# Patient Record
Sex: FEMALE | Race: OTHER | HISPANIC OR LATINO | URBAN - METROPOLITAN AREA
[De-identification: names, ages, dates, MRNs, and addresses within clinical notes are randomized per-mention and may not be internally consistent; named-entity substitution may affect disease eponyms.]

---

## 2018-01-16 ENCOUNTER — INPATIENT (INPATIENT)
Facility: HOSPITAL | Age: 83
LOS: 11 days | Discharge: HOME HEALTH | DRG: 252 | End: 2018-01-28
Attending: SPECIALIST | Admitting: SPECIALIST
Payer: MEDICARE

## 2018-01-16 VITALS
DIASTOLIC BLOOD PRESSURE: 55 MMHG | OXYGEN SATURATION: 95 % | TEMPERATURE: 98 F | SYSTOLIC BLOOD PRESSURE: 115 MMHG | HEART RATE: 108 BPM | WEIGHT: 184.09 LBS | RESPIRATION RATE: 18 BRPM

## 2018-01-16 LAB
ALBUMIN SERPL ELPH-MCNC: 4 G/DL — SIGNIFICANT CHANGE UP (ref 3.3–5)
ALP SERPL-CCNC: 109 U/L — SIGNIFICANT CHANGE UP (ref 40–120)
ALT FLD-CCNC: 24 U/L — SIGNIFICANT CHANGE UP (ref 10–45)
ANION GAP SERPL CALC-SCNC: 12 MMOL/L — SIGNIFICANT CHANGE UP (ref 5–17)
APTT BLD: 30.5 SEC — SIGNIFICANT CHANGE UP (ref 27.5–37.4)
AST SERPL-CCNC: 31 U/L — SIGNIFICANT CHANGE UP (ref 10–40)
BASOPHILS NFR BLD AUTO: 0 % — SIGNIFICANT CHANGE UP (ref 0–2)
BILIRUB SERPL-MCNC: 0.3 MG/DL — SIGNIFICANT CHANGE UP (ref 0.2–1.2)
BUN SERPL-MCNC: 42 MG/DL — HIGH (ref 7–23)
CALCIUM SERPL-MCNC: 9.4 MG/DL — SIGNIFICANT CHANGE UP (ref 8.4–10.5)
CHLORIDE SERPL-SCNC: 101 MMOL/L — SIGNIFICANT CHANGE UP (ref 96–108)
CK MB CFR SERPL CALC: <1 NG/ML — SIGNIFICANT CHANGE UP (ref 0–6.7)
CK SERPL-CCNC: 36 U/L — SIGNIFICANT CHANGE UP (ref 25–170)
CO2 SERPL-SCNC: 28 MMOL/L — SIGNIFICANT CHANGE UP (ref 22–31)
CREAT SERPL-MCNC: 1.57 MG/DL — HIGH (ref 0.5–1.3)
EOSINOPHIL NFR BLD AUTO: 0 % — SIGNIFICANT CHANGE UP (ref 0–6)
GLUCOSE SERPL-MCNC: 255 MG/DL — HIGH (ref 70–99)
HCT VFR BLD CALC: 27.7 % — LOW (ref 34.5–45)
HGB BLD-MCNC: 8.6 G/DL — LOW (ref 11.5–15.5)
INR BLD: 1.11 — SIGNIFICANT CHANGE UP (ref 0.88–1.16)
LYMPHOCYTES # BLD AUTO: 6.8 % — LOW (ref 13–44)
MCHC RBC-ENTMCNC: 28.4 PG — SIGNIFICANT CHANGE UP (ref 27–34)
MCHC RBC-ENTMCNC: 31 G/DL — LOW (ref 32–36)
MCV RBC AUTO: 91.4 FL — SIGNIFICANT CHANGE UP (ref 80–100)
MONOCYTES NFR BLD AUTO: 3.8 % — SIGNIFICANT CHANGE UP (ref 2–14)
NEUTROPHILS NFR BLD AUTO: 89.4 % — HIGH (ref 43–77)
NT-PROBNP SERPL-SCNC: 3805 PG/ML — HIGH (ref 0–300)
PLATELET # BLD AUTO: 137 K/UL — LOW (ref 150–400)
POTASSIUM SERPL-MCNC: 4.9 MMOL/L — SIGNIFICANT CHANGE UP (ref 3.5–5.3)
POTASSIUM SERPL-SCNC: 4.9 MMOL/L — SIGNIFICANT CHANGE UP (ref 3.5–5.3)
PROT SERPL-MCNC: 7.3 G/DL — SIGNIFICANT CHANGE UP (ref 6–8.3)
PROTHROM AB SERPL-ACNC: 12.4 SEC — SIGNIFICANT CHANGE UP (ref 9.8–12.7)
RBC # BLD: 3.03 M/UL — LOW (ref 3.8–5.2)
RBC # FLD: 17.2 % — HIGH (ref 10.3–16.9)
SODIUM SERPL-SCNC: 141 MMOL/L — SIGNIFICANT CHANGE UP (ref 135–145)
TROPONIN T SERPL-MCNC: <0.01 NG/ML — SIGNIFICANT CHANGE UP (ref 0–0.01)
WBC # BLD: 5.8 K/UL — SIGNIFICANT CHANGE UP (ref 3.8–10.5)
WBC # FLD AUTO: 5.8 K/UL — SIGNIFICANT CHANGE UP (ref 3.8–10.5)

## 2018-01-16 PROCEDURE — 93010 ELECTROCARDIOGRAM REPORT: CPT

## 2018-01-16 PROCEDURE — 71045 X-RAY EXAM CHEST 1 VIEW: CPT | Mod: 26

## 2018-01-16 PROCEDURE — 99285 EMERGENCY DEPT VISIT HI MDM: CPT | Mod: 25

## 2018-01-16 RX ORDER — HEPARIN SODIUM 5000 [USP'U]/ML
6500 INJECTION INTRAVENOUS; SUBCUTANEOUS ONCE
Qty: 0 | Refills: 0 | Status: COMPLETED | OUTPATIENT
Start: 2018-01-16 | End: 2018-01-16

## 2018-01-16 RX ORDER — HEPARIN SODIUM 5000 [USP'U]/ML
INJECTION INTRAVENOUS; SUBCUTANEOUS
Qty: 25000 | Refills: 0 | Status: DISCONTINUED | OUTPATIENT
Start: 2018-01-16 | End: 2018-01-17

## 2018-01-16 RX ORDER — FUROSEMIDE 40 MG
40 TABLET ORAL ONCE
Qty: 0 | Refills: 0 | Status: COMPLETED | OUTPATIENT
Start: 2018-01-16 | End: 2018-01-16

## 2018-01-16 RX ORDER — SODIUM CHLORIDE 9 MG/ML
3 INJECTION INTRAMUSCULAR; INTRAVENOUS; SUBCUTANEOUS ONCE
Qty: 0 | Refills: 0 | Status: COMPLETED | OUTPATIENT
Start: 2018-01-16 | End: 2018-01-16

## 2018-01-16 RX ADMIN — Medication 40 MILLIGRAM(S): at 22:14

## 2018-01-16 RX ADMIN — HEPARIN SODIUM 1500 UNIT(S)/HR: 5000 INJECTION INTRAVENOUS; SUBCUTANEOUS at 23:04

## 2018-01-16 RX ADMIN — HEPARIN SODIUM 6500 UNIT(S): 5000 INJECTION INTRAVENOUS; SUBCUTANEOUS at 23:03

## 2018-01-16 RX ADMIN — SODIUM CHLORIDE 3 MILLILITER(S): 9 INJECTION INTRAMUSCULAR; INTRAVENOUS; SUBCUTANEOUS at 21:53

## 2018-01-16 NOTE — ED PROVIDER NOTE - CARE PLAN
Principal Discharge DX:	Atrial fibrillation  Secondary Diagnosis:	DM (diabetes mellitus)  Secondary Diagnosis:	CAD (coronary artery disease)

## 2018-01-16 NOTE — ED ADULT NURSE NOTE - PMH
CAD (coronary artery disease)    DM (diabetes mellitus)    Emphysema    HTN (hypertension)    Hypercholesteremia

## 2018-01-16 NOTE — ED ADULT TRIAGE NOTE - ARRIVAL INFO ADDITIONAL COMMENTS
x 1 week, , sent by her MD for admission, flu like symptoms 3 weeks ago x 1 week, , sent by her MD for admission, back pain on inspiration flu like symptoms 3 weeks ago, new onset Afib?

## 2018-01-16 NOTE — ED PROVIDER NOTE - ATTENDING CONTRIBUTION TO CARE
89F with known anemia, shortness of breath, s/p PCI for CAD, pt with nl EF, pulm HTN recent admission in [facility] sent in by cardiologist for new afib and worsening CHF. Pt reports shortness of breath, mild edema to lower ext. Spoke to pt in preferred language of Armenian. Plan for IV heparin, Lasix, will admit to tele.

## 2018-01-16 NOTE — ED ADULT NURSE NOTE - OBJECTIVE STATEMENT
Patient sent to the ED to be admitted by cardiologist.  patient went to see cardiologist for worsening SOB over the past week.  patient denies any chest pain or discomfort at the moment.

## 2018-01-16 NOTE — PATIENT PROFILE ADULT. - ABILITY TO HEAR (WITH HEARING AID OR HEARING APPLIANCE IF NORMALLY USED):
Mildly to Moderately Impaired: difficulty hearing in some environments or speaker may need to increase volume or speak distinctly/Decreased hearing left ear no hearing aid

## 2018-01-16 NOTE — ED PROVIDER NOTE - OBJECTIVE STATEMENT
sent in by Cardiologist for admission + Known anemia , cad s/p pci CHF ( normal ef) vascular disease pulm HTN +_ recent admissions in [facility] and now with new A fib and worsening chf sob/edema  Denies any cp

## 2018-01-17 DIAGNOSIS — R06.00 DYSPNEA, UNSPECIFIED: ICD-10-CM

## 2018-01-17 DIAGNOSIS — N18.3 CHRONIC KIDNEY DISEASE, STAGE 3 (MODERATE): ICD-10-CM

## 2018-01-17 DIAGNOSIS — Z98.890 OTHER SPECIFIED POSTPROCEDURAL STATES: Chronic | ICD-10-CM

## 2018-01-17 DIAGNOSIS — I25.10 ATHEROSCLEROTIC HEART DISEASE OF NATIVE CORONARY ARTERY WITHOUT ANGINA PECTORIS: ICD-10-CM

## 2018-01-17 DIAGNOSIS — I27.21 SECONDARY PULMONARY ARTERIAL HYPERTENSION: ICD-10-CM

## 2018-01-17 DIAGNOSIS — I48.91 UNSPECIFIED ATRIAL FIBRILLATION: ICD-10-CM

## 2018-01-17 DIAGNOSIS — Z96.659 PRESENCE OF UNSPECIFIED ARTIFICIAL KNEE JOINT: Chronic | ICD-10-CM

## 2018-01-17 DIAGNOSIS — Z90.710 ACQUIRED ABSENCE OF BOTH CERVIX AND UTERUS: Chronic | ICD-10-CM

## 2018-01-17 DIAGNOSIS — D64.9 ANEMIA, UNSPECIFIED: ICD-10-CM

## 2018-01-17 DIAGNOSIS — R13.10 DYSPHAGIA, UNSPECIFIED: ICD-10-CM

## 2018-01-17 DIAGNOSIS — Z98.49 CATARACT EXTRACTION STATUS, UNSPECIFIED EYE: Chronic | ICD-10-CM

## 2018-01-17 DIAGNOSIS — E11.9 TYPE 2 DIABETES MELLITUS WITHOUT COMPLICATIONS: ICD-10-CM

## 2018-01-17 DIAGNOSIS — J43.9 EMPHYSEMA, UNSPECIFIED: ICD-10-CM

## 2018-01-17 DIAGNOSIS — I50.9 HEART FAILURE, UNSPECIFIED: ICD-10-CM

## 2018-01-17 LAB
ANION GAP SERPL CALC-SCNC: 13 MMOL/L — SIGNIFICANT CHANGE UP (ref 5–17)
APTT BLD: 128 SEC — CRITICAL HIGH (ref 27.5–37.4)
APTT BLD: 171.7 SEC — CRITICAL HIGH (ref 27.5–37.4)
APTT BLD: 88.8 SEC — HIGH (ref 27.5–37.4)
BUN SERPL-MCNC: 41 MG/DL — HIGH (ref 7–23)
CALCIUM SERPL-MCNC: 9.8 MG/DL — SIGNIFICANT CHANGE UP (ref 8.4–10.5)
CHLORIDE SERPL-SCNC: 100 MMOL/L — SIGNIFICANT CHANGE UP (ref 96–108)
CO2 SERPL-SCNC: 29 MMOL/L — SIGNIFICANT CHANGE UP (ref 22–31)
CREAT SERPL-MCNC: 1.51 MG/DL — HIGH (ref 0.5–1.3)
GLUCOSE BLDC GLUCOMTR-MCNC: 104 MG/DL — HIGH (ref 70–99)
GLUCOSE BLDC GLUCOMTR-MCNC: 160 MG/DL — HIGH (ref 70–99)
GLUCOSE BLDC GLUCOMTR-MCNC: 203 MG/DL — HIGH (ref 70–99)
GLUCOSE BLDC GLUCOMTR-MCNC: 221 MG/DL — HIGH (ref 70–99)
GLUCOSE SERPL-MCNC: 101 MG/DL — HIGH (ref 70–99)
HBA1C BLD-MCNC: 7 % — HIGH (ref 4–5.6)
HCT VFR BLD CALC: 27.5 % — LOW (ref 34.5–45)
HGB BLD-MCNC: 8.7 G/DL — LOW (ref 11.5–15.5)
INR BLD: 1.13 — SIGNIFICANT CHANGE UP (ref 0.88–1.16)
INR BLD: 1.17 — HIGH (ref 0.88–1.16)
MAGNESIUM SERPL-MCNC: 2.3 MG/DL — SIGNIFICANT CHANGE UP (ref 1.6–2.6)
MCHC RBC-ENTMCNC: 28.6 PG — SIGNIFICANT CHANGE UP (ref 27–34)
MCHC RBC-ENTMCNC: 31.6 G/DL — LOW (ref 32–36)
MCV RBC AUTO: 90.5 FL — SIGNIFICANT CHANGE UP (ref 80–100)
NT-PROBNP SERPL-SCNC: 4616 PG/ML — HIGH (ref 0–300)
PLATELET # BLD AUTO: 132 K/UL — LOW (ref 150–400)
POTASSIUM SERPL-MCNC: 4.2 MMOL/L — SIGNIFICANT CHANGE UP (ref 3.5–5.3)
POTASSIUM SERPL-SCNC: 4.2 MMOL/L — SIGNIFICANT CHANGE UP (ref 3.5–5.3)
PROTHROM AB SERPL-ACNC: 12.6 SEC — SIGNIFICANT CHANGE UP (ref 9.8–12.7)
PROTHROM AB SERPL-ACNC: 13 SEC — HIGH (ref 9.8–12.7)
RBC # BLD: 3.04 M/UL — LOW (ref 3.8–5.2)
RBC # FLD: 16.7 % — SIGNIFICANT CHANGE UP (ref 10.3–16.9)
SODIUM SERPL-SCNC: 142 MMOL/L — SIGNIFICANT CHANGE UP (ref 135–145)
TROPONIN T SERPL-MCNC: <0.01 NG/ML — SIGNIFICANT CHANGE UP (ref 0–0.01)
TSH SERPL-MCNC: 5.25 UIU/ML — HIGH (ref 0.35–4.94)
WBC # BLD: 6.9 K/UL — SIGNIFICANT CHANGE UP (ref 3.8–10.5)
WBC # FLD AUTO: 6.9 K/UL — SIGNIFICANT CHANGE UP (ref 3.8–10.5)

## 2018-01-17 PROCEDURE — 99233 SBSQ HOSP IP/OBS HIGH 50: CPT

## 2018-01-17 PROCEDURE — 71045 X-RAY EXAM CHEST 1 VIEW: CPT | Mod: 26

## 2018-01-17 PROCEDURE — 93306 TTE W/DOPPLER COMPLETE: CPT | Mod: 26

## 2018-01-17 PROCEDURE — 99223 1ST HOSP IP/OBS HIGH 75: CPT | Mod: GC

## 2018-01-17 RX ORDER — INSULIN LISPRO 100/ML
VIAL (ML) SUBCUTANEOUS
Qty: 0 | Refills: 0 | Status: DISCONTINUED | OUTPATIENT
Start: 2018-01-17 | End: 2018-01-26

## 2018-01-17 RX ORDER — DEXTROSE 50 % IN WATER 50 %
1 SYRINGE (ML) INTRAVENOUS ONCE
Qty: 0 | Refills: 0 | Status: DISCONTINUED | OUTPATIENT
Start: 2018-01-17 | End: 2018-01-26

## 2018-01-17 RX ORDER — FUROSEMIDE 40 MG
40 TABLET ORAL ONCE
Qty: 0 | Refills: 0 | Status: COMPLETED | OUTPATIENT
Start: 2018-01-17 | End: 2018-01-17

## 2018-01-17 RX ORDER — ATORVASTATIN CALCIUM 80 MG/1
20 TABLET, FILM COATED ORAL AT BEDTIME
Qty: 0 | Refills: 0 | Status: DISCONTINUED | OUTPATIENT
Start: 2018-01-17 | End: 2018-01-18

## 2018-01-17 RX ORDER — FUROSEMIDE 40 MG
40 TABLET ORAL DAILY
Qty: 0 | Refills: 0 | Status: DISCONTINUED | OUTPATIENT
Start: 2018-01-17 | End: 2018-01-17

## 2018-01-17 RX ORDER — GLUCAGON INJECTION, SOLUTION 0.5 MG/.1ML
1 INJECTION, SOLUTION SUBCUTANEOUS ONCE
Qty: 0 | Refills: 0 | Status: DISCONTINUED | OUTPATIENT
Start: 2018-01-17 | End: 2018-01-26

## 2018-01-17 RX ORDER — MONTELUKAST 4 MG/1
10 TABLET, CHEWABLE ORAL DAILY
Qty: 0 | Refills: 0 | Status: DISCONTINUED | OUTPATIENT
Start: 2018-01-17 | End: 2018-01-20

## 2018-01-17 RX ORDER — PANTOPRAZOLE SODIUM 20 MG/1
40 TABLET, DELAYED RELEASE ORAL
Qty: 0 | Refills: 0 | Status: DISCONTINUED | OUTPATIENT
Start: 2018-01-17 | End: 2018-01-28

## 2018-01-17 RX ORDER — FUROSEMIDE 40 MG
40 TABLET ORAL
Qty: 0 | Refills: 0 | Status: DISCONTINUED | OUTPATIENT
Start: 2018-01-17 | End: 2018-01-20

## 2018-01-17 RX ORDER — AMLODIPINE BESYLATE 2.5 MG/1
5 TABLET ORAL DAILY
Qty: 0 | Refills: 0 | Status: DISCONTINUED | OUTPATIENT
Start: 2018-01-17 | End: 2018-01-18

## 2018-01-17 RX ORDER — DEXTROSE 50 % IN WATER 50 %
12.5 SYRINGE (ML) INTRAVENOUS ONCE
Qty: 0 | Refills: 0 | Status: DISCONTINUED | OUTPATIENT
Start: 2018-01-17 | End: 2018-01-26

## 2018-01-17 RX ORDER — SODIUM CHLORIDE 9 MG/ML
1000 INJECTION, SOLUTION INTRAVENOUS
Qty: 0 | Refills: 0 | Status: DISCONTINUED | OUTPATIENT
Start: 2018-01-17 | End: 2018-01-26

## 2018-01-17 RX ORDER — LIDOCAINE 4 G/100G
1 CREAM TOPICAL DAILY
Qty: 0 | Refills: 0 | Status: DISCONTINUED | OUTPATIENT
Start: 2018-01-17 | End: 2018-01-28

## 2018-01-17 RX ORDER — TIOTROPIUM BROMIDE 18 UG/1
1 CAPSULE ORAL; RESPIRATORY (INHALATION) DAILY
Qty: 0 | Refills: 0 | Status: DISCONTINUED | OUTPATIENT
Start: 2018-01-17 | End: 2018-01-28

## 2018-01-17 RX ORDER — IPRATROPIUM/ALBUTEROL SULFATE 18-103MCG
3 AEROSOL WITH ADAPTER (GRAM) INHALATION ONCE
Qty: 0 | Refills: 0 | Status: COMPLETED | OUTPATIENT
Start: 2018-01-17 | End: 2018-01-17

## 2018-01-17 RX ORDER — ASPIRIN/CALCIUM CARB/MAGNESIUM 324 MG
81 TABLET ORAL DAILY
Qty: 0 | Refills: 0 | Status: DISCONTINUED | OUTPATIENT
Start: 2018-01-17 | End: 2018-01-28

## 2018-01-17 RX ORDER — HEPARIN SODIUM 5000 [USP'U]/ML
1000 INJECTION INTRAVENOUS; SUBCUTANEOUS
Qty: 25000 | Refills: 0 | Status: DISCONTINUED | OUTPATIENT
Start: 2018-01-17 | End: 2018-01-17

## 2018-01-17 RX ORDER — METOPROLOL TARTRATE 50 MG
5 TABLET ORAL ONCE
Qty: 0 | Refills: 0 | Status: DISCONTINUED | OUTPATIENT
Start: 2018-01-17 | End: 2018-01-17

## 2018-01-17 RX ORDER — DEXTROSE 50 % IN WATER 50 %
25 SYRINGE (ML) INTRAVENOUS ONCE
Qty: 0 | Refills: 0 | Status: DISCONTINUED | OUTPATIENT
Start: 2018-01-17 | End: 2018-01-26

## 2018-01-17 RX ORDER — HEPARIN SODIUM 5000 [USP'U]/ML
900 INJECTION INTRAVENOUS; SUBCUTANEOUS
Qty: 25000 | Refills: 0 | Status: DISCONTINUED | OUTPATIENT
Start: 2018-01-17 | End: 2018-01-18

## 2018-01-17 RX ORDER — ISOSORBIDE MONONITRATE 60 MG/1
30 TABLET, EXTENDED RELEASE ORAL DAILY
Qty: 0 | Refills: 0 | Status: DISCONTINUED | OUTPATIENT
Start: 2018-01-17 | End: 2018-01-18

## 2018-01-17 RX ORDER — INSULIN LISPRO 100/ML
VIAL (ML) SUBCUTANEOUS AT BEDTIME
Qty: 0 | Refills: 0 | Status: DISCONTINUED | OUTPATIENT
Start: 2018-01-17 | End: 2018-01-26

## 2018-01-17 RX ADMIN — Medication 40 MILLIGRAM(S): at 06:37

## 2018-01-17 RX ADMIN — Medication 4: at 16:24

## 2018-01-17 RX ADMIN — AMLODIPINE BESYLATE 5 MILLIGRAM(S): 2.5 TABLET ORAL at 06:38

## 2018-01-17 RX ADMIN — HEPARIN SODIUM 0 UNIT(S)/HR: 5000 INJECTION INTRAVENOUS; SUBCUTANEOUS at 07:08

## 2018-01-17 RX ADMIN — Medication 3 MILLILITER(S): at 06:49

## 2018-01-17 RX ADMIN — ISOSORBIDE MONONITRATE 30 MILLIGRAM(S): 60 TABLET, EXTENDED RELEASE ORAL at 13:14

## 2018-01-17 RX ADMIN — Medication 40 MILLIGRAM(S): at 09:38

## 2018-01-17 RX ADMIN — Medication 81 MILLIGRAM(S): at 13:15

## 2018-01-17 RX ADMIN — HEPARIN SODIUM 1200 UNIT(S)/HR: 5000 INJECTION INTRAVENOUS; SUBCUTANEOUS at 08:16

## 2018-01-17 RX ADMIN — MONTELUKAST 10 MILLIGRAM(S): 4 TABLET, CHEWABLE ORAL at 13:15

## 2018-01-17 RX ADMIN — HEPARIN SODIUM 1000 UNIT(S)/HR: 5000 INJECTION INTRAVENOUS; SUBCUTANEOUS at 13:48

## 2018-01-17 RX ADMIN — Medication 40 MILLIGRAM(S): at 22:20

## 2018-01-17 RX ADMIN — PANTOPRAZOLE SODIUM 40 MILLIGRAM(S): 20 TABLET, DELAYED RELEASE ORAL at 06:38

## 2018-01-17 RX ADMIN — Medication 4: at 01:42

## 2018-01-17 RX ADMIN — ATORVASTATIN CALCIUM 20 MILLIGRAM(S): 80 TABLET, FILM COATED ORAL at 22:20

## 2018-01-17 NOTE — CONSULT NOTE ADULT - SUBJECTIVE AND OBJECTIVE BOX
History of Present Illness:  History of Present Illness: 	  This is a 89 y/o female, obese, with HTN, HPL, DM-II, Anemia, diastolic CHF (NL EF), known CAD with previous PCIs, b/l carotid endarterectomies, who presented to her cardiologist with c/o mid sternal chest pressure radiating to the back, associated with SOB, palpitations, LE edema and 4lbs weight gain over 1 week. In the office, her EKG showed new AFib and she was sent to the ER for further evaluation.   In the ER, 1st troponin negative, BNP 3805, BUN 42/1.57, H/H 8.6/27.7, EKG AFib @ 100bpm, LBBB, CXR with some vascular congestion. She received Cardizem 5mg IV x1, Lasix 40mg IV x1, was started on IV heparin and admitted for further management and telemetry monitoring.  No s/s of bleeding noted.    Review of Systems:  Review of Systems: GENERAL, CONSTITUTIONAL : + recent weight gain. Denies fever, chills  EYES, VISION: denies changes in vision   EARS, NOSE, THROAT: denies hearing loss  HEART, CARDIOVASCULAR: + chest pain, palpitations, SOB,  LE edema. Denies claudication  RESPIRATORY: + SOB; Denies cough, wheezing, PND, orthopnea  GASTROINTESTINAL: Denies abdominal pain, heartburn, bloody stool, dark tarry stool  GENITOURINARY: Denies frequent urination, urgency  MUSCULOSKELETAL denies joint pain or swelling, restricted motion, musculoskeletal pain.   SKIN & INTEGUMENTARY Denies rashes, sores, blisters, blisters, growths.  NEUROLOGICAL: Denies numbness or tingling sensations, sensation loss, burning.   PSYCHIATRIC: Denies nervousness, anxiety, depression  ENDOCRINE Denies heat or cold intolerance, excessive thirst HEMATOLOGIC/LYMPHATIC: Denies abnormal bleeding, bleeding of any kind	      Allergies and Intolerances:        Allergies:  	No Known Allergies:     Home Medications:   * Patient Currently Takes Medications as of 17-Jan-2018 02:09 documented in Structured Notes  · 	torsemide 10 mg oral tablet: 1 tab(s) orally once a day, Last Dose Taken:    · 	aspirin 81 mg oral tablet: 1 tab(s) orally once a day, Last Dose Taken:    · 	isosorbide mononitrate 30 mg oral tablet, extended release: 1 tab(s) orally once a day (in the morning), Last Dose Taken:    · 	Januvia 50 mg oral tablet: 1 tab(s) orally once a day, Last Dose Taken:    · 	amLODIPine 5 mg oral tablet: 1 tab(s) orally once a day, Last Dose Taken:    · 	predniSONE 20 mg oral tablet: 1 tab(s) orally once a day, Last Dose Taken:    · 	atorvastatin 20 mg oral tablet: 1 tab(s) orally once a day, Last Dose Taken:    · 	montelukast 10 mg oral tablet: 1 tab(s) orally once a day, Last Dose Taken:    · 	omeprazole 40 mg oral delayed release capsule: 1 cap(s) orally once a day, Last Dose Taken:    · 	Spiriva 18 mcg inhalation capsule: 1 cap(s) inhaled once a day, Last Dose Taken:      .    Patient History:   Past Medical History:  Anemia    CAD (coronary artery disease)    DM (diabetes mellitus)    Emphysema    HTN (hypertension)    Hypercholesteremia.    Past Surgical History:  History of bilateral carotid endarterectomy    History of umbilical hernia repair    S/P bladder repair    S/P cataract surgery    S/P hysterectomy with oophorectomy    S/P TKR (total knee replacement).    Family History:  Mother  Still living? No  Family history of emphysema, Age at diagnosis: 71-80.    Social History:  Social History (marital status, living situation, occupation, tobacco use, alcohol and drug use, and sexual history): Denies tobacco, ETOH or illicit drug use Lives with her daughter, ambulates with a walker	    Tobacco Screening:  · Core Measure Site	No	    Risk Assessment:   Present on Admission:  Deep Venous Thrombosis	no 	  Pulmonary Embolus	no 	    Heart Failure:  Does this patient have a history of or has been diagnosed with heart failure? yes.      Physical Exam:  Physical Exam: Temp 98.6F, HR 70bpm, /80, RR 16, O2 sat 98% RA, Weight 200lbs, Height 5' 6"   T(C): 35.9 (01-17-18 @ 01:11), Max: 36.9 (01-16-18 @ 20:16)  HR: 103 (01-16-18 @ 23:45) (88 - 108)  BP: 135/63 (01-16-18 @ 23:45) (115/55 - 135/70)  RR: 18 (01-16-18 @ 23:45) (18 - 20)  SpO2: 100% (01-16-18 @ 23:45) (95% - 100%)    Appearance: Normal   HEENT:   Normal oral mucosa, PERRL, EOMI   Neck: Supple, - JVD; No Carotid Bruit   Cardiovascular: Normal S1 S2, No JVD, No murmurs  Respiratory: Lungs bibasilar crackles, no Rhonchi, Wheezing   Gastrointestinal:  Soft, Non-tender, + BS   Skin: No rashes, No ecchymoses, No cyanosis  Extremities: 3+ edema b/l, No clubbing, cyanosis  Vascular: Femoral pulses 1+ b/l without bruit, DP faint, b/l, PT faint b/l  Neurologic: Non-focal Psychiatry: A & O x 3, Mood & affect appropriate	      Labs and Results:  Labs, Radiology, Cardiology, and Other Results:                        8.7   6.9   )-----------( 132      ( 17 Jan 2018 05:27 )            27.5   142  |  100  |  41<H> ----------------------------<  101<H> 4.2   |  29  |  1.51<H>  Ca    9.8      17 Jan 2018 05:24 Mg     2.3     01-17  TPro  7.3  /  Alb  4.0  /  TBili  0.3  /  DBili  x   /  AST  31  /  ALT  24  /  AlkPhos  109  01-16 	    Assessment and Plan:   Assessment:  · Assessment		  89 y/o female with HTN, HPL, DM-II, CAD/PCIs, diastolic CHF (NL EF), anemia who is admitted with new onset AFib and CHF exacerbation      Problem/Plan - 1:  ·  Problem: Atrial fibrillation.  Plan: New Afib with controlled HR  - Heparin gtt: Dose to keep PTT 60-80    Problem/Plan - 2:  - Monitor CBC closely & transfuse PRBCs to keep Hb > 7g/dl  - Check Iron panel, SPEP, IPEP, Immunofixation, LDH, Beta-2 microglobulin  - Consider Epogen

## 2018-01-17 NOTE — CONSULT NOTE ADULT - ASSESSMENT
89 year old female, with a PMHx of CAD s/p PCIs, b/l carotid endarterectomy, HTN, HLD, DM-II, anemia, HFpEF who presented to her cardiologist with heart failure exacerbation found to have new onset atrial fibrillation. 89 year old female, with a PMHx of CAD s/p PCIs, b/l carotid endarterectomy, HTN, HLD, DM-II, anemia, HFpEF who presented to her cardiologist with heart failure exacerbation found to have new onset atrial fibrillation.  -check TSH  -start lopressor 12.5mg BID once adequately diuresed   -continue diuresis and heart failure management as per primary team 89 year old female, with a PMHx of CAD s/p PCIs, b/l carotid endarterectomy, HTN, HLD, DM-II, anemia, HFpEF who presented to her cardiologist with heart failure exacerbation found to have new onset atrial fibrillation and reduced systolic function (EF 20-25%).  -check TSH  -start amiodarone 400mg BID   -would obtain outpatient records of last ECG and last TTE  -continue heart failure management at per primary team   -discussed with Dr. Connolly  -will continue to follow 89 year old female, with a PMHx of CAD s/p PCIs, b/l carotid endarterectomy, HTN, HLD, DM-II, anemia, HFpEF who presented to her cardiologist with heart failure exacerbation found to have new onset atrial fibrillation and reduced systolic function (EF 20-25%).  -check TSH  -can start PO amiodarone or digoxin if rate uncontrolled  -would avoid betablockers/CCBs in the setting of heart failure  -would obtain outpatient records of last ECG and last TTE  -continue heart failure management at per primary team   -discussed with Dr. Connolly  -will continue to follow

## 2018-01-17 NOTE — PROGRESS NOTE ADULT - PROBLEM SELECTOR PLAN 2
+ JVD, crackles/rales on exam, b/l LE edema  - LE dopplers ordered, f/u results  - Bedside Echo performed, f/u results  - Lasix 40mg IV BID, additional Lasix 40mg IVPx1 given today for SOB  - Bipap started with goal SpO2 >89%, will attempt high flow NC if clinically improving  - Rubalcava placed for strict I&O's, daily weights + JVD, crackles/rales on exam, b/l LE edema  - LE dopplers ordered, f/u results  - Bedside Echo performed, f/u results  - Lasix 40mg IV BID, additional Lasix 40mg IVPx1 given today for SOB  - Bipap started with goal SpO2 >89%, will attempt high flow NC if clinically improving  - Rubalcava placed for strict I&O's, daily weights  - Dr. Gupta consulted, f/u recs - New onset AFIB rates 100-110s s/p Cardizem 5mg IVP x 2 overnight   - Heparin gtt  - Telemetry  - EP consulted, f/u further recs. Will need to d/c Cardizem and use BB for rate control, given newly depressed EF. - New onset AFIB rates 100-110s s/p Cardizem 5mg IVP x 2 overnight.   - Heparin gtt  - Telemetry  - EP consulted. Per verbal recs, can give Amiodarone 400mg PO x 1 overnight (TSH ordered @8PM) if rates are not controlled. No BBs or Cardizem, as pt is in HF. F/u official recs. - New onset AFIB rates 100-110s s/p Cardizem 5mg IVP x 2 overnight.   - On pt specific heparin gtt per heme recs, goal ptt 60-80  - Telemetry  - EP consulted. Per verbal recs, can give Amiodarone 400mg PO x 1 overnight (TSH ordered @8PM) if rates are not controlled. No BBs or Cardizem, as pt is in HF. F/u official recs.

## 2018-01-17 NOTE — PROGRESS NOTE ADULT - PROBLEM SELECTOR PLAN 3
- Continue Imdur, Lipitor, Norvasc - S/p diagnostic LH 7/2016 revealed distal LAD dz  - Continue Imdur, Lipitor, Norvasc - S/p diagnostic LH 7/2016 revealed distal LAD dz, per outpt ppw  - Plan for possible cardiac/renal cath when clinically optimized. D/w Dr. Falcon.  - Continue Imdur, Lipitor, Norvasc
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
## 2018-01-17 NOTE — PROGRESS NOTE ADULT - ASSESSMENT
89 y/o female with HTN, HPL, DM-II, CAD/PCIs, diastolic CHF (NL EF), anemia who is admitted with new onset AFib and CHF exacerbation 89 y/o female with HTN, HPL, DM-II, CAD/PCIs, diastolic CHF (NL EF), anemia who is admitted with new onset AFib and CHF exacerbation    Additional medical information obtained via Dr. Dillard' office note. Copy of note in chart.

## 2018-01-17 NOTE — CONSULT NOTE ADULT - PROBLEM SELECTOR RECOMMENDATION 2
NO EVIDENCE OF AECOPD.  The patient was exposed to biomass.  Continue Spiriva and as needed albuterol consider DC singulair.  Continue oxygen supplementation. Due to patient age no need for CT scan of chest.  Hold on steroids

## 2018-01-17 NOTE — PROGRESS NOTE ADULT - PROBLEM SELECTOR PLAN 6
Pt endorsing difficulty swallowing  - Speech and swallow eval ordered  - Aspiration precautions    FULL CODE  DVT ppx: Heparin gtt  Dispo: Continue diuresis, management of AFIB Pt endorsing difficulty swallowing  - Speech and swallow eval ordered  - Aspiration precautions

## 2018-01-17 NOTE — CONSULT NOTE ADULT - SUBJECTIVE AND OBJECTIVE BOX
HPI:  This is a 89 y/o female, obese, with HTN, HPL, DM-II, Anemia, diastolic CHF (NL EF), known CAD with previous PCIs, b/l carotid endarterectomies, who presented to her cardiologist with c/o mid sternal chest pressure radiating to the back, associated with SOB, palpitations, LE edema and 4lbs weight gain over 1 week. In the office, her EKG showed new AFib and she was sent to the ER for further evaluation.   In the ER, 1st troponin negative, BNP 3805, BUN 42/1.57, H/H 8.6/27.7, EKG AFib @ 100bpm, LBBB, CXR with some vascular congestion. She received Cardizem 5mg IV x1, Lasix 40mg IV x1, was started on IV heparin and admitted for further management and telemetry monitoring (17 Jan 2018 01:23)      PAST MEDICAL & SURGICAL HISTORY:  Anemia  CAD (coronary artery disease)  DM (diabetes mellitus)  Emphysema  Hypercholesteremia  HTN (hypertension)  S/P bladder repair  S/P hysterectomy with oophorectomy  History of bilateral carotid endarterectomy  History of umbilical hernia repair  S/P cataract surgery  S/P TKR (total knee replacement)    PREVIOUS DIAGNOSTIC TESTING:      ECHO  FINDINGS:    STRESS  FINDINGS:    CATHETERIZATION  FINDINGS:    MEDICATIONS  (STANDING):  amLODIPine   Tablet 5 milliGRAM(s) Oral daily  aspirin enteric coated 81 milliGRAM(s) Oral daily  atorvastatin 20 milliGRAM(s) Oral at bedtime  furosemide   Injectable 40 milliGRAM(s) IV Push two times a day  heparin  Infusion.  Unit(s)/Hr (15 mL/Hr) IV Continuous <Continuous>  isosorbide   mononitrate ER Tablet (IMDUR) 30 milliGRAM(s) Oral daily    insulin lispro (HumaLOG) corrective regimen sliding scale   SubCutaneous three times a day before meals  insulin lispro (HumaLOG) corrective regimen sliding scale   SubCutaneous at bedtime  lidocaine   Patch 1 Patch Transdermal daily  montelukast 10 milliGRAM(s) Oral daily  pantoprazole    Tablet 40 milliGRAM(s) Oral before breakfast  tiotropium 18 MICROgram(s) Capsule 1 Capsule(s) Inhalation daily    FAMILY HISTORY:  Family history of emphysema (Mother)      SOCIAL HISTORY:    CIGARETTES:    ALCOHOL:    Vital Signs Last 24 Hrs  T(C): 36.3 (17 Jan 2018 09:20), Max: 37.5 (17 Jan 2018 07:12)  T(F): 97.4 (17 Jan 2018 09:20), Max: 99.5 (17 Jan 2018 07:12)  HR: 108 (17 Jan 2018 08:30) (82 - 108)  BP: 149/60 (17 Jan 2018 08:12) (115/55 - 149/60)  BP(mean): --  RR: 20 (17 Jan 2018 10:00) (18 - 22)  SpO2: 99% (17 Jan 2018 10:00) (90% - 100%)    PHYSICAL EXAM:  Constitutional:  Respiratory:  Cardiovascular:  Gastrointestinal:  Extremities:  Neurological:    INTERPRETATION OF TELEMETRY:    ECG:    I&O's Detail    16 Jan 2018 07:01  -  17 Jan 2018 07:00  --------------------------------------------------------  IN:    heparin  Infusion.: 120 mL    Oral Fluid: 30 mL  Total IN: 150 mL    OUT:    Voided: 400 mL  Total OUT: 400 mL    Total NET: -250 mL      17 Jan 2018 07:01  -  17 Jan 2018 12:43  --------------------------------------------------------  IN:    Oral Fluid: 180 mL  Total IN: 180 mL    OUT:    Indwelling Catheter - Urethral: 550 mL  Total OUT: 550 mL    Total NET: -370 mL          LABS:                        8.7    6.9   )-----------( 132      ( 17 Jan 2018 05:27 )             27.5     01-17    142  |  100  |  41<H>  ----------------------------<  101<H>  4.2   |  29  |  1.51<H>    Ca    9.8      17 Jan 2018 05:24  Mg     2.3     01-17    TPro  7.3  /  Alb  4.0  /  TBili  0.3  /  DBili  x   /  AST  31  /  ALT  24  /  AlkPhos  109  01-16    CARDIAC MARKERS ( 17 Jan 2018 05:24 )  x     / <0.01 ng/mL / x     / x     / x      CARDIAC MARKERS ( 16 Jan 2018 21:50 )  x     / <0.01 ng/mL / 36 U/L / x     / <1.0 ng/mL    PT/INR - ( 17 Jan 2018 05:53 )   PT: 12.6 sec;   INR: 1.13     PTT - ( 17 Jan 2018 05:53 )  PTT:171.7 sec    I&O's Summary    16 Jan 2018 07:01  -  17 Jan 2018 07:00  --------------------------------------------------------  IN: 150 mL / OUT: 400 mL / NET: -250 mL    17 Jan 2018 07:01  -  17 Jan 2018 12:43  --------------------------------------------------------  IN: 180 mL / OUT: 550 mL / NET: -370 mL HPI:  89 year old female, with a PMHx of CAD s/p PCIs, b/l carotid endarterectomy, HTN, HLD, DM-II, anemia, HFpEF who presented to her cardiologist with c/o mid sternal chest pressure, SOB, palpitations, LE edema and 4lbs weight gain over 1 week. She went to her outpatient cardiologist's office and her EKG showed new AFib. She was subsequently sent to the ER for further evaluation. Of note, patient has been admitted for similar episodes multiple times in the last year.     In the ER, EKG AFib with LBBB at a rate of 100, CXR with pulmonary vascular congestion. She received Cardizem 5mg IV, Lasix 40mg IV, started on IV heparin and admitted for further management.     PAST MEDICAL & SURGICAL HISTORY:  Anemia  CAD (coronary artery disease)  DM (diabetes mellitus)  Emphysema  Hypercholesteremia  HTN (hypertension)  S/P bladder repair  S/P hysterectomy with oophorectomy  History of bilateral carotid endarterectomy  History of umbilical hernia repair  S/P cataract surgery  S/P TKR (total knee replacement)    ECHO  FINDINGS:    MEDICATIONS  (STANDING):  amLODIPine   Tablet 5 milliGRAM(s) Oral daily  aspirin enteric coated 81 milliGRAM(s) Oral daily  atorvastatin 20 milliGRAM(s) Oral at bedtime  furosemide   Injectable 40 milliGRAM(s) IV Push two times a day  heparin  Infusion.  Unit(s)/Hr (15 mL/Hr) IV Continuous <Continuous>  isosorbide   mononitrate ER Tablet (IMDUR) 30 milliGRAM(s) Oral daily    insulin lispro (HumaLOG) corrective regimen sliding scale   SubCutaneous three times a day before meals  insulin lispro (HumaLOG) corrective regimen sliding scale   SubCutaneous at bedtime  lidocaine   Patch 1 Patch Transdermal daily  montelukast 10 milliGRAM(s) Oral daily  pantoprazole    Tablet 40 milliGRAM(s) Oral before breakfast  tiotropium 18 MICROgram(s) Capsule 1 Capsule(s) Inhalation daily    FAMILY HISTORY:  Family history of emphysema (Mother)    Vital Signs Last 24 Hrs  T(C): 36.3 (17 Jan 2018 09:20), Max: 37.5 (17 Jan 2018 07:12)  T(F): 97.4 (17 Jan 2018 09:20), Max: 99.5 (17 Jan 2018 07:12)  HR: 108 (17 Jan 2018 08:30) (82 - 108)  BP: 149/60 (17 Jan 2018 08:12) (115/55 - 149/60)  BP(mean): --  RR: 20 (17 Jan 2018 10:00) (18 - 22)  SpO2: 99% (17 Jan 2018 10:00) (90% - 100%)    PHYSICAL EXAM:  Constitutional: NAD. resting comfortably  Respiratory: Crackles throughout lung fields b/l; no accessory muscle use on NC.   Cardiovascular: Irregularly irregular. No m/r/g  Gastrointestinal: Soft, nt, nd, +bs  Extremities: WWP, 2+ edema b/l  Neurological: non focal.     ECG: Afib, LBBB    I&O's Detail    16 Jan 2018 07:01  -  17 Jan 2018 07:00  --------------------------------------------------------  IN:    heparin  Infusion.: 120 mL    Oral Fluid: 30 mL  Total IN: 150 mL    OUT:    Voided: 400 mL  Total OUT: 400 mL    Total NET: -250 mL      17 Jan 2018 07:01  -  17 Jan 2018 12:43  --------------------------------------------------------  IN:    Oral Fluid: 180 mL  Total IN: 180 mL    OUT:    Indwelling Catheter - Urethral: 550 mL  Total OUT: 550 mL    Total NET: -370 mL      LABS:                        8.7    6.9   )-----------( 132      ( 17 Jan 2018 05:27 )             27.5     01-17    142  |  100  |  41<H>  ----------------------------<  101<H>  4.2   |  29  |  1.51<H>    Ca    9.8      17 Jan 2018 05:24  Mg     2.3     01-17    TPro  7.3  /  Alb  4.0  /  TBili  0.3  /  DBili  x   /  AST  31  /  ALT  24  /  AlkPhos  109  01-16    CARDIAC MARKERS ( 17 Jan 2018 05:24 )  x     / <0.01 ng/mL / x     / x     / x      CARDIAC MARKERS ( 16 Jan 2018 21:50 )  x     / <0.01 ng/mL / 36 U/L / x     / <1.0 ng/mL    PT/INR - ( 17 Jan 2018 05:53 )   PT: 12.6 sec;   INR: 1.13     PTT - ( 17 Jan 2018 05:53 )  PTT:171.7 sec

## 2018-01-17 NOTE — PROGRESS NOTE ADULT - SUBJECTIVE AND OBJECTIVE BOX
Interventional Cardiology    Several medical issues  New volume overload, dyspnea and new onset Atrial fibrillation  On CPAP  Improved post diuresis    Subjective Assessment:  	  MEDICATIONS:  amLODIPine   Tablet 5 milliGRAM(s) Oral daily  furosemide   Injectable 40 milliGRAM(s) IV Push two times a day  isosorbide   mononitrate ER Tablet (IMDUR) 30 milliGRAM(s) Oral daily      montelukast 10 milliGRAM(s) Oral daily  tiotropium 18 MICROgram(s) Capsule 1 Capsule(s) Inhalation daily      pantoprazole    Tablet 40 milliGRAM(s) Oral before breakfast    atorvastatin 20 milliGRAM(s) Oral at bedtime  dextrose 50% Injectable 12.5 Gram(s) IV Push once  dextrose 50% Injectable 25 Gram(s) IV Push once  dextrose 50% Injectable 25 Gram(s) IV Push once  dextrose Gel 1 Dose(s) Oral once PRN  glucagon  Injectable 1 milliGRAM(s) IntraMuscular once PRN  insulin lispro (HumaLOG) corrective regimen sliding scale   SubCutaneous three times a day before meals  insulin lispro (HumaLOG) corrective regimen sliding scale   SubCutaneous at bedtime    aspirin enteric coated 81 milliGRAM(s) Oral daily  dextrose 5%. 1000 milliLiter(s) IV Continuous <Continuous>  heparin  Infusion.  Unit(s)/Hr IV Continuous <Continuous>  lidocaine   Patch 1 Patch Transdermal daily      	    [PHYSICAL EXAM:  TELEMETRY:  T(C): 37.7 (18 @ 16:06), Max: 37.7 (18 @ 16:06)  HR: 87 (18 @ 16:57) (82 - 111)  BP: 112/59 (18 @ 16:06) (112/59 - 149/60)  RR: 18 (18 @ 16:06) (17 - 22)  SpO2: 99% (18 @ 16:57) (90% - 100%)  Wt(kg): --  I&O's Summary    2018 07:  -  2018 07:00  --------------------------------------------------------  IN: 150 mL / OUT: 400 mL / NET: -250 mL    2018 07:01  -  2018 18:21  --------------------------------------------------------  IN: 420 mL / OUT: 1450 mL / NET: -1030 mL        Weight (kg): 83.5 (16 @ 20:16)  Rubalcava:  Central/PICC/Mid Line:                                         Appearance: Normal	  HEENT:   Normal oral mucosa, PERRL, EOMI	  Neck: Supple, + JVD/ - JVD; Carotid Bruit s/p CEA and CATARINA b/l   Cardiovascular: distant irreg irreg  S1 S2, No JVD, No murmurs appreciated,   Respiratory: Decreased Breath Sounds bases and Rales b/l	  Gastrointestinal:  Soft, Non-tender, + BS	  Skin: No rashes, No ecchymoses, No cyanosis  Extremities: Normal range of motion, No clubbing, cyanosis   marked improvement of edema  Vascular: Peripheral pulses depressed bilaterally  Neurologic: Non-focal  Psychiatry: A & O x 3, Mood & affect appropriate      	    ECG:  Afib LBBB   	  RADIOLOGY:  CXR  EXAM:  XR CHEST PORTABLE URGENT 1V                          PROCEDURE DATE:  2018                     INTERPRETATION:  INDICATION: eval fluid status        TECHNIQUE: Chest, single portable AP view on  2018 9:52 AM     COMPARISON: Chest radiograph dated 2018    FINDINGS:  There is interval worsening of severe bilateral pulmonary congestion,   worse on the right. There is obscuration hilar and right paratracheal   regions.  There is a probable layering right pleural effusion.  No pneumothorax is evident.  Visualized cardiac silhouette appears grossly stable      IMPRESSION:   Worsening of severe bilateral pulmonary congestion, asymmetric on the   right.    DIAGNOSTIC TESTING:  [ ] Echocardiogram:    EXAM:  ECHOCARDIOGRAM (CARDIOL)                          PROCEDURE DATE:  2018         INTERPRETATION:  Patient Height: 165.0 cm  Patient Weight: 84.0 kg  Heart Rate: 103 bpm  BSA: 1.9 m^2  Interpretation Summary  The left atrium is moderately dilated. The right atrium is borderline   dilated.There is mild aortic valve thickening. There is trace aortic   regurgitation. No hemodynamically significant valvular aortic stenosis.    There   is mild mitral valve thickening. There is moderate mitral regurgitation.   Structurally normal tricuspid valve. There is mild to moderate tricuspid   regurgitation.  This study shows evidence of pulmonary hypertension. The   pulmonary artery systolic pressure is estimated to be 57 mmHg.  The   pulmonic   valve is not well visualized. There is mild pulmonic regurgitation.  The   right   ventricle is normal in size and function.There is mild concentric left   ventricular hypertrophy. Regional wall motion and LV ejection fraction   assessment is limited due to poor endocardial delineation.There is severe   global hypokinesis of the left ventricle. The left ventricular ejection   fraction is estimated to be 20- 25%.  No prior study for comparison.  Procedure Details  A complete two-dimensional transthoracic echocardiogram was performed (2D,  M-mode, spectral and color flow doppler).  Study Quality: Fair.  Left Ventricle  There is mild concentric left ventricular hypertrophy.  There is severe global hypokinesis of the left ventricle.  The left ventricular ejection fraction is estimated to be 20-25%  Left Atrium  The left atrium is moderately dilated.  Right Atrium  The right atrium is borderline dilated.  Right Ventricle  The right ventricle is normal in size and function.  Aortic Valve  There is mild aortic valve thickening.  There is trace aortic regurgitation.  No hemodynamically significant valvular aortic stenosis.  The calculated aortic valve area using the continuity equation is 2.3 cm2.  Mitral Valve  There is mild mitral valve thickening.  There is moderate mitral regurgitation.  Tricuspid Valve  Structurally normal tricuspid valve.  There is mild to moderate tricuspid regurgitation.  This study shows evidence of pulmonary hypertension.  The pulmonary artery systolic pressure is estimated to be 57 mmHg.  Pulmonic Valve  The pulmonic valve is not well visualized.  There is mild pulmonic regurgitation.  Arteries and Venous System  No aortic root dilatation.  The IVC is dilated (>2.1 cm) with an abnormal inspiratory collapse (<50%)  consistent with elevated right atrial pressure.  Pericardium / Pleura  There is no pericardial effusion.  Doppler Measurements & Calculations  Ao V2 max: 172 cm/sec  Ao max P.8 mmHg  Ao max PG (full): 6.1 mmHg  Ao V2 mean: 121.5 cm/sec  Ao mean P.4 mmHg  Ao mean PG (full): 3.3 mmHg  Ao V2 VTI: 30.6 cm  KAREN(I,A): 1.9 cm^2  KAREN(I,D): 1.9 cm^2  KAREN(V,A): 2.0 cm^2  KAREN(V,D): 2.0 cm^2  LV max P.8 mmHg  LV mean PG: 3.1 mmHg  LV V1 max: 120 cm/sec  LV V1 mean: 84.1 cm/sec  LV V1 VTI: 20.1 cm  MR max herrera: 578.4 cm/sec  MR max P.8 mmHg  SV(Ao): 227.8 ml  SI(Ao): 119.0 ml/m^2  SV(LVOT): 58.6 ml  SI(LVOT): 30.6 ml/m^2  TR Max herrera: 302.7 cm/sec  MMode 2D Measurements & Calculations  IVSd: 1.3 cm  LVIDd: 4.2 cm  LVIDs: 3.4 cm  LVPWd: 1.3 cm  IVS/LVPW: 1.0  FS: 20.4 %  EDV(Teich): 79.9 ml  ESV(Teich): 46.3 ml  LV mass(C)d: 196.1 grams  LV mass(C)dI: 102.5 grams/m^2  SI(cubed): 19.6 ml/m^2  Ao root diam: 3.1 cm  Ao root area: 7.4 cm^2  LA dimension: 4.3 cm  LA/Ao: 1.4  LVOT diam: 1.9 cm  LVOT area: 2.9 cm^2  LVOT area (M): 2.9 cm^2  Interpreting Physician:Charlie Marin MD electronically signed on   2018 16:23:47    [ ]  Catheterization:  [ ] Stress Test:    [ ] ANMOL  OTHER: 	    LABS:	 	  CARDIAC MARKERS:                                  8.7    6.9   )-----------( 132      ( 2018 05:27 )             27.5         142  |  100  |  41<H>  ----------------------------<  101<H>  4.2   |  29  |  1.51<H>    Ca    9.8      2018 05:24  Mg     2.3         TPro  7.3  /  Alb  4.0  /  TBili  0.3  /  DBili  x   /  AST  31  /  ALT  24  /  AlkPhos  109      proBNP: Serum Pro-Brain Natriuretic Peptide: 4616 pg/mL ( @ 05:24)  Serum Pro-Brain Natriuretic Peptide: 3805 pg/mL ( @ 21:50)    Lipid Profile:   HgA1c: Hemoglobin A1C, Whole Blood: 7.0 % ( @ 05:28)    TSH:   PT/INR - ( 2018 12:57 )   PT: 13.0 sec;   INR: 1.17          PTT - ( 2018 12:57 )  PTT:128.0 sec    ASSESSMENT/PLAN: 	        DVT ppx:  Dispo:

## 2018-01-17 NOTE — H&P ADULT - NSHPREVIEWOFSYSTEMS_GEN_ALL_CORE
GENERAL, CONSTITUTIONAL : + recent weight gain. Denies fever, chills  EYES, VISION: denies changes in vision   EARS, NOSE, THROAT: denies hearing loss  HEART, CARDIOVASCULAR: + chest pain, palpitations, SOB,  LE edema. Denies claudication  RESPIRATORY: + SOB; Denies cough, wheezing, PND, orthopnea  GASTROINTESTINAL: Denies abdominal pain, heartburn, bloody stool, dark tarry stool  GENITOURINARY: Denies frequent urination, urgency  MUSCULOSKELETAL denies joint pain or swelling, restricted motion, musculoskeletal pain.   SKIN & INTEGUMENTARY Denies rashes, sores, blisters, blisters, growths.  NEUROLOGICAL: Denies numbness or tingling sensations, sensation loss, burning.   PSYCHIATRIC: Denies nervousness, anxiety, depression  ENDOCRINE Denies heat or cold intolerance, excessive thirst  HEMATOLOGIC/LYMPHATIC: Denies abnormal bleeding, bleeding of any kind

## 2018-01-17 NOTE — DIETITIAN INITIAL EVALUATION ADULT. - NUTRITIONGOAL OUTCOME1
Nutrition will be restarted as medically feasible, intermittent w/BiPAP use/maintain asp. precaution

## 2018-01-17 NOTE — H&P ADULT - ASSESSMENT
89 y/o female with HTN, HPL, DM-II, CAD/PCIs, diastolic CHF (NL EF), anemia who is admitted with new onset AFib and CHF exacerbation

## 2018-01-17 NOTE — H&P ADULT - PSH
History of bilateral carotid endarterectomy    History of umbilical hernia repair    S/P bladder repair    S/P cataract surgery    S/P hysterectomy with oophorectomy    S/P TKR (total knee replacement)

## 2018-01-17 NOTE — H&P ADULT - PMH
Anemia    CAD (coronary artery disease)    DM (diabetes mellitus)    Emphysema    HTN (hypertension)    Hypercholesteremia

## 2018-01-17 NOTE — PROGRESS NOTE ADULT - PROBLEM SELECTOR PLAN 8
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 Dr. Castle will likely do renal angiogram when he does the [facility].  - BUN/Cr 41/1.51, baseline by outpt ppw  - Continue to monitor    FULL CODE  DVT ppx: Heparin gtt  Dispo: Continue diuresis, management of AFIB

## 2018-01-17 NOTE — PROGRESS NOTE ADULT - SUBJECTIVE AND OBJECTIVE BOX
Interventional Cardiology PA Adult Progress Note    Subjective Assessment: Pt seen and examined at bedside. Pts daughter able to translate from Sinhala to English. Pt endorsing SOB and difficulty breathing. Denies CP, palpitations, nausea/vomiting.   	  MEDICATIONS:  amLODIPine   Tablet 5 milliGRAM(s) Oral daily  furosemide   Injectable 40 milliGRAM(s) IV Push two times a day  isosorbide   mononitrate ER Tablet (IMDUR) 30 milliGRAM(s) Oral daily  montelukast 10 milliGRAM(s) Oral daily  tiotropium 18 MICROgram(s) Capsule 1 Capsule(s) Inhalation daily  pantoprazole    Tablet 40 milliGRAM(s) Oral before breakfast  atorvastatin 20 milliGRAM(s) Oral at bedtime  glucagon  Injectable 1 milliGRAM(s) IntraMuscular once PRN  insulin lispro (HumaLOG) corrective regimen sliding scale   SubCutaneous three times a day before meals  insulin lispro (HumaLOG) corrective regimen sliding scale   SubCutaneous at bedtime  aspirin enteric coated 81 milliGRAM(s) Oral daily  dextrose 5%. 1000 milliLiter(s) IV Continuous <Continuous>  heparin  Infusion.  Unit(s)/Hr IV Continuous <Continuous>  lidocaine   Patch 1 Patch Transdermal daily      	    [PHYSICAL EXAM:  TELEMETRY:  T(C): 36.3 (01-17-18 @ 09:20), Max: 37.5 (01-17-18 @ 07:12)  HR: 108 (01-17-18 @ 08:30) (82 - 108)  BP: 149/60 (01-17-18 @ 08:12) (115/55 - 149/60)  RR: 20 (01-17-18 @ 10:00) (18 - 22)  SpO2: 99% (01-17-18 @ 10:00) (90% - 100%)  Wt(kg): --  I&O's Summary    16 Jan 2018 07:01  -  17 Jan 2018 07:00  --------------------------------------------------------  IN: 150 mL / OUT: 400 mL / NET: -250 mL    17 Jan 2018 07:01  -  17 Jan 2018 12:49  --------------------------------------------------------  IN: 180 mL / OUT: 550 mL / NET: -370 mL        Weight (kg): 83.5 (01-16 @ 20:16)  Rubalcava:  Central/PICC/Mid Line:                                         Appearance: Normal	  Neck: Supple, + JVD  Cardiovascular: Irregularly irregular, + JVD, +ELLYN  Respiratory: +Accessory muscle use, b/l bibasilar rales and crackles throughout lung fields  Gastrointestinal:  Soft, Non-tender, + BS	  Extremities: 2+ pitting edema b/l LES  Neurologic: Non-focal  Psychiatry: A & O x 3, Mood & affect appropriate      LABS:	 	  CARDIAC MARKERS:                          8.7    6.9   )-----------( 132      ( 17 Jan 2018 05:27 )             27.5     01-17    142  |  100  |  41<H>  ----------------------------<  101<H>  4.2   |  29  |  1.51<H>    Ca    9.8      17 Jan 2018 05:24  Mg     2.3     01-17    TPro  7.3  /  Alb  4.0  /  TBili  0.3  /  DBili  x   /  AST  31  /  ALT  24  /  AlkPhos  109  01-16    proBNP: Serum Pro-Brain Natriuretic Peptide: 4616 pg/mL (01-17 @ 05:24)  Serum Pro-Brain Natriuretic Peptide: 3805 pg/mL (01-16 @ 21:50)    Lipid Profile:   HgA1c: Hemoglobin A1C, Whole Blood: 7.0 % (01-17 @ 05:28)    TSH:   PT/INR - ( 17 Jan 2018 05:53 )   PT: 12.6 sec;   INR: 1.13          PTT - ( 17 Jan 2018 05:53 )  PTT:171.7 sec

## 2018-01-17 NOTE — PROGRESS NOTE ADULT - PROBLEM SELECTOR PLAN 1
- New onset AFIB rates 100-110s s/p Cardizem 5mg IVP x 2 overnight   - Heparin gtt  - Telemetry  - EP consulted, f/u further recs + JVD, crackles/rales on exam, b/l LE edema  - Echo 1/17: LA moderately dilated, RA borderline dilated, mild AV thickening, trace AR, moderate MR, mild to mod TR, pulm HTN, PA pressure 57mmHg, mild VA, mild LVG, severe global hypokinesis, unable to assess for WMA 2/2 poor endocardial delineation, LVEF 20-25%  - Per Dr. Ravi (outpt cardiologist), this is newly depressed EF. EF nl in office last year.  - Lasix 40mg IV BID, additional Lasix 40mg IVPx1 given today for SOB  - Bipap started with goal SpO2 >89%, will attempt high flow NC if clinically improving  - Rubalcava placed for strict I&O's, daily weights  - LE dopplers ordered, f/u results  - Dr. Gupta consulted, f/u recs + JVD, crackles/rales on exam, b/l LE edema  - Echo 1/17: LA moderately dilated, RA borderline dilated, mild AV thickening, trace AR, moderate MR, mild to mod TR, pulm HTN, PA pressure 57mmHg, mild NH, mild LVG, severe global hypokinesis, unable to assess for WMA 2/2 poor endocardial delineation, LVEF 20-25%  - Per Dr. Ravi (outpt cardiologist), this is newly depressed EF. EF nl in office last year.  - Lasix 40mg IV BID, additional Lasix 40mg IVPx1 given today for SOB  - Bipap started today with goal SpO2 >89% with clinical improvement. Will d/c and attempt high flow NC.  - Rubalcava placed for strict I&O's, daily weights. -1L today.  - LE dopplers ordered, f/u results  - Dr. Gupta consulted, f/u recs + JVD, crackles/rales on exam, b/l LE edema  - Echo 1/17: LA moderately dilated, RA borderline dilated, mild AV thickening, trace AR, moderate MR, mild to mod TR, pulm HTN, PA pressure 57mmHg, mild GA, mild LVG, severe global hypokinesis, unable to assess for WMA 2/2 poor endocardial delineation, LVEF 20-25%  - Per Dr. Ravi (outpt cardiologist), this is newly depressed EF. EF nl in office last year. Plan for possible cardiac/renal cath when clinically optimized. D/w Dr. Falcon.  - Lasix 40mg IV BID, additional Lasix 40mg IVPx1 given today for SOB  - Bipap started today with goal SpO2 >89% with clinical improvement. Will d/c and attempt high flow NC.  - Rubalcava placed for strict I&O's, daily weights. -1L today.  - LE dopplers ordered, f/u results  - Dr. Gupta consulted, f/u recs + JVD, crackles/rales on exam, b/l LE edema  - Echo 1/17: LA moderately dilated, RA borderline dilated, mild AV thickening, trace AR, moderate MR, mild to mod TR, pulm HTN, PA pressure 57mmHg, mild IA, mild LVG, severe global hypokinesis, unable to assess for WMA 2/2 poor endocardial delineation, LVEF 20-25%  - Per Dr. Ravi (outpt cardiologist), this is newly depressed EF. EF nl in office last year. Plan for possible cardiac/renal cath when clinically optimized. D/w Dr. Falcon.  - Lasix 40mg IV BID, additional Lasix 40mg IVPx1 given today for SOB  - Bipap started today with goal SpO2 >89% with clinical improvement. Will d/c and attempt high flow NC. Bipap at bedside on standby.   - Rubalcava placed for strict I&O's, daily weights. -1L today.  - LE dopplers ordered, f/u results  - Dr. Gupta consulted, f/u recs

## 2018-01-17 NOTE — H&P ADULT - NSHPPHYSICALEXAM_GEN_ALL_CORE
Temp 98.6F, HR 70bpm, /80, RR 16, O2 sat 98% RA, Weight 200lbs, Height 5' 6"    T(C): 35.9 (01-17-18 @ 01:11), Max: 36.9 (01-16-18 @ 20:16)  HR: 103 (01-16-18 @ 23:45) (88 - 108)  BP: 135/63 (01-16-18 @ 23:45) (115/55 - 135/70)  RR: 18 (01-16-18 @ 23:45) (18 - 20)  SpO2: 100% (01-16-18 @ 23:45) (95% - 100%)  Wt(kg): --    Appearance: Normal	  HEENT:   Normal oral mucosa, PERRL, EOMI	  Neck: Supple, - JVD; No Carotid Bruit   Cardiovascular: Normal S1 S2, No JVD, No murmurs  Respiratory: Lungs bibasilar crackles, no Rhonchi, Wheezing	  Gastrointestinal:  Soft, Non-tender, + BS	  Skin: No rashes, No ecchymoses, No cyanosis  Extremities: 3+ edema b/l, No clubbing, cyanosis  Vascular: Femoral pulses 1+ b/l without bruit, DP faint, b/l, PT faint b/l  Neurologic: Non-focal  Psychiatry: A & O x 3, Mood & affect appropriate

## 2018-01-17 NOTE — H&P ADULT - HISTORY OF PRESENT ILLNESS
This is a 89 y/o female, obese, with HTN, HPL, DM-II, Anemia, diastolic CHF (NL EF), known CAD with previous PCIs, b/l carotid endarterectomies, who presented to her cardiologist with c/o mid sternal chest pressure radiating to the back, associated with SOB, palpitations, LE edema and 4lbs weight gain over 1 week. In the office, her EKG showed new AFib and she was sent to the ER for further evaluation.   In the ER, 1st troponin negative, BNP 3805, BUN 42/1.57, H/H 8.6/27.7, EKG AFib @ 100bpm, LBBB, CXR with some vascular congestion. She received Cardizem 5mg IV x1, Lasix 40mg IV x1, was started on IV heparin, This is a 89 y/o female, obese, with HTN, HPL, DM-II, Anemia, diastolic CHF (NL EF), known CAD with previous PCIs, b/l carotid endarterectomies, who presented to her cardiologist with c/o mid sternal chest pressure radiating to the back, associated with SOB, palpitations, LE edema and 4lbs weight gain over 1 week. In the office, her EKG showed new AFib and she was sent to the ER for further evaluation.   In the ER, 1st troponin negative, BNP 3805, BUN 42/1.57, H/H 8.6/27.7, EKG AFib @ 100bpm, LBBB, CXR with some vascular congestion. She received Cardizem 5mg IV x1, Lasix 40mg IV x1, was started on IV heparin and admitted for further management and telemetry monitoring

## 2018-01-17 NOTE — DIETITIAN INITIAL EVALUATION ADULT. - OTHER INFO
89 yo/female with PMHx HTN, HLD, DM, CHF, anemia, and CAD admitted with Chest pain, SOB, palpitations, LE Edema. Pt seen in room, awake, alert, on BiPAP d/t increased difficulty breathing. Daughter at bedside.  ID [id number]. Per daughter, pt eating well at home, but only soft foods: fish, squash, mashed potato, turkey etc. Currently NPO d/t BiPAP and SOB. SLP consulted for hx dysphagia. No N/V/C/D reported at this time. NKFA. Skin intact. Weight fluctuating d/t fluid shifts. CHF and Consistent Carbohydrate Diet handouts provided in [employer] to daughter.

## 2018-01-17 NOTE — CONSULT NOTE ADULT - SUBJECTIVE AND OBJECTIVE BOX
Patient is a 89y old  Female who presents with a chief complaint of     HPI:  This is a 89 y/o female, obese, with HTN, HPL, DM-II, Anemia, diastolic CHF (NL EF), known CAD with previous PCIs, b/l carotid endarterectomies, who presented to her cardiologist with c/o mid sternal chest pressure radiating to the back, associated with SOB, palpitations, LE edema and 4lbs weight gain over 1 week. In the office, her EKG showed new AFib and she was sent to the ER for further evaluation.   In the ER, 1st troponin negative, BNP 3805, BUN 42/1.57, H/H 8.6/27.7, EKG AFib @ 100bpm, LBBB, CXR with some vascular congestion. She received Cardizem 5mg IV x1, Lasix 40mg IV x1, was started on IV heparin and admitted for further management and telemetry monitoring (17 Jan 2018 01:23)      PAST MEDICAL & SURGICAL HISTORY:  Anemia  CAD (coronary artery disease)  DM (diabetes mellitus)  Emphysema  Hypercholesteremia  HTN (hypertension)  S/P bladder repair  S/P hysterectomy with oophorectomy  History of bilateral carotid endarterectomy  History of umbilical hernia repair  S/P cataract surgery  S/P TKR (total knee replacement)      FAMILY HISTORY:  Family history of emphysema (Mother)      SOCIAL HISTORY:  Smoking Status: [ ] Current, [ ] Former, [ x] Never  Pack Years:    MEDICATIONS:  Pulmonary:  montelukast 10 milliGRAM(s) Oral daily  tiotropium 18 MICROgram(s) Capsule 1 Capsule(s) Inhalation daily    Antimicrobials:    Anticoagulants:  aspirin enteric coated 81 milliGRAM(s) Oral daily  heparin  Infusion 1000 Unit(s)/Hr IV Continuous <Continuous>    Onc:    GI/:  pantoprazole    Tablet 40 milliGRAM(s) Oral before breakfast    Endocrine:  atorvastatin 20 milliGRAM(s) Oral at bedtime  dextrose 50% Injectable 12.5 Gram(s) IV Push once  dextrose 50% Injectable 25 Gram(s) IV Push once  dextrose 50% Injectable 25 Gram(s) IV Push once  dextrose Gel 1 Dose(s) Oral once PRN  glucagon  Injectable 1 milliGRAM(s) IntraMuscular once PRN  insulin lispro (HumaLOG) corrective regimen sliding scale   SubCutaneous three times a day before meals  insulin lispro (HumaLOG) corrective regimen sliding scale   SubCutaneous at bedtime    Cardiac:  amLODIPine   Tablet 5 milliGRAM(s) Oral daily  furosemide   Injectable 40 milliGRAM(s) IV Push two times a day  isosorbide   mononitrate ER Tablet (IMDUR) 30 milliGRAM(s) Oral daily    Other Medications:  dextrose 5%. 1000 milliLiter(s) IV Continuous <Continuous>  lidocaine   Patch 1 Patch Transdermal daily      Allergies    No Known Allergies    Intolerances        Vital Signs Last 24 Hrs  T(C): 37.7 (17 Jan 2018 16:06), Max: 37.7 (17 Jan 2018 16:06)  T(F): 99.8 (17 Jan 2018 16:06), Max: 99.8 (17 Jan 2018 16:06)  HR: 83 (17 Jan 2018 18:27) (82 - 111)  BP: 112/59 (17 Jan 2018 16:06) (112/59 - 149/60)  BP(mean): 84 (17 Jan 2018 12:45) (84 - 84)  RR: 18 (17 Jan 2018 18:27) (17 - 22)  SpO2: 98% (17 Jan 2018 18:27) (90% - 100%)    01-16 @ 07:01  -  01-17 @ 07:00  --------------------------------------------------------  IN: 150 mL / OUT: 400 mL / NET: -250 mL    01-17 @ 07:01 - 01-17 @ 22:00  --------------------------------------------------------  IN: 420 mL / OUT: 1450 mL / NET: -1030 mL          LABS:      CBC Full  -  ( 17 Jan 2018 05:27 )  WBC Count : 6.9 K/uL  Hemoglobin : 8.7 g/dL  Hematocrit : 27.5 %  Platelet Count - Automated : 132 K/uL  Mean Cell Volume : 90.5 fL  Mean Cell Hemoglobin : 28.6 pg  Mean Cell Hemoglobin Concentration : 31.6 g/dL  Auto Neutrophil # : x  Auto Lymphocyte # : x  Auto Monocyte # : x  Auto Eosinophil # : x  Auto Basophil # : x  Auto Neutrophil % : x  Auto Lymphocyte % : x  Auto Monocyte % : x  Auto Eosinophil % : x  Auto Basophil % : x    01-17    142  |  100  |  41<H>  ----------------------------<  101<H>  4.2   |  29  |  1.51<H>    Ca    9.8      17 Jan 2018 05:24  Mg     2.3     01-17    TPro  7.3  /  Alb  4.0  /  TBili  0.3  /  DBili  x   /  AST  31  /  ALT  24  /  AlkPhos  109  01-16    PT/INR - ( 17 Jan 2018 12:57 )   PT: 13.0 sec;   INR: 1.17     < from: Xray Chest 1 View AP- PORTABLE-Urgent (01.17.18 @ 09:52) >  EXAM:  XR CHEST PORTABLE URGENT 1V                          PROCEDURE DATE:  01/17/2018                     INTERPRETATION:  INDICATION: eval fluid status        TECHNIQUE: Chest, single portable AP view on  1/17/2018 9:52 AM     COMPARISON: Chest radiograph dated 1/16/2018    FINDINGS:  There is interval worsening of severe bilateral pulmonary congestion,   worse on the right. There is obscuration hilar and right paratracheal   regions.  There is a probable layering right pleural effusion.  Nopneumothorax is evident.  Visualized cardiac silhouette appears grossly stable      IMPRESSION:   Worsening of severe bilateral pulmonary congestion, asymmetric on the   right.    < end of copied text >       PTT - ( 17 Jan 2018 20:12 )  PTT:88.8 sec                    RADIOLOGY & ADDITIONAL STUDIES (The following images were personally reviewed):

## 2018-01-17 NOTE — PROGRESS NOTE ADULT - PROBLEM SELECTOR PLAN 7
- BUN/Cr 41/1.51  - Continue to monitor    FULL CODE  DVT ppx: Heparin gtt  Dispo: Continue diuresis, management of AFIB - H/o b/l RAAS by ultrasound outpt. Dr. Castle will likely do renal angiogram when he does the [facility].  - BUN/Cr 41/1.51  - Continue to monitor    FULL CODE  DVT ppx: Heparin gtt  Dispo: Continue diuresis, management of AFIB - H/o b/l RAAS by ultrasound outpt. Dr. Castle will likely do renal angiogram when he does the [facility].  - BUN/Cr 41/1.51, baseline by outpt ppw  - Continue to monitor    FULL CODE  DVT ppx: Heparin gtt  Dispo: Continue diuresis, management of AFIB - H/H 8.7/27.5 today  - Active T+S  - Heme (Dr. Elliott) following. Rec Heparin gtt: Dose to keep PTT 60-80  - Monitor CBC closely & transfuse PRBCs to keep Hb > 7g/dl  - Check Iron panel, SPEP, IPEP, Immunofixation, LDH, Beta-2 microglobulin (ordered, f/u results)  - Consider Epogen

## 2018-01-17 NOTE — PROVIDER CONTACT NOTE (OTHER) - ACTION/TREATMENT ORDERED:
SURYA Estrella informed, cardizem 5mg IV push ordered and administered, will continue to monitor pt.

## 2018-01-17 NOTE — H&P ADULT - NSHPLABSRESULTS_GEN_ALL_CORE
8.6    5.8   )-----------( 137      ( 16 Jan 2018 21:50 )             27.7       01-16    141  |  101  |  42<H>  ----------------------------<  255<H>  4.9   |  28  |  1.57<H>    Ca    9.4      16 Jan 2018 21:50    TPro  7.3  /  Alb  4.0  /  TBili  0.3  /  DBili  x   /  AST  31  /  ALT  24  /  AlkPhos  109  01-16      PT/INR - ( 16 Jan 2018 21:50 )   PT: 12.4 sec;   INR: 1.11          PTT - ( 16 Jan 2018 21:50 )  PTT:30.5 sec    CARDIAC MARKERS ( 16 Jan 2018 21:50 )  x     / <0.01 ng/mL / 36 U/L / x     / <1.0 ng/mL

## 2018-01-17 NOTE — DIETITIAN INITIAL EVALUATION ADULT. - ENERGY NEEDS
Height 66"; .7#; #; 141%IBW  BMI 29.7  Ideal body weight used for calculations as pt >120% of IBW. Needs estimated per age; fluids per PA.

## 2018-01-18 LAB
ANION GAP SERPL CALC-SCNC: 11 MMOL/L — SIGNIFICANT CHANGE UP (ref 5–17)
APTT BLD: 110 SEC — HIGH (ref 27.5–37.4)
APTT BLD: 40.6 SEC — HIGH (ref 27.5–37.4)
B2 MICROGLOB SERPL-MCNC: 6 MG/L — HIGH (ref 0.8–2.2)
BASOPHILS NFR BLD AUTO: 0.1 % — SIGNIFICANT CHANGE UP (ref 0–2)
BUN SERPL-MCNC: 34 MG/DL — HIGH (ref 7–23)
CALCIUM SERPL-MCNC: 9.4 MG/DL — SIGNIFICANT CHANGE UP (ref 8.4–10.5)
CHLORIDE SERPL-SCNC: 97 MMOL/L — SIGNIFICANT CHANGE UP (ref 96–108)
CO2 SERPL-SCNC: 32 MMOL/L — HIGH (ref 22–31)
CREAT SERPL-MCNC: 1.49 MG/DL — HIGH (ref 0.5–1.3)
EOSINOPHIL NFR BLD AUTO: 1.1 % — SIGNIFICANT CHANGE UP (ref 0–6)
FERRITIN SERPL-MCNC: 168.7 NG/ML — HIGH (ref 15–150)
GLUCOSE BLDC GLUCOMTR-MCNC: 118 MG/DL — HIGH (ref 70–99)
GLUCOSE BLDC GLUCOMTR-MCNC: 127 MG/DL — HIGH (ref 70–99)
GLUCOSE BLDC GLUCOMTR-MCNC: 149 MG/DL — HIGH (ref 70–99)
GLUCOSE BLDC GLUCOMTR-MCNC: 203 MG/DL — HIGH (ref 70–99)
GLUCOSE SERPL-MCNC: 132 MG/DL — HIGH (ref 70–99)
HCT VFR BLD CALC: 27.1 % — LOW (ref 34.5–45)
HGB BLD-MCNC: 8.3 G/DL — LOW (ref 11.5–15.5)
INR BLD: 1.18 — HIGH (ref 0.88–1.16)
INR BLD: 1.19 — HIGH (ref 0.88–1.16)
IRON SATN MFR SERPL: 12 % — LOW (ref 14–50)
IRON SATN MFR SERPL: 28 UG/DL — LOW (ref 30–160)
LDH SERPL L TO P-CCNC: 192 U/L — SIGNIFICANT CHANGE UP (ref 50–242)
LYMPHOCYTES # BLD AUTO: 12.5 % — LOW (ref 13–44)
MAGNESIUM SERPL-MCNC: 2 MG/DL — SIGNIFICANT CHANGE UP (ref 1.6–2.6)
MCHC RBC-ENTMCNC: 28.7 PG — SIGNIFICANT CHANGE UP (ref 27–34)
MCHC RBC-ENTMCNC: 30.6 G/DL — LOW (ref 32–36)
MCV RBC AUTO: 93.8 FL — SIGNIFICANT CHANGE UP (ref 80–100)
MONOCYTES NFR BLD AUTO: 9.1 % — SIGNIFICANT CHANGE UP (ref 2–14)
NEUTROPHILS NFR BLD AUTO: 77.2 % — HIGH (ref 43–77)
PLATELET # BLD AUTO: 140 K/UL — LOW (ref 150–400)
POTASSIUM SERPL-MCNC: 3.7 MMOL/L — SIGNIFICANT CHANGE UP (ref 3.5–5.3)
POTASSIUM SERPL-SCNC: 3.7 MMOL/L — SIGNIFICANT CHANGE UP (ref 3.5–5.3)
PROTHROM AB SERPL-ACNC: 13.1 SEC — HIGH (ref 9.8–12.7)
PROTHROM AB SERPL-ACNC: 13.2 SEC — HIGH (ref 9.8–12.7)
RBC # BLD: 2.89 M/UL — LOW (ref 3.8–5.2)
RBC # FLD: 17 % — HIGH (ref 10.3–16.9)
SODIUM SERPL-SCNC: 140 MMOL/L — SIGNIFICANT CHANGE UP (ref 135–145)
T3FREE SERPL-MCNC: 2.17 PG/ML — SIGNIFICANT CHANGE UP (ref 1.71–3.71)
T4 FREE SERPL-MCNC: 1.11 NG/DL — SIGNIFICANT CHANGE UP (ref 0.7–1.48)
THYROPEROXIDASE AB SERPL-ACNC: <10 IU/ML — SIGNIFICANT CHANGE UP (ref 0–34.9)
TIBC SERPL-MCNC: 228 UG/DL — SIGNIFICANT CHANGE UP (ref 220–430)
TSH SERPL-MCNC: 6.35 UIU/ML — HIGH (ref 0.35–4.94)
UIBC SERPL-MCNC: 200 UG/DL — SIGNIFICANT CHANGE UP (ref 110–370)
WBC # BLD: 7.1 K/UL — SIGNIFICANT CHANGE UP (ref 3.8–10.5)
WBC # FLD AUTO: 7.1 K/UL — SIGNIFICANT CHANGE UP (ref 3.8–10.5)

## 2018-01-18 PROCEDURE — 93970 EXTREMITY STUDY: CPT | Mod: 26

## 2018-01-18 PROCEDURE — 99233 SBSQ HOSP IP/OBS HIGH 50: CPT

## 2018-01-18 RX ORDER — IRON SUCROSE 20 MG/ML
100 INJECTION, SOLUTION INTRAVENOUS ONCE
Qty: 0 | Refills: 0 | Status: COMPLETED | OUTPATIENT
Start: 2018-01-18 | End: 2018-01-19

## 2018-01-18 RX ORDER — HEPARIN SODIUM 5000 [USP'U]/ML
900 INJECTION INTRAVENOUS; SUBCUTANEOUS
Qty: 25000 | Refills: 0 | Status: DISCONTINUED | OUTPATIENT
Start: 2018-01-18 | End: 2018-01-19

## 2018-01-18 RX ORDER — ATORVASTATIN CALCIUM 80 MG/1
40 TABLET, FILM COATED ORAL AT BEDTIME
Qty: 0 | Refills: 0 | Status: DISCONTINUED | OUTPATIENT
Start: 2018-01-18 | End: 2018-01-28

## 2018-01-18 RX ORDER — HYDRALAZINE HCL 50 MG
10 TABLET ORAL THREE TIMES A DAY
Qty: 0 | Refills: 0 | Status: DISCONTINUED | OUTPATIENT
Start: 2018-01-19 | End: 2018-01-26

## 2018-01-18 RX ORDER — HEPARIN SODIUM 5000 [USP'U]/ML
1100 INJECTION INTRAVENOUS; SUBCUTANEOUS
Qty: 25000 | Refills: 0 | Status: DISCONTINUED | OUTPATIENT
Start: 2018-01-18 | End: 2018-01-18

## 2018-01-18 RX ORDER — HEPARIN SODIUM 5000 [USP'U]/ML
3000 INJECTION INTRAVENOUS; SUBCUTANEOUS ONCE
Qty: 0 | Refills: 0 | Status: COMPLETED | OUTPATIENT
Start: 2018-01-18 | End: 2018-01-18

## 2018-01-18 RX ORDER — ISOSORBIDE DINITRATE 5 MG/1
10 TABLET ORAL THREE TIMES A DAY
Qty: 0 | Refills: 0 | Status: DISCONTINUED | OUTPATIENT
Start: 2018-01-19 | End: 2018-01-27

## 2018-01-18 RX ORDER — HEPARIN SODIUM 5000 [USP'U]/ML
900 INJECTION INTRAVENOUS; SUBCUTANEOUS
Qty: 25000 | Refills: 0 | Status: DISCONTINUED | OUTPATIENT
Start: 2018-01-18 | End: 2018-01-18

## 2018-01-18 RX ORDER — POTASSIUM CHLORIDE 20 MEQ
40 PACKET (EA) ORAL ONCE
Qty: 0 | Refills: 0 | Status: COMPLETED | OUTPATIENT
Start: 2018-01-18 | End: 2018-01-18

## 2018-01-18 RX ADMIN — Medication 4: at 11:03

## 2018-01-18 RX ADMIN — HEPARIN SODIUM 9 UNIT(S)/HR: 5000 INJECTION INTRAVENOUS; SUBCUTANEOUS at 19:37

## 2018-01-18 RX ADMIN — Medication 40 MILLIGRAM(S): at 10:20

## 2018-01-18 RX ADMIN — Medication 40 MILLIGRAM(S): at 23:01

## 2018-01-18 RX ADMIN — MONTELUKAST 10 MILLIGRAM(S): 4 TABLET, CHEWABLE ORAL at 11:03

## 2018-01-18 RX ADMIN — TIOTROPIUM BROMIDE 1 CAPSULE(S): 18 CAPSULE ORAL; RESPIRATORY (INHALATION) at 15:17

## 2018-01-18 RX ADMIN — ISOSORBIDE MONONITRATE 30 MILLIGRAM(S): 60 TABLET, EXTENDED RELEASE ORAL at 11:03

## 2018-01-18 RX ADMIN — Medication 81 MILLIGRAM(S): at 11:03

## 2018-01-18 RX ADMIN — ATORVASTATIN CALCIUM 40 MILLIGRAM(S): 80 TABLET, FILM COATED ORAL at 23:01

## 2018-01-18 RX ADMIN — HEPARIN SODIUM 11 UNIT(S)/HR: 5000 INJECTION INTRAVENOUS; SUBCUTANEOUS at 11:18

## 2018-01-18 RX ADMIN — AMLODIPINE BESYLATE 5 MILLIGRAM(S): 2.5 TABLET ORAL at 06:29

## 2018-01-18 RX ADMIN — PANTOPRAZOLE SODIUM 40 MILLIGRAM(S): 20 TABLET, DELAYED RELEASE ORAL at 06:29

## 2018-01-18 RX ADMIN — HEPARIN SODIUM 9 UNIT(S)/HR: 5000 INJECTION INTRAVENOUS; SUBCUTANEOUS at 01:09

## 2018-01-18 RX ADMIN — HEPARIN SODIUM 3000 UNIT(S): 5000 INJECTION INTRAVENOUS; SUBCUTANEOUS at 11:33

## 2018-01-18 RX ADMIN — Medication 40 MILLIEQUIVALENT(S): at 10:19

## 2018-01-18 NOTE — PROGRESS NOTE ADULT - ASSESSMENT
89 y/o female with HTN, HLD, DM-II, CAD/PCIs, diastolic CHF (previously normal EF, now reduced to 20-25%), anemia who is admitted with new onset AFib and CHF exacerbation.

## 2018-01-18 NOTE — PROGRESS NOTE ADULT - PROBLEM SELECTOR PLAN 3
- S/p diagnostic LH 7/2016 revealed distal LAD dz, per outpt ppw  - Plan for possible cardiac/renal cath when clinically optimized. D/w Dr. Falcon.  -Medication adjustments for medical optimization as per Dr. Crabtree: D/c Norvasc. Start Isordil 10 mg TID and Hydralazine 10 mg TID, increase Lipitor to 40 mg daily

## 2018-01-18 NOTE — PROGRESS NOTE ADULT - SUBJECTIVE AND OBJECTIVE BOX
EPS Progress Note    S: feels SOB but slightly improved. occasional palpitations. no active CP, dizziness, presyncope/syncope  T(C): 36.7 (01-18-18 @ 08:59), Max: 37.7 (01-17-18 @ 16:06)  HR: 86 (01-18-18 @ 11:06) (75 - 111)  BP: 109/53 (01-18-18 @ 11:06) (107/53 - 132/59)  RR: 18 (01-18-18 @ 11:06) (16 - 20)  SpO2: 97% (01-18-18 @ 11:06) (91% - 100%)  Wt(kg): --     Telemetry: AFIB with VR  bpm          General:  NAD         Chest:  Rales throughout, no wheezing   Cardiac:  irreg irreg, s1s2    Abdomen:  +BS      Soft      Non-Tender      Non-Distended         Extremities: 2+ LE Edema  b/l    Distal Pulses Intact        Labs:                                                               8.3    7.1   )-----------( 140      ( 18 Jan 2018 06:38 )             27.1     01-18    140  |  97  |  34<H>  ----------------------------<  132<H>  3.7   |  32<H>  |  1.49<H>    Ca    9.4      18 Jan 2018 06:38  Mg     2.0     01-18    TPro  7.3  /  Alb  4.0  /  TBili  0.3  /  DBili  x   /  AST  31  /  ALT  24  /  AlkPhos  109  01-16    PT/INR - ( 18 Jan 2018 09:00 )   PT: 13.1 sec;   INR: 1.18          PTT - ( 18 Jan 2018 09:00 )  PTT:40.6 sec    Assessment/Plan:   89 year old female, with a PMHx of CAD s/p PCIs, b/l carotid endarterectomy, HTN, HLD, DM-II, anemia, HFpEF who presented to her cardiologist with heart failure exacerbation found to have new onset atrial fibrillation and reduced systolic function (EF 20-25%). Rate controlled without AVN agents at this point.  0-TSH elevated on admission - recommend treating thyroid dysfunction  -would avoid betablockers/CCBs in the setting of heart failure  -continue heart failure management at per primary team, possible ischemic w/u  -continue heparin gtt for a/c - oral a/c on discharge  -will continue to follow EPS Progress Note    S: feels SOB but slightly improved. occasional palpitations. no active CP, dizziness, presyncope/syncope  T(C): 36.7 (01-18-18 @ 08:59), Max: 37.7 (01-17-18 @ 16:06)  HR: 86 (01-18-18 @ 11:06) (75 - 111)  BP: 109/53 (01-18-18 @ 11:06) (107/53 - 132/59)  RR: 18 (01-18-18 @ 11:06) (16 - 20)  SpO2: 97% (01-18-18 @ 11:06) (91% - 100%)  Wt(kg): --     Telemetry: AFIB with VR  bpm          General:  NAD         Chest:  Rales throughout, no wheezing   Cardiac:  irreg irreg, s1s2    Abdomen:  +BS      Soft      Non-Tender      Non-Distended         Extremities: 2+ LE Edema  b/l    Distal Pulses Intact        Labs:                                                               8.3    7.1   )-----------( 140      ( 18 Jan 2018 06:38 )             27.1     01-18    140  |  97  |  34<H>  ----------------------------<  132<H>  3.7   |  32<H>  |  1.49<H>    Ca    9.4      18 Jan 2018 06:38  Mg     2.0     01-18    TPro  7.3  /  Alb  4.0  /  TBili  0.3  /  DBili  x   /  AST  31  /  ALT  24  /  AlkPhos  109  01-16    PT/INR - ( 18 Jan 2018 09:00 )   PT: 13.1 sec;   INR: 1.18          PTT - ( 18 Jan 2018 09:00 )  PTT:40.6 sec    Assessment/Plan:   89 year old female, with a PMHx of CAD s/p PCIs, b/l carotid endarterectomy, HTN, HLD, DM-II, anemia, HFpEF who presented to her cardiologist with heart failure exacerbation found to have new onset atrial fibrillation and reduced systolic function (EF 20-25%). Rate controlled without AVN agents at this point.  -TSH elevated on admission - recommend treating thyroid dysfunction  -would avoid betablockers/CCBs in the setting of heart failure  -continue heart failure management as per primary team  continue heparin gtt for a/c - oral a/c on discharge  Will plan for ANMOL/DCCV when euvolemic  -will continue to follow

## 2018-01-18 NOTE — PROGRESS NOTE ADULT - SUBJECTIVE AND OBJECTIVE BOX
History of Present Illness:  History of Present Illness: 	  This is a 89 y/o female, obese, with HTN, HPL, DM-II, Anemia, diastolic CHF (NL EF), known CAD with previous PCIs, b/l carotid endarterectomies, who presented to her cardiologist with c/o mid sternal chest pressure radiating to the back, associated with SOB, palpitations, LE edema and 4lbs weight gain over 1 week. In the office, her EKG showed new AFib and she was sent to the ER for further evaluation.   In the ER, 1st troponin negative, BNP 3805, BUN 42/1.57, H/H 8.6/27.7, EKG AFib @ 100bpm, LBBB, CXR with some vascular congestion. She received Cardizem 5mg IV x1, Lasix 40mg IV x1, was started on IV heparin and admitted for further management and telemetry monitoring.  No s/s of bleeding noted.    Review of Systems:  Review of Systems: GENERAL, CONSTITUTIONAL : + recent weight gain. Denies fever, chills  EYES, VISION: denies changes in vision   EARS, NOSE, THROAT: denies hearing loss  HEART, CARDIOVASCULAR: + chest pain, palpitations, SOB,  LE edema. Denies claudication  RESPIRATORY: + SOB; Denies cough, wheezing, PND, orthopnea  GASTROINTESTINAL: Denies abdominal pain, heartburn, bloody stool, dark tarry stool  GENITOURINARY: Denies frequent urination, urgency  MUSCULOSKELETAL denies joint pain or swelling, restricted motion, musculoskeletal pain.   SKIN & INTEGUMENTARY Denies rashes, sores, blisters, blisters, growths.  NEUROLOGICAL: Denies numbness or tingling sensations, sensation loss, burning.   PSYCHIATRIC: Denies nervousness, anxiety, depression  ENDOCRINE Denies heat or cold intolerance, excessive thirst HEMATOLOGIC/LYMPHATIC: Denies abnormal bleeding, bleeding of any kind	      Allergies and Intolerances:        Allergies:  	No Known Allergies:     Home Medications:   * Patient Currently Takes Medications as of 17-Jan-2018 02:09 documented in Structured Notes  · 	torsemide 10 mg oral tablet: 1 tab(s) orally once a day, Last Dose Taken:    · 	aspirin 81 mg oral tablet: 1 tab(s) orally once a day, Last Dose Taken:    · 	isosorbide mononitrate 30 mg oral tablet, extended release: 1 tab(s) orally once a day (in the morning), Last Dose Taken:    · 	Januvia 50 mg oral tablet: 1 tab(s) orally once a day, Last Dose Taken:    · 	amLODIPine 5 mg oral tablet: 1 tab(s) orally once a day, Last Dose Taken:    · 	predniSONE 20 mg oral tablet: 1 tab(s) orally once a day, Last Dose Taken:    · 	atorvastatin 20 mg oral tablet: 1 tab(s) orally once a day, Last Dose Taken:    · 	montelukast 10 mg oral tablet: 1 tab(s) orally once a day, Last Dose Taken:    · 	omeprazole 40 mg oral delayed release capsule: 1 cap(s) orally once a day, Last Dose Taken:    · 	Spiriva 18 mcg inhalation capsule: 1 cap(s) inhaled once a day, Last Dose Taken:      .  Patient History:   Past Medical History:  Anemia    CAD (coronary artery disease)    DM (diabetes mellitus)    Emphysema    HTN (hypertension)    Hypercholesteremia.    Past Surgical History:  History of bilateral carotid endarterectomy    History of umbilical hernia repair    S/P bladder repair    S/P cataract surgery    S/P hysterectomy with oophorectomy    S/P TKR (total knee replacement).    Family History:  Mother  Still living? No  Family history of emphysema, Age at diagnosis: 71-80.    Social History:  Social History (marital status, living situation, occupation, tobacco use, alcohol and drug use, and sexual history): Denies tobacco, ETOH or illicit drug use Lives with her daughter, ambulates with a walker	    Tobacco Screening:  · Core Measure Site	No	    Risk Assessment:   Present on Admission:  Deep Venous Thrombosis	no 	  Pulmonary Embolus	no 	    Heart Failure:  Does this patient have a history of or has been diagnosed with heart failure? yes.      Physical Exam:  Physical Exam:  Vital Signs Last 24 Hrs T(C): 36.9 (18 Jan 2018 16:42), Max: 37.2 (17 Jan 2018 20:47) T(F): 98.5 (18 Jan 2018 16:42), Max: 98.9 (17 Jan 2018 20:47) HR: 90 (18 Jan 2018 19:00) (75 - 92) BP: 100/56 (18 Jan 2018 19:00) (100/56 - 132/59) RR: 18 (18 Jan 2018 19:00) (16 - 20) SpO2: 97% (18 Jan 2018 19:00) (97% - 100%)    Appearance: Normal   HEENT:   Normal oral mucosa, PERRL, EOMI   Neck: Supple, - JVD; No Carotid Bruit   Cardiovascular: Normal S1 S2, No JVD, No murmurs  Respiratory: Lungs bibasilar crackles, no Rhonchi, Wheezing   Gastrointestinal:  Soft, Non-tender, + BS   Skin: No rashes, No ecchymoses, No cyanosis  Extremities: 3+ edema b/l, No clubbing, cyanosis  Vascular: Femoral pulses 1+ b/l without bruit, DP faint, b/l, PT faint b/l  Neurologic: Non-focal Psychiatry: A & O x 3, Mood & affect appropriate	      Labs and Results:  Labs, Radiology, Cardiology, and Other Results:                       8.3   7.1   )-----------( 140      ( 18 Jan 2018 06:38 )            27.1   140  |  97  |  34<H> ----------------------------<  132<H> 3.7   |  32<H>  |  1.49<H>  Ca    9.4    Mg     2.0      TPro  7.3  /  Alb  4.0  /  TBili  0.3  /  DBili  x   /  AST  31  /  ALT  24  /  AlkPhos  109  01-16 	    Assessment and Plan:   Assessment:  · Assessment		  89 y/o female with HTN, HPL, DM-II, CAD/PCIs, diastolic CHF (NL EF), anemia who is admitted with new onset AFib and CHF exacerbation      Problem/Plan - 1:  ·  Problem: Atrial fibrillation.  Plan: New Afib with controlled HR  - Heparin gtt: Dose to keep PTT 60-80    Problem/Plan - 2:  - Monitor CBC closely & transfuse PRBCs to keep Hb > 7g/dl  - f/u SPEP, IPEP, Immunofixation, LDH, Beta-2 microglobulin  - Consider Epogen  - Trial of iron sucrose

## 2018-01-18 NOTE — PROGRESS NOTE ADULT - PROBLEM SELECTOR PLAN 1
+ JVD, crackles on exam, b/l LE edema  - Echo 1/17: LA moderately dilated, RA borderline dilated, mild AV thickening, trace AR, moderate MR, mild to mod TR, pulm HTN, PA pressure 57mmHg, mild RI, mild LVG, severe global hypokinesis, unable to assess for WMA 2/2 poor endocardial delineation, LVEF 20-25%  - Per Dr. Ravi (outpt cardiologist), this is newly depressed EF. EF nl in office last year. Plan for possible cardiac/renal cath when clinically optimized. D/w Dr. Falcon.  -Continue Lasix 40mg IV BID  - Currently transitioned to NC, O2 sat 98%, Bipap at bedside on standby.   - Rubalcava placed for strict I&O's, daily weights. -1L today.  - LE dopplers ordered, f/u results  - Dr. Gupta consulted, f/u recs

## 2018-01-18 NOTE — SWALLOW BEDSIDE ASSESSMENT ADULT - ASR SWALLOW DENTITION
No upper dentition, incomplete lower dentition.  Pt has upper partial denture but does not use it as it is ill-fitting ( hurts).

## 2018-01-18 NOTE — PROGRESS NOTE ADULT - PROBLEM SELECTOR PLAN 2
- New onset AFIB rates 100-110s s/p Cardizem 5mg IVP x 2 overnight.   - On pt specific heparin gtt per heme recs, goal ptt 60-80  - Telemetry  - EP consulted  -TSH elevated on admission - f/u thyroid antibiodies and endo recs  -would avoid betablockers/CCBs in the setting of heart failure  -Continue heparin gtt for AC, patient specific with goal PTT 60-80  -Will plan for ANMOL/DCCV when euvolemic - New onset AFIB rates 100-110s s/p Cardizem 5mg IVP x 2 overnight.   - On pt specific heparin gtt per heme recs, goal ptt 60-80  - Telemetry  - EP consulted  -TSH elevated on admission - f/u thyroid antibiodies and endo recs  -would avoid betablockers/CCBs in the setting of heart failure  -Continue heparin gtt for AC, patient specific with goal PTT 60-80  -Will plan for ANMOL/DCCV when euvolemic  -Consider adding BB/ACEI once medically optimized

## 2018-01-18 NOTE — SWALLOW BEDSIDE ASSESSMENT ADULT - SWALLOW EVAL: DIAGNOSIS
Patient presents with functional oropharyngeal swallow for thin liquids and purees.  She complained of pharyngeal residue with reg. solid and moist solid consistencies, which is suggestive of pharyngeal clearance deficits.  Pharyngeal residue sensation cleared with liquid wash.  Patient's pharyngeal dysphagia with solids may be, in part, related to her lack of dentition.

## 2018-01-18 NOTE — PROGRESS NOTE ADULT - PROBLEM SELECTOR PLAN 7
- H/H 8.3/27.1 today, continue to monitor  - Active T+S  - Heme (Dr. Elliott) following. Rec Heparin gtt: Dose to keep PTT 60-80  - Monitor CBC closely & transfuse PRBCs to keep Hb > 7g/dl  - Check Iron panel, SPEP, IPEP, Immunofixation, LDH, Beta-2 microglobulin (ordered, f/u results)  - Consider Epogen

## 2018-01-18 NOTE — SWALLOW BEDSIDE ASSESSMENT ADULT - COMMENTS
Pt endorsed long-standing history of dysphagia characterized by sensation of solid foods getting stuck in the throat requiring her ot take a sip of liquid to clear.

## 2018-01-18 NOTE — PROGRESS NOTE ADULT - PROBLEM SELECTOR PLAN 6
Pt endorsing difficulty swallowing  - Speech and swallow eval ordered, recommending Dysphagia 1 Pureed, thin Liquids  - Aspiration precautions

## 2018-01-18 NOTE — SWALLOW BEDSIDE ASSESSMENT ADULT - SLP GENERAL OBSERVATIONS
Pt awake and alert, pleasant,  (+) NC for O2 supplementation, in NAD.  Pt with functional communication skills in [place], described swallowing problem with clinician.

## 2018-01-18 NOTE — SWALLOW BEDSIDE ASSESSMENT ADULT - SWALLOW EVAL: RECOMMENDED FEEDING/EATING TECHNIQUES
hard swallow w/ each bite or sip/small sips/bites/allow for swallow between intakes/position upright (90 degrees)/maintain upright posture during/after eating for 30 mins

## 2018-01-18 NOTE — CONSULT NOTE ADULT - SUBJECTIVE AND OBJECTIVE BOX
HPI:  This is a 89 y/o female, obese, with HTN, HPL, DM-II, Anemia, diastolic CHF (NL EF), known CAD with previous PCIs, b/l carotid endarterectomies, who presented to her cardiologist with c/o mid sternal chest pressure radiating to the back, associated with SOB, palpitations, LE edema and 4lbs weight gain over 1 week. In the office, her EKG showed new AFib and she was sent to the ER for further evaluation.   In the ER, 1st troponin negative, BNP 3805, BUN 42/1.57, H/H 8.6/27.7, EKG AFib @ 100bpm, LBBB, CXR with some vascular congestion. She received Cardizem 5mg IV x1, Lasix 40mg IV x1, was started on IV heparin and admitted for further management and telemetry monitoring (17 Jan 2018 01:23)      PAST MEDICAL & SURGICAL HISTORY:  Anemia  CAD (coronary artery disease)  DM (diabetes mellitus)  Emphysema  Hypercholesteremia  HTN (hypertension)  S/P bladder repair  S/P hysterectomy with oophorectomy  History of bilateral carotid endarterectomy  History of umbilical hernia repair  S/P cataract surgery  S/P TKR (total knee replacement)      FAMILY HISTORY:  Family history of emphysema (Mother)      SOCIAL HISTORY:  Smoking:  ETOH:  Drug use:    HOME MEDICATIONS:    Insulin Pump Settings      MEDICATIONS  (STANDING):  amLODIPine   Tablet 5 milliGRAM(s) Oral daily  aspirin enteric coated 81 milliGRAM(s) Oral daily  atorvastatin 20 milliGRAM(s) Oral at bedtime  dextrose 5%. 1000 milliLiter(s) (50 mL/Hr) IV Continuous <Continuous>  dextrose 50% Injectable 12.5 Gram(s) IV Push once  dextrose 50% Injectable 25 Gram(s) IV Push once  dextrose 50% Injectable 25 Gram(s) IV Push once  furosemide   Injectable 40 milliGRAM(s) IV Push two times a day  heparin  Infusion 900 Unit(s)/Hr (9 mL/Hr) IV Continuous <Continuous>  insulin lispro (HumaLOG) corrective regimen sliding scale   SubCutaneous three times a day before meals  insulin lispro (HumaLOG) corrective regimen sliding scale   SubCutaneous at bedtime  isosorbide   mononitrate ER Tablet (IMDUR) 30 milliGRAM(s) Oral daily  lidocaine   Patch 1 Patch Transdermal daily  montelukast 10 milliGRAM(s) Oral daily  pantoprazole    Tablet 40 milliGRAM(s) Oral before breakfast  tiotropium 18 MICROgram(s) Capsule 1 Capsule(s) Inhalation daily    MEDICATIONS  (PRN):  dextrose Gel 1 Dose(s) Oral once PRN Blood Glucose LESS THAN 70 milliGRAM(s)/deciliter  glucagon  Injectable 1 milliGRAM(s) IntraMuscular once PRN Glucose LESS THAN 70 milligrams/deciliter      Allergies    No Known Allergies    Intolerances        REVIEW OF SYSTEMS  CONSTITUTIONAL:  Negative fever or chills  EYES:  Negative visual field deficit, blurry vision or double vision  ENT:  Negative for sore throat, runny nose, or earache  CARDIOVASCULAR:  Negative for chest pain or palpitations  RESPIRATORY:  Negative for cough, wheezing, sputum production, or SOB   GASTROINTESTINAL:  Negative for nausea, vomiting, diarrhea, or abdominal pain  GENITOURINARY:  Negative frequency, urgency or dysuria  MUSCULOSKELETAL:  No joint pain or stiffness  INTEGUMENTARY: no rashes  NEUROLOGIC:  No headache, confusion, syncope or seizures  PSYCHIATRIC: No depression or anxiety  HEMATOLOGY AND ONCOLOGY:  No unusual infections or bleeding    CAPILLARY GLUCOSE:  CAPILLARY BLOOD GLUCOSE        LABS:                        8.7    6.9   )-----------( 132      ( 17 Jan 2018 05:27 )             27.5     01-17    142  |  100  |  41<H>  ----------------------------<  101<H>  4.2   |  29  |  1.51<H>    Ca    9.8      17 Jan 2018 05:24  Mg     2.3     01-17    TPro  7.3  /  Alb  4.0  /  TBili  0.3  /  DBili  x   /  AST  31  /  ALT  24  /  AlkPhos  109  01-16    PT/INR - ( 17 Jan 2018 12:57 )   PT: 13.0 sec;   INR: 1.17          PTT - ( 17 Jan 2018 20:12 )  PTT:88.8 sec      RADIOLOGY & ADDITIONAL STUDIES:      Physical Examination:  Vital Signs Last 24 Hrs  T(C): 37.2 (17 Jan 2018 20:47), Max: 37.7 (17 Jan 2018 16:06)  T(F): 98.9 (17 Jan 2018 20:47), Max: 99.8 (17 Jan 2018 16:06)  HR: 75 (17 Jan 2018 23:42) (75 - 111)  BP: 107/53 (17 Jan 2018 22:08) (107/53 - 149/60)  BP(mean): 84 (17 Jan 2018 12:45) (84 - 84)  RR: 20 (17 Jan 2018 23:42) (17 - 22)  SpO2: 98% (17 Jan 2018 23:42) (90% - 99%)    Constitutional: wn/wd in NAD.   HEENT: NCAT, MMM, OP clear, EOMI, , no proptosis or lid retraction  Neck: no thyromegaly or palpable thyroid nodules   Respiratory: lungs CTAB.  Cardiovascular: regular rhythm, normal S1 and S2, no audible murmurs, no peripheral edema  GI: soft, NT/ND, no masses/HSM appreciated.  Neurology: no tremors.   Skin: no visible rashes/lesions  Psychiatric: AAO x 3, normal affect/mood.      ASSESSMENT/RECOMMENDATIONS:  Patient is a 89y old  Female who presents with a chief complaint of A. fib.  Consulted for management of diabetes type 2 and possible hypothyroidism.    Recommend:  Diabetic diet  Fingersticks glucose qid AC and HS and correction with moderate dose Lispro insulin.    Check thyroid function studies and antibodies - TSH, free T4, free T3, anti-thyroglobulin, anti thyroid peroxidase    Outpatient f/u with PCP.      Plan discussed with cardiologist Dr. Dillard.  Management of other medical issues per primary team.    Attending attestation:  I have seen and evaluated the patient at the bedside.   Nurse Practitioner/Fellow/Resident’s note reviewed, including vital signs, PE and laboratory results.  Direct personal management and extensive interpretation of the data conducted.   Assessment and recommendations as above.

## 2018-01-18 NOTE — PROGRESS NOTE ADULT - SUBJECTIVE AND OBJECTIVE BOX
Interventional Cardiology PA Adult Progress Note    Subjective Assessment: Patient seen and examined at bedside. Reports improvement in breathing from yesterday. Denies CP, palpitations, n/v, abdominal pain, syncope.   	  MEDICATIONS:  amLODIPine   Tablet 5 milliGRAM(s) Oral daily  furosemide   Injectable 40 milliGRAM(s) IV Push two times a day  isosorbide   mononitrate ER Tablet (IMDUR) 30 milliGRAM(s) Oral daily  montelukast 10 milliGRAM(s) Oral daily  tiotropium 18 MICROgram(s) Capsule 1 Capsule(s) Inhalation daily  pantoprazole    Tablet 40 milliGRAM(s) Oral before breakfast  atorvastatin 20 milliGRAM(s) Oral at bedtime  dextrose 50% Injectable 12.5 Gram(s) IV Push once  dextrose 50% Injectable 25 Gram(s) IV Push once  dextrose 50% Injectable 25 Gram(s) IV Push once  dextrose Gel 1 Dose(s) Oral once PRN  glucagon  Injectable 1 milliGRAM(s) IntraMuscular once PRN  insulin lispro (HumaLOG) corrective regimen sliding scale   SubCutaneous three times a day before meals  insulin lispro (HumaLOG) corrective regimen sliding scale   SubCutaneous at bedtime  aspirin enteric coated 81 milliGRAM(s) Oral daily  dextrose 5%. 1000 milliLiter(s) IV Continuous <Continuous>  heparin  Infusion 1100 Unit(s)/Hr IV Continuous <Continuous>  lidocaine   Patch 1 Patch Transdermal daily      [PHYSICAL EXAM:  TELEMETRY:  T(C): 36.9 (01-18-18 @ 16:42), Max: 37.2 (01-17-18 @ 20:47)  HR: 85 (01-18-18 @ 15:21) (75 - 92)  BP: 108/49 (01-18-18 @ 15:21) (107/53 - 132/59)  RR: 18 (01-18-18 @ 15:21) (16 - 20)  SpO2: 97% (01-18-18 @ 15:21) (97% - 100%)  Wt(kg): --  I&O's Summary    17 Jan 2018 07:01  -  18 Jan 2018 07:00  --------------------------------------------------------  IN: 420 mL / OUT: 2950 mL / NET: -2530 mL    18 Jan 2018 07:01  -  18 Jan 2018 18:23  --------------------------------------------------------  IN: 520 mL / OUT: 600 mL / NET: -80 mL        Rubalcava: In place, c/d/i                            Appearance: Normal	  Neck: Supple, + JVD  Cardiovascular: Irregularly irregular, + JVD, +ELLYN  Respiratory: +B/L rales, no wheezing or rhonchi   Gastrointestinal:  Soft, Non-tender, + BS	  Extremities: 2+ pitting edema b/l LES  Neurologic: Non-focal  Psychiatry: A & O x 3, Mood & affect appropriate      	        LABS:	 	  CARDIAC MARKERS:                                  8.3    7.1   )-----------( 140      ( 18 Jan 2018 06:38 )             27.1     01-18    140  |  97  |  34<H>  ----------------------------<  132<H>  3.7   |  32<H>  |  1.49<H>    Ca    9.4      18 Jan 2018 06:38  Mg     2.0     01-18    TPro  7.3  /  Alb  4.0  /  TBili  0.3  /  DBili  x   /  AST  31  /  ALT  24  /  AlkPhos  109  01-16    proBNP:   Lipid Profile:   HgA1c:   TSH: Thyroid Stimulating Hormone, Serum: 6.350 uIU/mL (01-18 @ 06:38)  Thyroid Stimulating Hormone, Serum: 5.255 uIU/mL (01-17 @ 20:12)    PT/INR - ( 18 Jan 2018 09:00 )   PT: 13.1 sec;   INR: 1.18          PTT - ( 18 Jan 2018 09:00 )  PTT:40.6 sec

## 2018-01-18 NOTE — SWALLOW BEDSIDE ASSESSMENT ADULT - ORAL PREPARATORY PHASE
Increased oral processing/mastication time with mechanical soft and reg. solid consistencies, aided by sip of thin liquid

## 2018-01-18 NOTE — PROGRESS NOTE ADULT - SUBJECTIVE AND OBJECTIVE BOX
INTERVAL HPI/OVERNIGHT EVENTS:    Patient is a 89y old  Female who presents with a chief complaint of  was seen and examined today. Reports the following symptoms:    CONSTITUTIONAL:  Negative fever or chills, feels well, good appetite  EYES:  Negative  blurry vision or double vision  CARDIOVASCULAR:  Negative for chest pain or palpitations  RESPIRATORY:  Negative for cough, wheezing, or SOB   GASTROINTESTINAL:  Negative for nausea, vomiting, diarrhea, constipation, or abdominal pain  GENITOURINARY:  Negative frequency, urgency or dysuria  NEUROLOGIC:  No headache, confusion, dizziness, lightheadedness    MEDICATIONS  (STANDING):  aspirin enteric coated 81 milliGRAM(s) Oral daily  atorvastatin 40 milliGRAM(s) Oral at bedtime  dextrose 5%. 1000 milliLiter(s) (50 mL/Hr) IV Continuous <Continuous>  dextrose 50% Injectable 12.5 Gram(s) IV Push once  dextrose 50% Injectable 25 Gram(s) IV Push once  dextrose 50% Injectable 25 Gram(s) IV Push once  furosemide   Injectable 40 milliGRAM(s) IV Push two times a day  heparin  Infusion 900 Unit(s)/Hr (9 mL/Hr) IV Continuous <Continuous>  insulin lispro (HumaLOG) corrective regimen sliding scale   SubCutaneous three times a day before meals  insulin lispro (HumaLOG) corrective regimen sliding scale   SubCutaneous at bedtime  iron sucrose IVPB 100 milliGRAM(s) IV Intermittent once  lidocaine   Patch 1 Patch Transdermal daily  montelukast 10 milliGRAM(s) Oral daily  pantoprazole    Tablet 40 milliGRAM(s) Oral before breakfast  tiotropium 18 MICROgram(s) Capsule 1 Capsule(s) Inhalation daily    MEDICATIONS  (PRN):  dextrose Gel 1 Dose(s) Oral once PRN Blood Glucose LESS THAN 70 milliGRAM(s)/deciliter  glucagon  Injectable 1 milliGRAM(s) IntraMuscular once PRN Glucose LESS THAN 70 milligrams/deciliter        LABS:                        8.3    7.1   )-----------( 140      ( 18 Jan 2018 06:38 )             27.1     01-18    140  |  97  |  34<H>  ----------------------------<  132<H>  3.7   |  32<H>  |  1.49<H>    Ca    9.4      18 Jan 2018 06:38  Mg     2.0     01-18      Hemoglobin A1C, Whole Blood: 7.0 % (01-17-18 @ 05:28)    PT/INR - ( 18 Jan 2018 17:43 )   PT: 13.2 sec;   INR: 1.19          PTT - ( 18 Jan 2018 17:43 )  PTT:110.0 sec    Thyroid Stimulating Hormone, Serum: 6.350 uIU/mL (01-18 @ 06:38)  Thyroid Stimulating Hormone, Serum: 5.255 uIU/mL (01-17 @ 20:12)      RADIOLOGY & ADDITIONAL TESTS:      Vital Signs Last 24 Hrs  T(C): 36.9 (18 Jan 2018 16:42), Max: 36.9 (18 Jan 2018 16:42)  T(F): 98.5 (18 Jan 2018 16:42), Max: 98.5 (18 Jan 2018 16:42)  HR: 90 (18 Jan 2018 19:00) (75 - 92)  BP: 100/56 (18 Jan 2018 19:00) (100/56 - 132/59)  BP(mean): --  RR: 18 (18 Jan 2018 19:00) (16 - 20)  SpO2: 97% (18 Jan 2018 19:00) (97% - 100%)    CAPILLARY BLOOD GLUCOSE      PHYSICAL EXAM:  Constitutional: wn/wd in NAD.   HEENT: NCAT, MMM, OP clear, EOMI, no proptosis or lid retraction  Neck: supple, no acanthosis, no thyromegaly or palpable thyroid nodules   Respiratory: Lungs CTAB.  Cardiovascular: regular rhythm, normal S1 and S2, no audible murmurs, no peripheral edema  GI: soft, + bowel sounds, NT/ND  Neurology: no tremors, DTR 2+  Skin: no visible rashes/lesions  Psychiatric: AAO x 3, normal affect/mood.        A/P: 89yFemale admitted for (    ). Consulted and being followed for Diabetes Type (  ).     Thyroid function:  Mildly elevated TSH    DM Type 2 (HbA1c 7%)     Please continue Lispro moderate/low dose sliding scale 4 times daily (before meals and bedtime).    Also continue consistent carbohydrate (Diabetic) diet, FS ac & hs. Target  - 150 mg/dl      Case discussed with attending and primary team      Attending attestation:  I have seen and evaluated the patient at the bedside.   Nurse Practitioner/Fellow/Resident’s note reviewed, including vital signs, PE and laboratory results.  Direct personal management and extensive interpretation of the data conducted.   Assessment and recommendations as above. INTERVAL HPI/OVERNIGHT EVENTS:    Patient is a 89y old  Female who presents with a chief complaint of SOB, weakness was seen and examined today. Reports the following symptoms:    CONSTITUTIONAL:  Negative fever or chills, feels well, good appetite  EYES:  Negative  blurry vision or double vision  CARDIOVASCULAR:  Negative for chest pain or palpitations  RESPIRATORY:  SOB   GASTROINTESTINAL:  Negative for nausea, vomiting, diarrhea, constipation, or abdominal pain  GENITOURINARY:  Negative frequency, urgency or dysuria  NEUROLOGIC:  No headache, confusion, dizziness, lightheadedness    MEDICATIONS  (STANDING):  aspirin enteric coated 81 milliGRAM(s) Oral daily  atorvastatin 40 milliGRAM(s) Oral at bedtime  dextrose 5%. 1000 milliLiter(s) (50 mL/Hr) IV Continuous <Continuous>  dextrose 50% Injectable 12.5 Gram(s) IV Push once  dextrose 50% Injectable 25 Gram(s) IV Push once  dextrose 50% Injectable 25 Gram(s) IV Push once  furosemide   Injectable 40 milliGRAM(s) IV Push two times a day  heparin  Infusion 900 Unit(s)/Hr (9 mL/Hr) IV Continuous <Continuous>  insulin lispro (HumaLOG) corrective regimen sliding scale   SubCutaneous three times a day before meals  insulin lispro (HumaLOG) corrective regimen sliding scale   SubCutaneous at bedtime  iron sucrose IVPB 100 milliGRAM(s) IV Intermittent once  lidocaine   Patch 1 Patch Transdermal daily  montelukast 10 milliGRAM(s) Oral daily  pantoprazole    Tablet 40 milliGRAM(s) Oral before breakfast  tiotropium 18 MICROgram(s) Capsule 1 Capsule(s) Inhalation daily    MEDICATIONS  (PRN):  dextrose Gel 1 Dose(s) Oral once PRN Blood Glucose LESS THAN 70 milliGRAM(s)/deciliter  glucagon  Injectable 1 milliGRAM(s) IntraMuscular once PRN Glucose LESS THAN 70 milligrams/deciliter        LABS:                        8.3    7.1   )-----------( 140      ( 18 Jan 2018 06:38 )             27.1     01-18    140  |  97  |  34<H>  ----------------------------<  132<H>  3.7   |  32<H>  |  1.49<H>    Ca    9.4      18 Jan 2018 06:38  Mg     2.0     01-18      Hemoglobin A1C, Whole Blood: 7.0 % (01-17-18 @ 05:28)    PT/INR - ( 18 Jan 2018 17:43 )   PT: 13.2 sec;   INR: 1.19          PTT - ( 18 Jan 2018 17:43 )  PTT:110.0 sec    Thyroid Stimulating Hormone, Serum: 6.350 uIU/mL (01-18 @ 06:38)  Thyroid Stimulating Hormone, Serum: 5.255 uIU/mL (01-17 @ 20:12)      RADIOLOGY & ADDITIONAL TESTS:      Vital Signs Last 24 Hrs  T(C): 36.9 (18 Jan 2018 16:42), Max: 36.9 (18 Jan 2018 16:42)  T(F): 98.5 (18 Jan 2018 16:42), Max: 98.5 (18 Jan 2018 16:42)  HR: 90 (18 Jan 2018 19:00) (75 - 92)  BP: 100/56 (18 Jan 2018 19:00) (100/56 - 132/59)  BP(mean): --  RR: 18 (18 Jan 2018 19:00) (16 - 20)  SpO2: 97% (18 Jan 2018 19:00) (97% - 100%)    CAPILLARY BLOOD GLUCOSE      PHYSICAL EXAM:  Constitutional: wn/wd in NAD.   HEENT: NCAT, MMM, OP clear, EOMI, no proptosis or lid retraction  Neck: supple, no acanthosis, no thyromegaly or palpable thyroid nodules   Respiratory: Lungs CTAB.  Cardiovascular: regular rhythm, normal S1 and S2, no audible murmurs, no peripheral edema  GI: soft, + bowel sounds, NT/ND  Neurology: no tremors, DTR 2+  Skin: no visible rashes/lesions  Psychiatric: AAO x 3, normal affect/mood.        A/P: 89yFemale admitted for SOB, weakness.  Consulted and being followed for Diabetes Type 2 and high TSH.     Thyroid function:  Mildly elevated TSH x 2 with normal free T4/T3 and patient with CHF, weakness, sluggishness.  REC:  Start low dose levothyroxine 25 mcg po qAM on an empty stomach with water at least one half hour before breakfast.    DM Type 2 (HbA1c 7%)   REC:  Please continue Lispro moderate/low dose sliding scale 4 times daily (before meals and bedtime).    Also continue consistent carbohydrate (Diabetic) diet, FS ac & hs. Target  - 150 mg/dl      Case discussed with attending and primary team      Attending attestation:  I have seen and evaluated the patient at the bedside.   Nurse Practitioner/Fellow/Resident’s note reviewed, including vital signs, PE and laboratory results.  Direct personal management and extensive interpretation of the data conducted.   Assessment and recommendations as above.

## 2018-01-18 NOTE — PROGRESS NOTE ADULT - PROBLEM SELECTOR PLAN 8
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 Dr. Castle will likely do renal angiogram when he does the [facility]  - BUN/Cr improved to 34/1.49, baseline by outpt ppw  - Continue to monitor    FULL CODE  DVT ppx: Heparin gtt, patient specific goal PTT 60-80  Dispo: Continue IV diuresis, management of AFIB

## 2018-01-18 NOTE — SWALLOW BEDSIDE ASSESSMENT ADULT - PHARYNGEAL PHASE
Suspect generally timely swallow initiation.  (+) laryngeal elevation palpated on swallows.  No overt signs & symptoms of airway protection deficits noted across trials.  Pt c/o pharyngeal stasis sensation after swallow of solid consistencies, cleared with liquid wash.

## 2018-01-19 ENCOUNTER — TRANSCRIPTION ENCOUNTER (OUTPATIENT)
Age: 83
End: 2018-01-19

## 2018-01-19 DIAGNOSIS — I50.9 HEART FAILURE, UNSPECIFIED: ICD-10-CM

## 2018-01-19 LAB
ANION GAP SERPL CALC-SCNC: 10 MMOL/L — SIGNIFICANT CHANGE UP (ref 5–17)
APTT BLD: 105.6 SEC — HIGH (ref 27.5–37.4)
APTT BLD: 56 SEC — HIGH (ref 27.5–37.4)
APTT BLD: 71.9 SEC — HIGH (ref 27.5–37.4)
BASOPHILS NFR BLD AUTO: 0.1 % — SIGNIFICANT CHANGE UP (ref 0–2)
BLD GP AB SCN SERPL QL: NEGATIVE — SIGNIFICANT CHANGE UP
BUN SERPL-MCNC: 33 MG/DL — HIGH (ref 7–23)
CALCIUM SERPL-MCNC: 9.5 MG/DL — SIGNIFICANT CHANGE UP (ref 8.4–10.5)
CHLORIDE SERPL-SCNC: 97 MMOL/L — SIGNIFICANT CHANGE UP (ref 96–108)
CO2 SERPL-SCNC: 34 MMOL/L — HIGH (ref 22–31)
CREAT SERPL-MCNC: 1.41 MG/DL — HIGH (ref 0.5–1.3)
EOSINOPHIL NFR BLD AUTO: 1.3 % — SIGNIFICANT CHANGE UP (ref 0–6)
GLUCOSE BLDC GLUCOMTR-MCNC: 186 MG/DL — HIGH (ref 70–99)
GLUCOSE BLDC GLUCOMTR-MCNC: 187 MG/DL — HIGH (ref 70–99)
GLUCOSE SERPL-MCNC: 139 MG/DL — HIGH (ref 70–99)
HCT VFR BLD CALC: 27 % — LOW (ref 34.5–45)
HGB BLD-MCNC: 8.2 G/DL — LOW (ref 11.5–15.5)
INR BLD: 1.13 — SIGNIFICANT CHANGE UP (ref 0.88–1.16)
INR BLD: 1.18 — HIGH (ref 0.88–1.16)
LYMPHOCYTES # BLD AUTO: 12.9 % — LOW (ref 13–44)
MAGNESIUM SERPL-MCNC: 2 MG/DL — SIGNIFICANT CHANGE UP (ref 1.6–2.6)
MCHC RBC-ENTMCNC: 28.3 PG — SIGNIFICANT CHANGE UP (ref 27–34)
MCHC RBC-ENTMCNC: 30.4 G/DL — LOW (ref 32–36)
MCV RBC AUTO: 93.1 FL — SIGNIFICANT CHANGE UP (ref 80–100)
MONOCYTES NFR BLD AUTO: 11.2 % — SIGNIFICANT CHANGE UP (ref 2–14)
NEUTROPHILS NFR BLD AUTO: 74.5 % — SIGNIFICANT CHANGE UP (ref 43–77)
PLATELET # BLD AUTO: 140 K/UL — LOW (ref 150–400)
POTASSIUM SERPL-MCNC: 4.5 MMOL/L — SIGNIFICANT CHANGE UP (ref 3.5–5.3)
POTASSIUM SERPL-SCNC: 4.5 MMOL/L — SIGNIFICANT CHANGE UP (ref 3.5–5.3)
PROT SERPL-MCNC: 6.1 G/DL — SIGNIFICANT CHANGE UP (ref 6–8.3)
PROT SERPL-MCNC: 6.1 G/DL — SIGNIFICANT CHANGE UP (ref 6–8.3)
PROTHROM AB SERPL-ACNC: 12.6 SEC — SIGNIFICANT CHANGE UP (ref 9.8–12.7)
PROTHROM AB SERPL-ACNC: 13.1 SEC — HIGH (ref 9.8–12.7)
RBC # BLD: 2.9 M/UL — LOW (ref 3.8–5.2)
RBC # FLD: 16.9 % — SIGNIFICANT CHANGE UP (ref 10.3–16.9)
RH IG SCN BLD-IMP: POSITIVE — SIGNIFICANT CHANGE UP
SODIUM SERPL-SCNC: 141 MMOL/L — SIGNIFICANT CHANGE UP (ref 135–145)
WBC # BLD: 6.8 K/UL — SIGNIFICANT CHANGE UP (ref 3.8–10.5)
WBC # FLD AUTO: 6.8 K/UL — SIGNIFICANT CHANGE UP (ref 3.8–10.5)

## 2018-01-19 PROCEDURE — 99232 SBSQ HOSP IP/OBS MODERATE 35: CPT

## 2018-01-19 RX ORDER — HEPARIN SODIUM 5000 [USP'U]/ML
1100 INJECTION INTRAVENOUS; SUBCUTANEOUS
Qty: 25000 | Refills: 0 | Status: DISCONTINUED | OUTPATIENT
Start: 2018-01-19 | End: 2018-01-19

## 2018-01-19 RX ORDER — LEVOTHYROXINE SODIUM 125 MCG
25 TABLET ORAL DAILY
Qty: 0 | Refills: 0 | Status: DISCONTINUED | OUTPATIENT
Start: 2018-01-19 | End: 2018-01-28

## 2018-01-19 RX ORDER — HEPARIN SODIUM 5000 [USP'U]/ML
900 INJECTION INTRAVENOUS; SUBCUTANEOUS
Qty: 25000 | Refills: 0 | Status: DISCONTINUED | OUTPATIENT
Start: 2018-01-19 | End: 2018-01-21

## 2018-01-19 RX ORDER — HEPARIN SODIUM 5000 [USP'U]/ML
3000 INJECTION INTRAVENOUS; SUBCUTANEOUS ONCE
Qty: 0 | Refills: 0 | Status: COMPLETED | OUTPATIENT
Start: 2018-01-19 | End: 2018-01-19

## 2018-01-19 RX ADMIN — HEPARIN SODIUM 11 UNIT(S)/HR: 5000 INJECTION INTRAVENOUS; SUBCUTANEOUS at 16:22

## 2018-01-19 RX ADMIN — HEPARIN SODIUM 3000 UNIT(S): 5000 INJECTION INTRAVENOUS; SUBCUTANEOUS at 16:27

## 2018-01-19 RX ADMIN — Medication 40 MILLIGRAM(S): at 10:16

## 2018-01-19 RX ADMIN — ATORVASTATIN CALCIUM 40 MILLIGRAM(S): 80 TABLET, FILM COATED ORAL at 21:53

## 2018-01-19 RX ADMIN — ISOSORBIDE DINITRATE 10 MILLIGRAM(S): 5 TABLET ORAL at 06:08

## 2018-01-19 RX ADMIN — ISOSORBIDE DINITRATE 10 MILLIGRAM(S): 5 TABLET ORAL at 21:53

## 2018-01-19 RX ADMIN — ISOSORBIDE DINITRATE 10 MILLIGRAM(S): 5 TABLET ORAL at 14:33

## 2018-01-19 RX ADMIN — TIOTROPIUM BROMIDE 1 CAPSULE(S): 18 CAPSULE ORAL; RESPIRATORY (INHALATION) at 10:17

## 2018-01-19 RX ADMIN — PANTOPRAZOLE SODIUM 40 MILLIGRAM(S): 20 TABLET, DELAYED RELEASE ORAL at 06:08

## 2018-01-19 RX ADMIN — LIDOCAINE 1 PATCH: 4 CREAM TOPICAL at 22:00

## 2018-01-19 RX ADMIN — Medication 81 MILLIGRAM(S): at 10:17

## 2018-01-19 RX ADMIN — Medication 25 MICROGRAM(S): at 06:08

## 2018-01-19 RX ADMIN — Medication 40 MILLIGRAM(S): at 21:52

## 2018-01-19 RX ADMIN — LIDOCAINE 1 PATCH: 4 CREAM TOPICAL at 10:17

## 2018-01-19 RX ADMIN — Medication 10 MILLIGRAM(S): at 21:53

## 2018-01-19 RX ADMIN — Medication 2: at 16:33

## 2018-01-19 RX ADMIN — HEPARIN SODIUM 9 UNIT(S)/HR: 5000 INJECTION INTRAVENOUS; SUBCUTANEOUS at 08:32

## 2018-01-19 RX ADMIN — Medication 10 MILLIGRAM(S): at 14:32

## 2018-01-19 RX ADMIN — MONTELUKAST 10 MILLIGRAM(S): 4 TABLET, CHEWABLE ORAL at 10:16

## 2018-01-19 RX ADMIN — Medication 10 MILLIGRAM(S): at 06:08

## 2018-01-19 RX ADMIN — IRON SUCROSE 210 MILLIGRAM(S): 20 INJECTION, SOLUTION INTRAVENOUS at 00:37

## 2018-01-19 NOTE — DISCHARGE NOTE ADULT - PLAN OF CARE
Follow-up with your cardiologist Dr. Dillard in 1-2 weeks. You have a history of heart failure and should continue your daily Lasix 80 mg daily. The pumping function of your heart was found to be decreased when you arrived to the hospital. You should weigh yourself daily and if you notice a weight gain of more than 2-3 pounds in 2 days, shortness of breath, or lower extremity swelling you should contact your doctor immediately. Please restrict your fluid intake to 1-2 liters daily. Please continue the medications Imdur (Isosorbide Mononitrate) 30 mg daily and Toprol (Metoprolol) 25 mg daily. Please also continue your Spiriva and Albuterol inhalers as needed for breathing. All of your needed medications have been electronically prescribed to your pharmacy. Follow-up with Electrophysiology Dr. Connolly on Thursday, February 22, 2018 at 3:15 PM. You presented to the hospital in an abnormal heart rhythm called atrial fibrillation. You underwent multiple procedures to get your heart back into normal rhythm. You are currently in normal rhythm and must continue the medication Amiodarone 200 mg daily. This abnormal heart rhythm puts you at risk for developing blood clots. Please continue taking the medication Eliquis 2.5 mg twice daily to prevent blood clots from forming which could cause stroke!! You have been newly diagnosed with hypothyroidism (low thyroid function). Please continue the medication Synthroid (Levothyroxine) 25 mcg daily in the morning. Please follow-up with your primary care doctor in 6 weeks to have blood work done to continue monitoring  your thyroid function. You had a procedure called a renal angiogram performed and received ballooning to your right renal artery. Please continue the medications Aspirin 81 mg daily and Plavix 75 mg daily to prevent blockages and/or closure of your stents. Your also have a history of chronic kidney disease and should regularly monitor your kidney function with your primary care doctor. If decreased urination or inability to urinate occurs, please call your doctor and seek immediate medical attention. You have a history of coronary artery disease and should continue the medications Aspirin, Plavix, and Eliquis as prescribed and explained above. Please follow-up with your cardiologist Dr. Dillard in 1-2 weeks and discuss when to stop one of these medications as you should not continue all three medications long term due to bleeding risks (Aspirin, Plavix, Eliquis). You have a diagnosis of diabetes and should continue all of your diabetic medications as prescribed. Follow-up with your cardiologist Dr. Dillard in 1 week. You have a history of coronary artery disease and should continue the medications Aspirin, Plavix, and Eliquis as prescribed and explained above. Please follow-up with your cardiologist Dr. Dillard in 1 week and discuss when to stop one of these medications as you should not continue all three medications long term due to bleeding risks (Aspirin, Plavix, Eliquis). You have a diagnosis of anemia. If dark tarry stools, bright red blood per rectum, or blood in the urine is noted, please call your doctor and seek immediate medical attention.

## 2018-01-19 NOTE — DISCHARGE NOTE ADULT - MEDICATION SUMMARY - MEDICATIONS TO TAKE
I will START or STAY ON the medications listed below when I get home from the hospital:    oxygen  -- inhaled once   -- Indication: For Breathing    aspirin 81 mg oral tablet  -- 1 tab(s) by mouth once a day  -- Indication: For CAD (coronary artery disease)/renal artery disease    isosorbide mononitrate 30 mg oral tablet, extended release  -- 1 tab(s) by mouth once a day (in the morning)  -- Indication: For Congestive heart disease    amiodarone 200 mg oral tablet  -- 1 tab(s) by mouth once a day  -- Indication: For Atrial fibrillation    Eliquis 2.5 mg oral tablet  -- 1 tab(s) by mouth every 12 hours  -- Indication: For Preventing Blood Clots/Stroke Prevention    Januvia 50 mg oral tablet  -- 1 tab(s) by mouth once a day  -- Indication: For Diabetes    atorvastatin 40 mg oral tablet  -- 1 tab(s) by mouth once a day (at bedtime)  -- Indication: For Cholesterol    clopidogrel 75 mg oral tablet  -- 1 tab(s) by mouth once a day  -- Indication: For CAD (coronary artery disease)/renal artery disease    metoprolol succinate 25 mg oral tablet, extended release  -- 1 tab(s) by mouth once a day  -- Indication: For CAD (coronary artery disease)    Spiriva 18 mcg inhalation capsule  -- 1 cap(s) inhaled once a day  -- Indication: For Breathing    amLODIPine 5 mg oral tablet  -- 1 tab(s) by mouth once a day  -- Indication: For Hypertension    Lasix 80 mg oral tablet  -- 1 tab(s) by mouth once a day  -- Indication: For Congestive heart disease    montelukast 10 mg oral tablet  -- 1 tab(s) by mouth once a day  -- Indication: For Breathing    omeprazole 40 mg oral delayed release capsule  -- 1 cap(s) by mouth once a day  -- Indication: For Stomach Acid Protection    levothyroxine 25 mcg (0.025 mg) oral tablet  -- 1 tab(s) by mouth once a day  -- Indication: For Hypothyroidism

## 2018-01-19 NOTE — DISCHARGE NOTE ADULT - CARE PLAN
Principal Discharge DX:	Acute on chronic congestive heart failure, unspecified congestive heart failure type  Goal:	Follow-up with your cardiologist Dr. Dillard in 1-2 weeks.  Assessment and plan of treatment:	You have a history of heart failure and should continue your daily Lasix 80 mg daily. The pumping function of your heart was found to be decreased when you arrived to the hospital. You should weigh yourself daily and if you notice a weight gain of more than 2-3 pounds in 2 days, shortness of breath, or lower extremity swelling you should contact your doctor immediately. Please restrict your fluid intake to 1-2 liters daily. Please continue the medications Imdur (Isosorbide Mononitrate) 30 mg daily and Toprol (Metoprolol) 25 mg daily. Please also continue your Spiriva and Albuterol inhalers as needed for breathing. All of your needed medications have been electronically prescribed to your pharmacy.  Secondary Diagnosis:	Atrial fibrillation  Goal:	Follow-up with Electrophysiology Dr. Connolly on Thursday, February 22, 2018 at 3:15 PM.  Assessment and plan of treatment:	You presented to the hospital in an abnormal heart rhythm called atrial fibrillation. You underwent multiple procedures to get your heart back into normal rhythm. You are currently in normal rhythm and must continue the medication Amiodarone 200 mg daily. This abnormal heart rhythm puts you at risk for developing blood clots. Please continue taking the medication Eliquis 2.5 mg twice daily to prevent blood clots from forming which could cause stroke!!  Secondary Diagnosis:	Hypothyroidism  Assessment and plan of treatment:	You have been newly diagnosed with hypothyroidism (low thyroid function). Please continue the medication Synthroid (Levothyroxine) 25 mcg daily in the morning. Please follow-up with your primary care doctor in 6 weeks to have blood work done to continue monitoring  your thyroid function.  Secondary Diagnosis:	Renal artery stenosis  Assessment and plan of treatment:	You had a procedure called a renal angiogram performed and received ballooning to your right renal artery. Please continue the medications Aspirin 81 mg daily and Plavix 75 mg daily to prevent blockages and/or closure of your stents. Your also have a history of chronic kidney disease and should regularly monitor your kidney function with your primary care doctor. If decreased urination or inability to urinate occurs, please call your doctor and seek immediate medical attention.  Secondary Diagnosis:	CAD (coronary artery disease)  Assessment and plan of treatment:	You have a history of coronary artery disease and should continue the medications Aspirin, Plavix, and Eliquis as prescribed and explained above. Please follow-up with your cardiologist Dr. Dillard in 1-2 weeks and discuss when to stop one of these medications as you should not continue all three medications long term due to bleeding risks (Aspirin, Plavix, Eliquis).  Secondary Diagnosis:	Anemia  Secondary Diagnosis:	DM (diabetes mellitus)  Assessment and plan of treatment:	You have a diagnosis of diabetes and should continue all of your diabetic medications as prescribed. Principal Discharge DX:	Acute on chronic congestive heart failure, unspecified congestive heart failure type  Goal:	Follow-up with your cardiologist Dr. Dillard in 1 week.  Assessment and plan of treatment:	You have a history of heart failure and should continue your daily Lasix 80 mg daily. The pumping function of your heart was found to be decreased when you arrived to the hospital. You should weigh yourself daily and if you notice a weight gain of more than 2-3 pounds in 2 days, shortness of breath, or lower extremity swelling you should contact your doctor immediately. Please restrict your fluid intake to 1-2 liters daily. Please continue the medications Imdur (Isosorbide Mononitrate) 30 mg daily and Toprol (Metoprolol) 25 mg daily. Please also continue your Spiriva and Albuterol inhalers as needed for breathing. All of your needed medications have been electronically prescribed to your pharmacy.  Secondary Diagnosis:	Atrial fibrillation  Goal:	Follow-up with Electrophysiology Dr. Connolly on Thursday, February 22, 2018 at 3:15 PM.  Assessment and plan of treatment:	You presented to the hospital in an abnormal heart rhythm called atrial fibrillation. You underwent multiple procedures to get your heart back into normal rhythm. You are currently in normal rhythm and must continue the medication Amiodarone 200 mg daily. This abnormal heart rhythm puts you at risk for developing blood clots. Please continue taking the medication Eliquis 2.5 mg twice daily to prevent blood clots from forming which could cause stroke!!  Secondary Diagnosis:	Hypothyroidism  Assessment and plan of treatment:	You have been newly diagnosed with hypothyroidism (low thyroid function). Please continue the medication Synthroid (Levothyroxine) 25 mcg daily in the morning. Please follow-up with your primary care doctor in 6 weeks to have blood work done to continue monitoring  your thyroid function.  Secondary Diagnosis:	Renal artery stenosis  Assessment and plan of treatment:	You had a procedure called a renal angiogram performed and received ballooning to your right renal artery. Please continue the medications Aspirin 81 mg daily and Plavix 75 mg daily to prevent blockages and/or closure of your stents. Your also have a history of chronic kidney disease and should regularly monitor your kidney function with your primary care doctor. If decreased urination or inability to urinate occurs, please call your doctor and seek immediate medical attention.  Secondary Diagnosis:	CAD (coronary artery disease)  Assessment and plan of treatment:	You have a history of coronary artery disease and should continue the medications Aspirin, Plavix, and Eliquis as prescribed and explained above. Please follow-up with your cardiologist Dr. Dillard in 1 week and discuss when to stop one of these medications as you should not continue all three medications long term due to bleeding risks (Aspirin, Plavix, Eliquis).  Secondary Diagnosis:	Anemia  Assessment and plan of treatment:	You have a diagnosis of anemia. If dark tarry stools, bright red blood per rectum, or blood in the urine is noted, please call your doctor and seek immediate medical attention.  Secondary Diagnosis:	DM (diabetes mellitus)  Assessment and plan of treatment:	You have a diagnosis of diabetes and should continue all of your diabetic medications as prescribed.

## 2018-01-19 NOTE — DISCHARGE NOTE ADULT - INSTRUCTIONS
Please continue a heart healthy diet low in sodium, fat, and cholesterol. Please restrict your fluid intake to 1-2 Liters daily.

## 2018-01-19 NOTE — DISCHARGE NOTE ADULT - CARE PROVIDER_API CALL
Phillip Dillard; PhD), Cardiovascular Disease; Interventional Cardiology  [address] [place] [id number]  Phone: [phone]  Fax: [phone]    Tristan Connolly), Cardiac Electrophysiology; Cardiovascular Disease; Internal Medicine  [address] [address]  Phone: [phone]  Fax: [phone]

## 2018-01-19 NOTE — CONSULT NOTE ADULT - PROBLEM SELECTOR RECOMMENDATION 9
1) Chest x-ray not c/w Pulmonary TB; patient denies weight loss, fever, night sweats  2) Pending CT chest per Pulmonary attending  3) Send Quantiferon TB Gold
is related to the CHF and valvular disease.  The CXR was consistent with worsening CHF.  There is an element of COPD that is contributing to the dyspnea.  I discussed with cardiology and continue to optimize the fluid management

## 2018-01-19 NOTE — DISCHARGE NOTE ADULT - ADDITIONAL INSTRUCTIONS
Follow-up with your cardiologist Dr. Dillard in 1-2 weeks.    Follow-up with Electrophysiology Dr. Connolly on Thursday, February 22, 2018 at 3:15 PM. Follow-up with your cardiologist Dr. Dillard in 1 week.     Follow-up with Electrophysiology Dr. Connolly on Thursday, February 22, 2018 at 3:15 PM.

## 2018-01-19 NOTE — CONSULT NOTE ADULT - SUBJECTIVE AND OBJECTIVE BOX
HPI:  This is a 89 y/o female, obese, with HTN, HPL, DM-II, Anemia, diastolic CHF (NL EF), known CAD with previous PCIs, b/l carotid endarterectomies, who presented to her cardiologist with c/o mid sternal chest pressure radiating to the back, associated with SOB, palpitations, LE edema and 4lbs weight gain over 1 week. In the office, her EKG showed new AFib and she was sent to the ER for further evaluation.   In the ER, 1st troponin negative, BNP 3805, BUN 42/1.57, H/H 8.6/27.7, EKG AFib @ 100bpm, LBBB, CXR with some vascular congestion. She received Cardizem 5mg IV x1, Lasix 40mg IV x1, was started on IV heparin and admitted for further management and telemetry monitoring (17 Jan 2018 01:23)      PAST MEDICAL & SURGICAL HISTORY:  Anemia  CAD (coronary artery disease)  DM (diabetes mellitus)  Emphysema  Hypercholesteremia  HTN (hypertension)  S/P bladder repair  S/P hysterectomy with oophorectomy  History of bilateral carotid endarterectomy  History of umbilical hernia repair  S/P cataract surgery  S/P TKR (total knee replacement)        REVIEW OF SYSTEMS:    General: no weakness; no fevers, no chills, no weigt loss  Skin/Breast: no rash  Respiratory and Thorax: no SOB, no cough  Cardiovascular:	No chest pain  Gastrointestinal:	 no nausea, vomiting , diarrhea  Genitourinary:	no dysuria, no difficulty urinating, no hematuria  Musculoskeletal:	no weakness, no joint swelling/pain  Neurological:	no focal weakness/numbness  Endocrine: no polyuria, no polydipsia      ANTIBIOTICS:  MEDICATIONS  (STANDING):  aspirin enteric coated 81 milliGRAM(s) Oral daily  atorvastatin 40 milliGRAM(s) Oral at bedtime  dextrose 5%. 1000 milliLiter(s) (50 mL/Hr) IV Continuous <Continuous>  dextrose 50% Injectable 12.5 Gram(s) IV Push once  dextrose 50% Injectable 25 Gram(s) IV Push once  dextrose 50% Injectable 25 Gram(s) IV Push once  furosemide   Injectable 40 milliGRAM(s) IV Push two times a day  heparin  Infusion 1100 Unit(s)/Hr (11 mL/Hr) IV Continuous <Continuous>  hydrALAZINE 10 milliGRAM(s) Oral three times a day  insulin lispro (HumaLOG) corrective regimen sliding scale   SubCutaneous three times a day before meals  insulin lispro (HumaLOG) corrective regimen sliding scale   SubCutaneous at bedtime  isosorbide   dinitrate Tablet (ISORDIL) 10 milliGRAM(s) Oral three times a day  levothyroxine 25 MICROGram(s) Oral daily  lidocaine   Patch 1 Patch Transdermal daily  montelukast 10 milliGRAM(s) Oral daily  pantoprazole    Tablet 40 milliGRAM(s) Oral before breakfast  tiotropium 18 MICROgram(s) Capsule 1 Capsule(s) Inhalation daily    MEDICATIONS  (PRN):  dextrose Gel 1 Dose(s) Oral once PRN Blood Glucose LESS THAN 70 milliGRAM(s)/deciliter  glucagon  Injectable 1 milliGRAM(s) IntraMuscular once PRN Glucose LESS THAN 70 milligrams/deciliter      Allergies: No Known Allergies    SOCIAL HISTORY: no smoking, no ETOH    FAMILY HISTORY:  Family history of emphysema (Mother)      Vital Signs Last 24 Hrs  T(C): 36.7 (19 Jan 2018 13:58), Max: 36.7 (19 Jan 2018 09:08)  T(F): 98.1 (19 Jan 2018 13:58), Max: 98.1 (19 Jan 2018 09:08)  HR: 82 (19 Jan 2018 17:00) (82 - 98)  BP: 108/54 (19 Jan 2018 17:00) (102/54 - 121/56)  BP(mean): --  RR: 18 (19 Jan 2018 17:00) (16 - 25)  SpO2: 97% (19 Jan 2018 17:00) (95% - 100%)    01-18-18 @ 07:01  -  01-19-18 @ 07:00  --------------------------------------------------------  IN: 556 mL / OUT: 1800 mL / NET: -1244 mL    01-19-18 @ 07:01  - 01-19-18 @ 19:27  --------------------------------------------------------  IN: 414 mL / OUT: 625 mL / NET: -211 mL        PHYSICAL EXAM:  Constitutional:Well-developed, well nourished  Eyes:ZAID, EOMI  Ear/Nose/Throat: no oral lesion, no sinus tenderness on percussion	  Neck:no JVD, no lymphadenopathy, supple  Respiratory: +B/L rales, no wheezing or rhonchi   Cardiovascular: S1S2 RRR, no murmurs  Gastrointestinal:soft, (+) BS, no HSM  Extremities:+1 pitting edema  Vascular: DP Pulse: right normal; left normal            LABS:                        8.2    6.8   )-----------( 140      ( 19 Jan 2018 06:53 )             27.0     01-19    141  |  97  |  33<H>  ----------------------------<  139<H>  4.5   |  34<H>  |  1.41<H>    Ca    9.5      19 Jan 2018 06:53  Mg     2.0     01-19    TPro  6.1  /  Alb  x   /  TBili  x   /  DBili  x   /  AST  x   /  ALT  x   /  AlkPhos  x   01-18    PT/INR - ( 19 Jan 2018 14:31 )   PT: 12.6 sec;   INR: 1.13          PTT - ( 19 Jan 2018 14:31 )  PTT:56.0 sec      RADIOLOGY & ADDITIONAL STUDIES:  < from: Xray Chest 1 View AP- PORTABLE-Urgent (01.17.18 @ 09:52) >    EXAM:  XR CHEST PORTABLE URGENT 1V                          PROCEDURE DATE:  01/17/2018                     INTERPRETATION:  INDICATION: eval fluid status        TECHNIQUE: Chest, single portable AP view on  1/17/2018 9:52 AM     COMPARISON: Chest radiograph dated 1/16/2018    FINDINGS:  There is interval worsening of severe bilateral pulmonary congestion,   worse on the right. There is obscuration hilar and right paratracheal   regions.  There is a probable layering right pleural effusion.  Nopneumothorax is evident.  Visualized cardiac silhouette appears grossly stable      IMPRESSION:   Worsening of severe bilateral pulmonary congestion, asymmetric on the   right.

## 2018-01-19 NOTE — PROGRESS NOTE ADULT - PROBLEM SELECTOR PLAN 2
- New onset AFIB rates 100-110s s/p Cardizem 5mg IVP x 2 overnight.   - On pt specific heparin gtt per heme recs, goal ptt 60-80  - Telemetry  - EP consulted  -TSH elevated on admission - f/u thyroid antibiodies and endo recs  -would avoid betablockers/CCBs at this time in the setting of heart failure  -Continue heparin gtt for AC, patient specific with goal PTT 60-80  -Will plan for ANMOL/DCCV when euvolemic  -Consider adding Toprol/ACEI once medically optimized

## 2018-01-19 NOTE — DISCHARGE NOTE ADULT - ABILITY TO HEAR (WITH HEARING AID OR HEARING APPLIANCE IF NORMALLY USED):
Decreased hearing left ear no hearing aid/Mildly to Moderately Impaired: difficulty hearing in some environments or speaker may need to increase volume or speak distinctly

## 2018-01-19 NOTE — CONSULT NOTE ADULT - CONSULT REASON
sob
Afib
Anemia
Diabetes type 2, hypothyroidism
Lung infiltrates, possible exposure to TB in [facility]

## 2018-01-19 NOTE — PROGRESS NOTE ADULT - PROBLEM SELECTOR PLAN 8
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 Dr. Castle will likely do renal angiogram when he does the [facility]  - BUN/Cr improving to 33/1.41, baseline by outpt ppw  - Continue to monitor    FULL CODE  DVT ppx: Heparin gtt, patient specific goal PTT 60-80  Dispo: Continue IV diuresis, management of AFIB, PT to see patient

## 2018-01-19 NOTE — PROGRESS NOTE ADULT - ASSESSMENT
89 y/o female with HTN, HLD, DM-II, CAD/PCIs, diastolic CHF (previously normal EF, now reduced to 20-25%), anemia who is admitted with new onset AFib and CHF exacerbation.

## 2018-01-19 NOTE — DISCHARGE NOTE ADULT - PATIENT PORTAL LINK FT
“You can access the [employer] Patient Portal, offered by [facility], by registering with the following website: [email or web address]”

## 2018-01-19 NOTE — PROGRESS NOTE ADULT - SUBJECTIVE AND OBJECTIVE BOX
Interventional Cardiology     Feels better  Still in Afib  LBBB old  Less edema legs - sacral present      Appearance: Normal	  HEENT:   Normal oral mucosa, PERRL, EOMI	  Neck: Supple, + JVD/ - JVD; Carotid Bruit s/p CEA and CATARINA b/l   Cardiovascular: distant irreg irreg  S1 S2, No JVD, No murmurs appreciated,   Respiratory: Decreased Breath Sounds bases and Rales b/l	  Gastrointestinal:  Soft, Non-tender, + BS	  Skin: No rashes, No ecchymoses, No cyanosis  Extremities: Normal range of motion, No clubbing, cyanosis   marked improvement of edema  Vascular: Peripheral pulses depressed bilaterally  Neurologic: Non-focal  Psychiatry: A & O x 3, Mood & affect appropriate  sacral mild edema      Subjective Assessment:  	  MEDICATIONS:  furosemide   Injectable 40 milliGRAM(s) IV Push two times a day  hydrALAZINE 10 milliGRAM(s) Oral three times a day  isosorbide   dinitrate Tablet (ISORDIL) 10 milliGRAM(s) Oral three times a day      montelukast 10 milliGRAM(s) Oral daily  tiotropium 18 MICROgram(s) Capsule 1 Capsule(s) Inhalation daily      pantoprazole    Tablet 40 milliGRAM(s) Oral before breakfast    atorvastatin 40 milliGRAM(s) Oral at bedtime  dextrose 50% Injectable 12.5 Gram(s) IV Push once  dextrose 50% Injectable 25 Gram(s) IV Push once  dextrose 50% Injectable 25 Gram(s) IV Push once  dextrose Gel 1 Dose(s) Oral once PRN  glucagon  Injectable 1 milliGRAM(s) IntraMuscular once PRN  insulin lispro (HumaLOG) corrective regimen sliding scale   SubCutaneous three times a day before meals  insulin lispro (HumaLOG) corrective regimen sliding scale   SubCutaneous at bedtime  levothyroxine 25 MICROGram(s) Oral daily    aspirin enteric coated 81 milliGRAM(s) Oral daily  dextrose 5%. 1000 milliLiter(s) IV Continuous <Continuous>  heparin  Infusion 900 Unit(s)/Hr IV Continuous <Continuous>  lidocaine   Patch 1 Patch Transdermal daily      	    [PHYSICAL EXAM:  TELEMETRY:  T(C): 36.7 (01-19-18 @ 13:58), Max: 36.9 (01-18-18 @ 16:42)  HR: 98 (01-19-18 @ 14:34) (88 - 98)  BP: 119/58 (01-19-18 @ 14:34) (100/56 - 121/56)  RR: 20 (01-19-18 @ 14:34) (16 - 25)  SpO2: 95% (01-19-18 @ 14:34) (95% - 100%)  Wt(kg): --  I&O's Summary    18 Jan 2018 07:01  -  19 Jan 2018 07:00  --------------------------------------------------------  IN: 556 mL / OUT: 1800 mL / NET: -1244 mL    19 Jan 2018 07:01  -  19 Jan 2018 15:49  --------------------------------------------------------  IN: 300 mL / OUT: 0 mL / NET: 300 mL                                      8.2    6.8   )-----------( 140      ( 19 Jan 2018 06:53 )             27.0     01-19    141  |  97  |  33<H>  ----------------------------<  139<H>  4.5   |  34<H>  |  1.41<H>    Ca    9.5      19 Jan 2018 06:53  Mg     2.0     01-19    TPro  6.1  /  Alb  x   /  TBili  x   /  DBili  x   /  AST  x   /  ALT  x   /  AlkPhos  x   01-18    proBNP:   Lipid Profile:   HgA1c:   TSH:   PT/INR - ( 19 Jan 2018 14:31 )   PT: 12.6 sec;   INR: 1.13          PTT - ( 19 Jan 2018 14:31 )  PTT:56.0 sec    ASSESSMENT/PLAN: 	        Several medical issues but improving  New volume overload, dyspnea and new onset Atrial fibrillation  On CPAP  Improved post diuresis  Recent trip to [facility]  Several admissions last few months for CHF/APE    Detailed office note in the chart  CAD  Carotid stenosis s/p CEA/CATARINA b/l  Vertebral artery stenosis  HTN  DM2  Moderate to severe pulmonary hypertension  CHF - new - low EF by echo  AFib new   Possible PFO  Thrombocytopenia  Anemia s/p IV Fe tx  Renal artery stenosis b/l ISR  History of ARF  Subclavian stenoses  Obesity  PAD  LBBB  COPD    D/w referring MD  D/w pulmonary  D/w hematology  D/w PA  D/w family    Diuresis IV - continue  Monitor renal function  Daily weight  IV heparin - Afib  Amiodarone as per EP  EP follow up re DCCV  Glucose control  Plan as per previous note  D/w patient and family possible need for relook LHC/RHC and renal angio with intent to treat - in the past patient/family did not agree with renal PTA - to be reevaluated according to patient's course  CT chest as per pulmonary  ASA 81 mg  GI prophylaxis  Transfuse as needed - hematology follow up  Pulmonary follow up - nebs  Endocrinology follow up -synthroid  Physical therapy - OOB  Nephrology follow up - creatinine improving post diuresis  Low dose Toprol 25 mg QD -trial  Intra-aortic pressure probably higher - subclavian stenoses  Further plan as per patient's course  Reviewed leg venous duplex - no DVT reported  Is/Os negative 3.7 lt    Sagar Dillard MD PhD  [phone]

## 2018-01-19 NOTE — PROGRESS NOTE ADULT - PROBLEM SELECTOR PLAN 3
- S/p diagnostic LH 7/2016 revealed distal LAD disease, per outpt ppw  - Plan for possible cardiac/renal cath when clinically optimized. D/w Dr. Falcon.  -Medication adjustments for medical optimization as per Dr. Crabtree: D/c Norvasc. Start Isordil 10 mg TID and Hydralazine 10 mg TID, increase Lipitor to 40 mg daily

## 2018-01-19 NOTE — DISCHARGE NOTE ADULT - HOSPITAL COURSE
89 y/o female, obese, with HTN, HPL, DM-II, Anemia, diastolic CHF (NL EF), known CAD with previous PCIs, b/l carotid endarterectomies, who presented to her cardiologist with c/o mid sternal chest pressure radiating to the back, associated with SOB, palpitations, LE edema and 4lbs weight gain over 1 week. In the office, her EKG showed new AFib and she was sent to the ER for further evaluation. In the ER, 1st troponin negative, BNP 3805, BUN 42/1.57, H/H 8.6/27.7, EKG AFib @ 100bpm, LBBB, CXR with some vascular congestion. She received Cardizem 5mg IV x1, Lasix 40mg IV x1, was started on IV heparin and was admitted for further management and telemetry monitoring. She underwent right heart cath 1/24/18 revealing moderate to severe pulmonary HTN.  She underwent IV diuresis with Lasix and is now euvolemic on exam, switched to Lasix 80 mg PO daily. Medications adjustments were made to aid in duresis and patient will be discharged on Imdur 30 mg daily and Toprol 25 mg daily.  Patient was not started on ACE/ARB due to worsening renal function. Echo 1/18 revealed moderately dilated LA, moderate MR, mild to moderate TR, evidence of pulmonary hypertension (PASP 57 mmHg), severe global hypokinesis of the LV, EF 20- 25%. (newly depressed) and will continue medical management and outpatient follow-up with Dr. Dillard.  The patient presented in new onset Afib and is s/p ANMOL and failed DCCV 1/23/18. She self-converted to NSR with freq PACs on 1/26/18. She will continue Toprol 25 mg daily and is s/p Amio 400mg BID load and is now discharged on Amiodarone 200 mg daily as per Dr. Dillard. Also discharged on Eliquis 2.5mg BID for anticoagulation. Throughout the hospital course, the patient remained chest pain free, cardiac enzymes negative x 3 sets. Endocrine was consulted regarding newly diagnosed hypothyroidism. Patient started on Levothyroxine 25mcg PO daily, to be continued on discharge and will F/U TFT in 6 weeks with PCP. Pulmonary was consulted and found no evidence of COPD. The patient was exposed to biomass.  Continue Spiriva and as needed albuterol. Continue oxygen supplementation. Due to patient age no need for CT scan of chest.  Patient's steroids held. The patient also endorsed difficulty swallowing (solid food getting stuck). Was evaluated by speech and swallow, patient with functional pharyngeal swallow. Recommended for Dysphagia 1 Pureed, thin Liquid diet and aspiration precautions. Barium swallow showed esophageal dysmotility likely secondary to presbyesophagus. GI service cleared patient for ANMOL this admission. Patient also has iron deficiency anemia. CBC was monitored closely & transfuse PRBCs to keep Hb > 7g/dl. S/p IV Iron x3 days and Epogen 10,000 units x3 days. F/U SPEP, IPEP, Immunofixation, LDH, Beta-2 microglobulin.  Patient with CKD (chronic kidney disease) stage 3, GFR 30-59 ml/min, renal consulted. Renal US 1/22/18 consistent with medical renal disease. Patient and family informed high risk for ANGELICA/need for possible HD post contrast exposure with angiogram---reported understanding. Underwent renal angiogram with Dr. Dillard on 1/24/18 with successful PTA of mid right renal artery ISR. To continue ASA/Plavix daily as well as Eliquis 2.5 mg BID, to follow-up with Dr. Dillard in 1 week and discuss when to stop triple therapy.   Patient had +UA/Urine culture with MDR Ecoli. Remained asymptomatic, afebrile, and did not require antibiotic treatment. Patient is scheduled for follow-up with Dr. Connolly 2/22/18 @ 3:15 pm for Possible BI-V pacer (no ICD) in near future. Pt seen and examined at bedside, no events overnight, pt without complaints- denies chest pain, shortness of breath, n/v/d, LE edema, palpitations, abdominal pain, dysuria, fevers/chills, syncope. Labs WNL, BUN/Cr stable 52/2.32, H/H 7.8/25.7. VSS. Patient will f/u with Dr. Dillard in 1 week and Dr. Connolly on 2/22/18 at 3:15PM. Instructed to return if symptoms worsen including not limited to chest pain, shortness of breath, palpitations, BRBPR, hematuria, dysuria, syncope. All medications electronically prescribed to pharmacy and side effects explained. Patient discharged with home oxygen and home health aid services reinstated. Pt seen and examined by attending Dr. Burden who agrees with above d/c plan.  V/S 	BP: 114/53 		HR: 68	 	RR: 16			T: 97.3		O2: 100% on supplemental O2  	  GEN: NAD  PULM:  CTA B/L  CARD: No JVD B/L, RRR, S1 and S2   ABD: +BS, NT, soft/ND	  EXT: No Edema B/L LE  NEURO: A+Ox3, no focal deficit 89 y/o female, obese, with HTN, HPL, DM-II, Anemia, diastolic CHF (NL EF), known CAD with previous PCIs, b/l carotid endarterectomies, who presented to her cardiologist with c/o mid sternal chest pressure radiating to the back, associated with SOB, palpitations, LE edema and 4lbs weight gain over 1 week. In the office, her EKG showed new AFib and she was sent to the ER for further evaluation. In the ER, 1st troponin negative, BNP 3805, BUN 42/1.57, H/H 8.6/27.7, EKG AFib @ 100bpm, LBBB, CXR with some vascular congestion. She received Cardizem 5mg IV x1, Lasix 40mg IV x1, was started on IV heparin and was admitted for further management and telemetry monitoring. She underwent right heart cath 1/24/18 revealing moderate to severe pulmonary HTN. She underwent IV diuresis with Lasix and is now euvolemic on exam, switched to Lasix 80 mg PO daily. Medications adjustments were made to aid in duresis and patient will be discharged on Imdur 30 mg daily and Toprol 25 mg daily.  Patient was not started on ACE/ARB due to worsening renal function. Echo 1/18 revealed moderately dilated LA, moderate MR, mild to moderate TR, evidence of pulmonary hypertension (PASP 57 mmHg), severe global hypokinesis of the LV, EF 20- 25%. (newly depressed) and will continue medical management and outpatient follow-up with Dr. Dillard.  The patient presented in new onset Afib and is s/p ANMOL and failed DCCV 1/23/18. She self-converted to NSR with freq PACs on 1/26/18. She will continue Toprol 25 mg daily and is s/p Amio 400mg BID load and is now discharged on Amiodarone 200 mg daily as per Dr. Dillard. Also discharged on Eliquis 2.5mg BID for anticoagulation. Throughout the hospital course, the patient remained chest pain free, cardiac enzymes negative x 3 sets. Endocrine was consulted regarding newly diagnosed hypothyroidism. Patient started on Levothyroxine 25mcg PO daily, to be continued on discharge and will F/U TFT in 6 weeks with PCP. Pulmonary was consulted and found no evidence of COPD. The patient was exposed to biomass.  Continue Spiriva and as needed albuterol. Continue oxygen supplementation. Due to patient age no need for CT scan of chest.  Patient's steroids held. The patient also endorsed difficulty swallowing (solid food getting stuck). Was evaluated by speech and swallow, patient with functional pharyngeal swallow. Recommended for Dysphagia 1 Pureed, thin Liquid diet and aspiration precautions. Barium swallow showed esophageal dysmotility likely secondary to presbyesophagus. GI service cleared patient for ANMOL this admission. Patient also has iron deficiency anemia. CBC was monitored closely & transfuse PRBCs to keep Hb > 7g/dl. S/p IV Iron x3 days and Epogen 10,000 units x3 days. F/U SPEP, IPEP, Immunofixation, LDH, Beta-2 microglobulin.  Patient with CKD (chronic kidney disease) stage 3, GFR 30-59 ml/min, renal consulted. Renal US 1/22/18 consistent with medical renal disease. Patient and family informed high risk for ANGELICA/need for possible HD post contrast exposure with angiogram---reported understanding. Underwent renal angiogram with Dr. Dillard on 1/24/18 with successful PTA of mid right renal artery ISR. To continue ASA/Plavix daily as well as Eliquis 2.5 mg BID, to follow-up with Dr. Dillard in 1 week and discuss when to stop triple therapy.   Patient had +UA/Urine culture with MDR Ecoli. Remained asymptomatic, afebrile, and did not require antibiotic treatment. Patient is scheduled for follow-up with Dr. Connolly 2/22/18 @ 3:15 pm for Possible BI-V pacer (no ICD) in near future. Pt seen and examined at bedside, patient and family declined use of  services. Patient prefers family to translate. No events overnight, pt without complaints- denies chest pain, shortness of breath, n/v/d, LE edema, palpitations, abdominal pain, dysuria, fevers/chills, syncope. Labs WNL, BUN/Cr stable 52/2.32, H/H 7.8/25.7. VSS. Patient will f/u with Dr. Dillard in 1 week and Dr. Connolly on 2/22/18 at 3:15PM. Instructed to return if symptoms worsen including not limited to chest pain, shortness of breath, palpitations, BRBPR, hematuria, dysuria, syncope. All medications electronically prescribed to pharmacy and side effects explained. Confirmed with patient pharmacy all prescriptions are ready for . Patient discharged with home oxygen and home health aid services reinstated. Pt seen and examined by attending Dr. Burden who agrees with above d/c plan.  V/S 	BP: 114/53 		HR: 68	 	RR: 16			T: 97.3		O2: 100% on supplemental O2  	  GEN: NAD  PULM:  CTA B/L  CARD: No JVD B/L, RRR, S1 and S2   ABD: +BS, NT, soft/ND	  EXT: No Edema B/L LE  NEURO: A+Ox3, no focal deficit

## 2018-01-19 NOTE — PROGRESS NOTE ADULT - PROBLEM SELECTOR PLAN 7
- H/H 8.2/27.0 today, continue to monitor  - Active T+S  - Heme (Dr. Elliott) following. Rec Heparin gtt: Dose to keep PTT 60-80  - Monitor CBC closely & transfuse PRBCs to keep Hb > 7g/dl  - Check Iron panel, SPEP, IPEP, Immunofixation, LDH, Beta-2 microglobulin (ordered, f/u results)  - Consider Epogen

## 2018-01-19 NOTE — DISCHARGE NOTE ADULT - MEDICATION SUMMARY - MEDICATIONS TO STOP TAKING
I will STOP taking the medications listed below when I get home from the hospital:    torsemide 10 mg oral tablet  -- 1 tab(s) by mouth once a day    predniSONE 20 mg oral tablet  -- 1 tab(s) by mouth once a day

## 2018-01-19 NOTE — PROGRESS NOTE ADULT - SUBJECTIVE AND OBJECTIVE BOX
Interventional Cardiology PA Adult Progress Note    Subjective Assessment: Patient seen and examined at bedside. Reports continuing improvement in breathing. Denies CP, palpitations, abdominal pain, dizziness, fevers/chills.   	  MEDICATIONS:  furosemide   Injectable 40 milliGRAM(s) IV Push two times a day  hydrALAZINE 10 milliGRAM(s) Oral three times a day  isosorbide   dinitrate Tablet (ISORDIL) 10 milliGRAM(s) Oral three times a day  montelukast 10 milliGRAM(s) Oral daily  tiotropium 18 MICROgram(s) Capsule 1 Capsule(s) Inhalation daily  pantoprazole    Tablet 40 milliGRAM(s) Oral before breakfast  atorvastatin 40 milliGRAM(s) Oral at bedtime  dextrose 50% Injectable 12.5 Gram(s) IV Push once  dextrose 50% Injectable 25 Gram(s) IV Push once  dextrose 50% Injectable 25 Gram(s) IV Push once  dextrose Gel 1 Dose(s) Oral once PRN  glucagon  Injectable 1 milliGRAM(s) IntraMuscular once PRN  insulin lispro (HumaLOG) corrective regimen sliding scale   SubCutaneous three times a day before meals  insulin lispro (HumaLOG) corrective regimen sliding scale   SubCutaneous at bedtime  levothyroxine 25 MICROGram(s) Oral daily  aspirin enteric coated 81 milliGRAM(s) Oral daily  dextrose 5%. 1000 milliLiter(s) IV Continuous <Continuous>  heparin  Infusion 1100 Unit(s)/Hr IV Continuous <Continuous>  lidocaine   Patch 1 Patch Transdermal daily      [PHYSICAL EXAM:  TELEMETRY:  T(C): 36.7 (01-19-18 @ 13:58), Max: 36.7 (01-19-18 @ 09:08)  HR: 82 (01-19-18 @ 17:00) (82 - 98)  BP: 108/54 (01-19-18 @ 17:00) (102/54 - 121/56)  RR: 18 (01-19-18 @ 17:00) (16 - 25)  SpO2: 97% (01-19-18 @ 17:00) (95% - 100%)  Wt(kg): --  I&O's Summary    18 Jan 2018 07:01  -  19 Jan 2018 07:00  --------------------------------------------------------  IN: 556 mL / OUT: 1800 mL / NET: -1244 mL    19 Jan 2018 07:01  -  19 Jan 2018 19:57  --------------------------------------------------------  IN: 414 mL / OUT: 625 mL / NET: -211 mL        Rubalcava: In place, c/d/i                                    Appearance: Normal	  Neck: Supple, + JVD  Cardiovascular: Irregularly irregular, + JVD, +ELLYN  Respiratory: +B/L rales, no wheezing or rhonchi   Gastrointestinal:  Soft, Non-tender, + BS	  Extremities: 1+ pitting edema b/l LES  Neurologic: Non-focal  Psychiatry: A & O x 3, Mood & affect appropriate      	      LABS:	 	  CARDIAC MARKERS:                                  8.2    6.8   )-----------( 140      ( 19 Jan 2018 06:53 )             27.0     01-19    141  |  97  |  33<H>  ----------------------------<  139<H>  4.5   |  34<H>  |  1.41<H>    Ca    9.5      19 Jan 2018 06:53  Mg     2.0     01-19    TPro  6.1  /  Alb  x   /  TBili  x   /  DBili  x   /  AST  x   /  ALT  x   /  AlkPhos  x   01-18    proBNP:   Lipid Profile:   HgA1c:   TSH:   PT/INR - ( 19 Jan 2018 14:31 )   PT: 12.6 sec;   INR: 1.13          PTT - ( 19 Jan 2018 14:31 )  PTT:56.0 sec

## 2018-01-19 NOTE — DISCHARGE NOTE ADULT - SECONDARY DIAGNOSIS.
Atrial fibrillation Hypothyroidism Renal artery stenosis CAD (coronary artery disease) Anemia DM (diabetes mellitus)

## 2018-01-19 NOTE — PROGRESS NOTE ADULT - SUBJECTIVE AND OBJECTIVE BOX
History of Present Illness:  History of Present Illness: 	  This is a 89 y/o female, obese, with HTN, HPL, DM-II, Anemia, diastolic CHF (NL EF), known CAD with previous PCIs, b/l carotid endarterectomies, who presented to her cardiologist with c/o mid sternal chest pressure radiating to the back, associated with SOB, palpitations, LE edema and 4lbs weight gain over 1 week. In the office, her EKG showed new AFib and she was sent to the ER for further evaluation.   In the ER, 1st troponin negative, BNP 3805, BUN 42/1.57, H/H 8.6/27.7, EKG AFib @ 100bpm, LBBB, CXR with some vascular congestion. She received Cardizem 5mg IV x1, Lasix 40mg IV x1, was started on IV heparin and admitted for further management and telemetry monitoring.  No s/s of bleeding noted.    Review of Systems:  Review of Systems: GENERAL, CONSTITUTIONAL : + recent weight gain. Denies fever, chills  EYES, VISION: denies changes in vision   EARS, NOSE, THROAT: denies hearing loss  HEART, CARDIOVASCULAR: + chest pain, palpitations, SOB,  LE edema. Denies claudication  RESPIRATORY: + SOB; Denies cough, wheezing, PND, orthopnea  GASTROINTESTINAL: Denies abdominal pain, heartburn, bloody stool, dark tarry stool  GENITOURINARY: Denies frequent urination, urgency  MUSCULOSKELETAL denies joint pain or swelling, restricted motion, musculoskeletal pain.   SKIN & INTEGUMENTARY Denies rashes, sores, blisters, blisters, growths.  NEUROLOGICAL: Denies numbness or tingling sensations, sensation loss, burning.   PSYCHIATRIC: Denies nervousness, anxiety, depression  ENDOCRINE Denies heat or cold intolerance, excessive thirst HEMATOLOGIC/LYMPHATIC: Denies abnormal bleeding, bleeding of any kind	      Allergies and Intolerances:        Allergies:  	No Known Allergies:     Home Medications:   * Patient Currently Takes Medications as of 17-Jan-2018 02:09 documented in Structured Notes  · 	torsemide 10 mg oral tablet: 1 tab(s) orally once a day, Last Dose Taken:    · 	aspirin 81 mg oral tablet: 1 tab(s) orally once a day, Last Dose Taken:    · 	isosorbide mononitrate 30 mg oral tablet, extended release: 1 tab(s) orally once a day (in the morning), Last Dose Taken:    · 	Januvia 50 mg oral tablet: 1 tab(s) orally once a day, Last Dose Taken:    · 	amLODIPine 5 mg oral tablet: 1 tab(s) orally once a day, Last Dose Taken:    · 	predniSONE 20 mg oral tablet: 1 tab(s) orally once a day, Last Dose Taken:    · 	atorvastatin 20 mg oral tablet: 1 tab(s) orally once a day, Last Dose Taken:    · 	montelukast 10 mg oral tablet: 1 tab(s) orally once a day, Last Dose Taken:    · 	omeprazole 40 mg oral delayed release capsule: 1 cap(s) orally once a day, Last Dose Taken:    · 	Spiriva 18 mcg inhalation capsule: 1 cap(s) inhaled once a day, Last Dose Taken:      .  Patient History:   Past Medical History:  Anemia    CAD (coronary artery disease)    DM (diabetes mellitus)    Emphysema    HTN (hypertension)    Hypercholesteremia.    Past Surgical History:  History of bilateral carotid endarterectomy    History of umbilical hernia repair    S/P bladder repair    S/P cataract surgery    S/P hysterectomy with oophorectomy    S/P TKR (total knee replacement).    Family History:  Mother  Still living? No  Family history of emphysema, Age at diagnosis: 71-80.    Social History:  Social History (marital status, living situation, occupation, tobacco use, alcohol and drug use, and sexual history): Denies tobacco, ETOH or illicit drug use Lives with her daughter, ambulates with a walker	    Tobacco Screening:  · Core Measure Site	No	    Risk Assessment:   Present on Admission:  Deep Venous Thrombosis	no 	  Pulmonary Embolus	no 	    Heart Failure:  Does this patient have a history of or has been diagnosed with heart failure? yes.      Physical Exam:  Physical Exam:                       8.2   6.8   )-----------( 140      ( 19 Jan 2018 06:53 )            27.0   141  |  97  |  33<H> ----------------------------<  139<H> 4.5   |  34<H>  |  1.41<H>  Ca    9.5      19 Jan 2018 06:53 Mg     2.0     01-19 TPro  6.1  /  Alb  x   /  TBili  x   /  DBili  x   /  AST  x   /  ALT  x   /  AlkPhos  x   01-18    Appearance: Normal   HEENT:   Normal oral mucosa, PERRL, EOMI   Neck: Supple, - JVD; No Carotid Bruit   Cardiovascular: Normal S1 S2, No JVD, No murmurs  Respiratory: Lungs bibasilar crackles, no Rhonchi, Wheezing   Gastrointestinal:  Soft, Non-tender, + BS   Skin: No rashes, No ecchymoses, No cyanosis  Extremities: 3+ edema b/l, No clubbing, cyanosis  Vascular: Femoral pulses 1+ b/l without bruit, DP faint, b/l, PT faint b/l  Neurologic: Non-focal Psychiatry: A & O x 3, Mood & affect appropriate	      Labs and Results:  Labs, Radiology, Cardiology, and Other Results:                       8.2   6.8   )-----------( 140      ( 19 Jan 2018 06:53 )            27.0   141  |  97  |  33<H> ----------------------------<  139<H> 4.5   |  34<H>  |  1.41<H>  Ca    9.5      19 Jan 2018 06:53 Mg     2.0     01-19  TPro  6.1  /  Alb  x   /  TBili  x   /  DBili  x   /  AST  x   /  ALT  x   /  AlkPhos  x   01-18 	    Assessment and Plan:   Assessment:  · Assessment		  89 y/o female with HTN, HPL, DM-II, CAD/PCIs, diastolic CHF (NL EF), anemia who is admitted with new onset AFib and CHF exacerbation      Problem/Plan - 1:  ·  Problem: Atrial fibrillation.  Plan: New Afib with controlled HR  - Heparin gtt: Dose to keep PTT 60-80    Problem/Plan - 2:  - Monitor CBC closely & transfuse PRBCs to keep Hb > 7g/dl  - f/u SPEP, IPEP, Immunofixation, LDH, Beta-2 microglobulin  - Consider Epogen  - Trial of iron sucrose

## 2018-01-19 NOTE — PROGRESS NOTE ADULT - PROBLEM SELECTOR PLAN 1
+ JVD, crackles on exam, improving B/L LE edema  - Echo 1/17: LA moderately dilated, RA borderline dilated, mild AV thickening, trace AR, moderate MR, mild to mod TR, pulm HTN, PA pressure 57mmHg, mild KY, mild LVG, severe global hypokinesis, unable to assess for WMA 2/2 poor endocardial delineation, LVEF 20-25%  - Per Dr. Ravi (outpt cardiologist), this is newly depressed EF. EF nl in office last year. Plan for possible cardiac/renal cath when clinically optimized. D/w Dr. Falcon.  -Continue Lasix 40mg IV BID   - Currently transitioned to NC, O2 sat 97%, Bipap at bedside on standby.   - Rubalcava placed for strict I&O's, daily weights. Appropriate urine output response.  - LE dopplers 1/18/18 negative for DVT   - Dr. Gupta consulted, f/u recs regarding Chest CT  -ID consulted, concern for possible TB exposure in [facility] however CXR not consistent with pulmonary TB, d/w pulm regarding obtaining chest CT  -F/u TB quantiferon gold

## 2018-01-19 NOTE — PROGRESS NOTE ADULT - PROBLEM SELECTOR PLAN 4
- Holding Januvia   - FS with Lispro coverage  - Endo consulted: Started Levothyroxine 25 mg in AM on empty stomach 30 min. prior to breakfast

## 2018-01-19 NOTE — PROGRESS NOTE ADULT - SUBJECTIVE AND OBJECTIVE BOX
INTERVAL HPI/OVERNIGHT EVENTS:      Patient is a 89y old  Female who presents with a chief complaint of SOB was seen and examined today. Reports the following symptoms:    CONSTITUTIONAL:  Negative fever or chills, feels better, good appetite  EYES:  Negative  blurry vision or double vision  CARDIOVASCULAR:  Negative for chest pain or palpitations  RESPIRATORY:  SOB   GASTROINTESTINAL:  Negative for nausea, vomiting, diarrhea, constipation, or abdominal pain  GENITOURINARY:  Negative frequency, urgency or dysuria  NEUROLOGIC:  No headache, confusion, dizziness, lightheadedness    MEDICATIONS  (STANDING):  aspirin enteric coated 81 milliGRAM(s) Oral daily  atorvastatin 40 milliGRAM(s) Oral at bedtime  dextrose 5%. 1000 milliLiter(s) (50 mL/Hr) IV Continuous <Continuous>  dextrose 50% Injectable 12.5 Gram(s) IV Push once  dextrose 50% Injectable 25 Gram(s) IV Push once  dextrose 50% Injectable 25 Gram(s) IV Push once  furosemide   Injectable 40 milliGRAM(s) IV Push two times a day  heparin  Infusion 1100 Unit(s)/Hr (11 mL/Hr) IV Continuous <Continuous>  hydrALAZINE 10 milliGRAM(s) Oral three times a day  insulin lispro (HumaLOG) corrective regimen sliding scale   SubCutaneous three times a day before meals  insulin lispro (HumaLOG) corrective regimen sliding scale   SubCutaneous at bedtime  isosorbide   dinitrate Tablet (ISORDIL) 10 milliGRAM(s) Oral three times a day  levothyroxine 25 MICROGram(s) Oral daily  lidocaine   Patch 1 Patch Transdermal daily  montelukast 10 milliGRAM(s) Oral daily  pantoprazole    Tablet 40 milliGRAM(s) Oral before breakfast  tiotropium 18 MICROgram(s) Capsule 1 Capsule(s) Inhalation daily    MEDICATIONS  (PRN):  dextrose Gel 1 Dose(s) Oral once PRN Blood Glucose LESS THAN 70 milliGRAM(s)/deciliter  glucagon  Injectable 1 milliGRAM(s) IntraMuscular once PRN Glucose LESS THAN 70 milligrams/deciliter        LABS:                        8.2    6.8   )-----------( 140      ( 19 Jan 2018 06:53 )             27.0     01-19    141  |  97  |  33<H>  ----------------------------<  139<H>  4.5   |  34<H>  |  1.41<H>    Ca    9.5      19 Jan 2018 06:53  Mg     2.0     01-19    TPro  6.1  /  Alb  x   /  TBili  x   /  DBili  x   /  AST  x   /  ALT  x   /  AlkPhos  x   01-18    Hemoglobin A1C, Whole Blood: 7.0 % (01-17-18 @ 05:28)    PT/INR - ( 19 Jan 2018 14:31 )   PT: 12.6 sec;   INR: 1.13          PTT - ( 19 Jan 2018 23:08 )  PTT:105.6 sec    Thyroid Stimulating Hormone, Serum: 6.350 uIU/mL (01-18 @ 06:38)  Thyroid Stimulating Hormone, Serum: 5.255 uIU/mL (01-17 @ 20:12)      RADIOLOGY & ADDITIONAL TESTS:      Vital Signs Last 24 Hrs  T(C): 36.7 (19 Jan 2018 22:08), Max: 36.7 (19 Jan 2018 09:08)  T(F): 98 (19 Jan 2018 22:08), Max: 98.1 (19 Jan 2018 09:08)  HR: 82 (19 Jan 2018 17:00) (82 - 98)  BP: 108/54 (19 Jan 2018 17:00) (108/54 - 121/56)  BP(mean): --  RR: 18 (19 Jan 2018 17:00) (16 - 25)  SpO2: 97% (19 Jan 2018 17:00) (95% - 100%)    CAPILLARY BLOOD GLUCOSE      PHYSICAL EXAM:  Constitutional: wn/wd in NAD.   HEENT: NCAT, MMM, OP clear, EOMI, , no proptosis or lid retraction  Neck: supple, no acanthosis, no thyromegaly or palpable thyroid nodules   Respiratory: Lungs CTAB.  Cardiovascular: regular rhythm, normal S1 and S2, no audible murmurs, no peripheral edema  GI: soft, + bowel sounds, NT/ND, no masses/HSM appreciated.  Neurology: no tremors, DTR 2+  Skin: no visible rashes/lesions  Psychiatric: AAO x 3, normal affect/mood.        A/P: 89yFemale admitted for SOB and diagnosed with CHF, hypothyroidism. Consulted and being followed for Diabetes Type 2.     DM Type 2 (HbA1c 7%) controlled with diet.  Please continue Lispro moderate/low dose sliding scale 4 times daily (before meals and bedtime).    Also continue consistent carbohydrate (Diabetic) diet, FS ac & hs. Target  - 150.     Hypothyroidism:  Patient started on levothyroxine 25 mcg po qAM.  Recheck thyroid function studies in six weeks.     Case discussed with attending and primary team.      Attending attestation:  I have seen and evaluated the patient at the bedside.   Nurse Practitioner/Fellow/Resident’s note reviewed, including vital signs, PE and laboratory results.  Direct personal management and extensive interpretation of the data conducted.   Assessment and recommendations as above.

## 2018-01-20 DIAGNOSIS — I50.9 HEART FAILURE, UNSPECIFIED: ICD-10-CM

## 2018-01-20 LAB
ANION GAP SERPL CALC-SCNC: 8 MMOL/L — SIGNIFICANT CHANGE UP (ref 5–17)
APPEARANCE UR: CLEAR — SIGNIFICANT CHANGE UP
APTT BLD: 66.8 SEC — HIGH (ref 27.5–37.4)
APTT BLD: 75.8 SEC — HIGH (ref 27.5–37.4)
BACTERIA # UR AUTO: (no result) /HPF
BASOPHILS NFR BLD AUTO: 0.2 % — SIGNIFICANT CHANGE UP (ref 0–2)
BILIRUB UR-MCNC: NEGATIVE — SIGNIFICANT CHANGE UP
BUN SERPL-MCNC: 33 MG/DL — HIGH (ref 7–23)
CALCIUM SERPL-MCNC: 9.7 MG/DL — SIGNIFICANT CHANGE UP (ref 8.4–10.5)
CHLORIDE SERPL-SCNC: 98 MMOL/L — SIGNIFICANT CHANGE UP (ref 96–108)
CO2 SERPL-SCNC: 36 MMOL/L — HIGH (ref 22–31)
COLOR SPEC: YELLOW — SIGNIFICANT CHANGE UP
COMMENT - URINE: SIGNIFICANT CHANGE UP
CREAT ?TM UR-MCNC: 73 MG/DL — SIGNIFICANT CHANGE UP
CREAT SERPL-MCNC: 1.58 MG/DL — HIGH (ref 0.5–1.3)
DIFF PNL FLD: NEGATIVE — SIGNIFICANT CHANGE UP
EOSINOPHIL NFR BLD AUTO: 1.7 % — SIGNIFICANT CHANGE UP (ref 0–6)
EPI CELLS # UR: SIGNIFICANT CHANGE UP /HPF (ref 0–5)
GLUCOSE BLDC GLUCOMTR-MCNC: 147 MG/DL — HIGH (ref 70–99)
GLUCOSE BLDC GLUCOMTR-MCNC: 159 MG/DL — HIGH (ref 70–99)
GLUCOSE BLDC GLUCOMTR-MCNC: 170 MG/DL — HIGH (ref 70–99)
GLUCOSE BLDC GLUCOMTR-MCNC: 173 MG/DL — HIGH (ref 70–99)
GLUCOSE SERPL-MCNC: 152 MG/DL — HIGH (ref 70–99)
GLUCOSE UR QL: NEGATIVE — SIGNIFICANT CHANGE UP
HCT VFR BLD CALC: 26.8 % — LOW (ref 34.5–45)
HGB BLD-MCNC: 8 G/DL — LOW (ref 11.5–15.5)
INR BLD: 1.16 — SIGNIFICANT CHANGE UP (ref 0.88–1.16)
KETONES UR-MCNC: NEGATIVE — SIGNIFICANT CHANGE UP
LEUKOCYTE ESTERASE UR-ACNC: (no result)
LYMPHOCYTES # BLD AUTO: 12.2 % — LOW (ref 13–44)
MAGNESIUM SERPL-MCNC: 2 MG/DL — SIGNIFICANT CHANGE UP (ref 1.6–2.6)
MCHC RBC-ENTMCNC: 28.6 PG — SIGNIFICANT CHANGE UP (ref 27–34)
MCHC RBC-ENTMCNC: 29.9 G/DL — LOW (ref 32–36)
MCV RBC AUTO: 95.7 FL — SIGNIFICANT CHANGE UP (ref 80–100)
MICROALBUMIN UR-MCNC: 2 MG/DL — SIGNIFICANT CHANGE UP
MICROALBUMIN/CREAT UR-RTO: 27 MG/G — SIGNIFICANT CHANGE UP (ref 0–30)
MONOCYTES NFR BLD AUTO: 9 % — SIGNIFICANT CHANGE UP (ref 2–14)
NEUTROPHILS NFR BLD AUTO: 76.9 % — SIGNIFICANT CHANGE UP (ref 43–77)
NITRITE UR-MCNC: NEGATIVE — SIGNIFICANT CHANGE UP
PH UR: 6 — SIGNIFICANT CHANGE UP (ref 5–8)
PLATELET # BLD AUTO: 151 K/UL — SIGNIFICANT CHANGE UP (ref 150–400)
POTASSIUM SERPL-MCNC: 4.2 MMOL/L — SIGNIFICANT CHANGE UP (ref 3.5–5.3)
POTASSIUM SERPL-SCNC: 4.2 MMOL/L — SIGNIFICANT CHANGE UP (ref 3.5–5.3)
PROT UR-MCNC: NEGATIVE MG/DL — SIGNIFICANT CHANGE UP
PROTHROM AB SERPL-ACNC: 12.9 SEC — HIGH (ref 9.8–12.7)
RBC # BLD: 2.8 M/UL — LOW (ref 3.8–5.2)
RBC # FLD: 16.5 % — SIGNIFICANT CHANGE UP (ref 10.3–16.9)
RBC CASTS # UR COMP ASSIST: < 5 /HPF — SIGNIFICANT CHANGE UP
SODIUM SERPL-SCNC: 142 MMOL/L — SIGNIFICANT CHANGE UP (ref 135–145)
SP GR SPEC: <=1.005 — SIGNIFICANT CHANGE UP (ref 1–1.03)
UROBILINOGEN FLD QL: 0.2 E.U./DL — SIGNIFICANT CHANGE UP
WBC # BLD: 6.5 K/UL — SIGNIFICANT CHANGE UP (ref 3.8–10.5)
WBC # FLD AUTO: 6.5 K/UL — SIGNIFICANT CHANGE UP (ref 3.8–10.5)
WBC UR QL: (no result) /HPF

## 2018-01-20 RX ORDER — IRON SUCROSE 20 MG/ML
100 INJECTION, SOLUTION INTRAVENOUS EVERY 24 HOURS
Qty: 0 | Refills: 0 | Status: COMPLETED | OUTPATIENT
Start: 2018-01-20 | End: 2018-01-22

## 2018-01-20 RX ORDER — FUROSEMIDE 40 MG
40 TABLET ORAL
Qty: 0 | Refills: 0 | Status: DISCONTINUED | OUTPATIENT
Start: 2018-01-21 | End: 2018-01-24

## 2018-01-20 RX ORDER — FUROSEMIDE 40 MG
40 TABLET ORAL DAILY
Qty: 0 | Refills: 0 | Status: DISCONTINUED | OUTPATIENT
Start: 2018-01-20 | End: 2018-01-20

## 2018-01-20 RX ORDER — ERYTHROPOIETIN 10000 [IU]/ML
10000 INJECTION, SOLUTION INTRAVENOUS; SUBCUTANEOUS DAILY
Qty: 0 | Refills: 0 | Status: COMPLETED | OUTPATIENT
Start: 2018-01-20 | End: 2018-01-23

## 2018-01-20 RX ADMIN — ISOSORBIDE DINITRATE 10 MILLIGRAM(S): 5 TABLET ORAL at 22:04

## 2018-01-20 RX ADMIN — HEPARIN SODIUM 9 UNIT(S)/HR: 5000 INJECTION INTRAVENOUS; SUBCUTANEOUS at 00:20

## 2018-01-20 RX ADMIN — Medication 25 MICROGRAM(S): at 06:13

## 2018-01-20 RX ADMIN — Medication 2: at 07:37

## 2018-01-20 RX ADMIN — Medication 81 MILLIGRAM(S): at 12:34

## 2018-01-20 RX ADMIN — Medication 10 MILLIGRAM(S): at 06:13

## 2018-01-20 RX ADMIN — ATORVASTATIN CALCIUM 40 MILLIGRAM(S): 80 TABLET, FILM COATED ORAL at 22:04

## 2018-01-20 RX ADMIN — ISOSORBIDE DINITRATE 10 MILLIGRAM(S): 5 TABLET ORAL at 06:13

## 2018-01-20 RX ADMIN — Medication 10 MILLIGRAM(S): at 14:56

## 2018-01-20 RX ADMIN — Medication 2: at 11:52

## 2018-01-20 RX ADMIN — Medication 40 MILLIGRAM(S): at 06:13

## 2018-01-20 RX ADMIN — ERYTHROPOIETIN 10000 UNIT(S): 10000 INJECTION, SOLUTION INTRAVENOUS; SUBCUTANEOUS at 22:46

## 2018-01-20 RX ADMIN — ISOSORBIDE DINITRATE 10 MILLIGRAM(S): 5 TABLET ORAL at 14:56

## 2018-01-20 RX ADMIN — Medication 10 MILLIGRAM(S): at 22:04

## 2018-01-20 RX ADMIN — IRON SUCROSE 210 MILLIGRAM(S): 20 INJECTION, SOLUTION INTRAVENOUS at 18:59

## 2018-01-20 RX ADMIN — PANTOPRAZOLE SODIUM 40 MILLIGRAM(S): 20 TABLET, DELAYED RELEASE ORAL at 06:13

## 2018-01-20 RX ADMIN — TIOTROPIUM BROMIDE 1 CAPSULE(S): 18 CAPSULE ORAL; RESPIRATORY (INHALATION) at 12:39

## 2018-01-20 RX ADMIN — LIDOCAINE 1 PATCH: 4 CREAM TOPICAL at 12:35

## 2018-01-20 NOTE — PROGRESS NOTE ADULT - SUBJECTIVE AND OBJECTIVE BOX
5 uris Cardiology PA Progress Note    S: Sitting in chair. No new complaints.      O: T(C): 36.5 (01-20-18 @ 14:00), Max: 36.8 (01-20-18 @ 00:44)  HR: 92 (01-20-18 @ 12:41) (86 - 102)  BP: 100/49 (01-20-18 @ 12:41) (100/49 - 141/63)  RR: 18 (01-20-18 @ 12:41) (16 - 24)  SpO2: 98% (01-20-18 @ 12:41) (95% - 100%)    TELE: AFIB HR 80s. LBBB     PHYSICAL  General:  NAD        Chest:  Rales bilaterally. No wheezing.   Cardiac:   + s1/s2, irregular  Abdomen:  +BS , soft ND/NT  Extremities: No LE edema    LABS:                        8.0    6.5   )-----------( 151      ( 20 Jan 2018 07:05 )             26.8     01-20    142  |  98  |  33<H>  ----------------------------<  152<H>  4.2   |  36<H>  |  1.58<H>    Ca    9.7      20 Jan 2018 07:05  Mg     2.0     01-20      PT/INR - ( 20 Jan 2018 07:05 )   PT: 12.9 sec;   INR: 1.16          PTT - ( 20 Jan 2018 07:05 )  PTT:75.8 sec    MEDICATIONS:  aspirin enteric coated 81 milliGRAM(s) Oral daily  atorvastatin 40 milliGRAM(s) Oral at bedtime  furosemide   Injectable 40 milliGRAM(s) IV Push two times a day  glucagon  Injectable 1 milliGRAM(s) IntraMuscular once PRN  heparin  Infusion 900 Unit(s)/Hr IV Continuous <Continuous>  hydrALAZINE 10 milliGRAM(s) Oral three times a day  insulin lispro (HumaLOG) corrective regimen sliding scale   SubCutaneous three times a day before meals  insulin lispro (HumaLOG) corrective regimen sliding scale   SubCutaneous at bedtime  isosorbide   dinitrate Tablet (ISORDIL) 10 milliGRAM(s) Oral three times a day  levothyroxine 25 MICROGram(s) Oral daily  lidocaine   Patch 1 Patch Transdermal daily  pantoprazole    Tablet 40 milliGRAM(s) Oral before breakfast  tiotropium 18 MICROgram(s) Capsule 1 Capsule(s) Inhalation daily

## 2018-01-20 NOTE — PROGRESS NOTE ADULT - PROBLEM SELECTOR PLAN 1
1) Chest x-ray not c/w Pulmonary TB; patient denies weight loss, fever, night sweats  2) No  CT chest per Pulmonary attending  3) Send Quantiferon TB Gold.

## 2018-01-20 NOTE — PROGRESS NOTE ADULT - SUBJECTIVE AND OBJECTIVE BOX
INTERVAL HPI/OVERNIGHT EVENTS:      Patient is a 89y old  Female who presents with a chief complaint of SOB was seen and examined today. Reports the following symptoms:    CONSTITUTIONAL:  Negative fever or chills, feels better, good appetite   RESPIRATORY:  SOB   GASTROINTESTINAL:  Negative for nausea, vomiting, diarrhea, constipation, or abdominal pain  GENITOURINARY:  Negative frequency, urgency or dysuria  NEUROLOGIC:  No headache, confusion, dizziness, lightheadedness    MEDICATIONS  (STANDING):  aspirin enteric coated 81 milliGRAM(s) Oral daily  atorvastatin 40 milliGRAM(s) Oral at bedtime  dextrose 5%. 1000 milliLiter(s) (50 mL/Hr) IV Continuous <Continuous>  dextrose 50% Injectable 12.5 Gram(s) IV Push once  dextrose 50% Injectable 25 Gram(s) IV Push once  dextrose 50% Injectable 25 Gram(s) IV Push once  epoetin jayesh Injectable 44445 Unit(s) SubCutaneous daily  heparin  Infusion 900 Unit(s)/Hr (9 mL/Hr) IV Continuous <Continuous>  hydrALAZINE 10 milliGRAM(s) Oral three times a day  insulin lispro (HumaLOG) corrective regimen sliding scale   SubCutaneous three times a day before meals  insulin lispro (HumaLOG) corrective regimen sliding scale   SubCutaneous at bedtime  iron sucrose IVPB 100 milliGRAM(s) IV Intermittent every 24 hours  isosorbide   dinitrate Tablet (ISORDIL) 10 milliGRAM(s) Oral three times a day  levothyroxine 25 MICROGram(s) Oral daily  lidocaine   Patch 1 Patch Transdermal daily  pantoprazole    Tablet 40 milliGRAM(s) Oral before breakfast  tiotropium 18 MICROgram(s) Capsule 1 Capsule(s) Inhalation daily    MEDICATIONS  (PRN):  dextrose Gel 1 Dose(s) Oral once PRN Blood Glucose LESS THAN 70 milliGRAM(s)/deciliter  glucagon  Injectable 1 milliGRAM(s) IntraMuscular once PRN Glucose LESS THAN 70 milligrams/deciliter        LABS:                        8.0    6.5   )-----------( 151      ( 20 Jan 2018 07:05 )             26.8     01-20    142  |  98  |  33<H>  ----------------------------<  152<H>  4.2   |  36<H>  |  1.58<H>    Ca    9.7      20 Jan 2018 07:05  Mg     2.0     01-20      Hemoglobin A1C, Whole Blood: 7.0 % (01-17-18 @ 05:28)    PT/INR - ( 20 Jan 2018 07:05 )   PT: 12.9 sec;   INR: 1.16          PTT - ( 20 Jan 2018 22:48 )  PTT:66.8 sec  Urinalysis Basic - ( 20 Jan 2018 20:14 )    Color: Yellow / Appearance: Clear / SG: <=1.005 / pH: x  Gluc: x / Ketone: NEGATIVE  / Bili: Negative / Urobili: 0.2 E.U./dL   Blood: x / Protein: NEGATIVE mg/dL / Nitrite: NEGATIVE   Leuk Esterase: Large / RBC: < 5 /HPF / WBC Many /HPF   Sq Epi: x / Non Sq Epi: 0-5 /HPF / Bacteria: Many /HPF      Thyroid Stimulating Hormone, Serum: 6.350 uIU/mL (01-18 @ 06:38)  Thyroid Stimulating Hormone, Serum: 5.255 uIU/mL (01-17 @ 20:12)      RADIOLOGY & ADDITIONAL TESTS:      Vital Signs Last 24 Hrs  T(C): 36.4 (20 Jan 2018 22:02), Max: 36.8 (20 Jan 2018 00:44)  T(F): 97.5 (20 Jan 2018 22:02), Max: 98.3 (20 Jan 2018 00:44)  HR: 98 (20 Jan 2018 22:16) (88 - 102)  BP: 97/47 (20 Jan 2018 20:10) (97/47 - 141/63)  BP(mean): --  RR: 18 (20 Jan 2018 20:10) (16 - 18)  SpO2: 100% (20 Jan 2018 22:16) (95% - 100%)    CAPILLARY BLOOD GLUCOSE      PHYSICAL EXAM:  Constitutional: wn/wd in NAD.   HEENT: NCAT, MMM, OP clear, EOMI, no proptosis or lid retraction  Neck: supple, no acanthosis, no thyromegaly or palpable thyroid nodules   Respiratory: Lungs Decreased breath sounds  Cardiovascular: regular rhythm, normal S1 and S2, no audible murmurs, no peripheral edema  GI: soft, + bowel sounds, NT/ND  Neurology: no tremors, DTR 2+  Skin: no visible rashes/lesions  Psychiatric: AAO x 3, normal affect/mood.    A/P: 89yFemale admitted for SOB.  Consulted and being followed for Diabetes Type 2 and hypothyroidism.     DM Type 2 controlled in hospital with diet and Lispro insulin prn.     Please continue Lispro moderate/low dose sliding scale 4 times daily (before meals and bedtime).  Please continue consistent carbohydrate (Diabetic) diet, FS ac & hs. Target  - 150.      Hypothyroidism:  Patient started on oral levothyroxine qAM on an empty stomach  Outpatient thyroid function check-up in 4 weeks.     Case discussed with attending and primary team      Attending attestation:  I have seen and evaluated the patient at the bedside.  Nurse Practitioner/Fellow/Resident’s note reviewed, including vital signs, PE and laboratory results.  Direct personal management and extensive interpretation of the data conducted.   Assessment and recommendations as above.

## 2018-01-20 NOTE — PROGRESS NOTE ADULT - SUBJECTIVE AND OBJECTIVE BOX
INTERVAL HPI/OVERNIGHT EVENTS: Afebrile, weak    CONSTITUTIONAL:  Negative fever or chills, feels well, good appetite  EYES:  Negative  blurry vision or double vision  CARDIOVASCULAR:  Negative for chest pain or palpitations  RESPIRATORY:  Negative for cough, wheezing, or SOB   GASTROINTESTINAL:  Negative for nausea, vomiting, diarrhea, constipation, or abdominal pain  GENITOURINARY:  Negative frequency, urgency or dysuria  NEUROLOGIC:  No headache, confusion, dizziness, lightheadedness      ANTIBIOTICS/RELEVANT:    MEDICATIONS  (STANDING):  aspirin enteric coated 81 milliGRAM(s) Oral daily  atorvastatin 40 milliGRAM(s) Oral at bedtime  dextrose 5%. 1000 milliLiter(s) (50 mL/Hr) IV Continuous <Continuous>  dextrose 50% Injectable 12.5 Gram(s) IV Push once  dextrose 50% Injectable 25 Gram(s) IV Push once  dextrose 50% Injectable 25 Gram(s) IV Push once  epoetin jayesh Injectable 59147 Unit(s) SubCutaneous daily  heparin  Infusion 900 Unit(s)/Hr (9 mL/Hr) IV Continuous <Continuous>  hydrALAZINE 10 milliGRAM(s) Oral three times a day  insulin lispro (HumaLOG) corrective regimen sliding scale   SubCutaneous three times a day before meals  insulin lispro (HumaLOG) corrective regimen sliding scale   SubCutaneous at bedtime  iron sucrose IVPB 100 milliGRAM(s) IV Intermittent every 24 hours  isosorbide   dinitrate Tablet (ISORDIL) 10 milliGRAM(s) Oral three times a day  levothyroxine 25 MICROGram(s) Oral daily  lidocaine   Patch 1 Patch Transdermal daily  pantoprazole    Tablet 40 milliGRAM(s) Oral before breakfast  tiotropium 18 MICROgram(s) Capsule 1 Capsule(s) Inhalation daily    MEDICATIONS  (PRN):  dextrose Gel 1 Dose(s) Oral once PRN Blood Glucose LESS THAN 70 milliGRAM(s)/deciliter  glucagon  Injectable 1 milliGRAM(s) IntraMuscular once PRN Glucose LESS THAN 70 milligrams/deciliter        Vital Signs Last 24 Hrs  T(C): 36.6 (20 Jan 2018 18:02), Max: 36.8 (20 Jan 2018 00:44)  T(F): 97.9 (20 Jan 2018 18:02), Max: 98.3 (20 Jan 2018 00:44)  HR: 92 (20 Jan 2018 12:41) (86 - 102)  BP: 100/49 (20 Jan 2018 12:41) (100/49 - 141/63)  BP(mean): --  RR: 18 (20 Jan 2018 12:41) (16 - 24)  SpO2: 98% (20 Jan 2018 12:41) (95% - 100%)    01-19-18 @ 07:01  -  01-20-18 @ 07:00  --------------------------------------------------------  IN: 630 mL / OUT: 1125 mL / NET: -495 mL    01-20-18 @ 07:01  -  01-20-18 @ 20:07  --------------------------------------------------------  IN: 752 mL / OUT: 750 mL / NET: 2 mL      PHYSICAL EXAM:  Constitutional: Well-developed, well nourished  Eyes:ZAID, EOMI  Ear/Nose/Throat: no oral lesion, no sinus tenderness on percussion	  Neck:no JVD, no lymphadenopathy, supple  Respiratory: cece rales  Cardiovascular: S1S2 irregular  Gastrointestinal:soft, (+) BS, no HSM  Extremities:no e/e/c  Vascular: DP Pulse: right normal; left normal      LABS:                        8.0    6.5   )-----------( 151      ( 20 Jan 2018 07:05 )             26.8     01-20    142  |  98  |  33<H>  ----------------------------<  152<H>  4.2   |  36<H>  |  1.58<H>    Ca    9.7      20 Jan 2018 07:05  Mg     2.0     01-20      PT/INR - ( 20 Jan 2018 07:05 )   PT: 12.9 sec;   INR: 1.16          PTT - ( 20 Jan 2018 07:05 )  PTT:75.8 sec    RADIOLOGY & ADDITIONAL STUDIES:  < from: Xray Chest 1 View AP- PORTABLE-Urgent (01.17.18 @ 09:52) >    EXAM:  XR CHEST PORTABLE URGENT 1V                          PROCEDURE DATE:  01/17/2018                     INTERPRETATION:  INDICATION: eval fluid status        TECHNIQUE: Chest, single portable AP view on  1/17/2018 9:52 AM     COMPARISON: Chest radiograph dated 1/16/2018    FINDINGS:  There is interval worsening of severe bilateral pulmonary congestion,   worse on the right. There is obscuration hilar and right paratracheal   regions.  There is a probable layering right pleural effusion.  Nopneumothorax is evident.  Visualized cardiac silhouette appears grossly stable      IMPRESSION:   Worsening of severe bilateral pulmonary congestion, asymmetric on the   right.

## 2018-01-20 NOTE — PROGRESS NOTE ADULT - PROBLEM SELECTOR PLAN 1
- continue lasix 40 IV  mg BID   - net negative 4.5 liters so far.  - feeling better today.  - Still holding ACE-i and metoprolol. Will restart them once pt is clinically better with heart failure. Currently on Isordil and Hydralazine (started a few days ago). - Decrease lasix 40 IV mg to daily   - net negative 4.5 liters so far.  - feeling better today.  - Still holding ACE-i and metoprolol. Will restart them once pt is clinically better with heart failure. Currently on Isordil and Hydralazine (started a few days ago).

## 2018-01-20 NOTE — PHYSICAL THERAPY INITIAL EVALUATION ADULT - GENERAL OBSERVATIONS, REHAB EVAL
Patient received in semi-galan position, no apparent distress, +2 L nasal canula, +hep lock, +intravenous line, +nava.

## 2018-01-20 NOTE — PROGRESS NOTE ADULT - PROBLEM SELECTOR PLAN 3
- S/p diagnostic LH 7/2016 revealed distal LAD disease, per outpt ppw  - currently chest pain free.   - Troponin x 2 negative.   - Continue ASA and lipitor.

## 2018-01-20 NOTE — PROGRESS NOTE ADULT - SUBJECTIVE AND OBJECTIVE BOX
MEDICAL HISTORY:      Consulted by Dr. Dillard for evaluation of reduced kidney function.               INTERVAL HISTORY:    SUMMARY COMMENTS:           ---------------------------------------------------------------------------------------------------------------------------------------------------------------------------    SUBJECTIVE & OBJECTIVE DATA DOCUMENTATION:     REVIEW OF SYSTEMS SCREENED ORGAN SYSTEMS:  Constitutional: fever, chills, anorexia or fatigue  Pulmonary: difficulty breathing, wheezing, cough  Cardiovascular: exertional dyspnea, chest pain, palpitations, dizziness or leg swelling  Gastrointestinal: abdominal or epigastric pain, nausea, vomiting or hematemesis, diarrhea, melena or bright red blood per rectum.  Genitourinary: dysuria, urgency, frequency, flank pain, difficulty voiding  Skin: No pruritis, rashes or lesions  Neurology: memory loss, dysarthria, extremity weakness, neuropathic pain, headache, visual changes  Dialysis access if present : pain, bleeding, swelling     ROS POSITIVE FINDINGS:     PAST MEDICAL & SURGICAL HISTORY:  Anemia  CAD (coronary artery disease)  DM (diabetes mellitus)  Emphysema  Hypercholesteremia  HTN (hypertension)  S/P bladder repair  S/P hysterectomy with oophorectomy  History of bilateral carotid endarterectomy  History of umbilical hernia repair  S/P cataract surgery  S/P TKR (total knee replacement)      PHYSICAL EXAM:  T(C): 36.4 (18 @ 09:32), Max: 36.8 (18 @ 00:44)  HR: 102 (18 @ 10:45)  BP: 141/63 (18 @ 10:45) (108/54 - 141/63)  RR: 17 (18 @ 08:51)  SpO2: 96% (01-20-18 @ 08:51)  Wt(kg): --  I&O's Summary    2018 07:  -  2018 07:00  --------------------------------------------------------  IN: 630 mL / OUT: 1125 mL / NET: -495 mL    2018 07:  -  2018 13:30  --------------------------------------------------------  IN: 9 mL / OUT: 0 mL / NET: 9 mL      Weight 83.5 ( @ 20:16)    APPEARANCE:   HEAD & NECK: normocephalic, no stiff neck, no masses, oral mucosa moist, no scleral icteris  RESPIRATORY: no accessory muscle use, no labored breathing, no dullness, no rales, crackles, no rhonchi, no wheeze  CARDIOVASCULAR: no JVD, RRR, S1S2, no gallop,  no murmur, no rub.  ABDOMEN: soft, no tenderness, no masses, no hepatomegally, no splenomegally  : no bladder distension  LYMPHATIC: no lymphadenopathy (neck, axilla, groin)  EXTREMITIES & MUSCULOSKELETAL: no cyanosis, no clubbing, no tenderness, strength  L=R  EDEMA:   PULSES: upper extremity pulses present, lower extremity pulses present   SKIN: no rash, no stasis, no induration  NEUROLOGIC & PSYCHIATRIC:  alert, interactive, oriented to time and place, responds to stimuli, no asterixis  DIALYSIS ACCESS if present:       MEDICATIONS  (STANDING):  aspirin enteric coated 81 milliGRAM(s) Oral daily  atorvastatin 40 milliGRAM(s) Oral at bedtime  dextrose 5%. 1000 milliLiter(s) (50 mL/Hr) IV Continuous <Continuous>  dextrose 50% Injectable 12.5 Gram(s) IV Push once  dextrose 50% Injectable 25 Gram(s) IV Push once  dextrose 50% Injectable 25 Gram(s) IV Push once  furosemide   Injectable 40 milliGRAM(s) IV Push two times a day  heparin  Infusion 900 Unit(s)/Hr (9 mL/Hr) IV Continuous <Continuous>  hydrALAZINE 10 milliGRAM(s) Oral three times a day  insulin lispro (HumaLOG) corrective regimen sliding scale   SubCutaneous three times a day before meals  insulin lispro (HumaLOG) corrective regimen sliding scale   SubCutaneous at bedtime  isosorbide   dinitrate Tablet (ISORDIL) 10 milliGRAM(s) Oral three times a day  levothyroxine 25 MICROGram(s) Oral daily  lidocaine   Patch 1 Patch Transdermal daily  pantoprazole    Tablet 40 milliGRAM(s) Oral before breakfast  tiotropium 18 MICROgram(s) Capsule 1 Capsule(s) Inhalation daily    MEDICATIONS  (PRN):  dextrose Gel 1 Dose(s) Oral once PRN Blood Glucose LESS THAN 70 milliGRAM(s)/deciliter  glucagon  Injectable 1 milliGRAM(s) IntraMuscular once PRN Glucose LESS THAN 70 milligrams/deciliter      DATA:  142    |  98     |  33<H>  ----------------------------<  152<H>  Ca:9.7   (2018 07:05)  4.2     |  36<H>  |  1.58<H>      eGFR if Non : 28 <L>  eGFR if : 33 <L>    TPro  6.1 g/dL  /  Alb  x      /  TBili  x      /  DBili  x      /  AST  x      /  ALT  x      /  AlkPhos  x      2018 16:08                        8.0<L>  6.5   )-----------( 151      ( 2018 07:05 )             26.8<L>    Phos:-- M.0 mg/dL PTH:-- Uric acid:-- Serum Osm:--  Ferritin:-- Iron:-- TIBC:-- Tsat:--  B12:-- TSH:-- ( @ 07:05) MEDICAL HISTORY:      Consulted by Dr. Dillard for evaluation of reduced kidney function.     89 year old  female admitted with CHF and atrial fibrillation in  the 100's. The history is notable for vasculopathy with multiple cardiac, lower extremity and carotid procedures. n echocardiogram here showed .reduced LVEF. The hospital course has focused on diuresis and rate control.     On admission GFR was reduced, with Scr ~1.5 mg/dl. Baseline Scr data n/a. On admission, anemia was noted with Hgb 8's g/dl.  A beta-microalbumin screen was elevated (6.0). TF sat was 12, Ferritin 169. Hematology is evaluating.    CKD workup in progress  Vasculopathy, coronary, carotid, PVD   HLD  Cardiomyopathy with reduced LVEF  long-standing HTN (40 years)  recent DM (few years)  Emphysema/COPD    ECHO severe global hypokinesis with LVEF 20-25, mod MR, LAE, PHTN    Selected medications at home: amlodipine. torsemide, prednisone 20 mg, Januvia, isordil, ASA, statin, omeprazole, various bronchodilators.     SUMMARY COMMENTS:     The current focus is on treatment of CHF and AFIB rate control. LVEF is reduced. A cardiac workup is in progress. Details still evolving to determine if cardioversion is to be attempted. Currently heparinized.     ·	GFR is reduced, and is, almost certainly, much lower than the GFR "calculator" indicates. GFR calculations were never intended to be used for individuals of advanced age.  ·	The GFR is likely 10-20 ml/min. Scr is relatively low because of the very low muscle mass in this individual.   ·	Because the GFR is so low, it is possible that a component of the anemia (Hgb 8's) is related to CKD. There is also incidental iron deficiency which could contribute to the low Hgb.   ·	However, the high beta-microalbumin screen is noted, and hematology has ordered multiple myeloma screens (SPEP, etc.).  ·	In view of the significantly low GFR, lab tests such as PTHi, Phos and 25OH vit D were ordered for tomorrow.   ·	The cause of the CKD could be due to several factors - there is long-standing HTN, extensive vasculopathy to name a few. I have not seen prior Scr values - they could be helpful to put the current degree kidney disease in context.   ·	A renal sonogram is be done as part of the workup for reduced GFR. Similarly, a UA and urine microalbumin screen was ordered.     TCO2 has ranged 32-36 meq/L which, presumably, may related to diuretic use and metabolic alkalosis. However, there is some kind of chronic lung disease and we don't know, for certain, there is not a component of respiratory acidosis contributing to the high TCO2. Consider an ABG screen.     Until we get things clarified, use caution if an IV contrast study is being considered. With advanced age and GFR <20 ml/min, contrast-induced LYNDA is a significant risk.     Discussed with the cardiac team.             ---------------------------------------------------------------------------------------------------------------------------------------------------------------------------    SUBJECTIVE & OBJECTIVE DATA DOCUMENTATION:     REVIEW OF SYSTEMS SCREENED ORGAN SYSTEMS:  Constitutional: fever, chills, anorexia or fatigue  Pulmonary: difficulty breathing, wheezing, cough  Cardiovascular: exertional dyspnea, chest pain, palpitations, dizziness or leg swelling  Gastrointestinal: abdominal or epigastric pain, nausea, vomiting or hematemesis, diarrhea, melena or bright red blood per rectum.  Genitourinary: dysuria, urgency, frequency, flank pain, difficulty voiding  Skin: No pruritis, rashes or lesions  Neurology: memory loss, dysarthria, extremity weakness, neuropathic pain, headache, visual changes  Dialysis access if present : pain, bleeding, swelling     ROS POSITIVE FINDINGS: current mild SOB    PAST MEDICAL & SURGICAL HISTORY:  Anemia  CAD (coronary artery disease)  DM (diabetes mellitus)  Emphysema  Hypercholesteremia  HTN (hypertension)  S/P bladder repair  S/P hysterectomy with oophorectomy  History of bilateral carotid endarterectomy  History of umbilical hernia repair  S/P cataract surgery  S/P TKR (total knee replacement)      PHYSICAL EXAM:  T(C): 36.4 (18 @ 09:32), Max: 36.8 (18 @ 00:44)  HR: 102 (18 @ 10:45)  BP: 141/63 (18 @ 10:45) (108/54 - 141/63)  RR: 17 (18 @ 08:51)  SpO2: 96% (18 @ 08:51)  Wt(kg): --  I&O's Summary    2018 07:  -  2018 07:00  --------------------------------------------------------  IN: 630 mL / OUT: 1125 mL / NET: -495 mL    2018 07:  -  2018 13:30  --------------------------------------------------------  IN: 9 mL / OUT: 0 mL / NET: 9 mL      Weight 83.5 ( @ 20:16)    APPEARANCE: IN bed, NAD.   HEAD & NECK: normocephalic, no stiff neck, no masses, oral mucosa moist, no scleral icteris  RESPIRATORY: no accessory muscle use, no labored breathing, no dullness, basilar reales, no wheeze  CARDIOVASCULAR: no JVD, IRRR, S1S2,+ gallop,  no murmur, no rub.  ABDOMEN: soft, no tenderness, no masses, no hepatomegally, no splenomegally  : no bladder distension  LYMPHATIC: no lymphadenopathy (neck, axilla, groin)  EXTREMITIES & MUSCULOSKELETAL: no cyanosis, no clubbing, no tenderness, strength  L=R  EDEMA: 1+  PULSES: upper extremity pulses present, lower extremity pulses absent  SKIN: no rash, no stasis, no induration  NEUROLOGIC & PSYCHIATRIC:  alert, interactive, oriented to time and place, responds to stimuli, no asterixis         MEDICATIONS  (STANDING):  aspirin enteric coated 81 milliGRAM(s) Oral daily  atorvastatin 40 milliGRAM(s) Oral at bedtime  dextrose 5%. 1000 milliLiter(s) (50 mL/Hr) IV Continuous <Continuous>  dextrose 50% Injectable 12.5 Gram(s) IV Push once  dextrose 50% Injectable 25 Gram(s) IV Push once  dextrose 50% Injectable 25 Gram(s) IV Push once  furosemide   Injectable 40 milliGRAM(s) IV Push two times a day  heparin  Infusion 900 Unit(s)/Hr (9 mL/Hr) IV Continuous <Continuous>  hydrALAZINE 10 milliGRAM(s) Oral three times a day  insulin lispro (HumaLOG) corrective regimen sliding scale   SubCutaneous three times a day before meals  insulin lispro (HumaLOG) corrective regimen sliding scale   SubCutaneous at bedtime  isosorbide   dinitrate Tablet (ISORDIL) 10 milliGRAM(s) Oral three times a day  levothyroxine 25 MICROGram(s) Oral daily  lidocaine   Patch 1 Patch Transdermal daily  pantoprazole    Tablet 40 milliGRAM(s) Oral before breakfast  tiotropium 18 MICROgram(s) Capsule 1 Capsule(s) Inhalation daily    MEDICATIONS  (PRN):  dextrose Gel 1 Dose(s) Oral once PRN Blood Glucose LESS THAN 70 milliGRAM(s)/deciliter  glucagon  Injectable 1 milliGRAM(s) IntraMuscular once PRN Glucose LESS THAN 70 milligrams/deciliter      DATA:  142    |  98     |  33<H>  ----------------------------<  152<H>  Ca:9.7   (2018 07:05)  4.2     |  36<H>  |  1.58<H>      eGFR if Non : 28 <L>  eGFR if : 33 <L>    TPro  6.1 g/dL  /  Alb  x      /  TBili  x      /  DBili  x      /  AST  x      /  ALT  x      /  AlkPhos  x      2018 16:08                        8.0<L>  6.5   )-----------( 151      ( 2018 07:05 )             26.8<L>    Phos:-- M.0 mg/dL PTH:-- Uric acid:-- Serum Osm:--  Ferritin:-- Iron:-- TIBC:-- Tsat:--  B12:-- TSH:-- ( @ 07:05)

## 2018-01-20 NOTE — PROGRESS NOTE ADULT - SUBJECTIVE AND OBJECTIVE BOX
History of Present Illness:  History of Present Illness: 	  This is a 89 y/o female, obese, with HTN, HPL, DM-II, Anemia, diastolic CHF (NL EF), known CAD with previous PCIs, b/l carotid endarterectomies, who presented to her cardiologist with c/o mid sternal chest pressure radiating to the back, associated with SOB, palpitations, LE edema and 4lbs weight gain over 1 week. In the office, her EKG showed new AFib and she was sent to the ER for further evaluation.   In the ER, 1st troponin negative, BNP 3805, BUN 42/1.57, H/H 8.6/27.7, EKG AFib @ 100bpm, LBBB, CXR with some vascular congestion. She received Cardizem 5mg IV x1, Lasix 40mg IV x1, was started on IV heparin and admitted for further management and telemetry monitoring.  No s/s of bleeding noted.    Review of Systems:  Review of Systems: GENERAL, CONSTITUTIONAL : + recent weight gain. Denies fever, chills  EYES, VISION: denies changes in vision   EARS, NOSE, THROAT: denies hearing loss  HEART, CARDIOVASCULAR: + chest pain, palpitations, SOB,  LE edema. Denies claudication  RESPIRATORY: + SOB; Denies cough, wheezing, PND, orthopnea  GASTROINTESTINAL: Denies abdominal pain, heartburn, bloody stool, dark tarry stool  GENITOURINARY: Denies frequent urination, urgency  MUSCULOSKELETAL denies joint pain or swelling, restricted motion, musculoskeletal pain.   SKIN & INTEGUMENTARY Denies rashes, sores, blisters, blisters, growths.  NEUROLOGICAL: Denies numbness or tingling sensations, sensation loss, burning.   PSYCHIATRIC: Denies nervousness, anxiety, depression  ENDOCRINE Denies heat or cold intolerance, excessive thirst HEMATOLOGIC/LYMPHATIC: Denies abnormal bleeding, bleeding of any kind	      Allergies and Intolerances:        Allergies:  	No Known Allergies:     Home Medications:   * Patient Currently Takes Medications as of 17-Jan-2018 02:09 documented in Structured Notes  · 	torsemide 10 mg oral tablet: 1 tab(s) orally once a day, Last Dose Taken:    · 	aspirin 81 mg oral tablet: 1 tab(s) orally once a day, Last Dose Taken:    · 	isosorbide mononitrate 30 mg oral tablet, extended release: 1 tab(s) orally once a day (in the morning), Last Dose Taken:    · 	Januvia 50 mg oral tablet: 1 tab(s) orally once a day, Last Dose Taken:    · 	amLODIPine 5 mg oral tablet: 1 tab(s) orally once a day, Last Dose Taken:    · 	predniSONE 20 mg oral tablet: 1 tab(s) orally once a day, Last Dose Taken:    · 	atorvastatin 20 mg oral tablet: 1 tab(s) orally once a day, Last Dose Taken:    · 	montelukast 10 mg oral tablet: 1 tab(s) orally once a day, Last Dose Taken:    · 	omeprazole 40 mg oral delayed release capsule: 1 cap(s) orally once a day, Last Dose Taken:    · 	Spiriva 18 mcg inhalation capsule: 1 cap(s) inhaled once a day, Last Dose Taken:      .  Patient History:   Past Medical History:  Anemia    CAD (coronary artery disease)    DM (diabetes mellitus)    Emphysema    HTN (hypertension)    Hypercholesteremia.    Past Surgical History:  History of bilateral carotid endarterectomy    History of umbilical hernia repair    S/P bladder repair    S/P cataract surgery    S/P hysterectomy with oophorectomy    S/P TKR (total knee replacement).    Family History:  Mother  Still living? No  Family history of emphysema, Age at diagnosis: 71-80.    Social History:  Social History (marital status, living situation, occupation, tobacco use, alcohol and drug use, and sexual history): Denies tobacco, ETOH or illicit drug use Lives with her daughter, ambulates with a walker	    Tobacco Screening:  · Core Measure Site	No	    Risk Assessment:   Present on Admission:  Deep Venous Thrombosis	no 	  Pulmonary Embolus	no 	    Heart Failure:  Does this patient have a history of or has been diagnosed with heart failure? yes.      Physical Exam:  Physical Exam:                       8.2   6.8   )-----------( 140      ( 19 Jan 2018 06:53 )            27.0   141  |  97  |  33<H> ----------------------------<  139<H> 4.5   |  34<H>  |  1.41<H>  Ca    9.5      19 Jan 2018 06:53 Mg     2.0     01-19 TPro  6.1  /  Alb  x   /  TBili  x   /  DBili  x   /  AST  x   /  ALT  x   /  AlkPhos  x   01-18    Appearance: Normal   HEENT:   Normal oral mucosa, PERRL, EOMI   Neck: Supple, - JVD; No Carotid Bruit   Cardiovascular: Normal S1 S2, No JVD, No murmurs  Respiratory: Lungs bibasilar crackles, no Rhonchi, Wheezing   Gastrointestinal:  Soft, Non-tender, + BS   Skin: No rashes, No ecchymoses, No cyanosis  Extremities: 3+ edema b/l, No clubbing, cyanosis  Vascular: Femoral pulses 1+ b/l without bruit, DP faint, b/l, PT faint b/l  Neurologic: Non-focal Psychiatry: A & O x 3, Mood & affect appropriate	      Labs and Results:  Labs, Radiology, Cardiology, and Other Results:                       8.2   6.8   )-----------( 140      ( 19 Jan 2018 06:53 )            27.0   141  |  97  |  33<H> ----------------------------<  139<H> 4.5   |  34<H>  |  1.41<H>  Ca    9.5      19 Jan 2018 06:53 Mg     2.0     01-19  TPro  6.1  /  Alb  x   /  TBili  x   /  DBili  x   /  AST  x   /  ALT  x   /  AlkPhos  x   01-18 	    Assessment and Plan:   Assessment:  · Assessment		  89 y/o female with HTN, HPL, DM-II, CAD/PCIs, diastolic CHF (NL EF), anemia who is admitted with new onset AFib and CHF exacerbation      Problem/Plan - 1:  ·  Problem: Atrial fibrillation.  Plan: New Afib with controlled HR  - Heparin gtt: Dose to keep PTT 60-80    Problem/Plan - 2:  - Monitor CBC closely & transfuse PRBCs to keep Hb > 7g/dl  - f/u SPEP, IPEP, Immunofixation, LDH, Beta-2 microglobulin  - Consider Epogen  - Trial of iron sucrose

## 2018-01-20 NOTE — PHYSICAL THERAPY INITIAL EVALUATION ADULT - PERTINENT HX OF CURRENT PROBLEM, REHAB EVAL
89 year old female who presented to her cardiologist with c/o mid sternal chest pressure radiating to the back, associated with SOB, palpitations, LE edema and 4lbs weight gain over 1 week.

## 2018-01-20 NOTE — PHYSICAL THERAPY INITIAL EVALUATION ADULT - ADDITIONAL COMMENTS
Pt has 5 steps to enter home. Prior use of RW primarily sometimes a cane. Pt has 8hr/day 7days/week HHA with daughter taking care of pt outside those hours. Pt is without prior falls. Pt is Yoruba speaking only.

## 2018-01-20 NOTE — PROGRESS NOTE ADULT - PROBLEM SELECTOR PLAN 8
- per Dr. Castle, there was b/l RAAS by ultrasound outpt. Dr. Castle plans to do renal angiogram when he does the [facility]  - Dr. Dominguez consulted by Dr. Castle for reduced renal function.   - Recommends repeat Renal U/S here (to check to renal size). Questionable Renal angiogram / PTA in her age and asymptomatic nature.     DVT ppx: Heparin gtt, patient specific goal PTT 60-80  Dispo: Continue IV diuresis, management of AFIB, PT to see patient

## 2018-01-20 NOTE — PROGRESS NOTE ADULT - PROBLEM SELECTOR PLAN 2
- Continue hep gtt.  - NPO for ANMOL / DCCV monday.   - Rate reasonably controlled even without AVN agents at this time.

## 2018-01-20 NOTE — PROGRESS NOTE ADULT - PROBLEM SELECTOR PLAN 5
- Dr. Gupta consulted.   NO EVIDENCE OF COPD. The patient was exposed to biomass.  Continue Spiriva and as needed albuterol. Consider DC singulair (stopped).  Continue oxygen supplementation. Due to patient age no need for CT scan of chest.  Hold on steroids

## 2018-01-20 NOTE — PROGRESS NOTE ADULT - PROBLEM SELECTOR PLAN 4
- Holding Januvia   - FS with Lispro coverage  - Endo consulted: Started Levothyroxine 25 mg in AM   - Check TFT in 6 weeks.

## 2018-01-20 NOTE — PROGRESS NOTE ADULT - PROBLEM SELECTOR PLAN 7
- H/H 8.2/27.0 today, continue to monitor  - Active T+S  - Heme (Dr. Elliott) following. Rec Heparin gtt: Dose to keep PTT 60-80  - Monitor CBC closely & transfuse PRBCs to keep Hb > 7g/dl  - Check Iron panel, SPEP, IPEP, Immunofixation, LDH, Beta-2 microglobulin (ordered, f/u results)  - Will start Epogen and trial of iron sucros.

## 2018-01-21 DIAGNOSIS — R30.0 DYSURIA: ICD-10-CM

## 2018-01-21 DIAGNOSIS — E03.9 HYPOTHYROIDISM, UNSPECIFIED: ICD-10-CM

## 2018-01-21 LAB
% ALBUMIN: 50.1 % — SIGNIFICANT CHANGE UP
% ALPHA 1: 7.7 % — SIGNIFICANT CHANGE UP
% ALPHA 2: 16.5 % — SIGNIFICANT CHANGE UP
% BETA: 12.9 % — SIGNIFICANT CHANGE UP
% GAMMA: 12.8 % — SIGNIFICANT CHANGE UP
ALBUMIN SERPL ELPH-MCNC: 3.1 G/DL — LOW (ref 3.6–5.5)
ALBUMIN SERPL ELPH-MCNC: 3.4 G/DL — SIGNIFICANT CHANGE UP (ref 3.3–5)
ALBUMIN/GLOB SERPL ELPH: 1 RATIO — SIGNIFICANT CHANGE UP
ALPHA1 GLOB SERPL ELPH-MCNC: 0.5 G/DL — HIGH (ref 0.1–0.4)
ALPHA2 GLOB SERPL ELPH-MCNC: 1 G/DL — SIGNIFICANT CHANGE UP (ref 0.5–1)
ANION GAP SERPL CALC-SCNC: 9 MMOL/L — SIGNIFICANT CHANGE UP (ref 5–17)
APPEARANCE UR: (no result)
APTT BLD: 55.2 SEC — HIGH (ref 27.5–37.4)
B-GLOBULIN SERPL ELPH-MCNC: 0.8 G/DL — SIGNIFICANT CHANGE UP (ref 0.5–1)
BILIRUB UR-MCNC: NEGATIVE — SIGNIFICANT CHANGE UP
BUN SERPL-MCNC: 32 MG/DL — HIGH (ref 7–23)
CALCIUM SERPL-MCNC: 9.7 MG/DL — SIGNIFICANT CHANGE UP (ref 8.4–10.5)
CHLORIDE SERPL-SCNC: 100 MMOL/L — SIGNIFICANT CHANGE UP (ref 96–108)
CO2 SERPL-SCNC: 36 MMOL/L — HIGH (ref 22–31)
COLOR SPEC: YELLOW — SIGNIFICANT CHANGE UP
CREAT SERPL-MCNC: 1.46 MG/DL — HIGH (ref 0.5–1.3)
DIFF PNL FLD: NEGATIVE — SIGNIFICANT CHANGE UP
GAMMA GLOBULIN: 0.8 G/DL — SIGNIFICANT CHANGE UP (ref 0.6–1.6)
GLUCOSE BLDC GLUCOMTR-MCNC: 139 MG/DL — HIGH (ref 70–99)
GLUCOSE BLDC GLUCOMTR-MCNC: 143 MG/DL — HIGH (ref 70–99)
GLUCOSE BLDC GLUCOMTR-MCNC: 166 MG/DL — HIGH (ref 70–99)
GLUCOSE BLDC GLUCOMTR-MCNC: 192 MG/DL — HIGH (ref 70–99)
GLUCOSE SERPL-MCNC: 137 MG/DL — HIGH (ref 70–99)
GLUCOSE UR QL: NEGATIVE — SIGNIFICANT CHANGE UP
HCT VFR BLD CALC: 26.6 % — LOW (ref 34.5–45)
HGB BLD-MCNC: 8 G/DL — LOW (ref 11.5–15.5)
INTERPRETATION SERPL IFE-IMP: SIGNIFICANT CHANGE UP
KETONES UR-MCNC: NEGATIVE — SIGNIFICANT CHANGE UP
LEUKOCYTE ESTERASE UR-ACNC: (no result)
MAGNESIUM SERPL-MCNC: 2 MG/DL — SIGNIFICANT CHANGE UP (ref 1.6–2.6)
MCHC RBC-ENTMCNC: 28.8 PG — SIGNIFICANT CHANGE UP (ref 27–34)
MCHC RBC-ENTMCNC: 30.1 G/DL — LOW (ref 32–36)
MCV RBC AUTO: 95.7 FL — SIGNIFICANT CHANGE UP (ref 80–100)
NITRITE UR-MCNC: NEGATIVE — SIGNIFICANT CHANGE UP
PH UR: 5.5 — SIGNIFICANT CHANGE UP (ref 5–8)
PHOSPHATE SERPL-MCNC: 3.7 MG/DL — SIGNIFICANT CHANGE UP (ref 2.5–4.5)
PLATELET # BLD AUTO: 147 K/UL — LOW (ref 150–400)
POTASSIUM SERPL-MCNC: 4 MMOL/L — SIGNIFICANT CHANGE UP (ref 3.5–5.3)
POTASSIUM SERPL-SCNC: 4 MMOL/L — SIGNIFICANT CHANGE UP (ref 3.5–5.3)
PROT PATTERN SERPL ELPH-IMP: SIGNIFICANT CHANGE UP
PROT UR-MCNC: NEGATIVE MG/DL — SIGNIFICANT CHANGE UP
PTH-INTACT FLD-MCNC: 63 PG/ML — SIGNIFICANT CHANGE UP (ref 15–65)
RBC # BLD: 2.78 M/UL — LOW (ref 3.8–5.2)
RBC # FLD: 16.4 % — SIGNIFICANT CHANGE UP (ref 10.3–16.9)
SODIUM SERPL-SCNC: 145 MMOL/L — SIGNIFICANT CHANGE UP (ref 135–145)
SP GR SPEC: <=1.005 — SIGNIFICANT CHANGE UP (ref 1–1.03)
UROBILINOGEN FLD QL: 0.2 E.U./DL — SIGNIFICANT CHANGE UP
WBC # BLD: 5.6 K/UL — SIGNIFICANT CHANGE UP (ref 3.8–10.5)
WBC # FLD AUTO: 5.6 K/UL — SIGNIFICANT CHANGE UP (ref 3.8–10.5)

## 2018-01-21 PROCEDURE — 71045 X-RAY EXAM CHEST 1 VIEW: CPT | Mod: 26

## 2018-01-21 RX ORDER — FUROSEMIDE 40 MG
40 TABLET ORAL ONCE
Qty: 0 | Refills: 0 | Status: COMPLETED | OUTPATIENT
Start: 2018-01-21 | End: 2018-01-21

## 2018-01-21 RX ORDER — METOPROLOL TARTRATE 50 MG
12.5 TABLET ORAL
Qty: 0 | Refills: 0 | Status: DISCONTINUED | OUTPATIENT
Start: 2018-01-21 | End: 2018-01-27

## 2018-01-21 RX ORDER — HEPARIN SODIUM 5000 [USP'U]/ML
1000 INJECTION INTRAVENOUS; SUBCUTANEOUS
Qty: 25000 | Refills: 0 | Status: DISCONTINUED | OUTPATIENT
Start: 2018-01-21 | End: 2018-01-22

## 2018-01-21 RX ORDER — ACETAMINOPHEN 500 MG
650 TABLET ORAL ONCE
Qty: 0 | Refills: 0 | Status: COMPLETED | OUTPATIENT
Start: 2018-01-21 | End: 2018-01-21

## 2018-01-21 RX ADMIN — ISOSORBIDE DINITRATE 10 MILLIGRAM(S): 5 TABLET ORAL at 22:00

## 2018-01-21 RX ADMIN — ISOSORBIDE DINITRATE 10 MILLIGRAM(S): 5 TABLET ORAL at 06:08

## 2018-01-21 RX ADMIN — Medication 10 MILLIGRAM(S): at 14:17

## 2018-01-21 RX ADMIN — ERYTHROPOIETIN 10000 UNIT(S): 10000 INJECTION, SOLUTION INTRAVENOUS; SUBCUTANEOUS at 16:13

## 2018-01-21 RX ADMIN — Medication 650 MILLIGRAM(S): at 16:14

## 2018-01-21 RX ADMIN — Medication 12.5 MILLIGRAM(S): at 18:14

## 2018-01-21 RX ADMIN — Medication 10 MILLIGRAM(S): at 21:27

## 2018-01-21 RX ADMIN — IRON SUCROSE 210 MILLIGRAM(S): 20 INJECTION, SOLUTION INTRAVENOUS at 18:14

## 2018-01-21 RX ADMIN — LIDOCAINE 1 PATCH: 4 CREAM TOPICAL at 12:09

## 2018-01-21 RX ADMIN — Medication 2: at 12:08

## 2018-01-21 RX ADMIN — Medication 81 MILLIGRAM(S): at 12:08

## 2018-01-21 RX ADMIN — ISOSORBIDE DINITRATE 10 MILLIGRAM(S): 5 TABLET ORAL at 14:17

## 2018-01-21 RX ADMIN — LIDOCAINE 1 PATCH: 4 CREAM TOPICAL at 00:34

## 2018-01-21 RX ADMIN — ATORVASTATIN CALCIUM 40 MILLIGRAM(S): 80 TABLET, FILM COATED ORAL at 21:27

## 2018-01-21 RX ADMIN — Medication 10 MILLIGRAM(S): at 06:08

## 2018-01-21 RX ADMIN — Medication 40 MILLIGRAM(S): at 18:14

## 2018-01-21 RX ADMIN — TIOTROPIUM BROMIDE 1 CAPSULE(S): 18 CAPSULE ORAL; RESPIRATORY (INHALATION) at 16:14

## 2018-01-21 RX ADMIN — HEPARIN SODIUM 10 UNIT(S)/HR: 5000 INJECTION INTRAVENOUS; SUBCUTANEOUS at 16:15

## 2018-01-21 RX ADMIN — Medication 25 MICROGRAM(S): at 06:08

## 2018-01-21 RX ADMIN — Medication 40 MILLIGRAM(S): at 10:51

## 2018-01-21 RX ADMIN — PANTOPRAZOLE SODIUM 40 MILLIGRAM(S): 20 TABLET, DELAYED RELEASE ORAL at 06:08

## 2018-01-21 RX ADMIN — Medication 650 MILLIGRAM(S): at 17:15

## 2018-01-21 NOTE — PROGRESS NOTE ADULT - SUBJECTIVE AND OBJECTIVE BOX
INTERVAL HPI/OVERNIGHT EVENTS: Cough, no fever, weak    CONSTITUTIONAL:  Negative fever or chills, feels well, good appetite  EYES:  Negative  blurry vision or double vision  CARDIOVASCULAR:  Negative for chest pain or palpitations  RESPIRATORY:  Negative for cough, wheezing, or SOB   GASTROINTESTINAL:  Negative for nausea, vomiting, diarrhea, constipation, or abdominal pain  GENITOURINARY:  Negative frequency, urgency or dysuria  NEUROLOGIC:  No headache, confusion, dizziness, lightheadedness      ANTIBIOTICS/RELEVANT:    MEDICATIONS  (STANDING):  aspirin enteric coated 81 milliGRAM(s) Oral daily  atorvastatin 40 milliGRAM(s) Oral at bedtime  dextrose 5%. 1000 milliLiter(s) (50 mL/Hr) IV Continuous <Continuous>  dextrose 50% Injectable 12.5 Gram(s) IV Push once  dextrose 50% Injectable 25 Gram(s) IV Push once  dextrose 50% Injectable 25 Gram(s) IV Push once  epoetin jayesh Injectable 53084 Unit(s) SubCutaneous daily  furosemide   Injectable 40 milliGRAM(s) IV Push <User Schedule>  heparin  Infusion 900 Unit(s)/Hr (9 mL/Hr) IV Continuous <Continuous>  hydrALAZINE 10 milliGRAM(s) Oral three times a day  insulin lispro (HumaLOG) corrective regimen sliding scale   SubCutaneous three times a day before meals  insulin lispro (HumaLOG) corrective regimen sliding scale   SubCutaneous at bedtime  iron sucrose IVPB 100 milliGRAM(s) IV Intermittent every 24 hours  isosorbide   dinitrate Tablet (ISORDIL) 10 milliGRAM(s) Oral three times a day  levothyroxine 25 MICROGram(s) Oral daily  lidocaine   Patch 1 Patch Transdermal daily  metoprolol     tartrate 12.5 milliGRAM(s) Oral two times a day  pantoprazole    Tablet 40 milliGRAM(s) Oral before breakfast  tiotropium 18 MICROgram(s) Capsule 1 Capsule(s) Inhalation daily    MEDICATIONS  (PRN):  dextrose Gel 1 Dose(s) Oral once PRN Blood Glucose LESS THAN 70 milliGRAM(s)/deciliter  glucagon  Injectable 1 milliGRAM(s) IntraMuscular once PRN Glucose LESS THAN 70 milligrams/deciliter        Vital Signs Last 24 Hrs  T(C): 36.1 (21 Jan 2018 09:02), Max: 36.6 (20 Jan 2018 18:02)  T(F): 97 (21 Jan 2018 09:02), Max: 97.9 (20 Jan 2018 18:02)  HR: 90 (21 Jan 2018 14:15) (77 - 98)  BP: 115/55 (21 Jan 2018 14:15) (97/47 - 139/95)  BP(mean): --  RR: 16 (21 Jan 2018 14:15) (16 - 18)  SpO2: 100% (21 Jan 2018 14:15) (92% - 100%)    01-20-18 @ 07:01  -  01-21-18 @ 07:00  --------------------------------------------------------  IN: 953 mL / OUT: 1100 mL / NET: -147 mL      PHYSICAL EXAM:  Constitutional:Well-developed, well nourished  Eyes:ZAID, EOMI  Ear/Nose/Throat: no oral lesion, no sinus tenderness on percussion	  Neck:no JVD, no lymphadenopathy, supple  Respiratory: cece rales  Cardiovascular: S1S2 irregular  Gastrointestinal:soft, (+) BS, no HSM  Extremities:no e/e/c  Vascular: DP Pulse:right normal; left normal      LABS:                          8.0    5.6   )-----------( 147      ( 21 Jan 2018 08:18 )             26.6     01-21    145  |  100  |  32<H>  ----------------------------<  137<H>  4.0   |  36<H>  |  1.46<H>    Ca    9.7      21 Jan 2018 08:17  Phos  3.7     01-21  Mg     2.0     01-21    TPro  x   /  Alb  3.4  /  TBili  x   /  DBili  x   /  AST  x   /  ALT  x   /  AlkPhos  x   01-21    PT/INR - ( 20 Jan 2018 07:05 )   PT: 12.9 sec;   INR: 1.16          PTT - ( 20 Jan 2018 22:48 )  PTT:66.8 sec  Urinalysis Basic - ( 21 Jan 2018 12:34 )    Color: Yellow / Appearance: Cloudy / SG: <=1.005 / pH: x  Gluc: x / Ketone: NEGATIVE  / Bili: Negative / Urobili: 0.2 E.U./dL   Blood: x / Protein: NEGATIVE mg/dL / Nitrite: NEGATIVE   Leuk Esterase: Moderate / RBC: x / WBC Many /HPF   Sq Epi: x / Non Sq Epi: 0-5 /HPF / Bacteria: Present /HPF      RADIOLOGY & ADDITIONAL STUDIES:  < from: Xray Chest 1 View AP- PORTABLE-Urgent (01.17.18 @ 09:52) >  EXAM:  XR CHEST PORTABLE URGENT 1V                          PROCEDURE DATE:  01/17/2018                     INTERPRETATION:  INDICATION: eval fluid status        TECHNIQUE: Chest, single portable AP view on  1/17/2018 9:52 AM     COMPARISON: Chest radiograph dated 1/16/2018    FINDINGS:  There is interval worsening of severe bilateral pulmonary congestion,   worse on the right. There is obscuration hilar and right paratracheal   regions.  There is a probable layering right pleural effusion.  Nopneumothorax is evident.  Visualized cardiac silhouette appears grossly stable      IMPRESSION:   Worsening of severe bilateral pulmonary congestion, asymmetric on the   right.    < end of copied text >

## 2018-01-21 NOTE — PROGRESS NOTE ADULT - PROBLEM SELECTOR PLAN 1
Acute Systolic CHF. Overloaded on exam today, net negative 4.3L  - given lasix 40mg IV x 1 this AM and will order additional dose for this afternoon  - started metoprolol 12.5mg BID today as discussed with Dr. Connolly  - continue hydralazine 10mg TID and isordil 10mg TID  - daily weights and I&Os  - holding ACE-I for now in setting of renal disease Acute Systolic CHF. Overloaded on exam today, net negative 4.3L, weight 76.3kg today from 77.2kg yesterday  - given lasix 40mg IV x 1 this AM and will order additional dose for this afternoon  - started metoprolol 12.5mg BID today as discussed with Dr. Connolly  - continue hydralazine 10mg TID and isordil 10mg TID  - daily weights and I&Os  - holding ACE-I for now in setting of renal disease

## 2018-01-21 NOTE — PROGRESS NOTE ADULT - ASSESSMENT
89 y/o female with HTN, HPL, DM-II, CAD/PCIs, diastolic CHF (NL EF), anemia who is admitted with new onset AFib and CHF exacerbation

## 2018-01-21 NOTE — PROGRESS NOTE ADULT - PROBLEM SELECTOR PLAN 10
- per Dr. Castle, there was b/l RAAS by ultrasound outpt. Dr. Castle plans to do renal angiogram when he does the [facility]  - Dr. Dominguez consulted by Dr. Castle for reduced renal function.   - Recommends repeat Renal U/S here (to check to renal size). Questionable Renal angiogram / PTA in her age and asymptomatic nature.     DVT ppx: Heparin gtt, patient specific goal PTT 60-80  Dispo: Continue IV diuresis, management of AFIB, PT to see patient

## 2018-01-21 NOTE — PROGRESS NOTE ADULT - SUBJECTIVE AND OBJECTIVE BOX
INTERVAL HPI/OVERNIGHT EVENTS:    Patient is a 89y old  Female who presents with a chief complaint of SOB was seen and examined today. Reports the following symptoms:    CONSTITUTIONAL:  Negative fever or chills, feels well, good appetite  EYES:  Negative  blurry vision or double vision  CARDIOVASCULAR:  Negative for chest pain or palpitations  RESPIRATORY:  Negative for cough, wheezing, or SOB   GASTROINTESTINAL:  Negative for nausea, vomiting, diarrhea, constipation, or abdominal pain  GENITOURINARY:  Negative frequency, urgency or dysuria  NEUROLOGIC:  No headache, confusion, dizziness, lightheadedness    MEDICATIONS  (STANDING):  aspirin enteric coated 81 milliGRAM(s) Oral daily  atorvastatin 40 milliGRAM(s) Oral at bedtime  dextrose 5%. 1000 milliLiter(s) (50 mL/Hr) IV Continuous <Continuous>  dextrose 50% Injectable 12.5 Gram(s) IV Push once  dextrose 50% Injectable 25 Gram(s) IV Push once  dextrose 50% Injectable 25 Gram(s) IV Push once  epoetin jayesh Injectable 63135 Unit(s) SubCutaneous daily  furosemide   Injectable 40 milliGRAM(s) IV Push <User Schedule>  heparin  Infusion 900 Unit(s)/Hr (9 mL/Hr) IV Continuous <Continuous>  hydrALAZINE 10 milliGRAM(s) Oral three times a day  insulin lispro (HumaLOG) corrective regimen sliding scale   SubCutaneous three times a day before meals  insulin lispro (HumaLOG) corrective regimen sliding scale   SubCutaneous at bedtime  iron sucrose IVPB 100 milliGRAM(s) IV Intermittent every 24 hours  isosorbide   dinitrate Tablet (ISORDIL) 10 milliGRAM(s) Oral three times a day  levothyroxine 25 MICROGram(s) Oral daily  lidocaine   Patch 1 Patch Transdermal daily  pantoprazole    Tablet 40 milliGRAM(s) Oral before breakfast  tiotropium 18 MICROgram(s) Capsule 1 Capsule(s) Inhalation daily    MEDICATIONS  (PRN):  dextrose Gel 1 Dose(s) Oral once PRN Blood Glucose LESS THAN 70 milliGRAM(s)/deciliter  glucagon  Injectable 1 milliGRAM(s) IntraMuscular once PRN Glucose LESS THAN 70 milligrams/deciliter        LABS:                        8.0    5.6   )-----------( 147      ( 21 Jan 2018 08:18 )             26.6     01-21    145  |  100  |  32<H>  ----------------------------<  137<H>  4.0   |  36<H>  |  1.46<H>    Ca    9.7      21 Jan 2018 08:17  Phos  3.7     01-21  Mg     2.0     01-21    TPro  x   /  Alb  3.4  /  TBili  x   /  DBili  x   /  AST  x   /  ALT  x   /  AlkPhos  x   01-21    Hemoglobin A1C, Whole Blood: 7.0 % (01-17-18 @ 05:28)    PT/INR - ( 20 Jan 2018 07:05 )   PT: 12.9 sec;   INR: 1.16          PTT - ( 20 Jan 2018 22:48 )  PTT:66.8 sec  Urinalysis Basic - ( 20 Jan 2018 20:14 )    Color: Yellow / Appearance: Clear / SG: <=1.005 / pH: x  Gluc: x / Ketone: NEGATIVE  / Bili: Negative / Urobili: 0.2 E.U./dL   Blood: x / Protein: NEGATIVE mg/dL / Nitrite: NEGATIVE   Leuk Esterase: Large / RBC: < 5 /HPF / WBC Many /HPF   Sq Epi: x / Non Sq Epi: 0-5 /HPF / Bacteria: Many /HPF      Thyroid Stimulating Hormone, Serum: 6.350 uIU/mL (01-18 @ 06:38)  Thyroid Stimulating Hormone, Serum: 5.255 uIU/mL (01-17 @ 20:12)      RADIOLOGY & ADDITIONAL TESTS:      Vital Signs Last 24 Hrs  T(C): 36.1 (21 Jan 2018 09:02), Max: 36.6 (20 Jan 2018 18:02)  T(F): 97 (21 Jan 2018 09:02), Max: 97.9 (20 Jan 2018 18:02)  HR: 77 (21 Jan 2018 06:07) (77 - 102)  BP: 118/58 (21 Jan 2018 06:07) (97/47 - 141/63)  BP(mean): --  RR: 18 (21 Jan 2018 06:07) (17 - 18)  SpO2: 97% (21 Jan 2018 09:07) (92% - 100%)    CAPILLARY BLOOD GLUCOSE      PHYSICAL EXAM:  Constitutional: wn/wd in NAD.   HEENT: NCAT, MMM, OP clear, EOMI, no proptosis or lid retraction  Neck: supple, no acanthosis, no thyromegaly or palpable thyroid nodules   Respiratory: Lungs CTAB.  Cardiovascular: regular rhythm, normal S1 and S2, no audible murmurs, no peripheral edema  GI: soft, + bowel sounds, NT/ND, overweight  Neurology: no tremors, DTR 2+  Skin: no visible rashes/lesions  Psychiatric: AAO x 3, normal affect/mood.        A/P: 89yFemale admitted for SOB. Consulted and being followed for Diabetes Type 2 and newly diagnosed hypothyroidism.     Controlled DM Type 2 (HbA1c 7%)     Please continue Lispro moderate/low dose sliding scale 4 times daily (before meals and bedtime).    Also continue consistent carbohydrate (Diabetic) diet, FS ac & hs. Target  - 150 mg/dl.    Hypothyroidism newly diagnosed and patient started on levothyroxine 25 mcg po qAM separate from all other oral intake except water.    Outpatient f/u with PCP.     Case discussed with attending and primary team, and family.      Attending attestation:  I have seen and evaluated the patient at the bedside.  Nurse Practitioner/Fellow/Resident’s note reviewed, including vital signs, PE and laboratory results.  Direct personal management and extensive interpretation of the data conducted.   Assessment and recommendations as above.

## 2018-01-21 NOTE — PROGRESS NOTE ADULT - PROBLEM SELECTOR PLAN 8
Hgb/Hct 8/26.6 today, stable in 8s, Active T+S  - Heme (Dr. Elliott) following. Rec Heparin gtt: Dose to keep PTT 60-80  - Monitor CBC closely & transfuse PRBCs to keep Hb > 7g/dl  - Iron deficient, given IV iron 1/20 and today 1/21 Pt endorsing difficulty swallowing  - Speech and swallow eval ordered, recommending Dysphagia 1 Pureed, thin Liquids  - Aspiration precautions

## 2018-01-21 NOTE — PROGRESS NOTE ADULT - PROBLEM SELECTOR PLAN 9
- per Dr. Castle, there was b/l RAAS by ultrasound outpt. Dr. Castle plans to do renal angiogram when he does the [facility]  - Dr. Dominguez consulted by Dr. Castle for reduced renal function.   - Recommends repeat Renal U/S here (to check to renal size). Questionable Renal angiogram / PTA in her age and asymptomatic nature.     DVT ppx: Heparin gtt, patient specific goal PTT 60-80  Dispo: Continue IV diuresis, management of AFIB, PT to see patient Hgb/Hct 8/26.6 today, stable in 8s, Active T+S  - Heme (Dr. Elliott) following. Rec Heparin gtt: Dose to keep PTT 60-80  - Monitor CBC closely & transfuse PRBCs to keep Hb > 7g/dl  - Iron deficient, given IV iron 1/20 and today 1/21

## 2018-01-21 NOTE — PROGRESS NOTE ADULT - PROBLEM SELECTOR PLAN 1
1) Chest x-ray not c/w Pulmonary TB; patient denies weight loss, fever, night sweats  2) No  CT chest per Pulmonary attending  3)  Quantiferon TB Gold.

## 2018-01-21 NOTE — PROGRESS NOTE ADULT - PROBLEM SELECTOR PLAN 7
Pt endorsing difficulty swallowing  - Speech and swallow eval ordered, recommending Dysphagia 1 Pureed, thin Liquids  - Aspiration precautions - Dr. Gupta consulted.   NO EVIDENCE OF COPD. The patient was exposed to biomass.  Continue Spiriva and as needed albuterol. Continue oxygen supplementation. Due to patient age no need for CT scan of chest.  Hold on steroids

## 2018-01-21 NOTE — PROGRESS NOTE ADULT - ASSESSMENT
89yo F with HTN, HLD, DM-II, CAD/PCIs, diastolic CHF (previously normal EF, now reduced to 20-25%), anemia who is admitted with new onset AFib and CHF exacerbation.

## 2018-01-21 NOTE — PROGRESS NOTE ADULT - SUBJECTIVE AND OBJECTIVE BOX
History of Present Illness:  History of Present Illness: 	  This is a 89 y/o female, obese, with HTN, HPL, DM-II, Anemia, diastolic CHF (NL EF), known CAD with previous PCIs, b/l carotid endarterectomies, who presented to her cardiologist with c/o mid sternal chest pressure radiating to the back, associated with SOB, palpitations, LE edema and 4lbs weight gain over 1 week. In the office, her EKG showed new AFib and she was sent to the ER for further evaluation.   In the ER, 1st troponin negative, BNP 3805, BUN 42/1.57, H/H 8.6/27.7, EKG AFib @ 100bpm, LBBB, CXR with some vascular congestion. She received Cardizem 5mg IV x1, Lasix 40mg IV x1, was started on IV heparin and admitted for further management and telemetry monitoring.  No s/s of bleeding noted.    Review of Systems:  Review of Systems: GENERAL, CONSTITUTIONAL : + recent weight gain. Denies fever, chills  EYES, VISION: denies changes in vision   EARS, NOSE, THROAT: denies hearing loss  HEART, CARDIOVASCULAR: + chest pain, palpitations, SOB,  LE edema. Denies claudication  RESPIRATORY: + SOB; Denies cough, wheezing, PND, orthopnea  GASTROINTESTINAL: Denies abdominal pain, heartburn, bloody stool, dark tarry stool  GENITOURINARY: Denies frequent urination, urgency  MUSCULOSKELETAL denies joint pain or swelling, restricted motion, musculoskeletal pain.   SKIN & INTEGUMENTARY Denies rashes, sores, blisters, blisters, growths.  NEUROLOGICAL: Denies numbness or tingling sensations, sensation loss, burning.   PSYCHIATRIC: Denies nervousness, anxiety, depression  ENDOCRINE Denies heat or cold intolerance, excessive thirst HEMATOLOGIC/LYMPHATIC: Denies abnormal bleeding, bleeding of any kind	      Allergies and Intolerances:        Allergies:  	No Known Allergies:     Home Medications:   * Patient Currently Takes Medications as of 17-Jan-2018 02:09 documented in Structured Notes  · 	torsemide 10 mg oral tablet: 1 tab(s) orally once a day, Last Dose Taken:    · 	aspirin 81 mg oral tablet: 1 tab(s) orally once a day, Last Dose Taken:    · 	isosorbide mononitrate 30 mg oral tablet, extended release: 1 tab(s) orally once a day (in the morning), Last Dose Taken:    · 	Januvia 50 mg oral tablet: 1 tab(s) orally once a day, Last Dose Taken:    · 	amLODIPine 5 mg oral tablet: 1 tab(s) orally once a day, Last Dose Taken:    · 	predniSONE 20 mg oral tablet: 1 tab(s) orally once a day, Last Dose Taken:    · 	atorvastatin 20 mg oral tablet: 1 tab(s) orally once a day, Last Dose Taken:    · 	montelukast 10 mg oral tablet: 1 tab(s) orally once a day, Last Dose Taken:    · 	omeprazole 40 mg oral delayed release capsule: 1 cap(s) orally once a day, Last Dose Taken:    · 	Spiriva 18 mcg inhalation capsule: 1 cap(s) inhaled once a day, Last Dose Taken:      .  Patient History:   Past Medical History:  Anemia    CAD (coronary artery disease)    DM (diabetes mellitus)    Emphysema    HTN (hypertension)    Hypercholesteremia.    Past Surgical History:  History of bilateral carotid endarterectomy    History of umbilical hernia repair    S/P bladder repair    S/P cataract surgery    S/P hysterectomy with oophorectomy    S/P TKR (total knee replacement).    Family History:  Mother  Still living? No  Family history of emphysema, Age at diagnosis: 71-80.    Social History:  Social History (marital status, living situation, occupation, tobacco use, alcohol and drug use, and sexual history): Denies tobacco, ETOH or illicit drug use Lives with her daughter, ambulates with a walker	    Tobacco Screening:  · Core Measure Site	No	    Risk Assessment:   Present on Admission:  Deep Venous Thrombosis	no 	  Pulmonary Embolus	no 	    Heart Failure:  Does this patient have a history of or has been diagnosed with heart failure? yes.      Physical Exam:  Physical Exam:  Vital Signs Last 24 Hrs T(C): 36.1 (21 Jan 2018 09:02), Max: 36.6 (20 Jan 2018 18:02) T(F): 97 (21 Jan 2018 09:02), Max: 97.9 (20 Jan 2018 18:02) HR: 87 (21 Jan 2018 10:37) (77 - 98) BP: 123/59 (21 Jan 2018 10:37) (97/47 - 139/95) RR: 18 (21 Jan 2018 10:37) (17 - 18) SpO2: 100% (21 Jan 2018 10:37) (92% - 100%)    Appearance: Normal   HEENT:   Normal oral mucosa, PERRL, EOMI   Neck: Supple, - JVD; No Carotid Bruit   Cardiovascular: Normal S1 S2, No JVD, No murmurs  Respiratory: Lungs bibasilar crackles, no Rhonchi, Wheezing   Gastrointestinal:  Soft, Non-tender, + BS   Skin: No rashes, No ecchymoses, No cyanosis  Extremities: 3+ edema b/l, No clubbing, cyanosis  Vascular: Femoral pulses 1+ b/l without bruit, DP faint, b/l, PT faint b/l  Neurologic: Non-focal Psychiatry: A & O x 3, Mood & affect appropriate	      Labs and Results:  Labs, Radiology, Cardiology, and Other Results:                       8.0   5.6   )-----------( 147      ( 21 Jan 2018 08:18 )            26.6   145  |  100  |  32<H> ----------------------------<  137<H> 4.0   |  36<H>  |  1.46<H>  Ca    9.7      21 Jan 2018 08:17 Phos  3.7     01-21 Mg     2.0     01-21 TPro  x   /  Alb  3.4  /  TBili  x   /  DBili  x   /  AST  x   /  ALT  x   /  AlkPhos  x   01-21	    Assessment and Plan:   Assessment:  · Assessment		  89 y/o female with HTN, HPL, DM-II, CAD/PCIs, diastolic CHF (NL EF), anemia who is admitted with new onset AFib and CHF exacerbation      Problem/Plan - 1:  ·  Problem: Atrial fibrillation.  Plan: New Afib with controlled HR  - Heparin gtt: Dose to keep PTT 60-80    Problem/Plan - 2:  - Monitor CBC closely & transfuse PRBCs to keep Hb > 7g/dl  - f/u SPEP, IPEP, Immunofixation, LDH, Beta-2 microglobulin  - Consider Epogen  - Trial of iron sucrose

## 2018-01-21 NOTE — PROGRESS NOTE ADULT - SUBJECTIVE AND OBJECTIVE BOX
MEDICAL HISTORY:      INTERVAL HISTORY:    SUMMARY COMMENTS:           ---------------------------------------------------------------------------------------------------------------------------------------------------------------------------    SUBJECTIVE & OBJECTIVE DATA DOCUMENTATION:     REVIEW OF SYSTEMS SCREENED ORGAN SYSTEMS:  Constitutional: fever, chills, anorexia or fatigue  Pulmonary: difficulty breathing, wheezing, cough  Cardiovascular: exertional dyspnea, chest pain, palpitations, dizziness or leg swelling  Gastrointestinal: abdominal or epigastric pain, nausea, vomiting or hematemesis, diarrhea, melena or bright red blood per rectum.  Genitourinary: dysuria, urgency, frequency, flank pain, difficulty voiding  Skin: No pruritis, rashes or lesions  Neurology: memory loss, dysarthria, extremity weakness, neuropathic pain, headache, visual changes  Dialysis access if present : pain, bleeding, swelling     ROS POSITIVE FINDINGS:     PAST MEDICAL & SURGICAL HISTORY:  Anemia  CAD (coronary artery disease)  DM (diabetes mellitus)  Emphysema  Hypercholesteremia  HTN (hypertension)  S/P bladder repair  S/P hysterectomy with oophorectomy  History of bilateral carotid endarterectomy  History of umbilical hernia repair  S/P cataract surgery  S/P TKR (total knee replacement)      PHYSICAL EXAM:  T(C): 36.1 (18 @ 09:02), Max: 36.6 (18 @ 18:02)  HR: 87 (18 @ 10:37)  BP: 123/59 (18 @ 10:37) (97/47 - 139/95)  RR: 18 (18 @ 10:37)  SpO2: 100% (18 @ 10:37)  Wt(kg): --  I&O's Summary    2018 07:01  -  2018 07:00  --------------------------------------------------------  IN: 953 mL / OUT: 1100 mL / NET: -147 mL      Weight 83.5 ( @ 20:16)    APPEARANCE:   HEAD & NECK: normocephalic, no stiff neck, no masses, oral mucosa moist, no scleral icteris  RESPIRATORY: no accessory muscle use, no labored breathing, no dullness, no rales, crackles, no rhonchi, no wheeze  CARDIOVASCULAR: no JVD, RRR, S1S2, no gallop,  no murmur, no rub.  ABDOMEN: soft, no tenderness, no masses, no hepatomegally, no splenomegally  : no bladder distension  LYMPHATIC: no lymphadenopathy (neck, axilla, groin)  EXTREMITIES & MUSCULOSKELETAL: no cyanosis, no clubbing, no tenderness, strength  L=R  EDEMA:   PULSES: upper extremity pulses present, lower extremity pulses present   SKIN: no rash, no stasis, no induration  NEUROLOGIC & PSYCHIATRIC:  alert, interactive, oriented to time and place, responds to stimuli, no asterixis  DIALYSIS ACCESS if present:       MEDICATIONS  (STANDING):  aspirin enteric coated 81 milliGRAM(s) Oral daily  atorvastatin 40 milliGRAM(s) Oral at bedtime  dextrose 5%. 1000 milliLiter(s) (50 mL/Hr) IV Continuous <Continuous>  dextrose 50% Injectable 12.5 Gram(s) IV Push once  dextrose 50% Injectable 25 Gram(s) IV Push once  dextrose 50% Injectable 25 Gram(s) IV Push once  epoetin jayesh Injectable 64266 Unit(s) SubCutaneous daily  furosemide   Injectable 40 milliGRAM(s) IV Push <User Schedule>  heparin  Infusion 900 Unit(s)/Hr (9 mL/Hr) IV Continuous <Continuous>  hydrALAZINE 10 milliGRAM(s) Oral three times a day  insulin lispro (HumaLOG) corrective regimen sliding scale   SubCutaneous three times a day before meals  insulin lispro (HumaLOG) corrective regimen sliding scale   SubCutaneous at bedtime  iron sucrose IVPB 100 milliGRAM(s) IV Intermittent every 24 hours  isosorbide   dinitrate Tablet (ISORDIL) 10 milliGRAM(s) Oral three times a day  levothyroxine 25 MICROGram(s) Oral daily  lidocaine   Patch 1 Patch Transdermal daily  pantoprazole    Tablet 40 milliGRAM(s) Oral before breakfast  tiotropium 18 MICROgram(s) Capsule 1 Capsule(s) Inhalation daily    MEDICATIONS  (PRN):  dextrose Gel 1 Dose(s) Oral once PRN Blood Glucose LESS THAN 70 milliGRAM(s)/deciliter  glucagon  Injectable 1 milliGRAM(s) IntraMuscular once PRN Glucose LESS THAN 70 milligrams/deciliter      DATA:  145    |  100    |  32<H>  ----------------------------<  137<H>  Ca:9.7   (2018 08:17)  4.0     |  36<H>  |  1.46<H>      eGFR if Non : 31 <L>  eGFR if : 36 <L>    TPro  x      /  Alb  3.4 g/dL  /  TBili  x      /  DBili  x      /  AST  x      /  ALT  x      /  AlkPhos  x      2018 08:17                        8.0<L>  5.6   )-----------( 147<L>    ( 2018 08:18 )             26.6<L>    Phos:3.7 mg/dL M.0 mg/dL PTH:-- Uric acid:-- Serum Osm:--  Ferritin:-- Iron:-- TIBC:-- Tsat:--  B12:-- TSH:-- ( @ 08:17)    Urinalysis Basic - ( 2018 20:14 )  Color: Yellow / Appearance: Clear / SG: <=1.005 / pH: x  Gluc: x / Ketone: NEGATIVE  / Bili: Negative / Urobili: 0.2 E.U./dL   Blood: x / Protein: NEGATIVE mg/dL / Nitrite: NEGATIVE   Leuk Esterase: Large<!!> / RBC: < 5 /HPF / WBC Many /HPF<!!>   Sq Epi: x / Non Sq Epi: 0-5 /HPF / Bacteria: Many /HPF<!!>      UProt:-- UCr:73 mg/dL P/C Ratio:-- 24 hour Prot:-- UVol:-- CrCl:--  Tj:-- UOsm:-- UVol:-- [facility]:-- [facility]:-- ( @ 20:14) MEDICAL HISTORY:     89 year old  female admitted with CHF and atrial fibrillation in  the 100's. The history is notable for vasculopathy with multiple cardiac, lower extremity and carotid procedures. n echocardiogram here showed .reduced LVEF. The hospital course has focused on diuresis and rate control.     On admission GFR was reduced, with Scr ~1.5 mg/dl. Baseline Scr data n/a. On admission, anemia was noted with Hgb 8's g/dl.  A beta-microalbumin screen was elevated (6.0). TF sat was 12, Ferritin 169. Hematology is evaluating.    CKD workup in progress  Anemia workup in progress.  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 torsemide, prednisone 20 mg, Januvia, isordil, ASA, statin, omeprazole, various bronchodilators.     INTERVAL HISTORY:    's, HR 80's-90's (AFIB)    No significant change in GFR with Scr 1.4.6-1.58 mg/dl.     Phos 3.3 g/dl. Hgb 8.0 g/dl.     UA benign sediment, microalbumin screen 27 mg/g creat (normal)    Weight 76.7 kg (prior data n/a).     Pending: SPEP, PTHi, Ca/Alb, 25OH vit D, renal sonogram.     SUMMARY COMMENTS:     No changes in GFR, work up continues, in particular, await renal sonogram. There is no significant urinary sediment activity and a urine microalbumin screen is normal c/w naphrosclerosis, atherosclerosis but further information is pending.     The patient has a Rubalcava catheter and c/o urethral pain. Not clear why the Rubalcava cath is present. The patient can get out of bed, even walks up and down the ochoa and certainly can use a bed pan. It seems to me the risk of urinary infection would be a reason to remove the Rubalcava.     Take note that there is no usable weight data. Changes in weight is the primary way changes in volume are assessed. Daily, accurte, standing weight are very important, especially if there is a need to reduce volume. Patient is prescribed 40 mg IV fursoemide daily - but no one knows if the is an actual serial reduction in volume. With GFR 10-20 ml/min (see my note from yesterday) it is possible that the current dose of furosemide is no accomplishing much, if anything at all.     Regarding anemia, once the issue of Meloma is resolved, consider IV iron and EPO Rx. The anemia could be primarily related to iron deficiency and CKD.     Discussed with the cardiac team.         ---------------------------------------------------------------------------------------------------------------------------------------------------------------------------    SUBJECTIVE & OBJECTIVE DATA DOCUMENTATION:     REVIEW OF SYSTEMS SCREENED ORGAN SYSTEMS:  Constitutional: fever, chills, anorexia or fatigue  Pulmonary: difficulty breathing, wheezing, cough  Cardiovascular: exertional dyspnea, chest pain, palpitations, dizziness or leg swelling  Gastrointestinal: abdominal or epigastric pain, nausea, vomiting or hematemesis, diarrhea, melena or bright red blood per rectum.  Genitourinary: dysuria, urgency, frequency, flank pain, difficulty voiding  Skin: No pruritis, rashes or lesions  Neurology: memory loss, dysarthria, extremity weakness, neuropathic pain, headache, visual changes  Dialysis access if present : pain, bleeding, swelling     ROS POSITIVE FINDINGS: mild SOB    PAST MEDICAL & SURGICAL HISTORY:  Anemia  CAD (coronary artery disease)  DM (diabetes mellitus)  Emphysema  Hypercholesteremia  HTN (hypertension)  S/P bladder repair  S/P hysterectomy with oophorectomy  History of bilateral carotid endarterectomy  History of umbilical hernia repair  S/P cataract surgery  S/P TKR (total knee replacement)      PHYSICAL EXAM:  T(C): 36.1 (18 @ 09:02), Max: 36.6 (18 @ 18:02)  HR: 87 (18 @ 10:37)  BP: 123/59 (18 @ 10:37) (97/47 - 139/95)  RR: 18 (18 @ 10:37)  SpO2: 100% (18 @ 10:37)  Wt(kg): --  I&O's Summary    2018 07:01  -  2018 07:00  --------------------------------------------------------  IN: 953 mL / OUT: 1100 mL / NET: -147 mL      Weight 83.5 ( @ 20:16)    APPEARANCE: In bed, NAD.   HEAD & NECK: normocephalic, no stiff neck, no masses, oral mucosa moist, no scleral icteris  RESPIRATORY: no accessory muscle use, no labored breathing, no dullness, basilar rales and rhonchi, no wheeze  CARDIOVASCULAR: no JVD, IRRR, S1S2,+ gallop,  no murmur, no rub.  ABDOMEN: soft, no tenderness, no masses, no hepatomegally, no splenomegally  : no bladder distension  LYMPHATIC: no lymphadenopathy (neck, axilla, groin)  EXTREMITIES & MUSCULOSKELETAL: no cyanosis, no clubbing, no tenderness, strength  L=R  EDEMA: trace  PULSES: upper extremity pulses present, lower extremity pulses absent  SKIN: no rash, no stasis, no induration  NEUROLOGIC & PSYCHIATRIC:  alert, interactive, oriented to time and place, responds to stimuli, no asterixis    MEDICATIONS  (STANDING):  aspirin enteric coated 81 milliGRAM(s) Oral daily  atorvastatin 40 milliGRAM(s) Oral at bedtime  dextrose 5%. 1000 milliLiter(s) (50 mL/Hr) IV Continuous <Continuous>  dextrose 50% Injectable 12.5 Gram(s) IV Push once  dextrose 50% Injectable 25 Gram(s) IV Push once  dextrose 50% Injectable 25 Gram(s) IV Push once  epoetin jayesh Injectable 51847 Unit(s) SubCutaneous daily  furosemide   Injectable 40 milliGRAM(s) IV Push <User Schedule>  heparin  Infusion 900 Unit(s)/Hr (9 mL/Hr) IV Continuous <Continuous>  hydrALAZINE 10 milliGRAM(s) Oral three times a day  insulin lispro (HumaLOG) corrective regimen sliding scale   SubCutaneous three times a day before meals  insulin lispro (HumaLOG) corrective regimen sliding scale   SubCutaneous at bedtime  iron sucrose IVPB 100 milliGRAM(s) IV Intermittent every 24 hours  isosorbide   dinitrate Tablet (ISORDIL) 10 milliGRAM(s) Oral three times a day  levothyroxine 25 MICROGram(s) Oral daily  lidocaine   Patch 1 Patch Transdermal daily  pantoprazole    Tablet 40 milliGRAM(s) Oral before breakfast  tiotropium 18 MICROgram(s) Capsule 1 Capsule(s) Inhalation daily    MEDICATIONS  (PRN):  dextrose Gel 1 Dose(s) Oral once PRN Blood Glucose LESS THAN 70 milliGRAM(s)/deciliter  glucagon  Injectable 1 milliGRAM(s) IntraMuscular once PRN Glucose LESS THAN 70 milligrams/deciliter      DATA:  145    |  100    |  32<H>  ----------------------------<  137<H>  Ca:9.7   (2018 08:17)  4.0     |  36<H>  |  1.46<H>      eGFR if Non : 31 <L>  eGFR if : 36 <L>    TPro  x      /  Alb  3.4 g/dL  /  TBili  x      /  DBili  x      /  AST  x      /  ALT  x      /  AlkPhos  x      2018 08:17                        8.0<L>  5.6   )-----------( 147<L>    ( 2018 08:18 )             26.6<L>    Phos:3.7 mg/dL M.0 mg/dL PTH:-- Uric acid:-- Serum Osm:--  Ferritin:-- Iron:-- TIBC:-- Tsat:--  B12:-- TSH:-- ( @ 08:17)    Urinalysis Basic - ( 2018 20:14 )  Color: Yellow / Appearance: Clear / SG: <=1.005 / pH: x  Gluc: x / Ketone: NEGATIVE  / Bili: Negative / Urobili: 0.2 E.U./dL   Blood: x / Protein: NEGATIVE mg/dL / Nitrite: NEGATIVE   Leuk Esterase: Large<!!> / RBC: < 5 /HPF / WBC Many /HPF<!!>   Sq Epi: x / Non Sq Epi: 0-5 /HPF / Bacteria: Many /HPF<!!>      UProt:-- UCr:73 mg/dL P/C Ratio:-- 24 hour Prot:-- UVol:-- CrCl:--  Tj:-- UOsm:-- UVol:-- UCl:-- [facility]:-- ( @ 20:14) MEDICAL HISTORY:     89 year old  female admitted with CHF and atrial fibrillation in  the 100's. The history is notable for vasculopathy with multiple cardiac, lower extremity and carotid procedures. n echocardiogram here showed .reduced LVEF. The hospital course has focused on diuresis and rate control.     On admission GFR was reduced, with Scr ~1.5 mg/dl. Baseline Scr data n/a. On admission, anemia was noted with Hgb 8's g/dl.  A beta-microalbumin screen was elevated (6.0). TF sat was 12, Ferritin 169. Hematology is evaluating.    CKD workup in progress  Anemia workup in progress.  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 torsemide, prednisone 20 mg, Januvia, isordil, ASA, statin, omeprazole, various bronchodilators.     INTERVAL HISTORY:    's, HR 80's-90's (AFIB)    No significant change in GFR with Scr 1.4.6-1.58 mg/dl.     Phos 3.3 g/dl. Hgb 8.0 g/dl.     UA benign sediment, microalbumin screen 27 mg/g creat (normal)    Weight 76.7 kg (prior data n/a).     Pending: SPEP, PTHi, Ca/Alb, 25OH vit D, renal sonogram.     SUMMARY COMMENTS:     No changes in GFR, work up continues, in particular, await renal sonogram. There is no significant urinary sediment activity and a urine microalbumin screen is normal c/w naphrosclerosis, atherosclerosis but further information is pending.     The patient has a Rubalcava catheter and c/o urethral pain. Not clear why the Rubalcava cath is present. The patient can get out of bed, even walks up and down the ochoa and certainly can use a bed pan. It seems to me the risk of urinary infection would be a reason to remove the Rubalcava.     Take note that there is no usable weight data. Changes in weight is the primary way changes in volume are assessed. Daily, standing weights are very important, especially if there is a need to reduce volume. Patient is prescribed 40 mg IV fursoemide daily - but no one knows if there has been any actual volume reduction over the last 24-48 hours. With GFR 10-20 ml/min (see my note from yesterday) it is possible that the current dose of furosemide is not accomplishing much, if anything at all.     Regarding the anemia, once the issue of Meloma is resolved, consider IV iron and EPO Rx. The anemia could be primarily related to iron deficiency and CKD.     Discussed with the cardiac team.         ---------------------------------------------------------------------------------------------------------------------------------------------------------------------------    SUBJECTIVE & OBJECTIVE DATA DOCUMENTATION:     REVIEW OF SYSTEMS SCREENED ORGAN SYSTEMS:  Constitutional: fever, chills, anorexia or fatigue  Pulmonary: difficulty breathing, wheezing, cough  Cardiovascular: exertional dyspnea, chest pain, palpitations, dizziness or leg swelling  Gastrointestinal: abdominal or epigastric pain, nausea, vomiting or hematemesis, diarrhea, melena or bright red blood per rectum.  Genitourinary: dysuria, urgency, frequency, flank pain, difficulty voiding  Skin: No pruritis, rashes or lesions  Neurology: memory loss, dysarthria, extremity weakness, neuropathic pain, headache, visual changes  Dialysis access if present : pain, bleeding, swelling     ROS POSITIVE FINDINGS: mild SOB    PAST MEDICAL & SURGICAL HISTORY:  Anemia  CAD (coronary artery disease)  DM (diabetes mellitus)  Emphysema  Hypercholesteremia  HTN (hypertension)  S/P bladder repair  S/P hysterectomy with oophorectomy  History of bilateral carotid endarterectomy  History of umbilical hernia repair  S/P cataract surgery  S/P TKR (total knee replacement)      PHYSICAL EXAM:  T(C): 36.1 (18 @ 09:02), Max: 36.6 (18 @ 18:02)  HR: 87 (18 @ 10:37)  BP: 123/59 (18 @ 10:37) (97/47 - 139/95)  RR: 18 (18 @ 10:37)  SpO2: 100% (18 @ 10:37)  Wt(kg): --  I&O's Summary    2018 07:01  -  2018 07:00  --------------------------------------------------------  IN: 953 mL / OUT: 1100 mL / NET: -147 mL      Weight 83.5 ( @ 20:16)    APPEARANCE: In bed, NAD.   HEAD & NECK: normocephalic, no stiff neck, no masses, oral mucosa moist, no scleral icteris  RESPIRATORY: no accessory muscle use, no labored breathing, no dullness, basilar rales and rhonchi, no wheeze  CARDIOVASCULAR: no JVD, IRRR, S1S2,+ gallop,  no murmur, no rub.  ABDOMEN: soft, no tenderness, no masses, no hepatomegally, no splenomegally  : no bladder distension  LYMPHATIC: no lymphadenopathy (neck, axilla, groin)  EXTREMITIES & MUSCULOSKELETAL: no cyanosis, no clubbing, no tenderness, strength  L=R  EDEMA: trace  PULSES: upper extremity pulses present, lower extremity pulses absent  SKIN: no rash, no stasis, no induration  NEUROLOGIC & PSYCHIATRIC:  alert, interactive, oriented to time and place, responds to stimuli, no asterixis    MEDICATIONS  (STANDING):  aspirin enteric coated 81 milliGRAM(s) Oral daily  atorvastatin 40 milliGRAM(s) Oral at bedtime  dextrose 5%. 1000 milliLiter(s) (50 mL/Hr) IV Continuous <Continuous>  dextrose 50% Injectable 12.5 Gram(s) IV Push once  dextrose 50% Injectable 25 Gram(s) IV Push once  dextrose 50% Injectable 25 Gram(s) IV Push once  epoetin jayesh Injectable 85967 Unit(s) SubCutaneous daily  furosemide   Injectable 40 milliGRAM(s) IV Push <User Schedule>  heparin  Infusion 900 Unit(s)/Hr (9 mL/Hr) IV Continuous <Continuous>  hydrALAZINE 10 milliGRAM(s) Oral three times a day  insulin lispro (HumaLOG) corrective regimen sliding scale   SubCutaneous three times a day before meals  insulin lispro (HumaLOG) corrective regimen sliding scale   SubCutaneous at bedtime  iron sucrose IVPB 100 milliGRAM(s) IV Intermittent every 24 hours  isosorbide   dinitrate Tablet (ISORDIL) 10 milliGRAM(s) Oral three times a day  levothyroxine 25 MICROGram(s) Oral daily  lidocaine   Patch 1 Patch Transdermal daily  pantoprazole    Tablet 40 milliGRAM(s) Oral before breakfast  tiotropium 18 MICROgram(s) Capsule 1 Capsule(s) Inhalation daily    MEDICATIONS  (PRN):  dextrose Gel 1 Dose(s) Oral once PRN Blood Glucose LESS THAN 70 milliGRAM(s)/deciliter  glucagon  Injectable 1 milliGRAM(s) IntraMuscular once PRN Glucose LESS THAN 70 milligrams/deciliter      DATA:  145    |  100    |  32<H>  ----------------------------<  137<H>  Ca:9.7   (2018 08:17)  4.0     |  36<H>  |  1.46<H>      eGFR if Non : 31 <L>  eGFR if : 36 <L>    TPro  x      /  Alb  3.4 g/dL  /  TBili  x      /  DBili  x      /  AST  x      /  ALT  x      /  AlkPhos  x      2018 08:17                        8.0<L>  5.6   )-----------( 147<L>    ( 2018 08:18 )             26.6<L>    Phos:3.7 mg/dL M.0 mg/dL PTH:-- Uric acid:-- Serum Osm:--  Ferritin:-- Iron:-- TIBC:-- Tsat:--  B12:-- TSH:-- ( @ 08:17)    Urinalysis Basic - ( 2018 20:14 )  Color: Yellow / Appearance: Clear / SG: <=1.005 / pH: x  Gluc: x / Ketone: NEGATIVE  / Bili: Negative / Urobili: 0.2 E.U./dL   Blood: x / Protein: NEGATIVE mg/dL / Nitrite: NEGATIVE   Leuk Esterase: Large<!!> / RBC: < 5 /HPF / WBC Many /HPF<!!>   Sq Epi: x / Non Sq Epi: 0-5 /HPF / Bacteria: Many /HPF<!!>      UProt:-- UCr:73 mg/dL P/C Ratio:-- 24 hour Prot:-- UVol:-- CrCl:--  Tj:-- UOsm:-- UVol:-- UCl:-- [facility]:-- ( @ 20:14)

## 2018-01-21 NOTE — PROGRESS NOTE ADULT - PROBLEM SELECTOR PLAN 2
afib with HR 80s-120s  - Continue hep gtt.  - NPO for possible ANMOL / DCCV monday.   - started low dose metoprolol 12.5mg BID today

## 2018-01-21 NOTE — PROGRESS NOTE ADULT - PROBLEM SELECTOR PLAN 6
- Dr. Gupta consulted.   NO EVIDENCE OF COPD. The patient was exposed to biomass.  Continue Spiriva and as needed albuterol. Continue oxygen supplementation. Due to patient age no need for CT scan of chest.  Hold on steroids complaining of dysuria, afebrile without WBC  - UA +leuks and neg nitrites, follow up culture  - TOV today given dysuria and patient ambulatory

## 2018-01-21 NOTE — PROGRESS NOTE ADULT - PROBLEM SELECTOR PLAN 3
CP free and HD stable  - S/p diagnostic LH 7/2016 revealed distal LAD disease, per outpt ppw  - Continue ASA/statin/BB

## 2018-01-21 NOTE — PROGRESS NOTE ADULT - SUBJECTIVE AND OBJECTIVE BOX
Interventional Cardiology PA Adult Progress Note    Subjective Assessment: Patient was seen and examined this AM and was asymptomatic without complaints. Denies CP or SOB.  	  MEDICATIONS:  furosemide   Injectable 40 milliGRAM(s) IV Push <User Schedule>  hydrALAZINE 10 milliGRAM(s) Oral three times a day  isosorbide   dinitrate Tablet (ISORDIL) 10 milliGRAM(s) Oral three times a day  metoprolol     tartrate 12.5 milliGRAM(s) Oral two times a day  tiotropium 18 MICROgram(s) Capsule 1 Capsule(s) Inhalation daily  pantoprazole    Tablet 40 milliGRAM(s) Oral before breakfast  atorvastatin 40 milliGRAM(s) Oral at bedtime  insulin lispro (HumaLOG) corrective regimen sliding scale   SubCutaneous three times a day before meals  insulin lispro (HumaLOG) corrective regimen sliding scale   SubCutaneous at bedtime  levothyroxine 25 MICROGram(s) Oral daily  aspirin enteric coated 81 milliGRAM(s) Oral daily  dextrose 5%. 1000 milliLiter(s) IV Continuous <Continuous>  epoetin jayesh Injectable 01615 Unit(s) SubCutaneous daily  heparin  Infusion 1000 Unit(s)/Hr IV Continuous <Continuous>  iron sucrose IVPB 100 milliGRAM(s) IV Intermittent every 24 hours  lidocaine   Patch 1 Patch Transdermal daily    [PHYSICAL EXAM:  TELEMETRY:  T(C): 36.4 (01-21-18 @ 16:38), Max: 36.7 (01-21-18 @ 13:58)  HR: 90 (01-21-18 @ 14:15) (77 - 98)  BP: 115/55 (01-21-18 @ 14:15) (97/47 - 139/95)  RR: 16 (01-21-18 @ 14:15) (16 - 18)  SpO2: 100% (01-21-18 @ 14:15) (92% - 100%)  Wt(kg): --  I&O's Summary    20 Jan 2018 07:01  -  21 Jan 2018 07:00  --------------------------------------------------------  IN: 953 mL / OUT: 1100 mL / NET: -147 mL    21 Jan 2018 07:01  -  21 Jan 2018 16:46  --------------------------------------------------------  IN: 303 mL / OUT: 0 mL / NET: 303 mL      Height (cm): 165.1 (01-20 @ 20:10)    Appearance: Normal	  HEENT:   Normal oral mucosa, PERRL, EOMI	  Neck: Supple, - JVD; No Carotid Bruit   Cardiovascular: Normal S1 S2, No JVD, No murmurs   Respiratory: Lungs clear to auscultation, No Rales, Rhonchi, Wheezing	  Gastrointestinal:  Soft, Non-tender, + BS	  Skin: No rashes, No ecchymoses, No cyanosis  Extremities: Normal range of motion, No clubbing, cyanosis or edema  Vascular: Peripheral pulses palpable 2+ bilaterally  Neurologic: Non-focal  Psychiatry: A & O x 3, Mood & affect appropriate  	    LABS:	 	  CARDIAC MARKERS:                        8.0    5.6   )-----------( 147      ( 21 Jan 2018 08:18 )             26.6     01-21    145  |  100  |  32<H>  ----------------------------<  137<H>  4.0   |  36<H>  |  1.46<H>    Ca    9.7      21 Jan 2018 08:17  Phos  3.7     01-21  Mg     2.0     01-21    TPro  x   /  Alb  3.4  /  TBili  x   /  DBili  x   /  AST  x   /  ALT  x   /  AlkPhos  x   01-21    PT/INR - ( 20 Jan 2018 07:05 )   PT: 12.9 sec;   INR: 1.16          PTT - ( 21 Jan 2018 15:10 )  PTT:55.2 sec

## 2018-01-22 LAB
24R-OH-CALCIDIOL SERPL-MCNC: 25 NG/ML — LOW (ref 30–80)
ANION GAP SERPL CALC-SCNC: 8 MMOL/L — SIGNIFICANT CHANGE UP (ref 5–17)
APTT BLD: 60.3 SEC — HIGH (ref 27.5–37.4)
APTT BLD: 66.5 SEC — HIGH (ref 27.5–37.4)
APTT BLD: 84.2 SEC — HIGH (ref 27.5–37.4)
APTT BLD: 85.3 SEC — HIGH (ref 27.5–37.4)
BUN SERPL-MCNC: 34 MG/DL — HIGH (ref 7–23)
CALCIUM SERPL-MCNC: 9.5 MG/DL — SIGNIFICANT CHANGE UP (ref 8.4–10.5)
CALCIUM SERPL-MCNC: 9.6 MG/DL — SIGNIFICANT CHANGE UP (ref 8.4–10.5)
CHLORIDE SERPL-SCNC: 96 MMOL/L — SIGNIFICANT CHANGE UP (ref 96–108)
CO2 SERPL-SCNC: 35 MMOL/L — HIGH (ref 22–31)
CREAT SERPL-MCNC: 1.47 MG/DL — HIGH (ref 0.5–1.3)
GLUCOSE BLDC GLUCOMTR-MCNC: 137 MG/DL — HIGH (ref 70–99)
GLUCOSE BLDC GLUCOMTR-MCNC: 145 MG/DL — HIGH (ref 70–99)
GLUCOSE BLDC GLUCOMTR-MCNC: 156 MG/DL — HIGH (ref 70–99)
GLUCOSE BLDC GLUCOMTR-MCNC: 189 MG/DL — HIGH (ref 70–99)
GLUCOSE SERPL-MCNC: 134 MG/DL — HIGH (ref 70–99)
HCT VFR BLD CALC: 26.9 % — LOW (ref 34.5–45)
HGB BLD-MCNC: 8.2 G/DL — LOW (ref 11.5–15.5)
MAGNESIUM SERPL-MCNC: 2 MG/DL — SIGNIFICANT CHANGE UP (ref 1.6–2.6)
MCHC RBC-ENTMCNC: 28.8 PG — SIGNIFICANT CHANGE UP (ref 27–34)
MCHC RBC-ENTMCNC: 30.5 G/DL — LOW (ref 32–36)
MCV RBC AUTO: 94.4 FL — SIGNIFICANT CHANGE UP (ref 80–100)
PLATELET # BLD AUTO: 159 K/UL — SIGNIFICANT CHANGE UP (ref 150–400)
POTASSIUM SERPL-MCNC: 4 MMOL/L — SIGNIFICANT CHANGE UP (ref 3.5–5.3)
POTASSIUM SERPL-SCNC: 4 MMOL/L — SIGNIFICANT CHANGE UP (ref 3.5–5.3)
RBC # BLD: 2.85 M/UL — LOW (ref 3.8–5.2)
RBC # FLD: 16.8 % — SIGNIFICANT CHANGE UP (ref 10.3–16.9)
SODIUM SERPL-SCNC: 139 MMOL/L — SIGNIFICANT CHANGE UP (ref 135–145)
WBC # BLD: 5.8 K/UL — SIGNIFICANT CHANGE UP (ref 3.8–10.5)
WBC # FLD AUTO: 5.8 K/UL — SIGNIFICANT CHANGE UP (ref 3.8–10.5)

## 2018-01-22 PROCEDURE — 99232 SBSQ HOSP IP/OBS MODERATE 35: CPT

## 2018-01-22 PROCEDURE — 74220 X-RAY XM ESOPHAGUS 1CNTRST: CPT | Mod: 26

## 2018-01-22 PROCEDURE — 76770 US EXAM ABDO BACK WALL COMP: CPT | Mod: 26

## 2018-01-22 RX ORDER — HEPARIN SODIUM 5000 [USP'U]/ML
900 INJECTION INTRAVENOUS; SUBCUTANEOUS
Qty: 25000 | Refills: 0 | Status: DISCONTINUED | OUTPATIENT
Start: 2018-01-22 | End: 2018-01-24

## 2018-01-22 RX ADMIN — ISOSORBIDE DINITRATE 10 MILLIGRAM(S): 5 TABLET ORAL at 14:37

## 2018-01-22 RX ADMIN — Medication 10 MILLIGRAM(S): at 05:19

## 2018-01-22 RX ADMIN — HEPARIN SODIUM 9 UNIT(S)/HR: 5000 INJECTION INTRAVENOUS; SUBCUTANEOUS at 09:05

## 2018-01-22 RX ADMIN — ATORVASTATIN CALCIUM 40 MILLIGRAM(S): 80 TABLET, FILM COATED ORAL at 21:11

## 2018-01-22 RX ADMIN — IRON SUCROSE 210 MILLIGRAM(S): 20 INJECTION, SOLUTION INTRAVENOUS at 18:11

## 2018-01-22 RX ADMIN — Medication 81 MILLIGRAM(S): at 14:37

## 2018-01-22 RX ADMIN — PANTOPRAZOLE SODIUM 40 MILLIGRAM(S): 20 TABLET, DELAYED RELEASE ORAL at 05:19

## 2018-01-22 RX ADMIN — Medication 2: at 16:33

## 2018-01-22 RX ADMIN — Medication 25 MICROGRAM(S): at 05:19

## 2018-01-22 RX ADMIN — ERYTHROPOIETIN 10000 UNIT(S): 10000 INJECTION, SOLUTION INTRAVENOUS; SUBCUTANEOUS at 21:11

## 2018-01-22 RX ADMIN — Medication 12.5 MILLIGRAM(S): at 18:11

## 2018-01-22 RX ADMIN — Medication 10 MILLIGRAM(S): at 21:11

## 2018-01-22 RX ADMIN — ISOSORBIDE DINITRATE 10 MILLIGRAM(S): 5 TABLET ORAL at 21:11

## 2018-01-22 RX ADMIN — TIOTROPIUM BROMIDE 1 CAPSULE(S): 18 CAPSULE ORAL; RESPIRATORY (INHALATION) at 14:38

## 2018-01-22 RX ADMIN — Medication 40 MILLIGRAM(S): at 10:45

## 2018-01-22 RX ADMIN — Medication 10 MILLIGRAM(S): at 14:37

## 2018-01-22 RX ADMIN — LIDOCAINE 1 PATCH: 4 CREAM TOPICAL at 00:11

## 2018-01-22 RX ADMIN — LIDOCAINE 1 PATCH: 4 CREAM TOPICAL at 14:38

## 2018-01-22 RX ADMIN — Medication 12.5 MILLIGRAM(S): at 05:19

## 2018-01-22 RX ADMIN — ISOSORBIDE DINITRATE 10 MILLIGRAM(S): 5 TABLET ORAL at 06:39

## 2018-01-22 NOTE — PROGRESS NOTE ADULT - PROBLEM SELECTOR PLAN 2
New onset Afib, Rates better controlled today, HR 80s  - Continue hep gtt (patient specific due to anemia, Goal PTT 60-80), f/u repeat PTT 10pm and adjust as needed  - Continue metoprolol 12.5mg PO BID.   - EP following.  - Plan for ANMOL/DCCV postponed secondary to history of dysphagia.  Per ANMOL attending needs GI clearance prior to ANMOL.  As per Speech/Swallow cleared for ANMOL given functional pharyngeal swallow.  Per GI Recs obtain barium swallow.  f/u Results and discuss w/ GI team.  - NPO for possible ANMOL/DCCV in AM in able. New onset Afib, Rates better controlled today, HR 80s  - Continue hep gtt (patient specific due to anemia, Goal PTT 60-80), f/u repeat PTT 10pm and adjust as needed  - Continue metoprolol 12.5mg PO BID.   - EP following.  - Plan for ANMOL/DCCV postponed secondary to history of dysphagia.  Per ANMOL attending needs GI clearance prior to ANMOL.  Per GI Recs obtain barium swallow.  - Barium swallowed performed today: prelim results Esophageal dysmotility likely secondary to presbyesophagus.  - GI team paged to discuss results, f/u Recs.  - NPO for possible ANMOL/DCCV in AM in able if cleared

## 2018-01-22 NOTE — PROGRESS NOTE ADULT - PROBLEM SELECTOR PLAN 10
CKD stage III, BUN/Crt remains stable 34/1.47 today  - Dr. Dominguez following.  - per Dr. Castle, there was b/l RAAS by ultrasound outpt w/ possible plan for Renal angiogram this admission (unclear if should be done given asymptomatic and age)  - Renal US performed, f/u results.     DVT ppx: Heparin gtt  Dispo: Continue IV diuresis w/ plan for ANMOL/DCCV.  Patient planned for d/C home w/ home PT and home health aid  Case discussed with dr. Monson. CKD stage III, BUN/Crt remains stable 34/1.47 today  - Dr. Dominguez following.  - per Dr. Castle, there was b/l RAAS by ultrasound outpt.  Tentative plan for Renal angiogram/PTA w/ Dr. Monson on Wednesday given continued episodes of pulm edema.  Will need to be added to schedule.   - Renal US performed, f/u results.     DVT ppx: Heparin gtt  Dispo: Continue IV diuresis w/ plan for ANMOL/DCCV.  Patient planned for d/C home w/ home PT and home health aid  Case discussed with dr. Monson.

## 2018-01-22 NOTE — PROGRESS NOTE ADULT - PROBLEM SELECTOR PLAN 8
Pt endorsing difficulty swallowing (solid food getting stuck)  - Speech and swallow eval ordered, recommending Dysphagia 1 Pureed, thin Liquids  - Aspiration precautions  - Per Speech/Swallow patient w/ Functional pharyngeal swallow. Pt endorsing difficulty swallowing (solid food getting stuck)  - Speech and swallow eval ordered, recommending Dysphagia 1 Pureed, thin Liquids  - Aspiration precautions  - Per Speech/Swallow patient w/ Functional pharyngeal swallow and will likely be okay during ANMOL.  Pending GI Clearance for ANMOL  - Barium swallow: Esophageal dysmotility likely secondary to presbyesophagus.

## 2018-01-22 NOTE — PROGRESS NOTE ADULT - PROBLEM SELECTOR PLAN 6
complaining of dysuria, afebrile without WBC  - UA +leuks and neg nitrites, f/u Urine CX.  - Rubalcava removed today, f/u TOV

## 2018-01-22 NOTE — PROGRESS NOTE ADULT - PROBLEM SELECTOR PLAN 1
Newly reduced EF, Improved SOB today but continued Rales on exam, BNP 3805, CE negative x2  - Echo: left atrium is moderately dilated, moderate MR, mild to moderate tricuspid   regurgitation, evidence of pulmonary hypertension (PASP 57 mmHg), severe global hypokinesis of the left ventricle, EF 20- 25%.   - CXR 1/17: Worsening of severe bilateral pulmonary congestion. f/u CXR 1/21.  - Continue Lasix 40mg IV QD, continue hydralazine 10mg TID and isordil 10mg TID  - Holding ACE-I in setting of renal disease.   - Continue daily weights and I&Os (nava removed today, f/u TOV)  - As per Dr. Monson will plan for NST this admission once more euvolemic.

## 2018-01-22 NOTE — PROGRESS NOTE ADULT - ASSESSMENT
ASSESSMENT/PLAN: 	    Several medical issues but improving  Family at the bedside    New volume overload, dyspnea and new onset Atrial fibrillation  On CPAP initially -now on NC O2  Improved post diuresis  Recent trip to [facility]  Several admissions last few months for CHF/APE    Detailed office note in the chart  CAD  Carotid stenosis s/p CEA/CATARINA b/l  Vertebral artery stenosis  HTN  DM2  Moderate to severe pulmonary hypertension  CHF - new - low EF by echo  AFib new   Possible PFO  Thrombocytopenia  Anemia s/p IV Fe tx  Renal artery stenosis b/l ISR  History of ARF  Subclavian stenoses  Obesity  PAD  LBBB  COPD    D/w referring MD  D/w pulmonary  D/w hematology  D/w PA  D/w family  D/w EP    Diuresis IV - continue - once daily now  Monitor renal function  Supplement lytes  Daily weight  IV heparin - Afib  Amiodarone as per EP  EP follow up re DCCV  Glucose control  Plan as per previous note  D/w patient and family today again possible need for relook LHC/RHC and renal angio with intent to treat - in the past patient/family did not agree with renal PTA - to be reevaluated according to patient's course    NST re ischemia LAD territory/CAD progression    CT chest as per pulmonary  Reviewed echo  ASA 81 mg  GI prophylaxis      Transfuse as needed - hematology follow up  Pulmonary follow up - nebs  Endocrinology follow up -synthroid  Physical therapy - OOB  Nephrology follow up - creatinine stable  post diuresis  Low dose Toprol 25 mg QD - increase 25 mg BID  Intra-aortic pressure probably higher - subclavian stenoses  Further plan as per patient's course  Reviewed leg venous duplex - no DVT reported  Is/Os negative       Sagar Dillard MD PhD  [phone]

## 2018-01-22 NOTE — CHART NOTE - NSCHARTNOTEFT_GEN_A_CORE
Admitting Diagnosis:   Patient is a 89y old  Female who presents with a chief complaint of chest pain, SOB, palpitations, LE Edema.     PAST MEDICAL & SURGICAL HISTORY:  Anemia  CAD (coronary artery disease)  DM (diabetes mellitus)  Emphysema  Hypercholesteremia  HTN (hypertension)  S/P bladder repair  S/P hysterectomy with oophorectomy  History of bilateral carotid endarterectomy  History of umbilical hernia repair  S/P cataract surgery  S/P TKR (total knee replacement)      Current Nutrition Order:  Diet, NPO after Midnight:      NPO Start Date: 2018,   NPO Start Time: 23:59 (18 @ 16:30)      PO Intake: Good (%) [   ]  Fair (50-75%) [   ] Poor (<25%) [   ]- N/A NPO     GI Issues: No N/V/C/D reported at this time.     Pain: No pain endorsed    Skin Integrity: Skin intact; Umesh 20     Labs:       139  |  96  |  34<H>  ----------------------------<  134<H>  4.0   |  35<H>  |  1.47<H>    Ca    9.6      2018 06:10  Phos  3.7       Mg     2.0         TPro  x   /  Alb  3.4  /  TBili  x   /  DBili  x   /  AST  x   /  ALT  x   /  AlkPhos  x       CAPILLARY BLOOD GLUCOSE      POCT Blood Glucose.: 189 mg/dL (2018 16:29)  POCT Blood Glucose.: 145 mg/dL (2018 11:05)  POCT Blood Glucose.: 137 mg/dL (2018 06:35)  POCT Blood Glucose.: 166 mg/dL (2018 21:37)      Medications:  MEDICATIONS  (STANDING):  aspirin enteric coated 81 milliGRAM(s) Oral daily  atorvastatin 40 milliGRAM(s) Oral at bedtime  dextrose 5%. 1000 milliLiter(s) (50 mL/Hr) IV Continuous <Continuous>  dextrose 50% Injectable 12.5 Gram(s) IV Push once  dextrose 50% Injectable 25 Gram(s) IV Push once  dextrose 50% Injectable 25 Gram(s) IV Push once  epoetin jayesh Injectable 03767 Unit(s) SubCutaneous daily  furosemide   Injectable 40 milliGRAM(s) IV Push <User Schedule>  heparin  Infusion 900 Unit(s)/Hr (9 mL/Hr) IV Continuous <Continuous>  hydrALAZINE 10 milliGRAM(s) Oral three times a day  insulin lispro (HumaLOG) corrective regimen sliding scale   SubCutaneous three times a day before meals  insulin lispro (HumaLOG) corrective regimen sliding scale   SubCutaneous at bedtime  iron sucrose IVPB 100 milliGRAM(s) IV Intermittent every 24 hours  isosorbide   dinitrate Tablet (ISORDIL) 10 milliGRAM(s) Oral three times a day  levothyroxine 25 MICROGram(s) Oral daily  lidocaine   Patch 1 Patch Transdermal daily  metoprolol     tartrate 12.5 milliGRAM(s) Oral two times a day  pantoprazole    Tablet 40 milliGRAM(s) Oral before breakfast  tiotropium 18 MICROgram(s) Capsule 1 Capsule(s) Inhalation daily    MEDICATIONS  (PRN):  dextrose Gel 1 Dose(s) Oral once PRN Blood Glucose LESS THAN 70 milliGRAM(s)/deciliter  glucagon  Injectable 1 milliGRAM(s) IntraMuscular once PRN Glucose LESS THAN 70 milligrams/deciliter      Weight:  Daily     Daily Weight in k.5 (2018 05:48)    Weight Change: Weights fluctuating between 76-78kg throughout admit     Estimated energy needs: Ideal body weight used for calculations as pt >120% of IBW. Needs estimated per age; fluids per PA.  Calories: 20-25 kcal/kg = 1862-7157 kcal/day  Protein: 1.0-1.2 g/kg = 59-71 g protein/day  Fluids per MD/PA 2/2 CHF     Subjective: 89 yo/female admitted with CHF exacerbation. Pt seen in room, awake, alert, pleasant, only Amharic speaking, family member at bedside available for translation. Pt cleared by SLP on  for puree solids with thin liquids. Pt currently NPO for ANMOL/DCCV this AM, but will be allowed to eat dinner. No N/V/C/D reported at this time. Tolerating current diet. Still w/intermittent BiPAP use. , 166mg/dL, Na/K/Mg WNL    Previous Nutrition Diagnosis:   Inadequate oral intake RT inability to meet needs via PO route 2/2 BiPAP AEB NPO diet order    Active [   ]  Resolved [ X  ]    If resolved, new PES: Excessive fluid intake RT no formal MNT education regarding fluid intake AEB edema, wt gain, SOB     Goal: Pt will be compliant with fluid restriction throughout admission     Recommendations: Re-start DASH, Puree, fluid restricted diet as medically feasible, reinforce diet education at f/u    Education: N/A- educated at previous visit.    Risk Level: High [ X  ] Moderate [   ] Low [   ]

## 2018-01-22 NOTE — PROGRESS NOTE ADULT - SUBJECTIVE AND OBJECTIVE BOX
MEDICAL HISTORY:      89 year old  female admitted with CHF and atrial fibrillation in  the 100's. The history is notable for vasculopathy with multiple cardiac, lower extremity and carotid procedures. n echocardiogram here showed .reduced LVEF. The hospital course has focused on diuresis and rate control.     On admission GFR was reduced, with Scr ~1.5 mg/dl. Baseline Scr data n/a. On admission, anemia was noted with Hgb 8's g/dl.  A beta-microalbumin screen was elevated (6.0). TF sat was 12, Ferritin 169. Hematology is evaluating.    CKD workup in progress  Anemia workup in progress.  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 torsemide, prednisone 20 mg, Januvia, isordil, ASA, statin, omeprazole, various bronchodilators.         INTERVAL HISTORY:    SUMMARY COMMENTS:           ---------------------------------------------------------------------------------------------------------------------------------------------------------------------------    SUBJECTIVE & OBJECTIVE DATA DOCUMENTATION:     REVIEW OF SYSTEMS SCREENED ORGAN SYSTEMS:  Constitutional: fever, chills, anorexia or fatigue  Pulmonary: difficulty breathing, wheezing, cough  Cardiovascular: exertional dyspnea, chest pain, palpitations, dizziness or leg swelling  Gastrointestinal: abdominal or epigastric pain, nausea, vomiting or hematemesis, diarrhea, melena or bright red blood per rectum.  Genitourinary: dysuria, urgency, frequency, flank pain, difficulty voiding  Skin: No pruritis, rashes or lesions  Neurology: memory loss, dysarthria, extremity weakness, neuropathic pain, headache, visual changes  Dialysis access if present : pain, bleeding, swelling     ROS POSITIVE FINDINGS:     PAST MEDICAL & SURGICAL HISTORY:  Anemia  CAD (coronary artery disease)  DM (diabetes mellitus)  Emphysema  Hypercholesteremia  HTN (hypertension)  S/P bladder repair  S/P hysterectomy with oophorectomy  History of bilateral carotid endarterectomy  History of umbilical hernia repair  S/P cataract surgery  S/P TKR (total knee replacement)      PHYSICAL EXAM:  T(C): 36.2 (18 @ 09:35), Max: 37 (18 @ 21:55)  HR: 81 (18 @ 08:29)  BP: 117/57 (18 @ 08:29) (104/52 - 130/59)  RR: 16 (18 @ 08:29)  SpO2: 100% (18 @ 08:29)  Wt(kg): --  I&O's Summary    2018 07:  -  2018 07:00  --------------------------------------------------------  IN: 550 mL / OUT: 2000 mL / NET: -1450 mL    2018 07:  -  2018 10:14  --------------------------------------------------------  IN: 0 mL / OUT: 300 mL / NET: -300 mL      Weight 83.5 ( @ 20:16)    APPEARANCE: In bed, NAD.   HEAD & NECK: normocephalic, no stiff neck, no masses, oral mucosa moist, no scleral icteris  RESPIRATORY: no accessory muscle use, no labored breathing, no dullness, basilar rales and rhonchi, no wheeze  CARDIOVASCULAR: no JVD, IRRR, S1S2,+ gallop,  no murmur, no rub.  ABDOMEN: soft, no tenderness, no masses, no hepatomegally, no splenomegally  : no bladder distension  LYMPHATIC: no lymphadenopathy (neck, axilla, groin)  EXTREMITIES & MUSCULOSKELETAL: no cyanosis, no clubbing, no tenderness, strength  L=R  EDEMA: trace  PULSES: upper extremity pulses present, lower extremity pulses absent  SKIN: no rash, no stasis, no induration  NEUROLOGIC & PSYCHIATRIC:  alert, interactive, oriented to time and place, responds to stimuli, no asterixis    MEDICATIONS  (STANDING):  aspirin enteric coated 81 milliGRAM(s) Oral daily  atorvastatin 40 milliGRAM(s) Oral at bedtime  dextrose 5%. 1000 milliLiter(s) (50 mL/Hr) IV Continuous <Continuous>  dextrose 50% Injectable 12.5 Gram(s) IV Push once  dextrose 50% Injectable 25 Gram(s) IV Push once  dextrose 50% Injectable 25 Gram(s) IV Push once  epoetin jayesh Injectable 90871 Unit(s) SubCutaneous daily  furosemide   Injectable 40 milliGRAM(s) IV Push <User Schedule>  heparin  Infusion 900 Unit(s)/Hr (9 mL/Hr) IV Continuous <Continuous>  hydrALAZINE 10 milliGRAM(s) Oral three times a day  insulin lispro (HumaLOG) corrective regimen sliding scale   SubCutaneous three times a day before meals  insulin lispro (HumaLOG) corrective regimen sliding scale   SubCutaneous at bedtime  iron sucrose IVPB 100 milliGRAM(s) IV Intermittent every 24 hours  isosorbide   dinitrate Tablet (ISORDIL) 10 milliGRAM(s) Oral three times a day  levothyroxine 25 MICROGram(s) Oral daily  lidocaine   Patch 1 Patch Transdermal daily  metoprolol     tartrate 12.5 milliGRAM(s) Oral two times a day  pantoprazole    Tablet 40 milliGRAM(s) Oral before breakfast  tiotropium 18 MICROgram(s) Capsule 1 Capsule(s) Inhalation daily    MEDICATIONS  (PRN):  dextrose Gel 1 Dose(s) Oral once PRN Blood Glucose LESS THAN 70 milliGRAM(s)/deciliter  glucagon  Injectable 1 milliGRAM(s) IntraMuscular once PRN Glucose LESS THAN 70 milligrams/deciliter      DATA:  139    |  96     |  34<H>  ----------------------------<  134<H>  Ca:9.6   (2018 06:10)  4.0     |  35<H>  |  1.47<H>      eGFR if Non : 31 <L>  eGFR if : 36 <L>    TPro  x      /  Alb  3.4 g/dL  /  TBili  x      /  DBili  x      /  AST  x      /  ALT  x      /  AlkPhos  x      2018 08:17                        8.2<L>  5.8   )-----------( 159      ( 2018 06:10 )             26.9<L>    Phos:-- M.0 mg/dL PTH:-- Uric acid:-- Serum Osm:--  Ferritin:-- Iron:-- TIBC:-- Tsat:--  B12:-- TSH:-- ( @ 06:10)    Urinalysis Basic - ( 2018 12:34 )  Color: Yellow / Appearance: Cloudy<!!> / SG: <=1.005 / pH: x  Gluc: x / Ketone: NEGATIVE  / Bili: Negative / Urobili: 0.2 E.U./dL   Blood: x / Protein: NEGATIVE mg/dL / Nitrite: NEGATIVE   Leuk Esterase: Moderate<!!> / RBC: x / WBC Many /HPF<!!>   Sq Epi: x / Non Sq Epi: 0-5 /HPF / Bacteria: Present /HPF<!!>      UProt:-- UCr:73 mg/dL P/C Ratio:-- 24 hour Prot:-- UVol:-- CrCl:--  Tj:-- UOsm:-- UVol:-- UCl:-- UK:-- ( @ 20:14) MEDICAL HISTORY:      89 year old  female admitted with CHF and atrial fibrillation in  the 100's. The history is notable for vasculopathy with multiple cardiac, lower extremity and carotid procedures. n echocardiogram here showed .reduced LVEF. The hospital course has focused on diuresis and rate control.     On admission GFR was reduced, with Scr ~1.5 mg/dl. Baseline Scr data n/a. On admission, anemia was noted with Hgb 8's g/dl.  A beta-microalbumin screen was elevated (6.0). TF sat was 12, Ferritin 169. Hematology is evaluating.    CKD workup in progress  Anemia workup in progress.  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 torsemide, prednisone 20 mg, Januvia, isordil, ASA, statin, omeprazole, various bronchodilators.     INTERVAL HISTORY:    DCCV scheduled for today.     Scr 1.46->1.47 mg/dl.     PTHi 63, 25OH vit D 25.     Hgb 8.2 g/dl (stable).     SPEP negative for monoclonal protein (check with hematology)    Weight 76.3->77.5 kg    's.  's (AFIB)    SUMMARY COMMENTS:     GFR remains stable.     Secondary hyperparathyroidism is not an issue (PTHi 63). 25OH vit D levels are mildly suboptimal. Consider D3 supplement ~ 1000 IU daily     Hgb is stable and it appears multiple myeloma is not an issue (check with hematology), if so, the anemia appears secondary to iron deficiency and CKD. Hematology has mentioned IV iron Rx - please confirm. EPO should only be given along with iron replacement.     Note that there has been no diuresis - weight is unchanged (and, in fact, is higher) over the last 48 hours; 76.3->77.5 kg. The only accurate tracker of net fluid loss is the change in weight. Presumably, the volume status and volume goals will be reassessed after rhythm is converted to SR.     Discussed with the cardiology team.           ---------------------------------------------------------------------------------------------------------------------------------------------------------------------------    SUBJECTIVE & OBJECTIVE DATA DOCUMENTATION:     REVIEW OF SYSTEMS SCREENED ORGAN SYSTEMS:  Constitutional: fever, chills, anorexia or fatigue  Pulmonary: difficulty breathing, wheezing, cough  Cardiovascular: exertional dyspnea, chest pain, palpitations, dizziness or leg swelling  Gastrointestinal: abdominal or epigastric pain, nausea, vomiting or hematemesis, diarrhea, melena or bright red blood per rectum.  Genitourinary: dysuria, urgency, frequency, flank pain, difficulty voiding  Skin: No pruritis, rashes or lesions  Neurology: memory loss, dysarthria, extremity weakness, neuropathic pain, headache, visual changes  Dialysis access if present : pain, bleeding, swelling     ROS POSITIVE FINDINGS:     PAST MEDICAL & SURGICAL HISTORY:  Anemia  CAD (coronary artery disease)  DM (diabetes mellitus)  Emphysema  Hypercholesteremia  HTN (hypertension)  S/P bladder repair  S/P hysterectomy with oophorectomy  History of bilateral carotid endarterectomy  History of umbilical hernia repair  S/P cataract surgery  S/P TKR (total knee replacement)      PHYSICAL EXAM:  T(C): 36.2 (18 @ 09:35), Max: 37 (18 @ 21:55)  HR: 81 (18 @ 08:29)  BP: 117/57 (18 @ 08:29) (104/52 - 130/59)  RR: 16 (18 @ 08:29)  SpO2: 100% (18 @ 08:29)  Wt(kg): --  I&O's Summary    2018 07:01  -  2018 07:00  --------------------------------------------------------  IN: 550 mL / OUT: 2000 mL / NET: -1450 mL    2018 07:01  -  2018 10:14  --------------------------------------------------------  IN: 0 mL / OUT: 300 mL / NET: -300 mL      Weight 83.5 ( @ 20:16)    APPEARANCE: In bed, NAD.   HEAD & NECK: normocephalic, no stiff neck, no masses, oral mucosa moist, no scleral icteris  RESPIRATORY: no accessory muscle use, no labored breathing, no dullness, basilar rales and rhonchi, no wheeze  CARDIOVASCULAR: no JVD, IRRR, S1S2,+ gallop,  no murmur, no rub.  ABDOMEN: soft, no tenderness, no masses, no hepatomegally, no splenomegally  : no bladder distension  LYMPHATIC: no lymphadenopathy (neck, axilla, groin)  EXTREMITIES & MUSCULOSKELETAL: no cyanosis, no clubbing, no tenderness, strength  L=R  EDEMA: trace  PULSES: upper extremity pulses present, lower extremity pulses absent  SKIN: no rash, no stasis, no induration  NEUROLOGIC & PSYCHIATRIC:  alert, interactive, oriented to time and place, responds to stimuli, no asterixis    MEDICATIONS  (STANDING):  aspirin enteric coated 81 milliGRAM(s) Oral daily  atorvastatin 40 milliGRAM(s) Oral at bedtime  dextrose 5%. 1000 milliLiter(s) (50 mL/Hr) IV Continuous <Continuous>  dextrose 50% Injectable 12.5 Gram(s) IV Push once  dextrose 50% Injectable 25 Gram(s) IV Push once  dextrose 50% Injectable 25 Gram(s) IV Push once  epoetin jayesh Injectable 81264 Unit(s) SubCutaneous daily  furosemide   Injectable 40 milliGRAM(s) IV Push <User Schedule>  heparin  Infusion 900 Unit(s)/Hr (9 mL/Hr) IV Continuous <Continuous>  hydrALAZINE 10 milliGRAM(s) Oral three times a day  insulin lispro (HumaLOG) corrective regimen sliding scale   SubCutaneous three times a day before meals  insulin lispro (HumaLOG) corrective regimen sliding scale   SubCutaneous at bedtime  iron sucrose IVPB 100 milliGRAM(s) IV Intermittent every 24 hours  isosorbide   dinitrate Tablet (ISORDIL) 10 milliGRAM(s) Oral three times a day  levothyroxine 25 MICROGram(s) Oral daily  lidocaine   Patch 1 Patch Transdermal daily  metoprolol     tartrate 12.5 milliGRAM(s) Oral two times a day  pantoprazole    Tablet 40 milliGRAM(s) Oral before breakfast  tiotropium 18 MICROgram(s) Capsule 1 Capsule(s) Inhalation daily    MEDICATIONS  (PRN):  dextrose Gel 1 Dose(s) Oral once PRN Blood Glucose LESS THAN 70 milliGRAM(s)/deciliter  glucagon  Injectable 1 milliGRAM(s) IntraMuscular once PRN Glucose LESS THAN 70 milligrams/deciliter      DATA:  139    |  96     |  34<H>  ----------------------------<  134<H>  Ca:9.6   (2018 06:10)  4.0     |  35<H>  |  1.47<H>      eGFR if Non : 31 <L>  eGFR if : 36 <L>    TPro  x      /  Alb  3.4 g/dL  /  TBili  x      /  DBili  x      /  AST  x      /  ALT  x      /  AlkPhos  x      2018 08:17                        8.2<L>  5.8   )-----------( 159      ( 2018 06:10 )             26.9<L>    Phos:-- M.0 mg/dL PTH:-- Uric acid:-- Serum Osm:--  Ferritin:-- Iron:-- TIBC:-- Tsat:--  B12:-- TSH:-- ( @ 06:10)    Urinalysis Basic - ( 2018 12:34 )  Color: Yellow / Appearance: Cloudy<!!> / SG: <=1.005 / pH: x  Gluc: x / Ketone: NEGATIVE  / Bili: Negative / Urobili: 0.2 E.U./dL   Blood: x / Protein: NEGATIVE mg/dL / Nitrite: NEGATIVE   Leuk Esterase: Moderate<!!> / RBC: x / WBC Many /HPF<!!>   Sq Epi: x / Non Sq Epi: 0-5 /HPF / Bacteria: Present /HPF<!!>      UProt:-- UCr:73 mg/dL P/C Ratio:-- 24 hour Prot:-- UVol:-- CrCl:--  Tj:-- UOsm:-- UVol:-- UCl:-- UK:-- ( @ 20:14)

## 2018-01-22 NOTE — PROGRESS NOTE ADULT - PROBLEM SELECTOR PLAN 9
Hx of Anemia, H/H remains Stable, H/H 8.2/26.9 today.  - Heme (Dr. Elliott) following. Rec Heparin gtt: Dose to keep PTT 60-80  - Monitor CBC closely & transfuse PRBCs to keep Hb > 7g/dl  - Iron deficient, s/p IV Iron x3 days and Epogen 10,000 units x3 days.  - f/u any further Recs

## 2018-01-22 NOTE — PROGRESS NOTE ADULT - SUBJECTIVE AND OBJECTIVE BOX
Interval Events: reviewed  Patient seen and examined at bedside.    Patient is a 89y old  Female who presents with a chief complaint of   she is feeling better and she walked to the bathroom  PAST MEDICAL & SURGICAL HISTORY:  Anemia  CAD (coronary artery disease)  DM (diabetes mellitus)  Emphysema  Hypercholesteremia  HTN (hypertension)  S/P bladder repair  S/P hysterectomy with oophorectomy  History of bilateral carotid endarterectomy  History of umbilical hernia repair  S/P cataract surgery  S/P TKR (total knee replacement)      MEDICATIONS:  Pulmonary:  tiotropium 18 MICROgram(s) Capsule 1 Capsule(s) Inhalation daily    Antimicrobials:    Anticoagulants:  aspirin enteric coated 81 milliGRAM(s) Oral daily  heparin  Infusion 900 Unit(s)/Hr IV Continuous <Continuous>    Cardiac:  furosemide   Injectable 40 milliGRAM(s) IV Push <User Schedule>  hydrALAZINE 10 milliGRAM(s) Oral three times a day  isosorbide   dinitrate Tablet (ISORDIL) 10 milliGRAM(s) Oral three times a day  metoprolol     tartrate 12.5 milliGRAM(s) Oral two times a day      Allergies    No Known Allergies    Intolerances        Vital Signs Last 24 Hrs  T(C): 36.2 (22 Jan 2018 09:35), Max: 37 (21 Jan 2018 21:55)  T(F): 97.2 (22 Jan 2018 09:35), Max: 98.6 (21 Jan 2018 21:55)  HR: 83 (22 Jan 2018 20:33) (76 - 110)  BP: 112/55 (22 Jan 2018 20:33) (106/51 - 141/63)  BP(mean): --  RR: 16 (22 Jan 2018 20:33) (16 - 18)  SpO2: 98% (22 Jan 2018 20:33) (87% - 100%)    01-21 @ 07:01 - 01-22 @ 07:00  --------------------------------------------------------  IN: 550 mL / OUT: 2000 mL / NET: -1450 mL    01-22 @ 07:01 - 01-22 @ 21:38  --------------------------------------------------------  IN: 209 mL / OUT: 1000 mL / NET: -791 mL          LABS:      CBC Full  -  ( 22 Jan 2018 06:10 )  WBC Count : 5.8 K/uL  Hemoglobin : 8.2 g/dL  Hematocrit : 26.9 %  Platelet Count - Automated : 159 K/uL  Mean Cell Volume : 94.4 fL  Mean Cell Hemoglobin : 28.8 pg  Mean Cell Hemoglobin Concentration : 30.5 g/dL  Auto Neutrophil # : x  Auto Lymphocyte # : x  Auto Monocyte # : x  Auto Eosinophil # : x  Auto Basophil # : x  Auto Neutrophil % : x  Auto Lymphocyte % : x  Auto Monocyte % : x  Auto Eosinophil % : x  Auto Basophil % : x    01-22    139  |  96  |  34<H>  ----------------------------<  134<H>  4.0   |  35<H>  |  1.47<H>    Ca    9.6      22 Jan 2018 06:10  Phos  3.7     01-21  Mg     2.0     01-22    TPro  x   /  Alb  3.4  /  TBili  x   /  DBili  x   /  AST  x   /  ALT  x   /  AlkPhos  x   01-21    PTT - ( 22 Jan 2018 15:50 )  PTT:60.3 sec      Urinalysis Basic - ( 21 Jan 2018 12:34 )    Color: Yellow / Appearance: Cloudy / SG: <=1.005 / pH: x  Gluc: x / Ketone: NEGATIVE  / Bili: Negative / Urobili: 0.2 E.U./dL   Blood: x / Protein: NEGATIVE mg/dL / Nitrite: NEGATIVE   Leuk Esterase: Moderate / RBC: x / WBC Many /HPF   Sq Epi: x / Non Sq Epi: 0-5 /HPF / Bacteria: Present /HPF                  RADIOLOGY & ADDITIONAL STUDIES (The following images were personally reviewed):  Rubalcava:                          No  Urine output:               Yes          DVT prophylaxis:         Yes          Flattus:                          Yes         Bowel movement:       Yes

## 2018-01-22 NOTE — PROGRESS NOTE ADULT - PROBLEM SELECTOR PLAN 3
remains CP free, CE negative x2  - S/p diagnostic [facility] 7/2016 revealed distal LAD disease per outpt ppw  - Continue ASA/statin/BB  - As per Dr. Monson plan for NST this admission given reduced EF.

## 2018-01-22 NOTE — PROGRESS NOTE ADULT - SUBJECTIVE AND OBJECTIVE BOX
History of Present Illness:  History of Present Illness: 	  This is a 89 y/o female, obese, with HTN, HPL, DM-II, Anemia, diastolic CHF (NL EF), known CAD with previous PCIs, b/l carotid endarterectomies, who presented to her cardiologist with c/o mid sternal chest pressure radiating to the back, associated with SOB, palpitations, LE edema and 4lbs weight gain over 1 week. In the office, her EKG showed new AFib and she was sent to the ER for further evaluation.   In the ER, 1st troponin negative, BNP 3805, BUN 42/1.57, H/H 8.6/27.7, EKG AFib @ 100bpm, LBBB, CXR with some vascular congestion. She received Cardizem 5mg IV x1, Lasix 40mg IV x1, was started on IV heparin and admitted for further management and telemetry monitoring.  No s/s of bleeding noted.    Review of Systems:  Review of Systems: GENERAL, CONSTITUTIONAL : + recent weight gain. Denies fever, chills  EYES, VISION: denies changes in vision   EARS, NOSE, THROAT: denies hearing loss  HEART, CARDIOVASCULAR: + chest pain, palpitations, SOB,  LE edema. Denies claudication  RESPIRATORY: + SOB; Denies cough, wheezing, PND, orthopnea  GASTROINTESTINAL: Denies abdominal pain, heartburn, bloody stool, dark tarry stool  GENITOURINARY: Denies frequent urination, urgency  MUSCULOSKELETAL denies joint pain or swelling, restricted motion, musculoskeletal pain.   SKIN & INTEGUMENTARY Denies rashes, sores, blisters, blisters, growths.  NEUROLOGICAL: Denies numbness or tingling sensations, sensation loss, burning.   PSYCHIATRIC: Denies nervousness, anxiety, depression  ENDOCRINE Denies heat or cold intolerance, excessive thirst HEMATOLOGIC/LYMPHATIC: Denies abnormal bleeding, bleeding of any kind	      Allergies and Intolerances:        Allergies:  	No Known Allergies:     Home Medications:   * Patient Currently Takes Medications as of 17-Jan-2018 02:09 documented in Structured Notes  · 	torsemide 10 mg oral tablet: 1 tab(s) orally once a day, Last Dose Taken:    · 	aspirin 81 mg oral tablet: 1 tab(s) orally once a day, Last Dose Taken:    · 	isosorbide mononitrate 30 mg oral tablet, extended release: 1 tab(s) orally once a day (in the morning), Last Dose Taken:    · 	Januvia 50 mg oral tablet: 1 tab(s) orally once a day, Last Dose Taken:    · 	amLODIPine 5 mg oral tablet: 1 tab(s) orally once a day, Last Dose Taken:    · 	predniSONE 20 mg oral tablet: 1 tab(s) orally once a day, Last Dose Taken:    · 	atorvastatin 20 mg oral tablet: 1 tab(s) orally once a day, Last Dose Taken:    · 	montelukast 10 mg oral tablet: 1 tab(s) orally once a day, Last Dose Taken:    · 	omeprazole 40 mg oral delayed release capsule: 1 cap(s) orally once a day, Last Dose Taken:    · 	Spiriva 18 mcg inhalation capsule: 1 cap(s) inhaled once a day, Last Dose Taken:      .  Patient History:   Past Medical History:  Anemia    CAD (coronary artery disease)    DM (diabetes mellitus)    Emphysema    HTN (hypertension)    Hypercholesteremia.    Past Surgical History:  History of bilateral carotid endarterectomy    History of umbilical hernia repair    S/P bladder repair    S/P cataract surgery    S/P hysterectomy with oophorectomy    S/P TKR (total knee replacement).    Family History:  Mother  Still living? No  Family history of emphysema, Age at diagnosis: 71-80.    Social History:  Social History (marital status, living situation, occupation, tobacco use, alcohol and drug use, and sexual history): Denies tobacco, ETOH or illicit drug use Lives with her daughter, ambulates with a walker	    Tobacco Screening:  · Core Measure Site	No	    Risk Assessment:   Present on Admission:  Deep Venous Thrombosis	no 	  Pulmonary Embolus	no 	    Heart Failure:  Does this patient have a history of or has been diagnosed with heart failure? yes.      Physical Exam:  Physical Exam:  Vital Signs Last 24 Hrs T(C): 36.2 (22 Jan 2018 09:35), Max: 37 (21 Jan 2018 21:55) T(F): 97.2 (22 Jan 2018 09:35), Max: 98.6 (21 Jan 2018 21:55) HR: 83 (22 Jan 2018 20:33) (76 - 110) BP: 112/55 (22 Jan 2018 20:33) (104/52 - 141/63) RR: 16 (22 Jan 2018 20:33) (16 - 18) SpO2: 98% (22 Jan 2018 20:33) (87% - 100%)    Appearance: Normal   HEENT:   Normal oral mucosa, PERRL, EOMI   Neck: Supple, - JVD; No Carotid Bruit   Cardiovascular: Normal S1 S2, No JVD, No murmurs  Respiratory: Lungs bibasilar crackles, no Rhonchi, Wheezing   Gastrointestinal:  Soft, Non-tender, + BS   Skin: No rashes, No ecchymoses, No cyanosis  Extremities: 3+ edema b/l, No clubbing, cyanosis  Vascular: Femoral pulses 1+ b/l without bruit, DP faint, b/l, PT faint b/l  Neurologic: Non-focal Psychiatry: A & O x 3, Mood & affect appropriate	      Labs and Results:  Labs, Radiology, Cardiology, and Other Results:                       8.2   5.8   )-----------( 159      ( 22 Jan 2018 06:10 )            26.9   139  |  96  |  34<H> ----------------------------<  134<H> 4.0   |  35<H>  |  1.47<H>  Ca    9.6      22 Jan 2018 06:10 Phos  3.7     01-21 Mg     2.0     01-22 TPro  x   /  Alb  3.4  /  TBili  x   /  DBili  x   /  AST  x   /  ALT  x   /  AlkPhos  x   01-21	    Assessment and Plan:   Assessment:  · Assessment		  89 y/o female with HTN, HPL, DM-II, CAD/PCIs, diastolic CHF (NL EF), anemia who is admitted with new onset AFib and CHF exacerbation      Problem/Plan - 1:  ·  Problem: Atrial fibrillation.  Plan: New Afib with controlled HR  - Heparin gtt: Dose to keep PTT 60-80    Problem/Plan - 2:  - Monitor CBC closely & transfuse PRBCs to keep Hb > 7g/dl  - f/u SPEP, IPEP, Immunofixation, LDH, Beta-2 microglobulin  - Consider Epogen  - Trial of iron sucrose

## 2018-01-22 NOTE — PROGRESS NOTE ADULT - SUBJECTIVE AND OBJECTIVE BOX
Interventional Cardiology     Feels better  Still in Afib  LBBB old  Less edema legs - sacral present but decreased      Appearance: Normal	  HEENT:   Normal oral mucosa, PERRL, EOMI	  Neck: Supple, + JVD/ - JVD; Carotid Bruit s/p CEA and CATARINA b/l   Cardiovascular: distant irreg irreg  S1 S2, No JVD, No murmurs appreciated,   Respiratory: Decreased Breath Sounds bases and Rales b/l	  Gastrointestinal:  Soft, Non-tender, + BS	  Skin: No rashes, No ecchymoses, No cyanosis  Extremities: Normal range of motion, No clubbing, cyanosis   marked improvement of edema  Vascular: Peripheral pulses depressed bilaterally  Neurologic: Non-focal  Psychiatry: A & O x 3, Mood & affect appropriate  sacral mild edema      Subjective Assessment:  MEDICATIONS  (STANDING):  aspirin enteric coated 81 milliGRAM(s) Oral daily  atorvastatin 40 milliGRAM(s) Oral at bedtime  dextrose 5%. 1000 milliLiter(s) (50 mL/Hr) IV Continuous <Continuous>  dextrose 50% Injectable 12.5 Gram(s) IV Push once  dextrose 50% Injectable 25 Gram(s) IV Push once  dextrose 50% Injectable 25 Gram(s) IV Push once  epoetin jayesh Injectable 18408 Unit(s) SubCutaneous daily  furosemide   Injectable 40 milliGRAM(s) IV Push <User Schedule>  heparin  Infusion 900 Unit(s)/Hr (9 mL/Hr) IV Continuous <Continuous>  hydrALAZINE 10 milliGRAM(s) Oral three times a day  insulin lispro (HumaLOG) corrective regimen sliding scale   SubCutaneous three times a day before meals  insulin lispro (HumaLOG) corrective regimen sliding scale   SubCutaneous at bedtime  iron sucrose IVPB 100 milliGRAM(s) IV Intermittent every 24 hours  isosorbide   dinitrate Tablet (ISORDIL) 10 milliGRAM(s) Oral three times a day  levothyroxine 25 MICROGram(s) Oral daily  lidocaine   Patch 1 Patch Transdermal daily  metoprolol     tartrate 12.5 milliGRAM(s) Oral two times a day  pantoprazole    Tablet 40 milliGRAM(s) Oral before breakfast  tiotropium 18 MICROgram(s) Capsule 1 Capsule(s) Inhalation daily    MEDICATIONS  (PRN):  dextrose Gel 1 Dose(s) Oral once PRN Blood Glucose LESS THAN 70 milliGRAM(s)/deciliter  glucagon  Injectable 1 milliGRAM(s) IntraMuscular once PRN Glucose LESS THAN 70 milligrams/deciliter  	    [PHYSICAL EXAM:  TELEMETRY:  T(C): 36.7 (01-19-18 @ 13:58), Max: 36.9 (01-18-18 @ 16:42)  HR: 98 (01-19-18 @ 14:34) (88 - 98)  BP: 119/58 (01-19-18 @ 14:34) (100/56 - 121/56)  RR: 20 (01-19-18 @ 14:34) (16 - 25)  SpO2: 95% (01-19-18 @ 14:34) (95% - 100%)  Wt(kg): --  I&O's Summary    18 Jan 2018 07:01  -  19 Jan 2018 07:00  --------------------------------------------------------  IN: 556 mL / OUT: 1800 mL / NET: -1244 mL    19 Jan 2018 07:01  -  19 Jan 2018 15:49  --------------------------------------------------------  IN: 300 mL / OUT: 0 mL / NET: 300 mL            01-22    139  |  96  |  34<H>  ----------------------------<  134<H>  4.0   |  35<H>  |  1.47<H>    Ca    9.6      22 Jan 2018 06:10  Phos  3.7     01-21  Mg     2.0     01-22    TPro  x   /  Alb  3.4  /  TBili  x   /  DBili  x   /  AST  x   /  ALT  x   /  AlkPhos  x   01-21                            8.2    6.8   )-----------( 140      ( 19 Jan 2018 06:53 )             27.0     01-19    141  |  97  |  33<H>  ----------------------------<  139<H>  4.5   |  34<H>  |  1.41<H>    Ca    9.5      19 Jan 2018 06:53  Mg     2.0     01-19    TPro  6.1  /  Alb  x   /  TBili  x   /  DBili  x   /  AST  x   /  ALT  x   /  AlkPhos  x   01-18    proBNP:   Lipid Profile:   HgA1c:   TSH:   PT/INR - ( 19 Jan 2018 14:31 )   PT: 12.6 sec;   INR: 1.13          PTT - ( 19 Jan 2018 14:31 )  PTT:56.0 sec

## 2018-01-22 NOTE — PROGRESS NOTE ADULT - PROBLEM SELECTOR PLAN 7
- Dr. Gupta consulted.   NO EVIDENCE OF COPD. The patient was exposed to biomass.  Continue Spiriva and as needed albuterol. Continue oxygen supplementation. Due to patient age no need for CT scan of chest.  Hold on steroids

## 2018-01-22 NOTE — PROGRESS NOTE ADULT - SUBJECTIVE AND OBJECTIVE BOX
Interventional Cardiology PA Adult Progress Note    Subjective Assessment: Patient seen and examined at bedside, improved SOB today.   	  MEDICATIONS:  furosemide   Injectable 40 milliGRAM(s) IV Push <User Schedule>  hydrALAZINE 10 milliGRAM(s) Oral three times a day  isosorbide   dinitrate Tablet (ISORDIL) 10 milliGRAM(s) Oral three times a day  metoprolol     tartrate 12.5 milliGRAM(s) Oral two times a day  tiotropium 18 MICROgram(s) Capsule 1 Capsule(s) Inhalation daily  pantoprazole    Tablet 40 milliGRAM(s) Oral before breakfast  atorvastatin 40 milliGRAM(s) Oral at bedtime  glucagon  Injectable 1 milliGRAM(s) IntraMuscular once PRN  insulin lispro (HumaLOG) corrective regimen sliding scale   SubCutaneous three times a day before meals  insulin lispro (HumaLOG) corrective regimen sliding scale   SubCutaneous at bedtime  levothyroxine 25 MICROGram(s) Oral daily  aspirin enteric coated 81 milliGRAM(s) Oral daily  dextrose 5%. 1000 milliLiter(s) IV Continuous <Continuous>  epoetin jayesh Injectable 31164 Unit(s) SubCutaneous daily  heparin  Infusion 900 Unit(s)/Hr IV Continuous <Continuous>  iron sucrose IVPB 100 milliGRAM(s) IV Intermittent every 24 hours  lidocaine   Patch 1 Patch Transdermal daily    [PHYSICAL EXAM:  TELEMETRY:  T(C): 36.2 (01-22-18 @ 09:35), Max: 37 (01-21-18 @ 21:55)  HR: 99 (01-22-18 @ 14:35) (73 - 110)  BP: 141/63 (01-22-18 @ 14:35) (104/52 - 141/63)  RR: 16 (01-22-18 @ 14:35) (16 - 18)  SpO2: 99% (01-22-18 @ 14:35) (87% - 100%)    I&O's Summary    21 Jan 2018 07:01  -  22 Jan 2018 07:00  --------------------------------------------------------  IN: 550 mL / OUT: 2000 mL / NET: -1450 mL    22 Jan 2018 07:01  -  22 Jan 2018 15:58  --------------------------------------------------------  IN: 64 mL / OUT: 450 mL / NET: -386 mL    Rubalcava: Removed today  Central/PICC/Mid Line: No                                         Appearance: Normal, NAD	  HEENT:   Normal oral mucosa, PERRL, EOMI	  Neck: Supple, + JVD; no Carotid Bruit   Cardiovascular: Normal S1 S2, No JVD, No murmurs,   Respiratory: b/l rales, No Rhonchi, Wheezing	  Gastrointestinal:  Soft, Non-tender, + BS	  Skin: No rashes, No ecchymoses, No cyanosis  Extremities: Normal range of motion, No clubbing, cyanosis, trace edema  Vascular: Peripheral pulses palpable 2+ bilaterally  Neurologic: Non-focal  Psychiatry: A & O x 3, Mood & affect appropriate  	    LABS:	 	  CARDIAC MARKERS:                        8.2    5.8   )-----------( 159      ( 22 Jan 2018 06:10 )             26.9     01-22    139  |  96  |  34<H>  ----------------------------<  134<H>  4.0   |  35<H>  |  1.47<H>    Ca    9.6      22 Jan 2018 06:10  Phos  3.7     01-21  Mg     2.0     01-22    TPro  x   /  Alb  3.4  /  TBili  x   /  DBili  x   /  AST  x   /  ALT  x   /  AlkPhos  x   01-21      PTT - ( 22 Jan 2018 06:10 )  PTT:84.2 sec    ASSESSMENT/PLAN: 	  DVT ppx: Heparin gtt

## 2018-01-23 LAB
ANION GAP SERPL CALC-SCNC: 12 MMOL/L — SIGNIFICANT CHANGE UP (ref 5–17)
APTT BLD: 62.2 SEC — HIGH (ref 27.5–37.4)
BUN SERPL-MCNC: 36 MG/DL — HIGH (ref 7–23)
CALCIUM SERPL-MCNC: 9.6 MG/DL — SIGNIFICANT CHANGE UP (ref 8.4–10.5)
CHLORIDE SERPL-SCNC: 95 MMOL/L — LOW (ref 96–108)
CO2 SERPL-SCNC: 33 MMOL/L — HIGH (ref 22–31)
CREAT SERPL-MCNC: 1.76 MG/DL — HIGH (ref 0.5–1.3)
GLUCOSE BLDC GLUCOMTR-MCNC: 126 MG/DL — HIGH (ref 70–99)
GLUCOSE BLDC GLUCOMTR-MCNC: 127 MG/DL — HIGH (ref 70–99)
GLUCOSE BLDC GLUCOMTR-MCNC: 130 MG/DL — HIGH (ref 70–99)
GLUCOSE BLDC GLUCOMTR-MCNC: 136 MG/DL — HIGH (ref 70–99)
GLUCOSE BLDC GLUCOMTR-MCNC: 162 MG/DL — HIGH (ref 70–99)
GLUCOSE BLDC GLUCOMTR-MCNC: 180 MG/DL — HIGH (ref 70–99)
GLUCOSE SERPL-MCNC: 144 MG/DL — HIGH (ref 70–99)
HCT VFR BLD CALC: 27.8 % — LOW (ref 34.5–45)
HGB BLD-MCNC: 8.2 G/DL — LOW (ref 11.5–15.5)
INR BLD: 1.11 — SIGNIFICANT CHANGE UP (ref 0.88–1.16)
M TB TUBERC IFN-G BLD QL: 0.01 IU/ML — SIGNIFICANT CHANGE UP
M TB TUBERC IFN-G BLD QL: 0.03 IU/ML — SIGNIFICANT CHANGE UP
M TB TUBERC IFN-G BLD QL: NEGATIVE — SIGNIFICANT CHANGE UP
MAGNESIUM SERPL-MCNC: 2.2 MG/DL — SIGNIFICANT CHANGE UP (ref 1.6–2.6)
MCHC RBC-ENTMCNC: 28.5 PG — SIGNIFICANT CHANGE UP (ref 27–34)
MCHC RBC-ENTMCNC: 29.5 G/DL — LOW (ref 32–36)
MCV RBC AUTO: 96.5 FL — SIGNIFICANT CHANGE UP (ref 80–100)
MITOGEN IGNF BCKGRD COR BLD-ACNC: 3.27 IU/ML — SIGNIFICANT CHANGE UP
NT-PROBNP SERPL-SCNC: 5529 PG/ML — HIGH (ref 0–300)
PLATELET # BLD AUTO: 168 K/UL — SIGNIFICANT CHANGE UP (ref 150–400)
POTASSIUM SERPL-MCNC: 3.6 MMOL/L — SIGNIFICANT CHANGE UP (ref 3.5–5.3)
POTASSIUM SERPL-SCNC: 3.6 MMOL/L — SIGNIFICANT CHANGE UP (ref 3.5–5.3)
PROTHROM AB SERPL-ACNC: 12.3 SEC — SIGNIFICANT CHANGE UP (ref 9.8–12.7)
RBC # BLD: 2.88 M/UL — LOW (ref 3.8–5.2)
RBC # FLD: 17 % — HIGH (ref 10.3–16.9)
SODIUM SERPL-SCNC: 140 MMOL/L — SIGNIFICANT CHANGE UP (ref 135–145)
WBC # BLD: 7.3 K/UL — SIGNIFICANT CHANGE UP (ref 3.8–10.5)
WBC # FLD AUTO: 7.3 K/UL — SIGNIFICANT CHANGE UP (ref 3.8–10.5)

## 2018-01-23 PROCEDURE — 93320 DOPPLER ECHO COMPLETE: CPT | Mod: 26

## 2018-01-23 PROCEDURE — 93312 ECHO TRANSESOPHAGEAL: CPT | Mod: 26

## 2018-01-23 PROCEDURE — 99232 SBSQ HOSP IP/OBS MODERATE 35: CPT

## 2018-01-23 PROCEDURE — 93325 DOPPLER ECHO COLOR FLOW MAPG: CPT | Mod: 26

## 2018-01-23 PROCEDURE — 92960 CARDIOVERSION ELECTRIC EXT: CPT

## 2018-01-23 RX ORDER — POTASSIUM CHLORIDE 20 MEQ
40 PACKET (EA) ORAL ONCE
Qty: 0 | Refills: 0 | Status: COMPLETED | OUTPATIENT
Start: 2018-01-23 | End: 2018-01-23

## 2018-01-23 RX ORDER — AMIODARONE HYDROCHLORIDE 400 MG/1
400 TABLET ORAL ONCE
Qty: 0 | Refills: 0 | Status: COMPLETED | OUTPATIENT
Start: 2018-01-23 | End: 2018-01-23

## 2018-01-23 RX ORDER — AMIODARONE HYDROCHLORIDE 400 MG/1
400 TABLET ORAL
Qty: 0 | Refills: 0 | Status: DISCONTINUED | OUTPATIENT
Start: 2018-01-23 | End: 2018-01-28

## 2018-01-23 RX ADMIN — Medication 2: at 16:50

## 2018-01-23 RX ADMIN — ISOSORBIDE DINITRATE 10 MILLIGRAM(S): 5 TABLET ORAL at 22:06

## 2018-01-23 RX ADMIN — LIDOCAINE 1 PATCH: 4 CREAM TOPICAL at 03:23

## 2018-01-23 RX ADMIN — Medication 12.5 MILLIGRAM(S): at 18:12

## 2018-01-23 RX ADMIN — TIOTROPIUM BROMIDE 1 CAPSULE(S): 18 CAPSULE ORAL; RESPIRATORY (INHALATION) at 18:12

## 2018-01-23 RX ADMIN — ISOSORBIDE DINITRATE 10 MILLIGRAM(S): 5 TABLET ORAL at 05:36

## 2018-01-23 RX ADMIN — Medication 81 MILLIGRAM(S): at 16:50

## 2018-01-23 RX ADMIN — Medication 25 MICROGRAM(S): at 05:36

## 2018-01-23 RX ADMIN — Medication 10 MILLIGRAM(S): at 05:36

## 2018-01-23 RX ADMIN — Medication 40 MILLIEQUIVALENT(S): at 18:17

## 2018-01-23 RX ADMIN — ISOSORBIDE DINITRATE 10 MILLIGRAM(S): 5 TABLET ORAL at 16:07

## 2018-01-23 RX ADMIN — PANTOPRAZOLE SODIUM 40 MILLIGRAM(S): 20 TABLET, DELAYED RELEASE ORAL at 05:37

## 2018-01-23 RX ADMIN — Medication 12.5 MILLIGRAM(S): at 05:36

## 2018-01-23 RX ADMIN — Medication 10 MILLIGRAM(S): at 16:07

## 2018-01-23 RX ADMIN — AMIODARONE HYDROCHLORIDE 400 MILLIGRAM(S): 400 TABLET ORAL at 16:07

## 2018-01-23 RX ADMIN — LIDOCAINE 1 PATCH: 4 CREAM TOPICAL at 16:07

## 2018-01-23 RX ADMIN — ATORVASTATIN CALCIUM 40 MILLIGRAM(S): 80 TABLET, FILM COATED ORAL at 22:05

## 2018-01-23 RX ADMIN — Medication 40 MILLIGRAM(S): at 09:03

## 2018-01-23 RX ADMIN — Medication 2: at 06:48

## 2018-01-23 RX ADMIN — AMIODARONE HYDROCHLORIDE 400 MILLIGRAM(S): 400 TABLET ORAL at 22:05

## 2018-01-23 RX ADMIN — Medication 10 MILLIGRAM(S): at 22:06

## 2018-01-23 NOTE — PROGRESS NOTE ADULT - PROBLEM SELECTOR PLAN 8
Patient endorsing difficulty swallowing (solid food getting stuck)  - Evaluated by speech and swallow, patient with functional pharyngeal swallow. Recommended for Dysphagia 1 Pureed, thin Liquid diet and aspiration precautions.   - Barium swallow: Esophageal dysmotility likely secondary to presbyesophagus. GI service cleared patient for ANMOL this AM.

## 2018-01-23 NOTE — PROGRESS NOTE ADULT - PROBLEM SELECTOR PLAN 9
Heme (Dr. Elliott following)  - Iron deficiency anemia- H/H 8.2/27.8. Will monitor CBC closely & transfuse PRBCs to keep Hb > 7g/dl. S/p IV Iron x3 days and Epogen 10,000 units x3 days.  - Will F/U SPEP, IPEP, Immunofixation, LDH, Beta-2 microglobulin.

## 2018-01-23 NOTE — PROGRESS NOTE ADULT - PROBLEM SELECTOR PLAN 7
Pulm (Dr. Alonso melendrez)   NO EVIDENCE OF COPD. The patient was exposed to biomass.  Continue Spiriva and as needed albuterol. Continue oxygen supplementation. Due to patient age no need for CT scan of chest.  Hold on steroids

## 2018-01-23 NOTE — PROGRESS NOTE ADULT - SUBJECTIVE AND OBJECTIVE BOX
EPS Progress Note    S: s/p failed cardioversion. No complaints currently.     O: T(C): 36.2 (01-23-18 @ 08:20), Max: 36.6 (01-22-18 @ 22:12)  HR: 95 (01-23-18 @ 14:29) (74 - 110)  BP: 148/61 (01-23-18 @ 14:29) (111/54 - 148/61)  RR: 18 (01-23-18 @ 14:29) (16 - 18)  SpO2: 98% (01-23-18 @ 14:29) (97% - 100%)    TELE: AFIB HR 80-90s     PHYSICAL  General:  NAD        Neck: + JVD   Chest:  crackles    Cardiac:   + s1/s2, irregular  Abdomen:  +BS , soft ND/NT  Extremities: No LE edema    LABS:                        8.2    7.3   )-----------( 168      ( 23 Jan 2018 07:00 )             27.8     01-23    140  |  95<L>  |  36<H>  ----------------------------<  144<H>  3.6   |  33<H>  |  1.76<H>    Ca    9.6      23 Jan 2018 07:00  Mg     2.2     01-23      PT/INR - ( 23 Jan 2018 07:00 )   PT: 12.3 sec;   INR: 1.11          PTT - ( 23 Jan 2018 07:00 )  PTT:62.2 sec    MEDICATIONS:  aspirin enteric coated 81 milliGRAM(s) Oral daily  atorvastatin 40 milliGRAM(s) Oral at bedtime  furosemide   Injectable 40 milliGRAM(s) IV Push <User Schedule>  glucagon  Injectable 1 milliGRAM(s) IntraMuscular once PRN  heparin  Infusion 900 Unit(s)/Hr IV Continuous <Continuous>  hydrALAZINE 10 milliGRAM(s) Oral three times a day  insulin lispro (HumaLOG) corrective regimen sliding scale   SubCutaneous three times a day before meals  insulin lispro (HumaLOG) corrective regimen sliding scale   SubCutaneous at bedtime  isosorbide   dinitrate Tablet (ISORDIL) 10 milliGRAM(s) Oral three times a day  levothyroxine 25 MICROGram(s) Oral daily  lidocaine   Patch 1 Patch Transdermal daily  metoprolol     tartrate 12.5 milliGRAM(s) Oral two times a day  pantoprazole    Tablet 40 milliGRAM(s) Oral before breakfast  tiotropium 18 MICROgram(s) Capsule 1 Capsule(s) Inhalation daily

## 2018-01-23 NOTE — PROGRESS NOTE ADULT - PROBLEM SELECTOR PLAN 3
CAD with hx of PCIs  - currently chest pain free, cardiac enzymes negative x 3 sets.  - Continue ASA/BB/Statin  - Given newly depressed EF, NPO after midnight for NST in AM. CAD with hx of PCIs  - currently chest pain free, cardiac enzymes negative x 3 sets.  - Continue ASA/BB/Statin  - Given newly depressed EF, tentative plan for NST on Thursday 1/25/18. CAD with hx of PCIs  - currently chest pain free, cardiac enzymes negative x 3 sets.  - Continue ASA/BB/Statin  - Given newly depressed EF, plan for NST 1/24 AM

## 2018-01-23 NOTE — PROGRESS NOTE ADULT - ASSESSMENT
89 y/o female with HTN, HLD, DM-II, CAD/PCIs, diastolic CHF (previously normal EF, now reduced to 20-25%), ? LYNDA vs chronic renal insufficiency, and anemia who is admitted with new onset AFib and CHF exacerbation.    - s/p failed cardioversion today. Continue to diurese her. Start Amio 400 mg BID PO load (please give first dose now). Will try to cardiovert her after a few grams of Amio on board, tentatively Friday.

## 2018-01-23 NOTE — PROGRESS NOTE ADULT - SUBJECTIVE AND OBJECTIVE BOX
INTERVAL HPI/OVERNIGHT EVENTS:      Patient is a 89y old  Female who presents with a chief complaint of  was seen and examined today. Reports the following symptoms:    CONSTITUTIONAL:  Negative fever or chills, feels well, good appetite  EYES:  Negative  blurry vision or double vision  CARDIOVASCULAR:  Negative for chest pain or palpitations  RESPIRATORY:  Negative for cough, wheezing, or SOB   GASTROINTESTINAL:  Negative for nausea, vomiting, diarrhea, constipation, or abdominal pain  GENITOURINARY:  Negative frequency, urgency or dysuria  NEUROLOGIC:  No headache, confusion, dizziness, lightheadedness    MEDICATIONS  (STANDING):  amiodarone    Tablet 400 milliGRAM(s) Oral two times a day  aspirin enteric coated 81 milliGRAM(s) Oral daily  atorvastatin 40 milliGRAM(s) Oral at bedtime  dextrose 5%. 1000 milliLiter(s) (50 mL/Hr) IV Continuous <Continuous>  dextrose 50% Injectable 12.5 Gram(s) IV Push once  dextrose 50% Injectable 25 Gram(s) IV Push once  dextrose 50% Injectable 25 Gram(s) IV Push once  furosemide   Injectable 40 milliGRAM(s) IV Push <User Schedule>  heparin  Infusion 900 Unit(s)/Hr (9 mL/Hr) IV Continuous <Continuous>  hydrALAZINE 10 milliGRAM(s) Oral three times a day  insulin lispro (HumaLOG) corrective regimen sliding scale   SubCutaneous three times a day before meals  insulin lispro (HumaLOG) corrective regimen sliding scale   SubCutaneous at bedtime  isosorbide   dinitrate Tablet (ISORDIL) 10 milliGRAM(s) Oral three times a day  levothyroxine 25 MICROGram(s) Oral daily  lidocaine   Patch 1 Patch Transdermal daily  metoprolol     tartrate 12.5 milliGRAM(s) Oral two times a day  pantoprazole    Tablet 40 milliGRAM(s) Oral before breakfast  tiotropium 18 MICROgram(s) Capsule 1 Capsule(s) Inhalation daily    MEDICATIONS  (PRN):  dextrose Gel 1 Dose(s) Oral once PRN Blood Glucose LESS THAN 70 milliGRAM(s)/deciliter  glucagon  Injectable 1 milliGRAM(s) IntraMuscular once PRN Glucose LESS THAN 70 milligrams/deciliter        LABS:                        8.2    7.3   )-----------( 168      ( 23 Jan 2018 07:00 )             27.8     01-23    140  |  95<L>  |  36<H>  ----------------------------<  144<H>  3.6   |  33<H>  |  1.76<H>    Ca    9.6      23 Jan 2018 07:00  Mg     2.2     01-23      Hemoglobin A1C, Whole Blood: 7.0 % (01-17-18 @ 05:28)    PT/INR - ( 23 Jan 2018 07:00 )   PT: 12.3 sec;   INR: 1.11          PTT - ( 23 Jan 2018 07:00 )  PTT:62.2 sec    Thyroid Stimulating Hormone, Serum: 6.350 uIU/mL (01-18 @ 06:38)  Thyroid Stimulating Hormone, Serum: 5.255 uIU/mL (01-17 @ 20:12)      RADIOLOGY & ADDITIONAL TESTS:      Vital Signs Last 24 Hrs  T(C): 36.4 (23 Jan 2018 22:25), Max: 36.6 (23 Jan 2018 17:45)  T(F): 97.5 (23 Jan 2018 22:25), Max: 97.9 (23 Jan 2018 17:45)  HR: 87 (23 Jan 2018 23:29) (74 - 95)  BP: 116/54 (23 Jan 2018 22:03) (111/55 - 148/61)  BP(mean): --  RR: 23 (23 Jan 2018 23:29) (16 - 23)  SpO2: 98% (23 Jan 2018 23:29) (96% - 100%)    CAPILLARY BLOOD GLUCOSE      PHYSICAL EXAM:  Constitutional: wn/wd in NAD.   HEENT: NCAT, MMM, OP clear, EOMI, , no proptosis or lid retraction  Neck: supple, no acanthosis, no thyromegaly or palpable thyroid nodules   Respiratory: Lungs CTAB.  Cardiovascular: regular rhythm, normal S1 and S2, no audible murmurs, no peripheral edema  GI: soft, + bowel sounds, NT/ND, no masses/HSM appreciated.  Neurology: no tremors, DTR 2+  Skin: no visible rashes/lesions  Psychiatric: AAO x 3, normal affect/mood.        A/P: 89yFemale admitted for (    ). Consulted and being followed for Diabetes Type (  ).       Uncontrolled DM Type (  ) -     Please continue           units lantus AM/PM, premeal Lispro  (  ) units TID, and  Lispro moderate/low dose sliding scale 4 times daily (before meals and bedtime).  Please continue consistent carbohydrate (Diabetic) diet, FS ac & hs. Target  - 150.      Case discussed with attending and primary team      Attending attestation:  I have seen and evaluated the patient at the bedside.   Endocrine Nurse Practitioner/Fellow/Resident’s note reviewed, including vital signs, PE and laboratory results.  Direct personal management and extensive interpretation of the data conducted.   I agree with the Nurse Practitioner/Fellow/Resident’s assessment and recommendations as above. INTERVAL HPI/OVERNIGHT EVENTS:      Patient is a 89y old  Female who presents with a chief complaint of  was seen and examined today. Reports the following symptoms:    CONSTITUTIONAL:  Negative fever or chills, feels well, good appetite  EYES:  Negative  blurry vision or double vision  CARDIOVASCULAR:  Negative for chest pain or palpitations  RESPIRATORY:  Negative for cough, wheezing, or SOB   GASTROINTESTINAL:  Negative for nausea, vomiting, diarrhea, constipation, or abdominal pain  GENITOURINARY:  Negative frequency, urgency or dysuria  NEUROLOGIC:  No headache, confusion, dizziness, lightheadedness    MEDICATIONS  (STANDING):  amiodarone    Tablet 400 milliGRAM(s) Oral two times a day  aspirin enteric coated 81 milliGRAM(s) Oral daily  atorvastatin 40 milliGRAM(s) Oral at bedtime  dextrose 5%. 1000 milliLiter(s) (50 mL/Hr) IV Continuous <Continuous>  dextrose 50% Injectable 12.5 Gram(s) IV Push once  dextrose 50% Injectable 25 Gram(s) IV Push once  dextrose 50% Injectable 25 Gram(s) IV Push once  furosemide   Injectable 40 milliGRAM(s) IV Push <User Schedule>  heparin  Infusion 900 Unit(s)/Hr (9 mL/Hr) IV Continuous <Continuous>  hydrALAZINE 10 milliGRAM(s) Oral three times a day  insulin lispro (HumaLOG) corrective regimen sliding scale   SubCutaneous three times a day before meals  insulin lispro (HumaLOG) corrective regimen sliding scale   SubCutaneous at bedtime  isosorbide   dinitrate Tablet (ISORDIL) 10 milliGRAM(s) Oral three times a day  levothyroxine 25 MICROGram(s) Oral daily  lidocaine   Patch 1 Patch Transdermal daily  metoprolol     tartrate 12.5 milliGRAM(s) Oral two times a day  pantoprazole    Tablet 40 milliGRAM(s) Oral before breakfast  tiotropium 18 MICROgram(s) Capsule 1 Capsule(s) Inhalation daily    MEDICATIONS  (PRN):  dextrose Gel 1 Dose(s) Oral once PRN Blood Glucose LESS THAN 70 milliGRAM(s)/deciliter  glucagon  Injectable 1 milliGRAM(s) IntraMuscular once PRN Glucose LESS THAN 70 milligrams/deciliter        LABS:                        8.2    7.3   )-----------( 168      ( 23 Jan 2018 07:00 )             27.8     01-23    140  |  95<L>  |  36<H>  ----------------------------<  144<H>  3.6   |  33<H>  |  1.76<H>    Ca    9.6      23 Jan 2018 07:00  Mg     2.2     01-23      Hemoglobin A1C, Whole Blood: 7.0 % (01-17-18 @ 05:28)    PT/INR - ( 23 Jan 2018 07:00 )   PT: 12.3 sec;   INR: 1.11          PTT - ( 23 Jan 2018 07:00 )  PTT:62.2 sec    Thyroid Stimulating Hormone, Serum: 6.350 uIU/mL (01-18 @ 06:38)  Thyroid Stimulating Hormone, Serum: 5.255 uIU/mL (01-17 @ 20:12)      RADIOLOGY & ADDITIONAL TESTS:      Vital Signs Last 24 Hrs  T(C): 36.4 (23 Jan 2018 22:25), Max: 36.6 (23 Jan 2018 17:45)  T(F): 97.5 (23 Jan 2018 22:25), Max: 97.9 (23 Jan 2018 17:45)  HR: 87 (23 Jan 2018 23:29) (74 - 95)  BP: 116/54 (23 Jan 2018 22:03) (111/55 - 148/61)  BP(mean): --  RR: 23 (23 Jan 2018 23:29) (16 - 23)  SpO2: 98% (23 Jan 2018 23:29) (96% - 100%)    CAPILLARY BLOOD GLUCOSE      PHYSICAL EXAM:  Constitutional: wn/wd in NAD.   HEENT: NCAT, MMM, OP clear, EOMI, no proptosis or lid retraction  Neck: supple, no acanthosis, no thyromegaly or palpable thyroid nodules   Respiratory: Lungs CTAB.  Cardiovascular: regular rhythm, normal S1 and S2, no audible murmurs, no peripheral edema  GI: soft, + bowel sounds, NT/ND, obese  Neurology: no tremors, DTR 2+  Skin: no visible rashes/lesions  Psychiatric: AAO x 3, normal affect/mood.        A/P: 89yFemale admitted for SOB. Consulted and being followed for Diabetes Type 2 and newly diagnosed hypothyroidism.     DM Type 2 controlled with diet and insulin correction.    Please continue Lispro moderate/low dose sliding scale 4 times daily (before meals and bedtime).    Also continue consistent carbohydrate (Diabetic) diet, FS ac & hs. Target  - 150 mg/dl.      Hypothyroidism managed with levothyroxine 25 mcg po qAM.  Recheck thyroid function tests in one month.    Case discussed with attending and primary team.      Attending attestation:  I have seen and evaluated the patient at the bedside.  Nurse Practitioner/Fellow/Resident’s note reviewed, including vital signs, PE and laboratory results.  Direct personal management and extensive interpretation of the data conducted.   Assessment and recommendations as above.

## 2018-01-23 NOTE — PROGRESS NOTE ADULT - SUBJECTIVE AND OBJECTIVE BOX
Interventional Cardiology PA Adult Progress Note      Patient seen and examined at bedside resting comfortably. Patient denies current acute complaints.        	  MEDICATIONS:  furosemide   Injectable 40 milliGRAM(s) IV Push <User Schedule>  hydrALAZINE 10 milliGRAM(s) Oral three times a day  isosorbide   dinitrate Tablet (ISORDIL) 10 milliGRAM(s) Oral three times a day  metoprolol     tartrate 12.5 milliGRAM(s) Oral two times a day  tiotropium 18 MICROgram(s) Capsule 1 Capsule(s) Inhalation daily  pantoprazole    Tablet 40 milliGRAM(s) Oral before breakfast  atorvastatin 40 milliGRAM(s) Oral at bedtime  insulin lispro (HumaLOG) corrective regimen sliding scale   SubCutaneous three times a day before meals  insulin lispro (HumaLOG) corrective regimen sliding scale   SubCutaneous at bedtime  levothyroxine 25 MICROGram(s) Oral daily  aspirin enteric coated 81 milliGRAM(s) Oral daily  dextrose 5%. 1000 milliLiter(s) IV Continuous <Continuous>  heparin  Infusion 900 Unit(s)/Hr IV Continuous <Continuous>  lidocaine   Patch 1 Patch Transdermal daily      	    [PHYSICAL EXAM:  TELEMETRY: Afib 80s-120s  T(C): 36.2 (01-23-18 @ 08:20), Max: 36.6 (01-22-18 @ 22:12)  HR: 85 (01-23-18 @ 09:35) (74 - 110)  BP: 124/58 (01-23-18 @ 08:37) (111/54 - 141/63)  RR: 17 (01-23-18 @ 09:35) (16 - 18)  SpO2: 98% (01-23-18 @ 09:35) (97% - 100%)  Wt(kg): 81.2kg  I&O's Summary    22 Jan 2018 07:01  -  23 Jan 2018 07:00  --------------------------------------------------------  IN: 317 mL / OUT: 1000 mL / NET: -683 mL    23 Jan 2018 07:01  -  23 Jan 2018 14:08  --------------------------------------------------------  IN: 0 mL / OUT: 150 mL / NET: -150 mL              Appearance: well appearing elderly female in NAD  HEENT:   NC/AT, moist mucous membranes	  Neck: Supple, no JVD  Cardiovascular:  RRR S1S2   Respiratory: CTA B/L	  Gastrointestinal:  + BS, soft, NT/ND	  Extremities: warm well perfused, no pedal edema bilaterally  Neurologic: no focal neurodeficits          LABS:	 	               8.2    7.3   )-----------( 168      ( 23 Jan 2018 07:00 )             27.8     01-23    140  |  95<L>  |  36<H>  ----------------------------<  144<H>  3.6   |  33<H>  |  1.76<H>    Ca    9.6      23 Jan 2018 07:00  Mg     2.2     01-23      proBNP: Serum Pro-Brain Natriuretic Peptide: 5529 pg/mL (01-23 @ 07:00)    PT/INR - ( 23 Jan 2018 07:00 )   PT: 12.3 sec;   INR: 1.11     PTT - ( 23 Jan 2018 07:00 )  PTT:62.2 sec Interventional Cardiology PA Adult Progress Note      Patient seen and examined at bedside resting comfortably. Patient denies current acute complaints.        	  MEDICATIONS:  furosemide   Injectable 40 milliGRAM(s) IV Push <User Schedule>  hydrALAZINE 10 milliGRAM(s) Oral three times a day  isosorbide   dinitrate Tablet (ISORDIL) 10 milliGRAM(s) Oral three times a day  metoprolol     tartrate 12.5 milliGRAM(s) Oral two times a day  tiotropium 18 MICROgram(s) Capsule 1 Capsule(s) Inhalation daily  pantoprazole    Tablet 40 milliGRAM(s) Oral before breakfast  atorvastatin 40 milliGRAM(s) Oral at bedtime  insulin lispro (HumaLOG) corrective regimen sliding scale   SubCutaneous three times a day before meals  insulin lispro (HumaLOG) corrective regimen sliding scale   SubCutaneous at bedtime  levothyroxine 25 MICROGram(s) Oral daily  aspirin enteric coated 81 milliGRAM(s) Oral daily  dextrose 5%. 1000 milliLiter(s) IV Continuous <Continuous>  heparin  Infusion 900 Unit(s)/Hr IV Continuous <Continuous>  lidocaine   Patch 1 Patch Transdermal daily      	    [PHYSICAL EXAM:  TELEMETRY: Afib 80s-120s  T(C): 36.2 (01-23-18 @ 08:20), Max: 36.6 (01-22-18 @ 22:12)  HR: 85 (01-23-18 @ 09:35) (74 - 110)  BP: 124/58 (01-23-18 @ 08:37) (111/54 - 141/63)  RR: 17 (01-23-18 @ 09:35) (16 - 18)  SpO2: 98% (01-23-18 @ 09:35) (97% - 100%)  Wt(kg): 81.2kg  I&O's Summary    22 Jan 2018 07:01  -  23 Jan 2018 07:00  --------------------------------------------------------  IN: 317 mL / OUT: 1000 mL / NET: -683 mL    23 Jan 2018 07:01  -  23 Jan 2018 14:08  --------------------------------------------------------  IN: 0 mL / OUT: 150 mL / NET: -150 mL              Appearance: well appearing elderly female in NAD  HEENT:   NC/AT, moist mucous membranes	  Neck: Supple, no JVD  Cardiovascular:  Irregularly irreguar S1S2   Respiratory: Bibasilar rales noted  Gastrointestinal:  + BS, soft, NT/ND	  Extremities: warm well perfused, no pedal edema bilaterally  Neurologic: no focal neurodeficits          LABS:	 	               8.2    7.3   )-----------( 168      ( 23 Jan 2018 07:00 )             27.8     01-23    140  |  95<L>  |  36<H>  ----------------------------<  144<H>  3.6   |  33<H>  |  1.76<H>    Ca    9.6      23 Jan 2018 07:00  Mg     2.2     01-23      proBNP: Serum Pro-Brain Natriuretic Peptide: 5529 pg/mL (01-23 @ 07:00)    PT/INR - ( 23 Jan 2018 07:00 )   PT: 12.3 sec;   INR: 1.11     PTT - ( 23 Jan 2018 07:00 )  PTT:62.2 sec

## 2018-01-23 NOTE — PROGRESS NOTE ADULT - PROBLEM SELECTOR PLAN 1
- clinically improving, will continue Lasix 40mg IV daily, Hydralazine 10mg PO TID, Isosordil 10mg PO TID, strict I/Os and daily weights.  - no ACE/ARB due to worsening renal function.  - Echo 1/18 revealed moderately dilated LA, moderate MR, mild to moderate TR, evidence of pulmonary hypertension (PASP 57 mmHg), severe global hypokinesis of the LV, EF 20- 25%. (newly depressed)  - NPO after midnight for NST in AM to R/O underlying ischemia. - clinically improving, will continue Lasix 40mg IV daily, Hydralazine 10mg PO TID, Isosordil 10mg PO TID, strict I/Os and daily weights.  - no ACE/ARB due to worsening renal function.  - Echo 1/18 revealed moderately dilated LA, moderate MR, mild to moderate TR, evidence of pulmonary hypertension (PASP 57 mmHg), severe global hypokinesis of the LV, EF 20- 25%. (newly depressed)  - Tentative plan for NST thursday 1/25 to R/O underlying ischemia. - clinically improving, will continue Lasix 40mg IV daily, Hydralazine 10mg PO TID, Isosordil 10mg PO TID, strict I/Os and daily weights.  - no ACE/ARB due to worsening renal function.  - Echo 1/18 revealed moderately dilated LA, moderate MR, mild to moderate TR, evidence of pulmonary hypertension (PASP 57 mmHg), severe global hypokinesis of the LV, EF 20- 25%. (newly depressed)  - NPO after midnight for NST 1/24 AM to R/O underlying ischemia.

## 2018-01-23 NOTE — PROGRESS NOTE ADULT - PROBLEM SELECTOR PLAN 2
EP service following  New onset Afib s/p ANMOL/DCCV this afternoon 1/23.  - in SR, continue Metoprolol Tartrate 12.5mg PO BID and Heparin gtt for anticoagulation.  - will discuss long term anticoagulation options with EP. EP service following  New onset Afib s/p ANMOL and failed DCCV this afternoon 1/23.  - in Afib 80s, will continue Metoprolol Tartrate 12.5mg PO BID and start Amio 400mg PO BID. Plan for repeat DCCV in a few days.  - Will continue Heparin gtt for now, and possibly switch to Eliquis 2.5mg PO BID as discussed with EP service.

## 2018-01-23 NOTE — PROGRESS NOTE ADULT - ASSESSMENT
89yo F with HTN, HLD, DM-II, CAD/PCIs, diastolic CHF (previously normal EF, now reduced to 20-25%), anemia who is admitted with new onset AFib and CHF exacerbation.

## 2018-01-23 NOTE — PROGRESS NOTE ADULT - PROBLEM SELECTOR PLAN 6
Afebrile without leukocytosis.  - UA +leuks and neg nitrites. Urine culture revealed gram negative rods, awaiting sensitivities.

## 2018-01-23 NOTE — PROGRESS NOTE ADULT - PROBLEM SELECTOR PLAN 10
Renal (Dr. Dominguez following)  Stage III CKD- renal function uptrending 34/1.47-->36/1.76. As per Dr. Castle outpatient renal duplex revealed bilateral RA. NPO after midnight for Renal angio with possible angioplasty in AM.  - Renal US 1/22 consistent with medical renal disease.    DVT ppx: on Heparin gtt  GI PPx: continue PPI  PT evaluated patient: MINDA vs HPT pending stair negotiation    Dispo: Stable, NPO after midnight for NST in AM, followed by Renal angio/angioplasty in afternoon. Renal (Dr. Dominguez following)  Stage III CKD- renal function uptrending 34/1.47-->36/1.76. Renal US 1/22 consistent with medical renal disease.   ---As per Dr. Castle outpatient renal duplex revealed bilateral renal artery stenosis. NPO after midnight for Renal angio with possible angioplasty in AM. Patient and family explained HIGH RISK for permanent LYNDA/need for HD post contrast exposure, would like to discuss tonight before signing consent for procedure.        DVT ppx: on Heparin gtt  GI PPx: continue PPI  PT evaluated patient: MINDA vs HPT pending stair negotiation    Dispo: Stable, NPO after midnight for possible Renal angio/angioplasty in AM. Renal (Dr. Dominguez following)  Stage III CKD- renal function uptrending 34/1.47-->36/1.76. Renal US 1/22 consistent with medical renal disease.   ---As per Dr. Castle outpatient renal duplex revealed bilateral renal artery stenosis. Initially planned for Renal angio with possible angioplasty in AM. However one patient and family explained HIGH RISK for permanent LYNDA/need for HD post contrast exposure, would like to post pone angiogram and discuss further prior to deciding.        DVT ppx: on Heparin gtt  GI PPx: continue PPI  PT evaluated patient: MINDA vs HPT pending stair negotiation    Dispo: Stable, NPO after midnight for NST in AM.

## 2018-01-23 NOTE — PROGRESS NOTE ADULT - SUBJECTIVE AND OBJECTIVE BOX
MEDICAL HISTORY:      89 year old  female admitted with CHF and atrial fibrillation in  the 100's. The history is notable for vasculopathy with multiple cardiac, lower extremity and carotid procedures. n echocardiogram here showed .reduced LVEF. The hospital course has focused on diuresis and rate control.     On admission GFR was reduced, with Scr ~1.5 mg/dl. Baseline Scr data n/a. On admission, anemia was noted with Hgb 8's g/dl.  A beta-microalbumin screen was elevated (6.0). TF sat was 12, Ferritin 169. Hematology is evaluating.    CKD workup in progress  Anemia workup in progress.  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 torsemide, prednisone 20 mg, Januvia, isordil, ASA, statin, omeprazole, various bronchodilators.         INTERVAL HISTORY:    ANMOL and DCCV rescheduled for today owing to the need for a swallowing evaluation.    SUMMARY COMMENTS:           ---------------------------------------------------------------------------------------------------------------------------------------------------------------------------    SUBJECTIVE & OBJECTIVE DATA DOCUMENTATION:     REVIEW OF SYSTEMS SCREENED ORGAN SYSTEMS:  Constitutional: fever, chills, anorexia or fatigue  Pulmonary: difficulty breathing, wheezing, cough  Cardiovascular: exertional dyspnea, chest pain, palpitations, dizziness or leg swelling  Gastrointestinal: abdominal or epigastric pain, nausea, vomiting or hematemesis, diarrhea, melena or bright red blood per rectum.  Genitourinary: dysuria, urgency, frequency, flank pain, difficulty voiding  Skin: No pruritis, rashes or lesions  Neurology: memory loss, dysarthria, extremity weakness, neuropathic pain, headache, visual changes  Dialysis access if present : pain, bleeding, swelling     ROS POSITIVE FINDINGS: none    PAST MEDICAL & SURGICAL HISTORY:  Anemia  CAD (coronary artery disease)  DM (diabetes mellitus)  Emphysema  Hypercholesteremia  HTN (hypertension)  S/P bladder repair  S/P hysterectomy with oophorectomy  History of bilateral carotid endarterectomy  History of umbilical hernia repair  S/P cataract surgery  S/P TKR (total knee replacement)      PHYSICAL EXAM:  T(C): 36.2 (18 @ 08:20), Max: 36.6 (18 @ 22:12)  HR: 83 (18 @ 08:37)  BP: 124/58 (18 @ 08:37) (111/54 - 141/63)  RR: 16 (18 @ 08:37)  SpO2: 98% (18 @ 08:37)  Wt(kg): --  I&O's Summary    2018 07:01  -  2018 07:00  --------------------------------------------------------  IN: 317 mL / OUT: 1000 mL / NET: -683 mL    2018 07:  -  2018 11:10  --------------------------------------------------------  IN: 0 mL / OUT: 150 mL / NET: -150 mL      Weight 83.5 ( @ 20:16)    APPEARANCE: In bed, NAD.   HEAD & NECK: normocephalic, no stiff neck, no masses, oral mucosa moist, no scleral icteris  RESPIRATORY: no accessory muscle use, no labored breathing, no dullness, basilar rales and rhonchi, no wheeze  CARDIOVASCULAR: no JVD, IRRR, S1S2,+ gallop,  no murmur, no rub.  ABDOMEN: soft, no tenderness, no masses, no hepatomegally, no splenomegally  : no bladder distension  LYMPHATIC: no lymphadenopathy (neck, axilla, groin)  EXTREMITIES & MUSCULOSKELETAL: no cyanosis, no clubbing, no tenderness, strength  L=R  EDEMA: trace  PULSES: upper extremity pulses present, lower extremity pulses absent  SKIN: no rash, no stasis, no induration  NEUROLOGIC & PSYCHIATRIC:  alert, interactive, oriented to time and place, responds to stimuli, no asterixis          MEDICATIONS  (STANDING):  aspirin enteric coated 81 milliGRAM(s) Oral daily  atorvastatin 40 milliGRAM(s) Oral at bedtime  dextrose 5%. 1000 milliLiter(s) (50 mL/Hr) IV Continuous <Continuous>  dextrose 50% Injectable 12.5 Gram(s) IV Push once  dextrose 50% Injectable 25 Gram(s) IV Push once  dextrose 50% Injectable 25 Gram(s) IV Push once  epoetin jayesh Injectable 07458 Unit(s) SubCutaneous daily  furosemide   Injectable 40 milliGRAM(s) IV Push <User Schedule>  heparin  Infusion 900 Unit(s)/Hr (9 mL/Hr) IV Continuous <Continuous>  hydrALAZINE 10 milliGRAM(s) Oral three times a day  insulin lispro (HumaLOG) corrective regimen sliding scale   SubCutaneous three times a day before meals  insulin lispro (HumaLOG) corrective regimen sliding scale   SubCutaneous at bedtime  isosorbide   dinitrate Tablet (ISORDIL) 10 milliGRAM(s) Oral three times a day  levothyroxine 25 MICROGram(s) Oral daily  lidocaine   Patch 1 Patch Transdermal daily  metoprolol     tartrate 12.5 milliGRAM(s) Oral two times a day  pantoprazole    Tablet 40 milliGRAM(s) Oral before breakfast  tiotropium 18 MICROgram(s) Capsule 1 Capsule(s) Inhalation daily    MEDICATIONS  (PRN):  dextrose Gel 1 Dose(s) Oral once PRN Blood Glucose LESS THAN 70 milliGRAM(s)/deciliter  glucagon  Injectable 1 milliGRAM(s) IntraMuscular once PRN Glucose LESS THAN 70 milligrams/deciliter      DATA:  140    |  95<L>  |  36<H>  ----------------------------<  144<H>  Ca:9.6   (2018 07:00)  3.6     |  33<H>  |  1.76<H>      eGFR if Non : 25 <L>  eGFR if : 29 <L>    TPro  x      /  Alb  3.4 g/dL  /  TBili  x      /  DBili  x      /  AST  x      /  ALT  x      /  AlkPhos  x      2018 08:17                        8.2<L>  7.3   )-----------( 168      ( 2018 07:00 )             27.8<L>    Phos:-- M.2 mg/dL PTH:-- Uric acid:-- Serum Osm:--  Ferritin:-- Iron:-- TIBC:-- Tsat:--  B12:-- TSH:-- ( @ 07:00)    Urinalysis Basic - ( 2018 12:34 )  Color: Yellow / Appearance: Cloudy<!!> / SG: <=1.005 / pH: x  Gluc: x / Ketone: NEGATIVE  / Bili: Negative / Urobili: 0.2 E.U./dL   Blood: x / Protein: NEGATIVE mg/dL / Nitrite: NEGATIVE   Leuk Esterase: Moderate<!!> / RBC: x / WBC Many /HPF<!!>   Sq Epi: x / Non Sq Epi: 0-5 /HPF / Bacteria: Present /HPF<!!>      UProt:-- UCr:73 mg/dL P/C Ratio:-- 24 hour Prot:-- UVol:-- CrCl:--  Tj:-- UOsm:-- UVol:-- UCl:-- UK:-- ( @ 20:14) MEDICAL HISTORY:      89 year old  female admitted with CHF and atrial fibrillation in  the 100's. The history is notable for vasculopathy with multiple cardiac, lower extremity and carotid procedures. n echocardiogram here showed .reduced LVEF. The hospital course has focused on diuresis and rate control.     On admission GFR was reduced, with Scr ~1.5 mg/dl. Baseline Scr data n/a. On admission, anemia was noted with Hgb 8's g/dl.  A beta-microalbumin screen was elevated (6.0). TF sat was 12, Ferritin 169. Hematology is evaluating.    CKD workup in progress  Anemia workup in progress.  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 torsemide, prednisone 20 mg, Januvia, isordil, ASA, statin, omeprazole, various bronchodilators.     INTERVAL HISTORY:    's, HR 90's (AFIB)    ANMOL and DCCV rescheduled for today owing to the need for a swallowing evaluation.    Scr 1.46, 1.47, 1.76 mg/dl    Weight 77.2, 76.3, 77.5, 81.2 mg/dl.     SUMMARY COMMENTS:     Addendum 6 PM: I was informed that the DCCV was unsuccessful and that a cardiac cath is being planned.     Note that the patient has gained (retained) 4 kg of volume over the last 4 days. The dose of furosemide (40 mg IV/day) is ineffective. In the setting of significantly reduced GFR and poor renal perfusion, the dose of diuretic generally has to be much higher to achieve negative fluid balance. If negative fluid balance is necessary, the dose of furosemide should be increased to 80 mg IV per day. Even higher doses may be necessary to achieve results. Tack the change in weight to determine the efficacy of the furosemide dose chosen.     As discussed in a previous note, GFR is significantly reduced - probably much more so than the GFR equation indicates because the GFR calculator was never meant to be use for very old patients with very low muscle mass. I estimate that the GFR is 10-20 ml/min.     With very low GFR there is a high risk of contrast-induced LYNDA.    With CHF there is also no practical way to reduce the risk of LYNDA since the cornerstone prophylactic intervention is IV fluid administration - not an good option in this patient.     The decision to proceed with a cardiac cath is one of risk assessment;  if the risk of not proceeding with the study is greater than the risk of proceeding with the study, then, with family support, one may choose to proceed.  The family should understand that a worst-case contrast-induced LYNDA scenario would involve dialysis therapy, possibly permanently.     Case discussed with the cardiac team.          ---------------------------------------------------------------------------------------------------------------------------------------------------------------------------    SUBJECTIVE & OBJECTIVE DATA DOCUMENTATION:     REVIEW OF SYSTEMS SCREENED ORGAN SYSTEMS:  Constitutional: fever, chills, anorexia or fatigue  Pulmonary: difficulty breathing, wheezing, cough  Cardiovascular: exertional dyspnea, chest pain, palpitations, dizziness or leg swelling  Gastrointestinal: abdominal or epigastric pain, nausea, vomiting or hematemesis, diarrhea, melena or bright red blood per rectum.  Genitourinary: dysuria, urgency, frequency, flank pain, difficulty voiding  Skin: No pruritis, rashes or lesions  Neurology: memory loss, dysarthria, extremity weakness, neuropathic pain, headache, visual changes  Dialysis access if present : pain, bleeding, swelling     ROS POSITIVE FINDINGS: none    PAST MEDICAL & SURGICAL HISTORY:  Anemia  CAD (coronary artery disease)  DM (diabetes mellitus)  Emphysema  Hypercholesteremia  HTN (hypertension)  S/P bladder repair  S/P hysterectomy with oophorectomy  History of bilateral carotid endarterectomy  History of umbilical hernia repair  S/P cataract surgery  S/P TKR (total knee replacement)      PHYSICAL EXAM:  T(C): 36.2 (18 @ 08:20), Max: 36.6 (18 @ 22:12)  HR: 83 (18 @ 08:37)  BP: 124/58 (18 @ 08:37) (111/54 - 141/63)  RR: 16 (18 @ 08:37)  SpO2: 98% (18 @ 08:37)  Wt(kg): --  I&O's Summary    2018 07:  -  2018 07:00  --------------------------------------------------------  IN: 317 mL / OUT: 1000 mL / NET: -683 mL    2018 07:  -  2018 11:10  --------------------------------------------------------  IN: 0 mL / OUT: 150 mL / NET: -150 mL      Weight 83.5 ( @ 20:16)    APPEARANCE: In bed, NAD.   HEAD & NECK: normocephalic, no stiff neck, no masses, oral mucosa moist, no scleral icteris  RESPIRATORY: no accessory muscle use, no labored breathing, no dullness, basilar rales and rhonchi, no wheeze  CARDIOVASCULAR: no JVD, IRRR, S1S2,+ gallop,  no murmur, no rub.  ABDOMEN: soft, no tenderness, no masses, no hepatomegally, no splenomegally  : no bladder distension  LYMPHATIC: no lymphadenopathy (neck, axilla, groin)  EXTREMITIES & MUSCULOSKELETAL: no cyanosis, no clubbing, no tenderness, strength  L=R  EDEMA: trace  PULSES: upper extremity pulses present, lower extremity pulses absent  SKIN: no rash, no stasis, no induration  NEUROLOGIC & PSYCHIATRIC:  alert, interactive, oriented to time and place, responds to stimuli, no asterixis          MEDICATIONS  (STANDING):  aspirin enteric coated 81 milliGRAM(s) Oral daily  atorvastatin 40 milliGRAM(s) Oral at bedtime  dextrose 5%. 1000 milliLiter(s) (50 mL/Hr) IV Continuous <Continuous>  dextrose 50% Injectable 12.5 Gram(s) IV Push once  dextrose 50% Injectable 25 Gram(s) IV Push once  dextrose 50% Injectable 25 Gram(s) IV Push once  epoetin jayesh Injectable 02169 Unit(s) SubCutaneous daily  furosemide   Injectable 40 milliGRAM(s) IV Push <User Schedule>  heparin  Infusion 900 Unit(s)/Hr (9 mL/Hr) IV Continuous <Continuous>  hydrALAZINE 10 milliGRAM(s) Oral three times a day  insulin lispro (HumaLOG) corrective regimen sliding scale   SubCutaneous three times a day before meals  insulin lispro (HumaLOG) corrective regimen sliding scale   SubCutaneous at bedtime  isosorbide   dinitrate Tablet (ISORDIL) 10 milliGRAM(s) Oral three times a day  levothyroxine 25 MICROGram(s) Oral daily  lidocaine   Patch 1 Patch Transdermal daily  metoprolol     tartrate 12.5 milliGRAM(s) Oral two times a day  pantoprazole    Tablet 40 milliGRAM(s) Oral before breakfast  tiotropium 18 MICROgram(s) Capsule 1 Capsule(s) Inhalation daily    MEDICATIONS  (PRN):  dextrose Gel 1 Dose(s) Oral once PRN Blood Glucose LESS THAN 70 milliGRAM(s)/deciliter  glucagon  Injectable 1 milliGRAM(s) IntraMuscular once PRN Glucose LESS THAN 70 milligrams/deciliter      DATA:  140    |  95<L>  |  36<H>  ----------------------------<  144<H>  Ca:9.6   (2018 07:00)  3.6     |  33<H>  |  1.76<H>      eGFR if Non : 25 <L>  eGFR if : 29 <L>    TPro  x      /  Alb  3.4 g/dL  /  TBili  x      /  DBili  x      /  AST  x      /  ALT  x      /  AlkPhos  x      2018 08:17                        8.2<L>  7.3   )-----------( 168      ( 2018 07:00 )             27.8<L>    Phos:-- M.2 mg/dL PTH:-- Uric acid:-- Serum Osm:--  Ferritin:-- Iron:-- TIBC:-- Tsat:--  B12:-- TSH:-- ( @ 07:00)    Urinalysis Basic - ( 2018 12:34 )  Color: Yellow / Appearance: Cloudy<!!> / SG: <=1.005 / pH: x  Gluc: x / Ketone: NEGATIVE  / Bili: Negative / Urobili: 0.2 E.U./dL   Blood: x / Protein: NEGATIVE mg/dL / Nitrite: NEGATIVE   Leuk Esterase: Moderate<!!> / RBC: x / WBC Many /HPF<!!>   Sq Epi: x / Non Sq Epi: 0-5 /HPF / Bacteria: Present /HPF<!!>      UProt:-- UCr:73 mg/dL P/C Ratio:-- 24 hour Prot:-- UVol:-- CrCl:--  Tj:-- UOsm:-- UVol:-- UCl:-- UK:-- ( @ 20:14)

## 2018-01-24 DIAGNOSIS — I70.1 ATHEROSCLEROSIS OF RENAL ARTERY: ICD-10-CM

## 2018-01-24 DIAGNOSIS — N39.0 URINARY TRACT INFECTION, SITE NOT SPECIFIED: ICD-10-CM

## 2018-01-24 LAB
-  AMIKACIN: SIGNIFICANT CHANGE UP
-  AMPICILLIN/SULBACTAM: SIGNIFICANT CHANGE UP
-  AMPICILLIN: SIGNIFICANT CHANGE UP
-  CEFAZOLIN: SIGNIFICANT CHANGE UP
-  CEFTRIAXONE: SIGNIFICANT CHANGE UP
-  CIPROFLOXACIN: SIGNIFICANT CHANGE UP
-  GENTAMICIN: SIGNIFICANT CHANGE UP
-  IMIPENEM: SIGNIFICANT CHANGE UP
-  NITROFURANTOIN: SIGNIFICANT CHANGE UP
-  PIPERACILLIN/TAZOBACTAM: SIGNIFICANT CHANGE UP
-  TOBRAMYCIN: SIGNIFICANT CHANGE UP
-  TRIMETHOPRIM/SULFAMETHOXAZOLE: SIGNIFICANT CHANGE UP
ANION GAP SERPL CALC-SCNC: 12 MMOL/L — SIGNIFICANT CHANGE UP (ref 5–17)
APTT BLD: 51.9 SEC — HIGH (ref 27.5–37.4)
BUN SERPL-MCNC: 38 MG/DL — HIGH (ref 7–23)
CALCIUM SERPL-MCNC: 9.8 MG/DL — SIGNIFICANT CHANGE UP (ref 8.4–10.5)
CHLORIDE SERPL-SCNC: 95 MMOL/L — LOW (ref 96–108)
CO2 SERPL-SCNC: 30 MMOL/L — SIGNIFICANT CHANGE UP (ref 22–31)
CREAT SERPL-MCNC: 1.78 MG/DL — HIGH (ref 0.5–1.3)
CULTURE RESULTS: SIGNIFICANT CHANGE UP
GLUCOSE BLDC GLUCOMTR-MCNC: 144 MG/DL — HIGH (ref 70–99)
GLUCOSE BLDC GLUCOMTR-MCNC: 145 MG/DL — HIGH (ref 70–99)
GLUCOSE BLDC GLUCOMTR-MCNC: 151 MG/DL — HIGH (ref 70–99)
GLUCOSE BLDC GLUCOMTR-MCNC: 155 MG/DL — HIGH (ref 70–99)
GLUCOSE SERPL-MCNC: 141 MG/DL — HIGH (ref 70–99)
HCT VFR BLD CALC: 28 % — LOW (ref 34.5–45)
HGB BLD-MCNC: 8.3 G/DL — LOW (ref 11.5–15.5)
MAGNESIUM SERPL-MCNC: 2.1 MG/DL — SIGNIFICANT CHANGE UP (ref 1.6–2.6)
MCHC RBC-ENTMCNC: 28.7 PG — SIGNIFICANT CHANGE UP (ref 27–34)
MCHC RBC-ENTMCNC: 29.6 G/DL — LOW (ref 32–36)
MCV RBC AUTO: 96.9 FL — SIGNIFICANT CHANGE UP (ref 80–100)
METHOD TYPE: SIGNIFICANT CHANGE UP
ORGANISM # SPEC MICROSCOPIC CNT: SIGNIFICANT CHANGE UP
ORGANISM # SPEC MICROSCOPIC CNT: SIGNIFICANT CHANGE UP
PLATELET # BLD AUTO: 169 K/UL — SIGNIFICANT CHANGE UP (ref 150–400)
POTASSIUM SERPL-MCNC: 4.3 MMOL/L — SIGNIFICANT CHANGE UP (ref 3.5–5.3)
POTASSIUM SERPL-SCNC: 4.3 MMOL/L — SIGNIFICANT CHANGE UP (ref 3.5–5.3)
RBC # BLD: 2.89 M/UL — LOW (ref 3.8–5.2)
RBC # FLD: 17.2 % — HIGH (ref 10.3–16.9)
SODIUM SERPL-SCNC: 137 MMOL/L — SIGNIFICANT CHANGE UP (ref 135–145)
SPECIMEN SOURCE: SIGNIFICANT CHANGE UP
WBC # BLD: 7.3 K/UL — SIGNIFICANT CHANGE UP (ref 3.8–10.5)
WBC # FLD AUTO: 7.3 K/UL — SIGNIFICANT CHANGE UP (ref 3.8–10.5)

## 2018-01-24 PROCEDURE — 99232 SBSQ HOSP IP/OBS MODERATE 35: CPT

## 2018-01-24 RX ORDER — FUROSEMIDE 40 MG
40 TABLET ORAL DAILY
Qty: 0 | Refills: 0 | Status: DISCONTINUED | OUTPATIENT
Start: 2018-01-24 | End: 2018-01-25

## 2018-01-24 RX ORDER — APIXABAN 2.5 MG/1
2.5 TABLET, FILM COATED ORAL EVERY 12 HOURS
Qty: 0 | Refills: 0 | Status: DISCONTINUED | OUTPATIENT
Start: 2018-01-24 | End: 2018-01-28

## 2018-01-24 RX ORDER — CLOPIDOGREL BISULFATE 75 MG/1
600 TABLET, FILM COATED ORAL ONCE
Qty: 0 | Refills: 0 | Status: COMPLETED | OUTPATIENT
Start: 2018-01-24 | End: 2018-01-24

## 2018-01-24 RX ORDER — SODIUM CHLORIDE 9 MG/ML
1000 INJECTION, SOLUTION INTRAVENOUS
Qty: 0 | Refills: 0 | Status: DISCONTINUED | OUTPATIENT
Start: 2018-01-24 | End: 2018-01-24

## 2018-01-24 RX ORDER — ERYTHROPOIETIN 10000 [IU]/ML
10000 INJECTION, SOLUTION INTRAVENOUS; SUBCUTANEOUS EVERY OTHER DAY
Qty: 0 | Refills: 0 | Status: DISCONTINUED | OUTPATIENT
Start: 2018-01-24 | End: 2018-01-28

## 2018-01-24 RX ORDER — IRON SUCROSE 20 MG/ML
200 INJECTION, SOLUTION INTRAVENOUS ONCE
Qty: 0 | Refills: 0 | Status: COMPLETED | OUTPATIENT
Start: 2018-01-24 | End: 2018-01-24

## 2018-01-24 RX ORDER — CLOPIDOGREL BISULFATE 75 MG/1
75 TABLET, FILM COATED ORAL DAILY
Qty: 0 | Refills: 0 | Status: DISCONTINUED | OUTPATIENT
Start: 2018-01-25 | End: 2018-01-28

## 2018-01-24 RX ADMIN — Medication 10 MILLIGRAM(S): at 14:20

## 2018-01-24 RX ADMIN — AMIODARONE HYDROCHLORIDE 400 MILLIGRAM(S): 400 TABLET ORAL at 05:32

## 2018-01-24 RX ADMIN — Medication 12.5 MILLIGRAM(S): at 17:22

## 2018-01-24 RX ADMIN — LIDOCAINE 1 PATCH: 4 CREAM TOPICAL at 05:20

## 2018-01-24 RX ADMIN — SODIUM CHLORIDE 50 MILLILITER(S): 9 INJECTION, SOLUTION INTRAVENOUS at 06:53

## 2018-01-24 RX ADMIN — ERYTHROPOIETIN 10000 UNIT(S): 10000 INJECTION, SOLUTION INTRAVENOUS; SUBCUTANEOUS at 23:03

## 2018-01-24 RX ADMIN — Medication 12.5 MILLIGRAM(S): at 06:32

## 2018-01-24 RX ADMIN — LIDOCAINE 1 PATCH: 4 CREAM TOPICAL at 14:21

## 2018-01-24 RX ADMIN — TIOTROPIUM BROMIDE 1 CAPSULE(S): 18 CAPSULE ORAL; RESPIRATORY (INHALATION) at 14:22

## 2018-01-24 RX ADMIN — CLOPIDOGREL BISULFATE 600 MILLIGRAM(S): 75 TABLET, FILM COATED ORAL at 07:43

## 2018-01-24 RX ADMIN — APIXABAN 2.5 MILLIGRAM(S): 2.5 TABLET, FILM COATED ORAL at 23:03

## 2018-01-24 RX ADMIN — ISOSORBIDE DINITRATE 10 MILLIGRAM(S): 5 TABLET ORAL at 14:21

## 2018-01-24 RX ADMIN — AMIODARONE HYDROCHLORIDE 400 MILLIGRAM(S): 400 TABLET ORAL at 17:22

## 2018-01-24 RX ADMIN — ATORVASTATIN CALCIUM 40 MILLIGRAM(S): 80 TABLET, FILM COATED ORAL at 22:27

## 2018-01-24 RX ADMIN — ISOSORBIDE DINITRATE 10 MILLIGRAM(S): 5 TABLET ORAL at 05:32

## 2018-01-24 RX ADMIN — Medication 25 MICROGRAM(S): at 05:32

## 2018-01-24 RX ADMIN — Medication 10 MILLIGRAM(S): at 05:32

## 2018-01-24 RX ADMIN — Medication 40 MILLIGRAM(S): at 14:21

## 2018-01-24 RX ADMIN — PANTOPRAZOLE SODIUM 40 MILLIGRAM(S): 20 TABLET, DELAYED RELEASE ORAL at 06:32

## 2018-01-24 RX ADMIN — Medication 81 MILLIGRAM(S): at 07:12

## 2018-01-24 RX ADMIN — SODIUM CHLORIDE 100 MILLILITER(S): 9 INJECTION, SOLUTION INTRAVENOUS at 07:44

## 2018-01-24 RX ADMIN — IRON SUCROSE 110 MILLIGRAM(S): 20 INJECTION, SOLUTION INTRAVENOUS at 23:03

## 2018-01-24 NOTE — PROGRESS NOTE ADULT - SUBJECTIVE AND OBJECTIVE BOX
History of Present Illness:  History of Present Illness: 	  This is a 89 y/o female, obese, with HTN, HPL, DM-II, Anemia, diastolic CHF (NL EF), known CAD with previous PCIs, b/l carotid endarterectomies, who presented to her cardiologist with c/o mid sternal chest pressure radiating to the back, associated with SOB, palpitations, LE edema and 4lbs weight gain over 1 week. In the office, her EKG showed new AFib and she was sent to the ER for further evaluation.   In the ER, 1st troponin negative, BNP 3805, BUN 42/1.57, H/H 8.6/27.7, EKG AFib @ 100bpm, LBBB, CXR with some vascular congestion. She received Cardizem 5mg IV x1, Lasix 40mg IV x1, was started on IV heparin and admitted for further management and telemetry monitoring.  No s/s of bleeding noted.    Review of Systems:  Review of Systems: GENERAL, CONSTITUTIONAL : + recent weight gain. Denies fever, chills  EYES, VISION: denies changes in vision   EARS, NOSE, THROAT: denies hearing loss  HEART, CARDIOVASCULAR: + chest pain, palpitations, SOB,  LE edema. Denies claudication  RESPIRATORY: + SOB; Denies cough, wheezing, PND, orthopnea  GASTROINTESTINAL: Denies abdominal pain, heartburn, bloody stool, dark tarry stool  GENITOURINARY: Denies frequent urination, urgency  MUSCULOSKELETAL denies joint pain or swelling, restricted motion, musculoskeletal pain.   SKIN & INTEGUMENTARY Denies rashes, sores, blisters, blisters, growths.  NEUROLOGICAL: Denies numbness or tingling sensations, sensation loss, burning.   PSYCHIATRIC: Denies nervousness, anxiety, depression  ENDOCRINE Denies heat or cold intolerance, excessive thirst HEMATOLOGIC/LYMPHATIC: Denies abnormal bleeding, bleeding of any kind	      Allergies and Intolerances:        Allergies:  	No Known Allergies:     Home Medications:   * Patient Currently Takes Medications as of 17-Jan-2018 02:09 documented in Structured Notes  · 	torsemide 10 mg oral tablet: 1 tab(s) orally once a day, Last Dose Taken:    · 	aspirin 81 mg oral tablet: 1 tab(s) orally once a day, Last Dose Taken:    · 	isosorbide mononitrate 30 mg oral tablet, extended release: 1 tab(s) orally once a day (in the morning), Last Dose Taken:    · 	Januvia 50 mg oral tablet: 1 tab(s) orally once a day, Last Dose Taken:    · 	amLODIPine 5 mg oral tablet: 1 tab(s) orally once a day, Last Dose Taken:    · 	predniSONE 20 mg oral tablet: 1 tab(s) orally once a day, Last Dose Taken:    · 	atorvastatin 20 mg oral tablet: 1 tab(s) orally once a day, Last Dose Taken:    · 	montelukast 10 mg oral tablet: 1 tab(s) orally once a day, Last Dose Taken:    · 	omeprazole 40 mg oral delayed release capsule: 1 cap(s) orally once a day, Last Dose Taken:    · 	Spiriva 18 mcg inhalation capsule: 1 cap(s) inhaled once a day, Last Dose Taken:      .  Patient History:   Past Medical History:  Anemia    CAD (coronary artery disease)    DM (diabetes mellitus)    Emphysema    HTN (hypertension)    Hypercholesteremia.    Past Surgical History:  History of bilateral carotid endarterectomy    History of umbilical hernia repair    S/P bladder repair    S/P cataract surgery    S/P hysterectomy with oophorectomy    S/P TKR (total knee replacement).    Family History:  Mother  Still living? No  Family history of emphysema, Age at diagnosis: 71-80.    Social History:  Social History (marital status, living situation, occupation, tobacco use, alcohol and drug use, and sexual history): Denies tobacco, ETOH or illicit drug use Lives with her daughter, ambulates with a walker	    Tobacco Screening:  · Core Measure Site	No	    Risk Assessment:   Present on Admission:  Deep Venous Thrombosis	no 	  Pulmonary Embolus	no 	    Heart Failure:  Does this patient have a history of or has been diagnosed with heart failure? yes.      Physical Exam:  Physical Exam:  Vital Signs Last 24 Hrs T(C): 35.9 (24 Jan 2018 06:39), Max: 36.4 (23 Jan 2018 22:25) T(F): 96.6 (24 Jan 2018 06:39), Max: 97.5 (23 Jan 2018 22:25) HR: 71 (24 Jan 2018 20:19) (71 - 90) BP: 103/48 (24 Jan 2018 20:19) (95/53 - 131/56) RR: 18 (24 Jan 2018 20:19) (16 - 23) SpO2: 98% (24 Jan 2018 20:19) (96% - 100%)    Appearance: Normal   HEENT:   Normal oral mucosa, PERRL, EOMI   Neck: Supple, - JVD; No Carotid Bruit   Cardiovascular: Normal S1 S2, No JVD, No murmurs  Respiratory: Lungs bibasilar crackles, no Rhonchi, Wheezing   Gastrointestinal:  Soft, Non-tender, + BS   Skin: No rashes, No ecchymoses, No cyanosis  Extremities: 3+ edema b/l, No clubbing, cyanosis  Vascular: Femoral pulses 1+ b/l without bruit, DP faint, b/l, PT faint b/l  Neurologic: Non-focal Psychiatry: A & O x 3, Mood & affect appropriate	      Labs and Results:  Labs, Radiology, Cardiology, and Other Results:                          8.3   7.3   )-----------( 169      ( 24 Jan 2018 07:33 )            28.0         137  |  95<L>  |  38<H> ----------------------------<  141<H> 4.3   |  30  |  1.78<H>  Ca    9.8      24 Jan 2018 07:33 Mg     2.1     01-24 	    Assessment and Plan:   Assessment:  · Assessment		  89 y/o female with HTN, HPL, DM-II, CAD/PCIs, diastolic CHF (NL EF), anemia who is admitted with new onset AFib and CHF exacerbation      Problem/Plan - 1:  ·  Problem: Atrial fibrillation.  Plan: New Afib with controlled HR  - Heparin gtt: Dose to keep PTT 60-80    Problem/Plan - 2:  - Monitor CBC closely & transfuse PRBCs to keep Hb > 7g/dl  - f/u SPEP, IPEP, Immunofixation, LDH, Beta-2 microglobulin  - Consider Epogen  - Trial of iron sucrose

## 2018-01-24 NOTE — PROGRESS NOTE ADULT - PROBLEM SELECTOR PLAN 4
Hgb a1c 7.0  - PO home meds on hold, continue FS's and ICS PRN Dr. Fry following   - Newly diagnosed hypothyroidism- started Levothyroxine 25mcg PO daily. F/U TFT in 6 weeks.

## 2018-01-24 NOTE — PROGRESS NOTE ADULT - SUBJECTIVE AND OBJECTIVE BOX
INTERVAL HPI/OVERNIGHT EVENTS:      Patient is a 89y old  Female who presents with a chief complaint of  was seen and examined today. Reports the following symptoms:    CONSTITUTIONAL:  Negative fever or chills, feels well, good appetite  EYES:  Negative  blurry vision or double vision  CARDIOVASCULAR:  Negative for chest pain or palpitations  RESPIRATORY:  Negative for cough, wheezing, or SOB   GASTROINTESTINAL:  Negative for nausea, vomiting, diarrhea, constipation, or abdominal pain  GENITOURINARY:  Negative frequency, urgency or dysuria  NEUROLOGIC:  No headache, confusion, dizziness, lightheadedness    MEDICATIONS  (STANDING):  amiodarone    Tablet 400 milliGRAM(s) Oral two times a day  apixaban 2.5 milliGRAM(s) Oral every 12 hours  aspirin enteric coated 81 milliGRAM(s) Oral daily  atorvastatin 40 milliGRAM(s) Oral at bedtime  dextrose 5%. 1000 milliLiter(s) (50 mL/Hr) IV Continuous <Continuous>  dextrose 50% Injectable 12.5 Gram(s) IV Push once  dextrose 50% Injectable 25 Gram(s) IV Push once  dextrose 50% Injectable 25 Gram(s) IV Push once  epoetin jayesh Injectable 43974 Unit(s) SubCutaneous every other day  furosemide   Injectable 40 milliGRAM(s) IV Push daily  hydrALAZINE 10 milliGRAM(s) Oral three times a day  insulin lispro (HumaLOG) corrective regimen sliding scale   SubCutaneous three times a day before meals  insulin lispro (HumaLOG) corrective regimen sliding scale   SubCutaneous at bedtime  isosorbide   dinitrate Tablet (ISORDIL) 10 milliGRAM(s) Oral three times a day  levothyroxine 25 MICROGram(s) Oral daily  lidocaine   Patch 1 Patch Transdermal daily  metoprolol     tartrate 12.5 milliGRAM(s) Oral two times a day  pantoprazole    Tablet 40 milliGRAM(s) Oral before breakfast  tiotropium 18 MICROgram(s) Capsule 1 Capsule(s) Inhalation daily    MEDICATIONS  (PRN):  dextrose Gel 1 Dose(s) Oral once PRN Blood Glucose LESS THAN 70 milliGRAM(s)/deciliter  glucagon  Injectable 1 milliGRAM(s) IntraMuscular once PRN Glucose LESS THAN 70 milligrams/deciliter        LABS:                        8.3    7.3   )-----------( 169      ( 24 Jan 2018 07:33 )             28.0     01-24    137  |  95<L>  |  38<H>  ----------------------------<  141<H>  4.3   |  30  |  1.78<H>    Ca    9.8      24 Jan 2018 07:33  Mg     2.1     01-24      Hemoglobin A1C, Whole Blood: 7.0 % (01-17-18 @ 05:28)    PT/INR - ( 23 Jan 2018 07:00 )   PT: 12.3 sec;   INR: 1.11          PTT - ( 24 Jan 2018 07:34 )  PTT:51.9 sec    Thyroid Stimulating Hormone, Serum: 6.350 uIU/mL (01-18 @ 06:38)  Thyroid Stimulating Hormone, Serum: 5.255 uIU/mL (01-17 @ 20:12)      RADIOLOGY & ADDITIONAL TESTS:      Vital Signs Last 24 Hrs  T(C): 36.2 (24 Jan 2018 22:03), Max: 36.2 (24 Jan 2018 22:03)  T(F): 97.2 (24 Jan 2018 22:03), Max: 97.2 (24 Jan 2018 22:03)  HR: 75 (24 Jan 2018 23:23) (71 - 90)  BP: 100/49 (24 Jan 2018 22:26) (95/53 - 131/56)  BP(mean): --  RR: 18 (24 Jan 2018 23:23) (16 - 20)  SpO2: 98% (24 Jan 2018 23:23) (96% - 100%)    CAPILLARY BLOOD GLUCOSE      PHYSICAL EXAM:  Constitutional: wn/wd in NAD.   HEENT: NCAT, MMM, OP clear, EOMI, , no proptosis or lid retraction  Neck: supple, no acanthosis, no thyromegaly or palpable thyroid nodules   Respiratory: Lungs CTAB.  Cardiovascular: regular rhythm, normal S1 and S2, no audible murmurs, no peripheral edema  GI: soft, + bowel sounds, NT/ND, no masses/HSM appreciated.  Neurology: no tremors, DTR 2+  Skin: no visible rashes/lesions  Psychiatric: AAO x 3, normal affect/mood.        A/P: 89yFemale admitted for (    ). Consulted and being followed for Diabetes Type (  ).       Uncontrolled DM Type (  ) -     Please continue           units lantus AM/PM, premeal Lispro  (  ) units TID, and  Lispro moderate/low dose sliding scale 4 times daily (before meals and bedtime).  Please continue consistent carbohydrate (Diabetic) diet, FS ac & hs. Target  - 150.      Case discussed with attending and primary team      Attending attestation:  I have seen and evaluated the patient at the bedside.   Endocrine Nurse Practitioner/Fellow/Resident’s note reviewed, including vital signs, PE and laboratory results.  Direct personal management and extensive interpretation of the data conducted.   I agree with the Nurse Practitioner/Fellow/Resident’s assessment and recommendations as above. INTERVAL HPI/OVERNIGHT EVENTS:      Patient is a 89y old  Female who presents with a chief complaint of SOB and fatigue was seen and examined today. Reports the following symptoms:    CONSTITUTIONAL:  Negative fever or chills, feels better, good appetite  EYES:  Negative  blurry vision or double vision  CARDIOVASCULAR:  Negative for chest pain or palpitations  RESPIRATORY:  Negative for cough, wheezing, or SOB   GASTROINTESTINAL:  Negative for nausea, vomiting, diarrhea, constipation, or abdominal pain  GENITOURINARY:  Negative frequency, urgency or dysuria  NEUROLOGIC:  No headache, confusion, dizziness, lightheadedness    MEDICATIONS  (STANDING):  amiodarone    Tablet 400 milliGRAM(s) Oral two times a day  apixaban 2.5 milliGRAM(s) Oral every 12 hours  aspirin enteric coated 81 milliGRAM(s) Oral daily  atorvastatin 40 milliGRAM(s) Oral at bedtime  dextrose 5%. 1000 milliLiter(s) (50 mL/Hr) IV Continuous <Continuous>  dextrose 50% Injectable 12.5 Gram(s) IV Push once  dextrose 50% Injectable 25 Gram(s) IV Push once  dextrose 50% Injectable 25 Gram(s) IV Push once  epoetin jayesh Injectable 19733 Unit(s) SubCutaneous every other day  furosemide   Injectable 40 milliGRAM(s) IV Push daily  hydrALAZINE 10 milliGRAM(s) Oral three times a day  insulin lispro (HumaLOG) corrective regimen sliding scale   SubCutaneous three times a day before meals  insulin lispro (HumaLOG) corrective regimen sliding scale   SubCutaneous at bedtime  isosorbide   dinitrate Tablet (ISORDIL) 10 milliGRAM(s) Oral three times a day  levothyroxine 25 MICROGram(s) Oral daily  lidocaine   Patch 1 Patch Transdermal daily  metoprolol     tartrate 12.5 milliGRAM(s) Oral two times a day  pantoprazole    Tablet 40 milliGRAM(s) Oral before breakfast  tiotropium 18 MICROgram(s) Capsule 1 Capsule(s) Inhalation daily    MEDICATIONS  (PRN):  dextrose Gel 1 Dose(s) Oral once PRN Blood Glucose LESS THAN 70 milliGRAM(s)/deciliter  glucagon  Injectable 1 milliGRAM(s) IntraMuscular once PRN Glucose LESS THAN 70 milligrams/deciliter        LABS:                        8.3    7.3   )-----------( 169      ( 24 Jan 2018 07:33 )             28.0     01-24    137  |  95<L>  |  38<H>  ----------------------------<  141<H>  4.3   |  30  |  1.78<H>    Ca    9.8      24 Jan 2018 07:33  Mg     2.1     01-24      Hemoglobin A1C, Whole Blood: 7.0 % (01-17-18 @ 05:28)    PT/INR - ( 23 Jan 2018 07:00 )   PT: 12.3 sec;   INR: 1.11          PTT - ( 24 Jan 2018 07:34 )  PTT:51.9 sec    Thyroid Stimulating Hormone, Serum: 6.350 uIU/mL (01-18 @ 06:38)  Thyroid Stimulating Hormone, Serum: 5.255 uIU/mL (01-17 @ 20:12)      RADIOLOGY & ADDITIONAL TESTS:      Vital Signs Last 24 Hrs  T(C): 36.2 (24 Jan 2018 22:03), Max: 36.2 (24 Jan 2018 22:03)  T(F): 97.2 (24 Jan 2018 22:03), Max: 97.2 (24 Jan 2018 22:03)  HR: 75 (24 Jan 2018 23:23) (71 - 90)  BP: 100/49 (24 Jan 2018 22:26) (95/53 - 131/56)  BP(mean): --  RR: 18 (24 Jan 2018 23:23) (16 - 20)  SpO2: 98% (24 Jan 2018 23:23) (96% - 100%)    CAPILLARY BLOOD GLUCOSE      PHYSICAL EXAM:  Constitutional: wn/wd in NAD.   HEENT: NCAT, MMM, OP clear, EOMI, no proptosis or lid retraction  Neck: supple, no acanthosis, no thyromegaly or palpable thyroid nodules   Respiratory: Lungs CTAB.  Cardiovascular: regular rhythm, normal S1 and S2, no audible murmurs, no peripheral edema  GI: soft, + bowel sounds, NT/ND, obese  Neurology: no tremors, DTR 1+  Skin: no visible rashes/lesions  Psychiatric: AAO x 3, normal affect/mood.        A/P: 89yFemale admitted for SOB. Consulted and being followed for Diabetes Type 2 and hypothyroidism.     DM Type 2 (HbA1c 7%) with diet and sliding scale insulin.     Please continue Lispro moderate/low dose sliding scale 4 times daily (before meals and bedtime).    Also continue consistent carbohydrate (Diabetic) diet, FS ac & hs. Target  - 150 mg/dl.      Case discussed with attending and primary team      Attending attestation:  I have seen and evaluated the patient at the bedside.  Nurse Practitioner/Fellow/Resident’s note reviewed, including vital signs, PE and laboratory results.  Direct personal management and extensive interpretation of the data conducted.   Assessment and recommendations as above.

## 2018-01-24 NOTE — PROGRESS NOTE ADULT - PROBLEM SELECTOR PLAN 5
Dr. Fry following   - Newly diagnosed hypothyroidism- started Levothyroxine 25mcg PO daily. F/U TFT in 6 weeks. Pulm (Dr. Alonso melendrez)   NO EVIDENCE OF COPD. The patient was exposed to biomass.  Continue Spiriva and as needed albuterol. Continue oxygen supplementation. Due to patient age no need for CT scan of chest.  Hold on steroids

## 2018-01-24 NOTE — PROGRESS NOTE ADULT - SUBJECTIVE AND OBJECTIVE BOX
Preprocedure note  D/w family and patient at length  Explained possible complications especially ANGELICA/need for HD  Explained high risk features - age, CHF, anemia, CRF - low GFR, CAD, A/C etc  Appreciated nephrology input  D/w covering cardiology and EP  Dr Russell to follow   Further plan as per patient's course  D/w SURYA Dillard MD PhD  [phone]

## 2018-01-24 NOTE — PROGRESS NOTE ADULT - SUBJECTIVE AND OBJECTIVE BOX
MEDICAL HISTORY:      89 year old  female admitted with CHF and atrial fibrillation in  the 100's. The history is notable for vasculopathy with multiple cardiac, lower extremity and carotid procedures. n echocardiogram here showed .reduced LVEF. The hospital course has focused on diuresis and rate control.     On admission GFR was reduced, with Scr ~1.5 mg/dl. Baseline Scr data n/a. On admission, anemia was noted with Hgb 8's g/dl.  A beta-microalbumin screen was elevated (6.0). TF sat was 12, Ferritin 169. Hematology is evaluating.    CKD workup in progress  Anemia workup in progress.  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 torsemide, prednisone 20 mg, Januvia, isordil, ASA, statin, omeprazole, various bronchodilators.     INTERVAL HISTORY:    SUMMARY COMMENTS:           ---------------------------------------------------------------------------------------------------------------------------------------------------------------------------    SUBJECTIVE & OBJECTIVE DATA DOCUMENTATION:     REVIEW OF SYSTEMS SCREENED ORGAN SYSTEMS:  Constitutional: fever, chills, anorexia or fatigue  Pulmonary: difficulty breathing, wheezing, cough  Cardiovascular: exertional dyspnea, chest pain, palpitations, dizziness or leg swelling  Gastrointestinal: abdominal or epigastric pain, nausea, vomiting or hematemesis, diarrhea, melena or bright red blood per rectum.  Genitourinary: dysuria, urgency, frequency, flank pain, difficulty voiding  Skin: No pruritis, rashes or lesions  Neurology: memory loss, dysarthria, extremity weakness, neuropathic pain, headache, visual changes  Dialysis access if present : pain, bleeding, swelling     ROS POSITIVE FINDINGS:     PAST MEDICAL & SURGICAL HISTORY:  Anemia  CAD (coronary artery disease)  DM (diabetes mellitus)  Emphysema  Hypercholesteremia  HTN (hypertension)  S/P bladder repair  S/P hysterectomy with oophorectomy  History of bilateral carotid endarterectomy  History of umbilical hernia repair  S/P cataract surgery  S/P TKR (total knee replacement)      PHYSICAL EXAM:  T(C): 35.9 (18 @ 06:39), Max: 36.6 (18 @ 17:45)  HR: 80 (18 @ 08:27)  BP: 127/53 (18 @ 08:27) (107/56 - 148/61)  RR: 16 (18 @ 08:27)  SpO2: 97% (18 @ 08:27)  Wt(kg): --  I&O's Summary    2018 07:  -  2018 07:00  --------------------------------------------------------  IN: 386 mL / OUT: 900 mL / NET: -514 mL    2018 07:  -  2018 10:28  --------------------------------------------------------  IN: 130 mL / OUT: 0 mL / NET: 130 mL      Weight     APPEARANCE: In bed, NAD.   HEAD & NECK: normocephalic, no stiff neck, no masses, oral mucosa moist, no scleral icteris  RESPIRATORY: no accessory muscle use, no labored breathing, no dullness, basilar rales and rhonchi, no wheeze  CARDIOVASCULAR: no JVD, IRRR, S1S2,+ gallop,  no murmur, no rub.  ABDOMEN: soft, no tenderness, no masses, no hepatomegally, no splenomegally  : no bladder distension  LYMPHATIC: no lymphadenopathy (neck, axilla, groin)  EXTREMITIES & MUSCULOSKELETAL: no cyanosis, no clubbing, no tenderness, strength  L=R  EDEMA: trace  PULSES: upper extremity pulses present, lower extremity pulses absent  SKIN: no rash, no stasis, no induration  NEUROLOGIC & PSYCHIATRIC:  alert, interactive, oriented to time and place, responds to stimuli, no asterixis      MEDICATIONS  (STANDING):  amiodarone    Tablet 400 milliGRAM(s) Oral two times a day  aspirin enteric coated 81 milliGRAM(s) Oral daily  atorvastatin 40 milliGRAM(s) Oral at bedtime  dextrose 5%. 1000 milliLiter(s) (50 mL/Hr) IV Continuous <Continuous>  dextrose 50% Injectable 12.5 Gram(s) IV Push once  dextrose 50% Injectable 25 Gram(s) IV Push once  dextrose 50% Injectable 25 Gram(s) IV Push once  heparin  Infusion 900 Unit(s)/Hr (9 mL/Hr) IV Continuous <Continuous>  hydrALAZINE 10 milliGRAM(s) Oral three times a day  insulin lispro (HumaLOG) corrective regimen sliding scale   SubCutaneous three times a day before meals  insulin lispro (HumaLOG) corrective regimen sliding scale   SubCutaneous at bedtime  isosorbide   dinitrate Tablet (ISORDIL) 10 milliGRAM(s) Oral three times a day  levothyroxine 25 MICROGram(s) Oral daily  lidocaine   Patch 1 Patch Transdermal daily  metoprolol     tartrate 12.5 milliGRAM(s) Oral two times a day  pantoprazole    Tablet 40 milliGRAM(s) Oral before breakfast  sodium chloride 0.45%. 1000 milliLiter(s) (100 mL/Hr) IV Continuous <Continuous>  tiotropium 18 MICROgram(s) Capsule 1 Capsule(s) Inhalation daily    MEDICATIONS  (PRN):  dextrose Gel 1 Dose(s) Oral once PRN Blood Glucose LESS THAN 70 milliGRAM(s)/deciliter  glucagon  Injectable 1 milliGRAM(s) IntraMuscular once PRN Glucose LESS THAN 70 milligrams/deciliter      DATA:  137    |  95<L>  |  38<H>  ----------------------------<  141<H>  Ca:9.8   (2018 07:33)  4.3     |  30     |  1.78<H>      eGFR if Non : 24 <L>  eGFR if : 28 <L>                            8.3<L>  7.3   )-----------( 169      ( 2018 07:33 )             28.0<L>    Phos:-- M.1 mg/dL PTH:-- Uric acid:-- Serum Osm:--  Ferritin:-- Iron:-- TIBC:-- Tsat:--  B12:-- TSH:-- ( @ 07:33)    Urinalysis Basic - ( 2018 12:34 )  Color: Yellow / Appearance: Cloudy<!!> / SG: <=1.005 / pH: x  Gluc: x / Ketone: NEGATIVE  / Bili: Negative / Urobili: 0.2 E.U./dL   Blood: x / Protein: NEGATIVE mg/dL / Nitrite: NEGATIVE   Leuk Esterase: Moderate<!!> / RBC: x / WBC Many /HPF<!!>   Sq Epi: x / Non Sq Epi: 0-5 /HPF / Bacteria: Present /HPF<!!>      UProt:-- UCr:73 mg/dL P/C Ratio:-- 24 hour Prot:-- UVol:-- CrCl:--  Tj:-- UOsm:-- UVol:-- UCl:-- UK:-- ( @ 20:14) MEDICAL HISTORY:      89 year old  female admitted with CHF and atrial fibrillation in  the 100's. The history is notable for vasculopathy with multiple cardiac, lower extremity and carotid procedures. n echocardiogram here showed .reduced LVEF. The hospital course has focused on diuresis and rate control.     On admission GFR was reduced, with Scr ~1.5 mg/dl. Baseline Scr data n/a. On admission, anemia was noted with Hgb 8's g/dl.  A beta-microalbumin screen was elevated (6.0). TF sat was 12, Ferritin 169. Hematology is evaluating.    CKD workup in progress  Anemia workup in progress.  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 torsemide, prednisone 20 mg, Januvia, isordil, ASA, statin, omeprazole, various bronchodilators.     INTERVAL HISTORY:    's, HR 80's AFIB.     To cardiac cath today.     Scr 1.76->1.75 mg/dl    Weight 81.2->81.0 kg    SUMMARY COMMENTS:     Addendum 6 PM: s/p cardiac cath today with no IV contrast used. Details to follow.     GFR remains stable, <20 ml/min, with no changes expected post the no-contrast study.     Discussed anemia management with hematology and IV iron and EPO will be started.     Discussed with cardiac team.         ---------------------------------------------------------------------------------------------------------------------------------------------------------------------------    SUBJECTIVE & OBJECTIVE DATA DOCUMENTATION:     REVIEW OF SYSTEMS SCREENED ORGAN SYSTEMS:  Constitutional: fever, chills, anorexia or fatigue  Pulmonary: difficulty breathing, wheezing, cough  Cardiovascular: exertional dyspnea, chest pain, palpitations, dizziness or leg swelling  Gastrointestinal: abdominal or epigastric pain, nausea, vomiting or hematemesis, diarrhea, melena or bright red blood per rectum.  Genitourinary: dysuria, urgency, frequency, flank pain, difficulty voiding  Skin: No pruritis, rashes or lesions  Neurology: memory loss, dysarthria, extremity weakness, neuropathic pain, headache, visual changes  Dialysis access if present : pain, bleeding, swelling     ROS POSITIVE FINDINGS:     PAST MEDICAL & SURGICAL HISTORY:  Anemia  CAD (coronary artery disease)  DM (diabetes mellitus)  Emphysema  Hypercholesteremia  HTN (hypertension)  S/P bladder repair  S/P hysterectomy with oophorectomy  History of bilateral carotid endarterectomy  History of umbilical hernia repair  S/P cataract surgery  S/P TKR (total knee replacement)      PHYSICAL EXAM:  T(C): 35.9 (18 @ 06:39), Max: 36.6 (18 @ 17:45)  HR: 80 (18 @ 08:27)  BP: 127/53 (18 @ 08:27) (107/56 - 148/61)  RR: 16 (18 @ 08:27)  SpO2: 97% (18 @ 08:27)  Wt(kg): --  I&O's Summary    2018 07:  -  2018 07:00  --------------------------------------------------------  IN: 386 mL / OUT: 900 mL / NET: -514 mL    2018 07:  -  2018 10:28  --------------------------------------------------------  IN: 130 mL / OUT: 0 mL / NET: 130 mL      Weight     APPEARANCE: In bed, NAD.   HEAD & NECK: normocephalic, no stiff neck, no masses, oral mucosa moist, no scleral icteris  RESPIRATORY: no accessory muscle use, no labored breathing, no dullness, basilar rales and rhonchi, no wheeze  CARDIOVASCULAR: no JVD, IRRR, S1S2,+ gallop,  no murmur, no rub.  ABDOMEN: soft, no tenderness, no masses, no hepatomegally, no splenomegally  : no bladder distension  LYMPHATIC: no lymphadenopathy (neck, axilla, groin)  EXTREMITIES & MUSCULOSKELETAL: no cyanosis, no clubbing, no tenderness, strength  L=R  EDEMA: trace  PULSES: upper extremity pulses present, lower extremity pulses absent  SKIN: no rash, no stasis, no induration  NEUROLOGIC & PSYCHIATRIC:  alert, interactive, oriented to time and place, responds to stimuli, no asterixis      MEDICATIONS  (STANDING):  amiodarone    Tablet 400 milliGRAM(s) Oral two times a day  aspirin enteric coated 81 milliGRAM(s) Oral daily  atorvastatin 40 milliGRAM(s) Oral at bedtime  dextrose 5%. 1000 milliLiter(s) (50 mL/Hr) IV Continuous <Continuous>  dextrose 50% Injectable 12.5 Gram(s) IV Push once  dextrose 50% Injectable 25 Gram(s) IV Push once  dextrose 50% Injectable 25 Gram(s) IV Push once  heparin  Infusion 900 Unit(s)/Hr (9 mL/Hr) IV Continuous <Continuous>  hydrALAZINE 10 milliGRAM(s) Oral three times a day  insulin lispro (HumaLOG) corrective regimen sliding scale   SubCutaneous three times a day before meals  insulin lispro (HumaLOG) corrective regimen sliding scale   SubCutaneous at bedtime  isosorbide   dinitrate Tablet (ISORDIL) 10 milliGRAM(s) Oral three times a day  levothyroxine 25 MICROGram(s) Oral daily  lidocaine   Patch 1 Patch Transdermal daily  metoprolol     tartrate 12.5 milliGRAM(s) Oral two times a day  pantoprazole    Tablet 40 milliGRAM(s) Oral before breakfast  sodium chloride 0.45%. 1000 milliLiter(s) (100 mL/Hr) IV Continuous <Continuous>  tiotropium 18 MICROgram(s) Capsule 1 Capsule(s) Inhalation daily    MEDICATIONS  (PRN):  dextrose Gel 1 Dose(s) Oral once PRN Blood Glucose LESS THAN 70 milliGRAM(s)/deciliter  glucagon  Injectable 1 milliGRAM(s) IntraMuscular once PRN Glucose LESS THAN 70 milligrams/deciliter      DATA:  137    |  95<L>  |  38<H>  ----------------------------<  141<H>  Ca:9.8   (2018 07:33)  4.3     |  30     |  1.78<H>      eGFR if Non : 24 <L>  eGFR if : 28 <L>                            8.3<L>  7.3   )-----------( 169      ( 2018 07:33 )             28.0<L>    Phos:-- M.1 mg/dL PTH:-- Uric acid:-- Serum Osm:--  Ferritin:-- Iron:-- TIBC:-- Tsat:--  B12:-- TSH:-- ( @ 07:33)    Urinalysis Basic - ( 2018 12:34 )  Color: Yellow / Appearance: Cloudy<!!> / SG: <=1.005 / pH: x  Gluc: x / Ketone: NEGATIVE  / Bili: Negative / Urobili: 0.2 E.U./dL   Blood: x / Protein: NEGATIVE mg/dL / Nitrite: NEGATIVE   Leuk Esterase: Moderate<!!> / RBC: x / WBC Many /HPF<!!>   Sq Epi: x / Non Sq Epi: 0-5 /HPF / Bacteria: Present /HPF<!!>      UProt:-- UCr:73 mg/dL P/C Ratio:-- 24 hour Prot:-- UVol:-- CrCl:--  Tj:-- UOsm:-- UVol:-- UCl:-- UK:-- ( @ 20:14)

## 2018-01-24 NOTE — PROGRESS NOTE ADULT - ASSESSMENT
89yo F with HTN, HLD, DM-II, CAD/PCIs, diastolic CHF (previously normal EF, now reduced to 20-25%), anemia who is admitted with new onset AFib and CHF exacerbation. 89 yr old F with HTN, HLD, DM-II, CAD/PCIs, diastolic CHF (previously normal EF, now reduced to 20-25%), renal artery stenosis, chronic iron deficiency anemia who is admitted with new onset AFib and acute systolic CHF exacerbation.

## 2018-01-24 NOTE — PROGRESS NOTE ADULT - PROBLEM SELECTOR PLAN 10
Renal (Dr. Dominguez following)  Stage III CKD- renal function uptrending 34/1.47-->36/1.76. Renal US 1/22 consistent with medical renal disease.   ---As per Dr. Castle outpatient renal duplex revealed bilateral renal artery stenosis. Initially planned for Renal angio with possible angioplasty in AM. However one patient and family explained HIGH RISK for permanent LYNDA/need for HD post contrast exposure, would like to post pone angiogram and discuss further prior to deciding.        DVT ppx: on Heparin gtt  GI PPx: continue PPI  PT evaluated patient: MINDA vs HPT pending stair negotiation    Dispo: Stable, NPO after midnight for NST in AM. Afebrile without leukocytosis.  - UA +leuks and neg nitrites. Urine culture revealed gram negative rods, awaiting sensitivities. +UA/Urine culture with MDR Ecoli. Patient currently asymptomatic, afebrile, will hold off antibiotic treatment.    PT evaluated patient: MINDA vs HPT pending stair negotiation    Dispo: Patient s/p renal angioplasty, will continue current medical management with diuresis while monitoring renal function.

## 2018-01-24 NOTE — PROGRESS NOTE ADULT - PROBLEM SELECTOR PLAN 9
Heme (Dr. Elliott following)  - Iron deficiency anemia- H/H 8.2/27.8. Will monitor CBC closely & transfuse PRBCs to keep Hb > 7g/dl. S/p IV Iron x3 days and Epogen 10,000 units x3 days.  - Will F/U SPEP, IPEP, Immunofixation, LDH, Beta-2 microglobulin. s/p renal angiogram 1/24 with successful PTA of mid right renal artery ISR. Continue ASA/Plavix.

## 2018-01-24 NOTE — PROGRESS NOTE ADULT - PROBLEM SELECTOR PLAN 7
Pulm (Dr. Alonso melendrez)   NO EVIDENCE OF COPD. The patient was exposed to biomass.  Continue Spiriva and as needed albuterol. Continue oxygen supplementation. Due to patient age no need for CT scan of chest.  Hold on steroids Heme (Dr. Elliott following)  - Iron deficiency anemia- H/H 8.3/28. Will monitor CBC closely & transfuse PRBCs to keep Hb > 7g/dl. S/p IV Iron x3 days and Epogen 10,000 units x3 days.  - Will F/U SPEP, IPEP, Immunofixation, LDH, Beta-2 microglobulin.

## 2018-01-24 NOTE — PROGRESS NOTE ADULT - SUBJECTIVE AND OBJECTIVE BOX
Cardiology PA Adult Progress Note      Patient seen and examined at bedside resting comfortably, denies current complaints.        	  MEDICATIONS:  amiodarone    Tablet 400 milliGRAM(s) Oral two times a day  furosemide   Injectable 40 milliGRAM(s) IV Push daily  hydrALAZINE 10 milliGRAM(s) Oral three times a day  isosorbide   dinitrate Tablet (ISORDIL) 10 milliGRAM(s) Oral three times a day  metoprolol     tartrate 12.5 milliGRAM(s) Oral two times a day  tiotropium 18 MICROgram(s) Capsule 1 Capsule(s) Inhalation daily  pantoprazole    Tablet 40 milliGRAM(s) Oral before breakfast  atorvastatin 40 milliGRAM(s) Oral at bedtime  insulin lispro (HumaLOG) corrective regimen sliding scale   SubCutaneous three times a day before meals  insulin lispro (HumaLOG) corrective regimen sliding scale   SubCutaneous at bedtime  levothyroxine 25 MICROGram(s) Oral daily  apixaban 2.5 milliGRAM(s) Oral every 12 hours  aspirin enteric coated 81 milliGRAM(s) Oral daily  lidocaine   Patch 1 Patch Transdermal daily      	    [PHYSICAL EXAM:  TELEMETRY:  T(C): 35.9 (01-24-18 @ 06:39), Max: 36.6 (01-23-18 @ 17:45)  HR: 78 (01-24-18 @ 14:15) (72 - 95)  BP: 131/56 (01-24-18 @ 14:15) (107/56 - 148/61)  RR: 16 (01-24-18 @ 14:15) (16 - 23)  SpO2: 96% (01-24-18 @ 14:15) (96% - 100%)  Wt(kg): 81kg  I&O's Summary    23 Jan 2018 07:01  -  24 Jan 2018 07:00  --------------------------------------------------------  IN: 386 mL / OUT: 900 mL / NET: -514 mL    24 Jan 2018 07:01  -  24 Jan 2018 14:23  --------------------------------------------------------  IN: 130 mL / OUT: 0 mL / NET: 130 mL                    	    LABS:	 	             8.3    7.3   )-----------( 169      ( 24 Jan 2018 07:33 )             28.0     01-24    137  |  95<L>  |  38<H>  ----------------------------<  141<H>  4.3   |  30  |  1.78<H>    Ca    9.8      24 Jan 2018 07:33  Mg     2.1     01-24          PT/INR - ( 23 Jan 2018 07:00 )   PT: 12.3 sec;   INR: 1.11     PTT - ( 24 Jan 2018 07:34 )  PTT:51.9 sec Cardiology PA Adult Progress Note      Patient seen and examined at bedside resting comfortably, denies current complaints.        	  MEDICATIONS:  amiodarone    Tablet 400 milliGRAM(s) Oral two times a day  furosemide   Injectable 40 milliGRAM(s) IV Push daily  hydrALAZINE 10 milliGRAM(s) Oral three times a day  isosorbide   dinitrate Tablet (ISORDIL) 10 milliGRAM(s) Oral three times a day  metoprolol     tartrate 12.5 milliGRAM(s) Oral two times a day  tiotropium 18 MICROgram(s) Capsule 1 Capsule(s) Inhalation daily  pantoprazole    Tablet 40 milliGRAM(s) Oral before breakfast  atorvastatin 40 milliGRAM(s) Oral at bedtime  insulin lispro (HumaLOG) corrective regimen sliding scale   SubCutaneous three times a day before meals  insulin lispro (HumaLOG) corrective regimen sliding scale   SubCutaneous at bedtime  levothyroxine 25 MICROGram(s) Oral daily  apixaban 2.5 milliGRAM(s) Oral every 12 hours  aspirin enteric coated 81 milliGRAM(s) Oral daily  lidocaine   Patch 1 Patch Transdermal daily      	    [PHYSICAL EXAM:  TELEMETRY:  T(C): 35.9 (01-24-18 @ 06:39), Max: 36.6 (01-23-18 @ 17:45)  HR: 78 (01-24-18 @ 14:15) (72 - 95)  BP: 131/56 (01-24-18 @ 14:15) (107/56 - 148/61)  RR: 16 (01-24-18 @ 14:15) (16 - 23)  SpO2: 96% (01-24-18 @ 14:15) (96% - 100%)  Wt(kg): 81kg  I&O's Summary    23 Jan 2018 07:01  -  24 Jan 2018 07:00  --------------------------------------------------------  IN: 386 mL / OUT: 900 mL / NET: -514 mL    24 Jan 2018 07:01  -  24 Jan 2018 14:23  --------------------------------------------------------  IN: 130 mL / OUT: 0 mL / NET: 130 mL                  Appearance: well appearing elderly female in NAD  HEENT:   NC/AT, moist mucous membranes	  Neck: Supple, no JVD  Cardiovascular:  Irregularly irreguar S1S2   Respiratory: Bibasilar rales noted  Gastrointestinal:  + BS, soft, NT/ND	  Extremities: warm well perfused, no pedal edema bilaterally  Neurologic: no focal neurodeficits              	    LABS:	 	             8.3    7.3   )-----------( 169      ( 24 Jan 2018 07:33 )             28.0     01-24    137  |  95<L>  |  38<H>  ----------------------------<  141<H>  4.3   |  30  |  1.78<H>    Ca    9.8      24 Jan 2018 07:33  Mg     2.1     01-24          PT/INR - ( 23 Jan 2018 07:00 )   PT: 12.3 sec;   INR: 1.11     PTT - ( 24 Jan 2018 07:34 )  PTT:51.9 sec Cardiology PA Adult Progress Note      Patient seen and examined at bedside resting comfortably, denies current complaints.        	  MEDICATIONS:  amiodarone    Tablet 400 milliGRAM(s) Oral two times a day  furosemide   Injectable 40 milliGRAM(s) IV Push daily  hydrALAZINE 10 milliGRAM(s) Oral three times a day  isosorbide   dinitrate Tablet (ISORDIL) 10 milliGRAM(s) Oral three times a day  metoprolol     tartrate 12.5 milliGRAM(s) Oral two times a day  tiotropium 18 MICROgram(s) Capsule 1 Capsule(s) Inhalation daily  pantoprazole    Tablet 40 milliGRAM(s) Oral before breakfast  atorvastatin 40 milliGRAM(s) Oral at bedtime  insulin lispro (HumaLOG) corrective regimen sliding scale   SubCutaneous three times a day before meals  insulin lispro (HumaLOG) corrective regimen sliding scale   SubCutaneous at bedtime  levothyroxine 25 MICROGram(s) Oral daily  apixaban 2.5 milliGRAM(s) Oral every 12 hours  aspirin enteric coated 81 milliGRAM(s) Oral daily  lidocaine   Patch 1 Patch Transdermal daily      	    [PHYSICAL EXAM:  TELEMETRY:  T(C): 35.9 (01-24-18 @ 06:39), Max: 36.6 (01-23-18 @ 17:45)  HR: 78 (01-24-18 @ 14:15) (72 - 95)  BP: 131/56 (01-24-18 @ 14:15) (107/56 - 148/61)  RR: 16 (01-24-18 @ 14:15) (16 - 23)  SpO2: 96% (01-24-18 @ 14:15) (96% - 100%)  Wt(kg): 81kg  I&O's Summary    23 Jan 2018 07:01  -  24 Jan 2018 07:00  --------------------------------------------------------  IN: 386 mL / OUT: 900 mL / NET: -514 mL    24 Jan 2018 07:01  -  24 Jan 2018 14:23  --------------------------------------------------------  IN: 130 mL / OUT: 0 mL / NET: 130 mL                  Appearance: well appearing elderly female in NAD  HEENT:   NC/AT, moist mucous membranes	  Neck: Supple, no JVD  Cardiovascular:  Irregularly irreguar S1S2   Respiratory: Bibasilar rales noted  Gastrointestinal:  + BS, soft, NT/ND	  Extremities: warm well perfused, no pedal edema bilaterally  RUE: right brachial access site dressing C/D/I, right radial band intact  Neurologic: no focal neurodeficits              	    LABS:	 	             8.3    7.3   )-----------( 169      ( 24 Jan 2018 07:33 )             28.0     01-24    137  |  95<L>  |  38<H>  ----------------------------<  141<H>  4.3   |  30  |  1.78<H>    Ca    9.8      24 Jan 2018 07:33  Mg     2.1     01-24          PT/INR - ( 23 Jan 2018 07:00 )   PT: 12.3 sec;   INR: 1.11     PTT - ( 24 Jan 2018 07:34 )  PTT:51.9 sec Cardiology PA Adult Progress Note      Patient seen and examined at bedside resting comfortably, denies current complaints.        	  MEDICATIONS:  amiodarone    Tablet 400 milliGRAM(s) Oral two times a day  furosemide   Injectable 40 milliGRAM(s) IV Push daily  hydrALAZINE 10 milliGRAM(s) Oral three times a day  isosorbide   dinitrate Tablet (ISORDIL) 10 milliGRAM(s) Oral three times a day  metoprolol     tartrate 12.5 milliGRAM(s) Oral two times a day  tiotropium 18 MICROgram(s) Capsule 1 Capsule(s) Inhalation daily  pantoprazole    Tablet 40 milliGRAM(s) Oral before breakfast  atorvastatin 40 milliGRAM(s) Oral at bedtime  insulin lispro (HumaLOG) corrective regimen sliding scale   SubCutaneous three times a day before meals  insulin lispro (HumaLOG) corrective regimen sliding scale   SubCutaneous at bedtime  levothyroxine 25 MICROGram(s) Oral daily  apixaban 2.5 milliGRAM(s) Oral every 12 hours  aspirin enteric coated 81 milliGRAM(s) Oral daily  lidocaine   Patch 1 Patch Transdermal daily      	    [PHYSICAL EXAM:  TELEMETRY:  T(C): 35.9 (01-24-18 @ 06:39), Max: 36.6 (01-23-18 @ 17:45)  HR: 78 (01-24-18 @ 14:15) (72 - 95)  BP: 131/56 (01-24-18 @ 14:15) (107/56 - 148/61)  RR: 16 (01-24-18 @ 14:15) (16 - 23)  SpO2: 96% (01-24-18 @ 14:15) (96% - 100%)  Wt(kg): 81kg  I&O's Summary    23 Jan 2018 07:01  -  24 Jan 2018 07:00  --------------------------------------------------------  IN: 386 mL / OUT: 900 mL / NET: -514 mL    24 Jan 2018 07:01  -  24 Jan 2018 14:23  --------------------------------------------------------  IN: 130 mL / OUT: 0 mL / NET: 130 mL                  Appearance: well appearing elderly female in NAD  HEENT:   NC/AT, moist mucous membranes	  Neck: Supple, no JVD  Cardiovascular:  Irregularly irregular S1S2   Respiratory: Bibasilar rales noted  Gastrointestinal:  + BS, soft, NT/ND	  Extremities: warm well perfused, no pedal edema bilaterally  LUE: left brachial access site dressing C/D/I, right radial band intact  Neurologic: no focal neurodeficits              	    LABS:	 	             8.3    7.3   )-----------( 169      ( 24 Jan 2018 07:33 )             28.0     01-24    137  |  95<L>  |  38<H>  ----------------------------<  141<H>  4.3   |  30  |  1.78<H>    Ca    9.8      24 Jan 2018 07:33  Mg     2.1     01-24          PT/INR - ( 23 Jan 2018 07:00 )   PT: 12.3 sec;   INR: 1.11     PTT - ( 24 Jan 2018 07:34 )  PTT:51.9 sec

## 2018-01-24 NOTE — PROGRESS NOTE ADULT - PROBLEM SELECTOR PLAN 6
Afebrile without leukocytosis.  - UA +leuks and neg nitrites. Urine culture revealed gram negative rods, awaiting sensitivities. Patient endorsing difficulty swallowing (solid food getting stuck)  - Evaluated by speech and swallow, patient with functional pharyngeal swallow. Recommended for Dysphagia 1 Pureed, thin Liquid diet and aspiration precautions.   - Barium swallow: Esophageal dysmotility likely secondary to presbyesophagus. GI service cleared patient for ANMOL this admission.

## 2018-01-24 NOTE — PROGRESS NOTE ADULT - PROBLEM SELECTOR PLAN 8
Patient endorsing difficulty swallowing (solid food getting stuck)  - Evaluated by speech and swallow, patient with functional pharyngeal swallow. Recommended for Dysphagia 1 Pureed, thin Liquid diet and aspiration precautions.   - Barium swallow: Esophageal dysmotility likely secondary to presbyesophagus. GI service cleared patient for ANMOL this AM. Renal (Dr. Dominguez following)  Stage III CKD- renal function uptrending 34/1.47-->38/1.78. Renal US 1/22 consistent with medical renal disease.   ---As per Dr. Castle outpatient renal duplex revealed bilateral renal artery stenosis. Initially planned for Renal angio with possible angioplasty in AM. However one patient and family explained HIGH RISK for permanent LYNDA/need for HD post contrast exposure, would like to post pone angiogram and discuss further prior to deciding.        DVT ppx: on Heparin gtt  GI PPx: continue PPI  PT evaluated patient: MINDA vs HPT pending stair negotiation    Dispo: Stable, NPO after midnight for NST in AM. Renal (Dr. Dominguez following)  Stage III CKD- renal function uptrending 34/1.47-->38/1.78. Renal US 1/22 consistent with medical renal disease.   **Patient and family informed high risk for ANGELICA/need for possible HD post contrast exposure with angiogram---reported understanding.

## 2018-01-24 NOTE — PROGRESS NOTE ADULT - PROBLEM SELECTOR PLAN 3
CAD with hx of PCIs  - currently chest pain free, cardiac enzymes negative x 3 sets.  - Continue ASA/BB/Statin

## 2018-01-24 NOTE — PROGRESS NOTE ADULT - PROBLEM SELECTOR PLAN 2
EP service following  New onset Afib s/p ANMOL and failed DCCV 1/23.  - in Afib 80s, will continue Metoprolol Tartrate 12.5mg PO BID and Amio 400mg PO BID (started 1/23/18). Plan for possible repeat DCCV this admission.  - Will start Eliquis 2.5mg PO BID tonight.

## 2018-01-25 LAB
ANION GAP SERPL CALC-SCNC: 11 MMOL/L — SIGNIFICANT CHANGE UP (ref 5–17)
BUN SERPL-MCNC: 43 MG/DL — HIGH (ref 7–23)
CALCIUM SERPL-MCNC: 9.8 MG/DL — SIGNIFICANT CHANGE UP (ref 8.4–10.5)
CHLORIDE SERPL-SCNC: 97 MMOL/L — SIGNIFICANT CHANGE UP (ref 96–108)
CO2 SERPL-SCNC: 32 MMOL/L — HIGH (ref 22–31)
CREAT SERPL-MCNC: 2.23 MG/DL — HIGH (ref 0.5–1.3)
GLUCOSE BLDC GLUCOMTR-MCNC: 133 MG/DL — HIGH (ref 70–99)
GLUCOSE BLDC GLUCOMTR-MCNC: 140 MG/DL — HIGH (ref 70–99)
GLUCOSE BLDC GLUCOMTR-MCNC: 155 MG/DL — HIGH (ref 70–99)
GLUCOSE BLDC GLUCOMTR-MCNC: 193 MG/DL — HIGH (ref 70–99)
GLUCOSE SERPL-MCNC: 136 MG/DL — HIGH (ref 70–99)
HCT VFR BLD CALC: 25.1 % — LOW (ref 34.5–45)
HGB BLD-MCNC: 7.7 G/DL — LOW (ref 11.5–15.5)
MAGNESIUM SERPL-MCNC: 2.1 MG/DL — SIGNIFICANT CHANGE UP (ref 1.6–2.6)
MCHC RBC-ENTMCNC: 29.3 PG — SIGNIFICANT CHANGE UP (ref 27–34)
MCHC RBC-ENTMCNC: 30.7 G/DL — LOW (ref 32–36)
MCV RBC AUTO: 95.4 FL — SIGNIFICANT CHANGE UP (ref 80–100)
PLATELET # BLD AUTO: 164 K/UL — SIGNIFICANT CHANGE UP (ref 150–400)
POTASSIUM SERPL-MCNC: 4.6 MMOL/L — SIGNIFICANT CHANGE UP (ref 3.5–5.3)
POTASSIUM SERPL-SCNC: 4.6 MMOL/L — SIGNIFICANT CHANGE UP (ref 3.5–5.3)
RBC # BLD: 2.63 M/UL — LOW (ref 3.8–5.2)
RBC # FLD: 17.9 % — HIGH (ref 10.3–16.9)
SODIUM SERPL-SCNC: 140 MMOL/L — SIGNIFICANT CHANGE UP (ref 135–145)
WBC # BLD: 6.8 K/UL — SIGNIFICANT CHANGE UP (ref 3.8–10.5)
WBC # FLD AUTO: 6.8 K/UL — SIGNIFICANT CHANGE UP (ref 3.8–10.5)

## 2018-01-25 PROCEDURE — 99232 SBSQ HOSP IP/OBS MODERATE 35: CPT

## 2018-01-25 RX ORDER — IRON SUCROSE 20 MG/ML
200 INJECTION, SOLUTION INTRAVENOUS EVERY 24 HOURS
Qty: 0 | Refills: 0 | Status: DISCONTINUED | OUTPATIENT
Start: 2018-01-25 | End: 2018-01-28

## 2018-01-25 RX ADMIN — Medication 10 MILLIGRAM(S): at 06:11

## 2018-01-25 RX ADMIN — Medication 12.5 MILLIGRAM(S): at 18:01

## 2018-01-25 RX ADMIN — APIXABAN 2.5 MILLIGRAM(S): 2.5 TABLET, FILM COATED ORAL at 22:50

## 2018-01-25 RX ADMIN — ISOSORBIDE DINITRATE 10 MILLIGRAM(S): 5 TABLET ORAL at 07:02

## 2018-01-25 RX ADMIN — AMIODARONE HYDROCHLORIDE 400 MILLIGRAM(S): 400 TABLET ORAL at 18:02

## 2018-01-25 RX ADMIN — CLOPIDOGREL BISULFATE 75 MILLIGRAM(S): 75 TABLET, FILM COATED ORAL at 11:44

## 2018-01-25 RX ADMIN — APIXABAN 2.5 MILLIGRAM(S): 2.5 TABLET, FILM COATED ORAL at 11:36

## 2018-01-25 RX ADMIN — Medication 12.5 MILLIGRAM(S): at 07:02

## 2018-01-25 RX ADMIN — LIDOCAINE 1 PATCH: 4 CREAM TOPICAL at 02:16

## 2018-01-25 RX ADMIN — LIDOCAINE 1 PATCH: 4 CREAM TOPICAL at 11:36

## 2018-01-25 RX ADMIN — Medication 2: at 11:36

## 2018-01-25 RX ADMIN — PANTOPRAZOLE SODIUM 40 MILLIGRAM(S): 20 TABLET, DELAYED RELEASE ORAL at 07:02

## 2018-01-25 RX ADMIN — AMIODARONE HYDROCHLORIDE 400 MILLIGRAM(S): 400 TABLET ORAL at 06:10

## 2018-01-25 RX ADMIN — ATORVASTATIN CALCIUM 40 MILLIGRAM(S): 80 TABLET, FILM COATED ORAL at 22:50

## 2018-01-25 RX ADMIN — Medication 25 MICROGRAM(S): at 06:10

## 2018-01-25 RX ADMIN — Medication 2: at 16:32

## 2018-01-25 RX ADMIN — Medication 40 MILLIGRAM(S): at 06:10

## 2018-01-25 RX ADMIN — TIOTROPIUM BROMIDE 1 CAPSULE(S): 18 CAPSULE ORAL; RESPIRATORY (INHALATION) at 11:36

## 2018-01-25 RX ADMIN — IRON SUCROSE 110 MILLIGRAM(S): 20 INJECTION, SOLUTION INTRAVENOUS at 22:49

## 2018-01-25 RX ADMIN — LIDOCAINE 1 PATCH: 4 CREAM TOPICAL at 22:52

## 2018-01-25 RX ADMIN — Medication 81 MILLIGRAM(S): at 11:36

## 2018-01-25 NOTE — PROGRESS NOTE ADULT - PROBLEM SELECTOR PLAN 7
Heme (Dr. Elliott following)  - Iron deficiency anemia- H/H 8.3/28. Will monitor CBC closely & transfuse PRBCs to keep Hb > 7g/dl. S/p IV Iron x3 days and Epogen 10,000 units x3 days.  - Will F/U SPEP, IPEP, Immunofixation, LDH, Beta-2 microglobulin.

## 2018-01-25 NOTE — PROGRESS NOTE ADULT - ASSESSMENT
89 yr old F with HTN, HLD, DM-II, CAD/PCIs, diastolic CHF (previously normal EF, now reduced to 20-25%), renal artery stenosis, chronic iron deficiency anemia who is admitted with new onset AFib and acute systolic CHF exacerbation.

## 2018-01-25 NOTE — PROGRESS NOTE ADULT - SUBJECTIVE AND OBJECTIVE BOX
Interventional Cardiology PA Adult Progress Note    Subjective Assessment: pt seen this AM with daughter at bedside. Pt endorsing no complaints.  	  MEDICATIONS:  amiodarone    Tablet 400 milliGRAM(s) Oral two times a day  hydrALAZINE 10 milliGRAM(s) Oral three times a day  isosorbide   dinitrate Tablet (ISORDIL) 10 milliGRAM(s) Oral three times a day  metoprolol     tartrate 12.5 milliGRAM(s) Oral two times a day      tiotropium 18 MICROgram(s) Capsule 1 Capsule(s) Inhalation daily      pantoprazole    Tablet 40 milliGRAM(s) Oral before breakfast    atorvastatin 40 milliGRAM(s) Oral at bedtime  dextrose 50% Injectable 12.5 Gram(s) IV Push once  dextrose 50% Injectable 25 Gram(s) IV Push once  dextrose 50% Injectable 25 Gram(s) IV Push once  dextrose Gel 1 Dose(s) Oral once PRN  glucagon  Injectable 1 milliGRAM(s) IntraMuscular once PRN  insulin lispro (HumaLOG) corrective regimen sliding scale   SubCutaneous three times a day before meals  insulin lispro (HumaLOG) corrective regimen sliding scale   SubCutaneous at bedtime  levothyroxine 25 MICROGram(s) Oral daily    apixaban 2.5 milliGRAM(s) Oral every 12 hours  aspirin enteric coated 81 milliGRAM(s) Oral daily  clopidogrel Tablet 75 milliGRAM(s) Oral daily  dextrose 5%. 1000 milliLiter(s) IV Continuous <Continuous>  epoetin jayesh Injectable 76871 Unit(s) SubCutaneous every other day  lidocaine   Patch 1 Patch Transdermal daily      	    [PHYSICAL EXAM:  TELEMETRY: AF rate controlled in 70s no acute events o/n  T(C): 36.3 (01-25-18 @ 09:05), Max: 36.3 (01-25-18 @ 09:05)  HR: 71 (01-25-18 @ 09:05) (71 - 80)  BP: 107/44 (01-25-18 @ 11:56) (95/53 - 131/56)  RR: 15 (01-25-18 @ 09:05) (15 - 20)  SpO2: 98% (01-25-18 @ 11:56) (96% - 99%)  Wt(kg): --  I&O's Summary    24 Jan 2018 07:01  -  25 Jan 2018 07:00  --------------------------------------------------------  IN: 600 mL / OUT: 650 mL / NET: -50 mL    25 Jan 2018 07:01  -  25 Jan 2018 13:06  --------------------------------------------------------  IN: 30 mL / OUT: 200 mL / NET: -170 mL                                           Appearance: Normal	  HEENT:   Normal oral mucosa, PERRL, EOMI	  Neck: Supple no JVD  Cardiovascular: irregularly irregular, +S1, +S2.   Respiratory: +bibasilar rales  Gastrointestinal:  Soft, Non-tender, + BS	  Skin: No rashes, No ecchymoses, No cyanosis  Extremities: no edema, +L radial and brachial site c/d/i  Vascular: Peripheral pulses palpable 2+ bilaterally  Neurologic: Non-focal  Psychiatry: A & O x 3, Mood & affect appropriate      	    ECG:  	  RADIOLOGY:   DIAGNOSTIC TESTING:  [ ] Echocardiogram:  [ ]  Catheterization:  [ ] Stress Test:    [ ] ANMOL  OTHER: 	    LABS:	 	  CARDIAC MARKERS:                                  7.7    6.8   )-----------( 164      ( 25 Jan 2018 06:19 )             25.1 01-25    140  |  97  |  43<H>  ----------------------------<  136<H>  4.6   |  32<H>  |  2.23<H>    Ca    9.8      25 Jan 2018 06:19  Mg     2.1     01-25      proBNP:   Lipid Profile:   HgA1c:   TSH:   PTT - ( 24 Jan 2018 07:34 )  PTT:51.9 sec      DVT ppx: Eliquis renally dosed    Dispo: Monitor renal function while holding Lasix and f/u EP recs for any possible intervention this admission.

## 2018-01-25 NOTE — PROGRESS NOTE ADULT - SUBJECTIVE AND OBJECTIVE BOX
INTERVAL HPI/OVERNIGHT EVENTS:    Patient is a 89y old female who presents with a chief complaint of SOB was seen and examined today. Reports the following symptoms:    CONSTITUTIONAL:  Negative fever or chills, feels better, good appetite  EYES:  Negative  blurry vision or double vision  CARDIOVASCULAR:  Negative for chest pain or palpitations  RESPIRATORY:  Negative for cough, wheezing, or SOB   GASTROINTESTINAL:  Negative for nausea, vomiting, diarrhea, constipation, or abdominal pain  GENITOURINARY:  Negative frequency, urgency or dysuria  NEUROLOGIC:  No headache, confusion, dizziness, lightheadedness    MEDICATIONS  (STANDING):  amiodarone    Tablet 400 milliGRAM(s) Oral two times a day  apixaban 2.5 milliGRAM(s) Oral every 12 hours  aspirin enteric coated 81 milliGRAM(s) Oral daily  atorvastatin 40 milliGRAM(s) Oral at bedtime  clopidogrel Tablet 75 milliGRAM(s) Oral daily  dextrose 5%. 1000 milliLiter(s) (50 mL/Hr) IV Continuous <Continuous>  dextrose 50% Injectable 12.5 Gram(s) IV Push once  dextrose 50% Injectable 25 Gram(s) IV Push once  dextrose 50% Injectable 25 Gram(s) IV Push once  epoetin jayesh Injectable 95022 Unit(s) SubCutaneous every other day  hydrALAZINE 10 milliGRAM(s) Oral three times a day  insulin lispro (HumaLOG) corrective regimen sliding scale   SubCutaneous three times a day before meals  insulin lispro (HumaLOG) corrective regimen sliding scale   SubCutaneous at bedtime  isosorbide   dinitrate Tablet (ISORDIL) 10 milliGRAM(s) Oral three times a day  levothyroxine 25 MICROGram(s) Oral daily  lidocaine   Patch 1 Patch Transdermal daily  metoprolol     tartrate 12.5 milliGRAM(s) Oral two times a day  pantoprazole    Tablet 40 milliGRAM(s) Oral before breakfast  tiotropium 18 MICROgram(s) Capsule 1 Capsule(s) Inhalation daily    MEDICATIONS  (PRN):  dextrose Gel 1 Dose(s) Oral once PRN Blood Glucose LESS THAN 70 milliGRAM(s)/deciliter  glucagon  Injectable 1 milliGRAM(s) IntraMuscular once PRN Glucose LESS THAN 70 milligrams/deciliter        LABS:                        7.7    6.8   )-----------( 164      ( 25 Jan 2018 06:19 )             25.1     01-25    140  |  97  |  43<H>  ----------------------------<  136<H>  4.6   |  32<H>  |  2.23<H>    Ca    9.8      25 Jan 2018 06:19  Mg     2.1     01-25      Hemoglobin A1C, Whole Blood: 7.0 % (01-17-18 @ 05:28)    PTT - ( 24 Jan 2018 07:34 )  PTT:51.9 sec    Thyroid Stimulating Hormone, Serum: 6.350 uIU/mL (01-18 @ 06:38)  Thyroid Stimulating Hormone, Serum: 5.255 uIU/mL (01-17 @ 20:12)      RADIOLOGY & ADDITIONAL TESTS:      Vital Signs Last 24 Hrs  T(C): 36.2 (25 Jan 2018 05:05), Max: 36.2 (24 Jan 2018 22:03)  T(F): 97.1 (25 Jan 2018 05:05), Max: 97.2 (24 Jan 2018 22:03)  HR: 71 (25 Jan 2018 09:05) (71 - 80)  BP: 117/51 (25 Jan 2018 09:05) (95/53 - 131/56)  BP(mean): --  RR: 15 (25 Jan 2018 09:05) (15 - 20)  SpO2: 96% (25 Jan 2018 09:05) (96% - 99%)    CAPILLARY BLOOD GLUCOSE      PHYSICAL EXAM:  Constitutional: wn/wd in NAD.   HEENT: NCAT, MMM, OP clear, EOMI, , no proptosis or lid retraction  Neck: supple, no acanthosis, no thyromegaly or palpable thyroid nodules   Respiratory: Lungs CTAB.  Cardiovascular: regular rhythm, normal S1 and S2, no audible murmurs, no peripheral edema  GI: soft, + bowel sounds, NT/ND, no masses/HSM appreciated.  Neurology: no tremors, DTR 2+  Skin: no visible rashes/lesions  Psychiatric: AAO x 3, normal affect/mood.        A/P: 89yFemale admitted for SOB. Consulted and being followed for Diabetes Type 2 and hypothyroidism.     DM Type 2 controlled with diet and insulin -     Please continue 12 units Lantus HS, premeal Lispro 3 units TID, and  Lispro moderate/low dose sliding scale 4 times daily (before meals and bedtime).    Please continue consistent carbohydrate (Diabetic) diet, FS ac & hs. Target  - 150 mg/dl.      Hyperthyroidism controlled with methimazole 10 mg po qd.    Case discussed with attending and primary team.      Attending attestation:  I have seen and evaluated the patient at the bedside.  Nurse Practitioner/Fellow/Resident’s note reviewed, including vital signs, PE and laboratory results.  Direct personal management and extensive interpretation of the data conducted.   Assessment and recommendations as above. INTERVAL HPI/OVERNIGHT EVENTS:    Patient is a 89y old female who presents with a chief complaint of SOB was seen and examined today. Reports the following symptoms:    CONSTITUTIONAL:  Negative fever or chills, feels better, good appetite  EYES:  Negative  blurry vision or double vision  CARDIOVASCULAR:  Negative for chest pain or palpitations  RESPIRATORY:  Negative for cough, wheezing, or SOB   GASTROINTESTINAL:  Negative for nausea, vomiting, diarrhea, constipation, or abdominal pain  GENITOURINARY:  Negative frequency, urgency or dysuria  NEUROLOGIC:  No headache, confusion, dizziness, lightheadedness    MEDICATIONS  (STANDING):  amiodarone    Tablet 400 milliGRAM(s) Oral two times a day  apixaban 2.5 milliGRAM(s) Oral every 12 hours  aspirin enteric coated 81 milliGRAM(s) Oral daily  atorvastatin 40 milliGRAM(s) Oral at bedtime  clopidogrel Tablet 75 milliGRAM(s) Oral daily  dextrose 5%. 1000 milliLiter(s) (50 mL/Hr) IV Continuous <Continuous>  dextrose 50% Injectable 12.5 Gram(s) IV Push once  dextrose 50% Injectable 25 Gram(s) IV Push once  dextrose 50% Injectable 25 Gram(s) IV Push once  epoetin jayesh Injectable 61220 Unit(s) SubCutaneous every other day  hydrALAZINE 10 milliGRAM(s) Oral three times a day  insulin lispro (HumaLOG) corrective regimen sliding scale   SubCutaneous three times a day before meals  insulin lispro (HumaLOG) corrective regimen sliding scale   SubCutaneous at bedtime  isosorbide   dinitrate Tablet (ISORDIL) 10 milliGRAM(s) Oral three times a day  levothyroxine 25 MICROGram(s) Oral daily  lidocaine   Patch 1 Patch Transdermal daily  metoprolol     tartrate 12.5 milliGRAM(s) Oral two times a day  pantoprazole    Tablet 40 milliGRAM(s) Oral before breakfast  tiotropium 18 MICROgram(s) Capsule 1 Capsule(s) Inhalation daily    MEDICATIONS  (PRN):  dextrose Gel 1 Dose(s) Oral once PRN Blood Glucose LESS THAN 70 milliGRAM(s)/deciliter  glucagon  Injectable 1 milliGRAM(s) IntraMuscular once PRN Glucose LESS THAN 70 milligrams/deciliter        LABS:                        7.7    6.8   )-----------( 164      ( 25 Jan 2018 06:19 )             25.1     01-25    140  |  97  |  43<H>  ----------------------------<  136<H>  4.6   |  32<H>  |  2.23<H>    Ca    9.8      25 Jan 2018 06:19  Mg     2.1     01-25      Hemoglobin A1C, Whole Blood: 7.0 % (01-17-18 @ 05:28)    PTT - ( 24 Jan 2018 07:34 )  PTT:51.9 sec    Thyroid Stimulating Hormone, Serum: 6.350 uIU/mL (01-18 @ 06:38)  Thyroid Stimulating Hormone, Serum: 5.255 uIU/mL (01-17 @ 20:12)      RADIOLOGY & ADDITIONAL TESTS:      Vital Signs Last 24 Hrs  T(C): 36.2 (25 Jan 2018 05:05), Max: 36.2 (24 Jan 2018 22:03)  T(F): 97.1 (25 Jan 2018 05:05), Max: 97.2 (24 Jan 2018 22:03)  HR: 71 (25 Jan 2018 09:05) (71 - 80)  BP: 117/51 (25 Jan 2018 09:05) (95/53 - 131  BP(mean): --  RR: 15 (25 Jan 2018 09:05) (15 - 20)  SpO2: 96% (25 Jan 2018 09:05) (96% - 99%)    CAPILLARY BLOOD GLUCOSE      PHYSICAL EXAM:  Constitutional: wn/wd in NAD.   HEENT: NCAT, MMM, OP clear, EOMI, no proptosis or lid retraction  Neck: supple, no acanthosis, no thyromegaly or palpable thyroid nodules   Respiratory: Lungs CTAB.  Cardiovascular: regular rhythm, normal S1 and S2, no audible murmurs, no peripheral edema  GI: soft, + bowel sounds, NT/ND, no masses/HSM appreciated.  Neurology: no tremors, DTR 2+  Skin: no visible rashes/lesions  Psychiatric: AAO x 3, normal affect/mood.        A/P: 89yFemale admitted for SOB. Consulted and being followed for Diabetes Type 2 and hypothyroidism.     DM Type 2 controlled with diet      Please continue Lispro moderate/low dose sliding scale 4 times daily (before meals and bedtime).    Please continue consistent carbohydrate (Diabetic) diet, FS ac & hs. Target  - 150 mg/dl.      Hypothyroidism controlled with levothyroxine 25 mcg po qd.    Case discussed with attending and primary team.      Attending attestation:  I have seen and evaluated the patient at the bedside.  Nurse Practitioner/Fellow/Resident’s note reviewed, including vital signs, PE and laboratory results.  Direct personal management and extensive interpretation of the data conducted.   Assessment and recommendations as above.

## 2018-01-25 NOTE — CHART NOTE - NSCHARTNOTEFT_GEN_A_CORE
Admitting Diagnosis:   Patient is a 89y old  Female who presents with a chief complaint of chest pain, SOB, palpitations, LE Edema.     PAST MEDICAL & SURGICAL HISTORY:  Anemia  CAD (coronary artery disease)  DM (diabetes mellitus)  Emphysema  Hypercholesteremia  HTN (hypertension)  S/P bladder repair  S/P hysterectomy with oophorectomy  History of bilateral carotid endarterectomy  History of umbilical hernia repair  S/P cataract surgery  S/P TKR (total knee replacement)      Current Nutrition Order:  Puree w/Thin Liquids     PO Intake: Good (%) [   ]  Fair (50-75%) [ X  ] Poor (<25%) [   ]    GI Issues: No N/V/C/D reported at this time.     Pain: No pain endorsed    Skin Integrity: Surgical wound intact     Labs:       140  |  97  |  43<H>  ----------------------------<  136<H>  4.6   |  32<H>  |  2.23<H>    Ca    9.8      2018 06:19  Mg     2.1           CAPILLARY BLOOD GLUCOSE      POCT Blood Glucose.: 193 mg/dL (2018 10:51)  POCT Blood Glucose.: 133 mg/dL (2018 06:39)  POCT Blood Glucose.: 155 mg/dL (2018 22:29)  POCT Blood Glucose.: 145 mg/dL (2018 16:20)      Medications:  MEDICATIONS  (STANDING):  amiodarone    Tablet 400 milliGRAM(s) Oral two times a day  apixaban 2.5 milliGRAM(s) Oral every 12 hours  aspirin enteric coated 81 milliGRAM(s) Oral daily  atorvastatin 40 milliGRAM(s) Oral at bedtime  clopidogrel Tablet 75 milliGRAM(s) Oral daily  dextrose 5%. 1000 milliLiter(s) (50 mL/Hr) IV Continuous <Continuous>  dextrose 50% Injectable 12.5 Gram(s) IV Push once  dextrose 50% Injectable 25 Gram(s) IV Push once  dextrose 50% Injectable 25 Gram(s) IV Push once  epoetin jayesh Injectable 39789 Unit(s) SubCutaneous every other day  hydrALAZINE 10 milliGRAM(s) Oral three times a day  insulin lispro (HumaLOG) corrective regimen sliding scale   SubCutaneous three times a day before meals  insulin lispro (HumaLOG) corrective regimen sliding scale   SubCutaneous at bedtime  isosorbide   dinitrate Tablet (ISORDIL) 10 milliGRAM(s) Oral three times a day  levothyroxine 25 MICROGram(s) Oral daily  lidocaine   Patch 1 Patch Transdermal daily  metoprolol     tartrate 12.5 milliGRAM(s) Oral two times a day  pantoprazole    Tablet 40 milliGRAM(s) Oral before breakfast  tiotropium 18 MICROgram(s) Capsule 1 Capsule(s) Inhalation daily    MEDICATIONS  (PRN):  dextrose Gel 1 Dose(s) Oral once PRN Blood Glucose LESS THAN 70 milliGRAM(s)/deciliter  glucagon  Injectable 1 milliGRAM(s) IntraMuscular once PRN Glucose LESS THAN 70 milligrams/deciliter      Weight:  Daily     Daily Weight in k.2 (2018 07:01)    Weight Change: Weights fluctuating between 76-72kg throughout admit     Estimated energy needs: Ideal body weight used for calculations as pt >120% of IBW. Needs estimated per age; fluids per PA.  Calories: 20-25 kcal/kg = 7431-0118 kcal/day  Protein: 1.0-1.2 g/kg = 59-71 g protein/day  Fluids per MD/PA 2/2 CHF     Subjective: 89 yo/female admitted with CHF exacerbation. Pt seen in room, awake, alert, pleasant, only [place]ian speaking, family member at bedside available for translation. Pt endorses fair appetite and intake, just received lunch as RD was in room. No N/V/C/D reported at this time. Tolerating puree well. , 155, 145mg/dL.     Previous Nutrition Diagnosis:   Excessive fluid intake RT no formal MNT education regarding fluid intake AEB edema, wt gain, SOB     Active [ X  ]  Resolved [   ]    If resolved, new PES:     Goal: Pt will be compliant with fluid restriction throughout admission, pt will have no further unintentional wt gain     Recommendations:   Recommend addition of Consistent Carbohydrate Diet and DASH diet   Encourage PO intake and provide assistance as necessary  Trend daily weights     Education: N/A- educated at previous visit.    Risk Level: High [ X  ] Moderate [   ] Low [   ].

## 2018-01-25 NOTE — PROGRESS NOTE ADULT - SUBJECTIVE AND OBJECTIVE BOX
MEDICAL HISTORY:      89 year old  female admitted with CHF and atrial fibrillation in  the 100's. The history is notable for vasculopathy with multiple cardiac, lower extremity and carotid procedures. n echocardiogram here showed .reduced LVEF. The hospital course has focused on diuresis and rate control.     On admission GFR was reduced, with Scr ~1.5 mg/dl. Baseline Scr data n/a. On admission, anemia was noted with Hgb 8's g/dl.  A beta-microalbumin screen was elevated (6.0). TF sat was 12, Ferritin 169. Hematology is evaluating.    CKD workup in progress  Anemia workup in progress.  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 torsemide, prednisone 20 mg, Januvia, isordil, ASA, statin, omeprazole, various bronchodilators.       INTERVAL HISTORY:    Scr 1.76, 1.78, 2.23 mg/dl.     SUMMARY COMMENTS:           ---------------------------------------------------------------------------------------------------------------------------------------------------------------------------    SUBJECTIVE & OBJECTIVE DATA DOCUMENTATION:     REVIEW OF SYSTEMS SCREENED ORGAN SYSTEMS:  Constitutional: fever, chills, anorexia or fatigue  Pulmonary: difficulty breathing, wheezing, cough  Cardiovascular: exertional dyspnea, chest pain, palpitations, dizziness or leg swelling  Gastrointestinal: abdominal or epigastric pain, nausea, vomiting or hematemesis, diarrhea, melena or bright red blood per rectum.  Genitourinary: dysuria, urgency, frequency, flank pain, difficulty voiding  Skin: No pruritis, rashes or lesions  Neurology: memory loss, dysarthria, extremity weakness, neuropathic pain, headache, visual changes  Dialysis access if present : pain, bleeding, swelling     ROS POSITIVE FINDINGS:     PAST MEDICAL & SURGICAL HISTORY:  Anemia  CAD (coronary artery disease)  DM (diabetes mellitus)  Emphysema  Hypercholesteremia  HTN (hypertension)  S/P bladder repair  S/P hysterectomy with oophorectomy  History of bilateral carotid endarterectomy  History of umbilical hernia repair  S/P cataract surgery  S/P TKR (total knee replacement)      PHYSICAL EXAM:  T(C): 36.3 (18 @ 09:05), Max: 36.3 (18 @ 09:05)  HR: 71 (18 @ 09:05)  BP: 117/51 (18 @ 09:05) (95/53 - 131/56)  RR: 15 (18 @ 09:05)  SpO2: 96% (18 @ 09:05)  Wt(kg): --  I&O's Summary    2018 07:  -  2018 07:00  --------------------------------------------------------  IN: 600 mL / OUT: 650 mL / NET: -50 mL    2018 07:  -  2018 10:10  --------------------------------------------------------  IN: 30 mL / OUT: 200 mL / NET: -170 mL      Weight     APPEARANCE: In bed, NAD.   HEAD & NECK: normocephalic, no stiff neck, no masses, oral mucosa moist, no scleral icteris  RESPIRATORY: no accessory muscle use, no labored breathing, no dullness, basilar rales and rhonchi, no wheeze  CARDIOVASCULAR: no JVD, IRRR, S1S2,+ gallop,  no murmur, no rub.  ABDOMEN: soft, no tenderness, no masses, no hepatomegally, no splenomegally  : no bladder distension  LYMPHATIC: no lymphadenopathy (neck, axilla, groin)  EXTREMITIES & MUSCULOSKELETAL: no cyanosis, no clubbing, no tenderness, strength  L=R  EDEMA: trace  PULSES: upper extremity pulses present, lower extremity pulses absent  SKIN: no rash, no stasis, no induration  NEUROLOGIC & PSYCHIATRIC:  alert, interactive, oriented to time and place, responds to stimuli, no asterixis      MEDICATIONS  (STANDING):  amiodarone    Tablet 400 milliGRAM(s) Oral two times a day  apixaban 2.5 milliGRAM(s) Oral every 12 hours  aspirin enteric coated 81 milliGRAM(s) Oral daily  atorvastatin 40 milliGRAM(s) Oral at bedtime  clopidogrel Tablet 75 milliGRAM(s) Oral daily  dextrose 5%. 1000 milliLiter(s) (50 mL/Hr) IV Continuous <Continuous>  dextrose 50% Injectable 12.5 Gram(s) IV Push once  dextrose 50% Injectable 25 Gram(s) IV Push once  dextrose 50% Injectable 25 Gram(s) IV Push once  epoetin jayesh Injectable 07848 Unit(s) SubCutaneous every other day  hydrALAZINE 10 milliGRAM(s) Oral three times a day  insulin lispro (HumaLOG) corrective regimen sliding scale   SubCutaneous three times a day before meals  insulin lispro (HumaLOG) corrective regimen sliding scale   SubCutaneous at bedtime  isosorbide   dinitrate Tablet (ISORDIL) 10 milliGRAM(s) Oral three times a day  levothyroxine 25 MICROGram(s) Oral daily  lidocaine   Patch 1 Patch Transdermal daily  metoprolol     tartrate 12.5 milliGRAM(s) Oral two times a day  pantoprazole    Tablet 40 milliGRAM(s) Oral before breakfast  tiotropium 18 MICROgram(s) Capsule 1 Capsule(s) Inhalation daily    MEDICATIONS  (PRN):  dextrose Gel 1 Dose(s) Oral once PRN Blood Glucose LESS THAN 70 milliGRAM(s)/deciliter  glucagon  Injectable 1 milliGRAM(s) IntraMuscular once PRN Glucose LESS THAN 70 milligrams/deciliter      DATA:  140    |  97     |  43<H>  ----------------------------<  136<H>  Ca:9.8   (2018 06:19)  4.6     |  32<H>  |  2.23<H>      eGFR if Non : 18 <L>  eGFR if : 21 <L>                            7.7<L>  6.8   )-----------( 164      ( 2018 06:19 )             25.1<L>    Phos:-- M.1 mg/dL PTH:-- Uric acid:-- Serum Osm:--  Ferritin:-- Iron:-- TIBC:-- Tsat:--  B12:-- TSH:-- ( @ 06:19)    Urinalysis Basic - ( 2018 12:34 )  Color: Yellow / Appearance: Cloudy<!!> / SG: <=1.005 / pH: x  Gluc: x / Ketone: NEGATIVE  / Bili: Negative / Urobili: 0.2 E.U./dL   Blood: x / Protein: NEGATIVE mg/dL / Nitrite: NEGATIVE   Leuk Esterase: Moderate<!!> / RBC: x / WBC Many /HPF<!!>   Sq Epi: x / Non Sq Epi: 0-5 /HPF / Bacteria: Present /HPF<!!>      UProt:-- UCr:73 mg/dL P/C Ratio:-- 24 hour Prot:-- UVol:-- CrCl:--  Tj:-- UOsm:-- UVol:-- UCl:-- UK:-- ( @ 20:14) MEDICAL HISTORY:      89 year old  female admitted with CHF and atrial fibrillation in  the 100's. The history is notable for vasculopathy with multiple cardiac, lower extremity and carotid procedures. n echocardiogram here showed .reduced LVEF. The hospital course has focused on diuresis and rate control.     On admission GFR was reduced, with Scr ~1.5 mg/dl. Baseline Scr data n/a. On admission, anemia was noted with Hgb 8's g/dl.  A beta-microalbumin screen was elevated (6.0). TF sat was 12, Ferritin 169. Hematology is evaluating.    CKD secibcary to nephrosclerosis  Anemia secondary to CKD and iron deficiency  Vasculopathy, coronary, carotid, PVD   Cardiomyopathy with reduced LVEF  long-standing HTN (40 years)  recent DM (few years)  HLD  Emphysema/COPD    ECHO severe global hypokinesis with LVEF 20-25, mod MR, LAE, PHTN    Selected medications at home: amlodipine. torsemide, prednisone 20 mg, Januvia, isordil, ASA, statin, omeprazole, various bronchodilators.       INTERVAL HISTORY:    Scr 1.76, 1.78, 2.23 mg/dl.     Hgb 7.7 g/dl.     Weight 81.0, 81.2 kg    's, HR 70's (AFIB)    SUMMARY COMMENTS:     Serum creatine and GFR is essentially stable, with GFR<20 ml/min. There will be some bouncing around of the Scr related to hemodynamic effects - renal perfusion issues in the setting of CHF.     Hgb is now<8.0 g/dl and IV iron and EPO has been started.     Weight remains unchanged. The current dose of furosemide is keeping volume stable, but is not effecting negative fluid balance (manifested as weight loss).     Discussed with the cardiac team.           ---------------------------------------------------------------------------------------------------------------------------------------------------------------------------    SUBJECTIVE & OBJECTIVE DATA DOCUMENTATION:     REVIEW OF SYSTEMS SCREENED ORGAN SYSTEMS:  Constitutional: fever, chills, anorexia or fatigue  Pulmonary: difficulty breathing, wheezing, cough  Cardiovascular: exertional dyspnea, chest pain, palpitations, dizziness or leg swelling  Gastrointestinal: abdominal or epigastric pain, nausea, vomiting or hematemesis, diarrhea, melena or bright red blood per rectum.  Genitourinary: dysuria, urgency, frequency, flank pain, difficulty voiding  Skin: No pruritis, rashes or lesions  Neurology: memory loss, dysarthria, extremity weakness, neuropathic pain, headache, visual changes  Dialysis access if present : pain, bleeding, swelling     ROS POSITIVE FINDINGS: currently neg    PAST MEDICAL & SURGICAL HISTORY:  Anemia  CAD (coronary artery disease)  DM (diabetes mellitus)  Emphysema  Hypercholesteremia  HTN (hypertension)  S/P bladder repair  S/P hysterectomy with oophorectomy  History of bilateral carotid endarterectomy  History of umbilical hernia repair  S/P cataract surgery  S/P TKR (total knee replacement)      PHYSICAL EXAM:  T(C): 36.3 (18 @ 09:05), Max: 36.3 (18 @ 09:05)  HR: 71 (18 @ 09:05)  BP: 117/51 (18 @ 09:05) (95/53 - 131/56)  RR: 15 (18 @ 09:05)  SpO2: 96% (18 @ 09:05)  Wt(kg): --  I&O's Summary    2018 07:  -  2018 07:00  --------------------------------------------------------  IN: 600 mL / OUT: 650 mL / NET: -50 mL    2018 07:  -  2018 10:10  --------------------------------------------------------  IN: 30 mL / OUT: 200 mL / NET: -170 mL      Weight     APPEARANCE: In bed, NAD.   HEAD & NECK: normocephalic, no stiff neck, no masses, oral mucosa moist, no scleral icteris  RESPIRATORY: no accessory muscle use, no labored breathing, no dullness, basilar rales and rhonchi, no wheeze  CARDIOVASCULAR: no JVD, IRRR, S1S2,+ gallop,  no murmur, no rub.  ABDOMEN: soft, no tenderness, no masses, no hepatomegally, no splenomegally  : no bladder distension  LYMPHATIC: no lymphadenopathy (neck, axilla, groin)  EXTREMITIES & MUSCULOSKELETAL: no cyanosis, no clubbing, no tenderness, strength  L=R  EDEMA: trace  PULSES: upper extremity pulses present, lower extremity pulses absent  SKIN: no rash, no stasis, no induration  NEUROLOGIC & PSYCHIATRIC:  alert, interactive, oriented to time and place, responds to stimuli, no asterixis      MEDICATIONS  (STANDING):  amiodarone    Tablet 400 milliGRAM(s) Oral two times a day  apixaban 2.5 milliGRAM(s) Oral every 12 hours  aspirin enteric coated 81 milliGRAM(s) Oral daily  atorvastatin 40 milliGRAM(s) Oral at bedtime  clopidogrel Tablet 75 milliGRAM(s) Oral daily  dextrose 5%. 1000 milliLiter(s) (50 mL/Hr) IV Continuous <Continuous>  dextrose 50% Injectable 12.5 Gram(s) IV Push once  dextrose 50% Injectable 25 Gram(s) IV Push once  dextrose 50% Injectable 25 Gram(s) IV Push once  epoetin jayesh Injectable 87449 Unit(s) SubCutaneous every other day  hydrALAZINE 10 milliGRAM(s) Oral three times a day  insulin lispro (HumaLOG) corrective regimen sliding scale   SubCutaneous three times a day before meals  insulin lispro (HumaLOG) corrective regimen sliding scale   SubCutaneous at bedtime  isosorbide   dinitrate Tablet (ISORDIL) 10 milliGRAM(s) Oral three times a day  levothyroxine 25 MICROGram(s) Oral daily  lidocaine   Patch 1 Patch Transdermal daily  metoprolol     tartrate 12.5 milliGRAM(s) Oral two times a day  pantoprazole    Tablet 40 milliGRAM(s) Oral before breakfast  tiotropium 18 MICROgram(s) Capsule 1 Capsule(s) Inhalation daily    MEDICATIONS  (PRN):  dextrose Gel 1 Dose(s) Oral once PRN Blood Glucose LESS THAN 70 milliGRAM(s)/deciliter  glucagon  Injectable 1 milliGRAM(s) IntraMuscular once PRN Glucose LESS THAN 70 milligrams/deciliter      DATA:  140    |  97     |  43<H>  ----------------------------<  136<H>  Ca:9.8   (2018 06:19)  4.6     |  32<H>  |  2.23<H>      eGFR if Non : 18 <L>  eGFR if : 21 <L>                            7.7<L>  6.8   )-----------( 164      ( 2018 06:19 )             25.1<L>    Phos:-- M.1 mg/dL PTH:-- Uric acid:-- Serum Osm:--  Ferritin:-- Iron:-- TIBC:-- Tsat:--  B12:-- TSH:-- ( @ 06:19)    Urinalysis Basic - ( 2018 12:34 )  Color: Yellow / Appearance: Cloudy<!!> / SG: <=1.005 / pH: x  Gluc: x / Ketone: NEGATIVE  / Bili: Negative / Urobili: 0.2 E.U./dL   Blood: x / Protein: NEGATIVE mg/dL / Nitrite: NEGATIVE   Leuk Esterase: Moderate<!!> / RBC: x / WBC Many /HPF<!!>   Sq Epi: x / Non Sq Epi: 0-5 /HPF / Bacteria: Present /HPF<!!>      UProt:-- UCr:73 mg/dL P/C Ratio:-- 24 hour Prot:-- UVol:-- CrCl:--  Tj:-- UOsm:-- UVol:-- UCl:-- UK:-- ( @ 20:14)

## 2018-01-25 NOTE — PROGRESS NOTE ADULT - PROBLEM SELECTOR PLAN 3
- CAD with hx of PCIs  - currently chest pain free, cardiac enzymes negative x 3 sets.  - Continue ASA/BB/Statin

## 2018-01-25 NOTE — PROGRESS NOTE ADULT - PROBLEM SELECTOR PLAN 9
-s/p renal angiogram 1/24 with successful PTA of mid right renal artery ISR. Continue ASA/Plavix daily

## 2018-01-25 NOTE — PROGRESS NOTE ADULT - SUBJECTIVE AND OBJECTIVE BOX
History of Present Illness:  History of Present Illness: 	  This is a 89 y/o female, obese, with HTN, HPL, DM-II, Anemia, diastolic CHF (NL EF), known CAD with previous PCIs, b/l carotid endarterectomies, who presented to her cardiologist with c/o mid sternal chest pressure radiating to the back, associated with SOB, palpitations, LE edema and 4lbs weight gain over 1 week. In the office, her EKG showed new AFib and she was sent to the ER for further evaluation.   In the ER, 1st troponin negative, BNP 3805, BUN 42/1.57, H/H 8.6/27.7, EKG AFib @ 100bpm, LBBB, CXR with some vascular congestion. She received Cardizem 5mg IV x1, Lasix 40mg IV x1, was started on IV heparin and admitted for further management and telemetry monitoring.  No s/s of bleeding noted.    Review of Systems:  Review of Systems: GENERAL, CONSTITUTIONAL : + recent weight gain. Denies fever, chills  EYES, VISION: denies changes in vision   EARS, NOSE, THROAT: denies hearing loss  HEART, CARDIOVASCULAR: + chest pain, palpitations, SOB,  LE edema. Denies claudication  RESPIRATORY: + SOB; Denies cough, wheezing, PND, orthopnea  GASTROINTESTINAL: Denies abdominal pain, heartburn, bloody stool, dark tarry stool  GENITOURINARY: Denies frequent urination, urgency  MUSCULOSKELETAL denies joint pain or swelling, restricted motion, musculoskeletal pain.   SKIN & INTEGUMENTARY Denies rashes, sores, blisters, blisters, growths.  NEUROLOGICAL: Denies numbness or tingling sensations, sensation loss, burning.   PSYCHIATRIC: Denies nervousness, anxiety, depression  ENDOCRINE Denies heat or cold intolerance, excessive thirst HEMATOLOGIC/LYMPHATIC: Denies abnormal bleeding, bleeding of any kind	      Allergies and Intolerances:        Allergies:  	No Known Allergies:     Home Medications:   * Patient Currently Takes Medications as of 17-Jan-2018 02:09 documented in Structured Notes  · 	torsemide 10 mg oral tablet: 1 tab(s) orally once a day, Last Dose Taken:    · 	aspirin 81 mg oral tablet: 1 tab(s) orally once a day, Last Dose Taken:    · 	isosorbide mononitrate 30 mg oral tablet, extended release: 1 tab(s) orally once a day (in the morning), Last Dose Taken:    · 	Januvia 50 mg oral tablet: 1 tab(s) orally once a day, Last Dose Taken:    · 	amLODIPine 5 mg oral tablet: 1 tab(s) orally once a day, Last Dose Taken:    · 	predniSONE 20 mg oral tablet: 1 tab(s) orally once a day, Last Dose Taken:    · 	atorvastatin 20 mg oral tablet: 1 tab(s) orally once a day, Last Dose Taken:    · 	montelukast 10 mg oral tablet: 1 tab(s) orally once a day, Last Dose Taken:    · 	omeprazole 40 mg oral delayed release capsule: 1 cap(s) orally once a day, Last Dose Taken:    · 	Spiriva 18 mcg inhalation capsule: 1 cap(s) inhaled once a day, Last Dose Taken:      .  Patient History:   Past Medical History:  Anemia    CAD (coronary artery disease)    DM (diabetes mellitus)    Emphysema    HTN (hypertension)    Hypercholesteremia.    Past Surgical History:  History of bilateral carotid endarterectomy    History of umbilical hernia repair    S/P bladder repair    S/P cataract surgery    S/P hysterectomy with oophorectomy    S/P TKR (total knee replacement).    Family History:  Mother  Still living? No  Family history of emphysema, Age at diagnosis: 71-80.    Social History:  Social History (marital status, living situation, occupation, tobacco use, alcohol and drug use, and sexual history): Denies tobacco, ETOH or illicit drug use Lives with her daughter, ambulates with a walker	    Tobacco Screening:  · Core Measure Site	No	    Risk Assessment:   Present on Admission:  Deep Venous Thrombosis	no 	  Pulmonary Embolus	no 	    Heart Failure:  Does this patient have a history of or has been diagnosed with heart failure? yes.      Physical Exam:  Physical Exam:  Vital Signs Last 24 Hrs T(C): 35.9 (24 Jan 2018 06:39), Max: 36.4 (23 Jan 2018 22:25) T(F): 96.6 (24 Jan 2018 06:39), Max: 97.5 (23 Jan 2018 22:25) HR: 71 (24 Jan 2018 20:19) (71 - 90) BP: 103/48 (24 Jan 2018 20:19) (95/53 - 131/56) RR: 18 (24 Jan 2018 20:19) (16 - 23) SpO2: 98% (24 Jan 2018 20:19) (96% - 100%)    Appearance: Normal   HEENT:   Normal oral mucosa, PERRL, EOMI   Neck: Supple, - JVD; No Carotid Bruit   Cardiovascular: Normal S1 S2, No JVD, No murmurs  Respiratory: Lungs bibasilar crackles, no Rhonchi, Wheezing   Gastrointestinal:  Soft, Non-tender, + BS   Skin: No rashes, No ecchymoses, No cyanosis  Extremities: 3+ edema b/l, No clubbing, cyanosis  Vascular: Femoral pulses 1+ b/l without bruit, DP faint, b/l, PT faint b/l  Neurologic: Non-focal Psychiatry: A & O x 3, Mood & affect appropriate	      Labs and Results:  Labs, Radiology, Cardiology, and Other Results:                       7.7   6.8   )-----------( 164      ( 25 Jan 2018 06:19 )            25.1  	    Assessment and Plan:   Assessment:  · Assessment		  89 y/o female with HTN, HPL, DM-II, CAD/PCIs, diastolic CHF (NL EF), anemia who is admitted with new onset AFib and CHF exacerbation      Problem/Plan - 1:  ·  Problem: Atrial fibrillation.  Plan: New Afib with controlled HR  - Heparin gtt: Dose to keep PTT 60-80    Problem/Plan - 2:  - Monitor CBC closely & transfuse PRBCs to keep Hb > 7g/dl  - f/u SPEP, IPEP, Immunofixation, LDH, Beta-2 microglobulin  -  Epogen 10k sq tiw  - IV iron sucrose

## 2018-01-25 NOTE — PROGRESS NOTE ADULT - PROBLEM SELECTOR PLAN 1
- Right heart cath 1/24 revealed moderate to severe pulmonary HTN.  - Cr bumped- received AM Lasix 40mg IVP -on hold pending renal fxn  - Hydralazine 10mg PO TID, Isosordil 10mg PO TID, strict I/Os and daily weights.  - no ACE/ARB due to worsening renal function.  - Echo 1/18 revealed moderately dilated LA, moderate MR, mild to moderate TR, evidence of pulmonary hypertension (PASP 57 mmHg), severe global hypokinesis of the LV, EF 20- 25%. (newly depressed)

## 2018-01-25 NOTE — PROGRESS NOTE ADULT - PROBLEM SELECTOR PLAN 10
-+UA/Urine culture with MDR Ecoli. Patient currently asymptomatic, afebrile, will hold off antibiotic treatment.    Dispo: Monitor renal function while holding Lasix and f/u EP recs for any possible intervention this admission. Case d/w Dr. Crabtree and Dr. Dillard.  PT evaluated patient: MINDA vs HPT pending stair negotiation

## 2018-01-25 NOTE — PROGRESS NOTE ADULT - PROBLEM SELECTOR PLAN 6
Patient endorsing difficulty swallowing (solid food getting stuck)  - Evaluated by speech and swallow, patient with functional pharyngeal swallow. Recommended for Dysphagia 1 Pureed, thin Liquid diet and aspiration precautions.   - Barium swallow: Esophageal dysmotility likely secondary to presbyesophagus. GI service cleared patient for ANMOL this admission.

## 2018-01-25 NOTE — PROGRESS NOTE ADULT - PROBLEM SELECTOR PLAN 2
EP service following  - New onset Afib s/p ANMOL and failed DCCV 1/23.  - in Afib 80s, will continue Metoprolol Tartrate 12.5mg PO BID and Amio 400mg PO BID (started 1/23/18).   - c/w Eliquis 2.5mg BID  -f/u EP recs regarding possible repeat DCCV vs Ablation this admission

## 2018-01-25 NOTE — PROGRESS NOTE ADULT - PROBLEM SELECTOR PLAN 8
Renal (Dr. Dominguez following)  Stage III CKD- renal function uptrending 34/1.47-->38/1.78 post cath to 2.23 today - holding Lasix.  -Renal US 1/22 consistent with medical renal disease.   **Patient and family informed high risk for ANGELICA/need for possible HD post contrast exposure with angiogram---reported understanding.

## 2018-01-25 NOTE — PROGRESS NOTE ADULT - PROBLEM SELECTOR PLAN 4
Dr. Fry following   - Newly diagnosed hypothyroidisim  -c/w Levothyroxine 25mcg PO daily. F/U TFT in 6 weeks.

## 2018-01-26 PROBLEM — E78.00 PURE HYPERCHOLESTEROLEMIA, UNSPECIFIED: Chronic | Status: ACTIVE | Noted: 2018-01-16

## 2018-01-26 PROBLEM — E11.9 TYPE 2 DIABETES MELLITUS WITHOUT COMPLICATIONS: Chronic | Status: ACTIVE | Noted: 2018-01-16

## 2018-01-26 PROBLEM — J43.9 EMPHYSEMA, UNSPECIFIED: Chronic | Status: ACTIVE | Noted: 2018-01-16

## 2018-01-26 PROBLEM — I10 ESSENTIAL (PRIMARY) HYPERTENSION: Chronic | Status: ACTIVE | Noted: 2018-01-16

## 2018-01-26 PROBLEM — I25.10 ATHEROSCLEROTIC HEART DISEASE OF NATIVE CORONARY ARTERY WITHOUT ANGINA PECTORIS: Chronic | Status: ACTIVE | Noted: 2018-01-16

## 2018-01-26 LAB
ANION GAP SERPL CALC-SCNC: 13 MMOL/L — SIGNIFICANT CHANGE UP (ref 5–17)
BUN SERPL-MCNC: 48 MG/DL — HIGH (ref 7–23)
CALCIUM SERPL-MCNC: 9.9 MG/DL — SIGNIFICANT CHANGE UP (ref 8.4–10.5)
CHLORIDE SERPL-SCNC: 95 MMOL/L — LOW (ref 96–108)
CO2 SERPL-SCNC: 28 MMOL/L — SIGNIFICANT CHANGE UP (ref 22–31)
CREAT SERPL-MCNC: 2.5 MG/DL — HIGH (ref 0.5–1.3)
GLUCOSE BLDC GLUCOMTR-MCNC: 126 MG/DL — HIGH (ref 70–99)
GLUCOSE BLDC GLUCOMTR-MCNC: 132 MG/DL — HIGH (ref 70–99)
GLUCOSE BLDC GLUCOMTR-MCNC: 144 MG/DL — HIGH (ref 70–99)
GLUCOSE BLDC GLUCOMTR-MCNC: 174 MG/DL — HIGH (ref 70–99)
GLUCOSE SERPL-MCNC: 139 MG/DL — HIGH (ref 70–99)
HCT VFR BLD CALC: 26.5 % — LOW (ref 34.5–45)
HGB BLD-MCNC: 7.9 G/DL — LOW (ref 11.5–15.5)
MAGNESIUM SERPL-MCNC: 2.3 MG/DL — SIGNIFICANT CHANGE UP (ref 1.6–2.6)
MCHC RBC-ENTMCNC: 29.3 PG — SIGNIFICANT CHANGE UP (ref 27–34)
MCHC RBC-ENTMCNC: 29.8 G/DL — LOW (ref 32–36)
MCV RBC AUTO: 98.1 FL — SIGNIFICANT CHANGE UP (ref 80–100)
PLATELET # BLD AUTO: 149 K/UL — LOW (ref 150–400)
POTASSIUM SERPL-MCNC: 4.5 MMOL/L — SIGNIFICANT CHANGE UP (ref 3.5–5.3)
POTASSIUM SERPL-SCNC: 4.5 MMOL/L — SIGNIFICANT CHANGE UP (ref 3.5–5.3)
RBC # BLD: 2.7 M/UL — LOW (ref 3.8–5.2)
RBC # FLD: 17.7 % — HIGH (ref 10.3–16.9)
SODIUM SERPL-SCNC: 136 MMOL/L — SIGNIFICANT CHANGE UP (ref 135–145)
WBC # BLD: 6.8 K/UL — SIGNIFICANT CHANGE UP (ref 3.8–10.5)
WBC # FLD AUTO: 6.8 K/UL — SIGNIFICANT CHANGE UP (ref 3.8–10.5)

## 2018-01-26 PROCEDURE — 99232 SBSQ HOSP IP/OBS MODERATE 35: CPT

## 2018-01-26 PROCEDURE — 93010 ELECTROCARDIOGRAM REPORT: CPT

## 2018-01-26 RX ORDER — DEXTROSE 50 % IN WATER 50 %
25 SYRINGE (ML) INTRAVENOUS ONCE
Qty: 0 | Refills: 0 | Status: DISCONTINUED | OUTPATIENT
Start: 2018-01-26 | End: 2018-01-28

## 2018-01-26 RX ORDER — INSULIN LISPRO 100/ML
VIAL (ML) SUBCUTANEOUS
Qty: 0 | Refills: 0 | Status: DISCONTINUED | OUTPATIENT
Start: 2018-01-26 | End: 2018-01-28

## 2018-01-26 RX ORDER — SODIUM CHLORIDE 9 MG/ML
1000 INJECTION, SOLUTION INTRAVENOUS
Qty: 0 | Refills: 0 | Status: DISCONTINUED | OUTPATIENT
Start: 2018-01-26 | End: 2018-01-28

## 2018-01-26 RX ORDER — GLUCAGON INJECTION, SOLUTION 0.5 MG/.1ML
1 INJECTION, SOLUTION SUBCUTANEOUS ONCE
Qty: 0 | Refills: 0 | Status: DISCONTINUED | OUTPATIENT
Start: 2018-01-26 | End: 2018-01-28

## 2018-01-26 RX ORDER — DEXTROSE 50 % IN WATER 50 %
12.5 SYRINGE (ML) INTRAVENOUS ONCE
Qty: 0 | Refills: 0 | Status: DISCONTINUED | OUTPATIENT
Start: 2018-01-26 | End: 2018-01-28

## 2018-01-26 RX ORDER — DEXTROSE 50 % IN WATER 50 %
1 SYRINGE (ML) INTRAVENOUS ONCE
Qty: 0 | Refills: 0 | Status: DISCONTINUED | OUTPATIENT
Start: 2018-01-26 | End: 2018-01-28

## 2018-01-26 RX ORDER — FUROSEMIDE 40 MG
40 TABLET ORAL DAILY
Qty: 0 | Refills: 0 | Status: DISCONTINUED | OUTPATIENT
Start: 2018-01-26 | End: 2018-01-27

## 2018-01-26 RX ADMIN — AMIODARONE HYDROCHLORIDE 400 MILLIGRAM(S): 400 TABLET ORAL at 06:20

## 2018-01-26 RX ADMIN — ATORVASTATIN CALCIUM 40 MILLIGRAM(S): 80 TABLET, FILM COATED ORAL at 21:54

## 2018-01-26 RX ADMIN — Medication 25 MICROGRAM(S): at 06:20

## 2018-01-26 RX ADMIN — Medication 40 MILLIGRAM(S): at 18:54

## 2018-01-26 RX ADMIN — PANTOPRAZOLE SODIUM 40 MILLIGRAM(S): 20 TABLET, DELAYED RELEASE ORAL at 06:20

## 2018-01-26 RX ADMIN — APIXABAN 2.5 MILLIGRAM(S): 2.5 TABLET, FILM COATED ORAL at 12:15

## 2018-01-26 RX ADMIN — ISOSORBIDE DINITRATE 10 MILLIGRAM(S): 5 TABLET ORAL at 07:23

## 2018-01-26 RX ADMIN — ISOSORBIDE DINITRATE 10 MILLIGRAM(S): 5 TABLET ORAL at 21:54

## 2018-01-26 RX ADMIN — IRON SUCROSE 110 MILLIGRAM(S): 20 INJECTION, SOLUTION INTRAVENOUS at 21:54

## 2018-01-26 RX ADMIN — ERYTHROPOIETIN 10000 UNIT(S): 10000 INJECTION, SOLUTION INTRAVENOUS; SUBCUTANEOUS at 21:54

## 2018-01-26 RX ADMIN — Medication 12.5 MILLIGRAM(S): at 18:53

## 2018-01-26 RX ADMIN — TIOTROPIUM BROMIDE 1 CAPSULE(S): 18 CAPSULE ORAL; RESPIRATORY (INHALATION) at 12:17

## 2018-01-26 RX ADMIN — APIXABAN 2.5 MILLIGRAM(S): 2.5 TABLET, FILM COATED ORAL at 22:12

## 2018-01-26 RX ADMIN — Medication 81 MILLIGRAM(S): at 12:15

## 2018-01-26 RX ADMIN — Medication 12.5 MILLIGRAM(S): at 06:20

## 2018-01-26 RX ADMIN — AMIODARONE HYDROCHLORIDE 400 MILLIGRAM(S): 400 TABLET ORAL at 18:53

## 2018-01-26 RX ADMIN — LIDOCAINE 1 PATCH: 4 CREAM TOPICAL at 12:15

## 2018-01-26 RX ADMIN — CLOPIDOGREL BISULFATE 75 MILLIGRAM(S): 75 TABLET, FILM COATED ORAL at 12:15

## 2018-01-26 RX ADMIN — Medication: at 18:55

## 2018-01-26 RX ADMIN — Medication 10 MILLIGRAM(S): at 06:19

## 2018-01-26 RX ADMIN — ISOSORBIDE DINITRATE 10 MILLIGRAM(S): 5 TABLET ORAL at 14:51

## 2018-01-26 NOTE — PROGRESS NOTE ADULT - SUBJECTIVE AND OBJECTIVE BOX
MEDICAL HISTORY:      89 year old  female admitted with CHF and atrial fibrillation in  the 100's. The history is notable for vasculopathy with multiple cardiac, lower extremity and carotid procedures. n echocardiogram here showed .reduced LVEF. The hospital course has focused on diuresis and rate control.     On admission GFR was reduced, with Scr ~1.5 mg/dl. Baseline Scr data n/a. On admission, anemia was noted with Hgb 8's g/dl.  A beta-microalbumin screen was elevated (6.0). TF sat was 12, Ferritin 169. Hematology is evaluating.    CKD secibcary to nephrosclerosis  Anemia secondary to CKD and iron deficiency  Vasculopathy, coronary, carotid, PVD   Cardiomyopathy with reduced LVEF  long-standing HTN (40 years)  recent DM (few years)  HLD  Emphysema/COPD    ECHO severe global hypokinesis with LVEF 20-25, mod MR, LAE, PHTN    Selected medications at home: amlodipine. torsemide, prednisone 20 mg, Januvia, isordil, ASA, statin, omeprazole, various bronchodilators.       INTERVAL HISTORY:    SUMMARY COMMENTS:           ---------------------------------------------------------------------------------------------------------------------------------------------------------------------------    SUBJECTIVE & OBJECTIVE DATA DOCUMENTATION:     REVIEW OF SYSTEMS SCREENED ORGAN SYSTEMS:  Constitutional: fever, chills, anorexia or fatigue  Pulmonary: difficulty breathing, wheezing, cough  Cardiovascular: exertional dyspnea, chest pain, palpitations, dizziness or leg swelling  Gastrointestinal: abdominal or epigastric pain, nausea, vomiting or hematemesis, diarrhea, melena or bright red blood per rectum.  Genitourinary: dysuria, urgency, frequency, flank pain, difficulty voiding  Skin: No pruritis, rashes or lesions  Neurology: memory loss, dysarthria, extremity weakness, neuropathic pain, headache, visual changes  Dialysis access if present : pain, bleeding, swelling     ROS POSITIVE FINDINGS: negative    PAST MEDICAL & SURGICAL HISTORY:  Anemia  CAD (coronary artery disease)  DM (diabetes mellitus)  Emphysema  Hypercholesteremia  HTN (hypertension)  S/P bladder repair  S/P hysterectomy with oophorectomy  History of bilateral carotid endarterectomy  History of umbilical hernia repair  S/P cataract surgery  S/P TKR (total knee replacement)      PHYSICAL EXAM:  T(C): 36.4 (18 @ 08:42), Max: 37.1 (18 @ 21:30)  HR: 68 (18 @ 08:33)  BP: 100/46 (18 @ 08:33) (93/54 - 128/56)  RR: 17 (18 @ 08:33)  SpO2: 98% (18 @ 08:33)  Wt(kg): --  I&O's Summary    2018 07:01  -  2018 07:00  --------------------------------------------------------  IN: 870 mL / OUT: 1260 mL / NET: -390 mL      Weight     APPEARANCE: In bed, NAD.   HEAD & NECK: normocephalic, no stiff neck, no masses, oral mucosa moist, no scleral icteris  RESPIRATORY: no accessory muscle use, no labored breathing, no dullness, basilar rales and rhonchi, no wheeze  CARDIOVASCULAR: no JVD, IRRR, S1S2,+ gallop,  no murmur, no rub.  ABDOMEN: soft, no tenderness, no masses, no hepatomegally, no splenomegally  : no bladder distension  LYMPHATIC: no lymphadenopathy (neck, axilla, groin)  EXTREMITIES & MUSCULOSKELETAL: no cyanosis, no clubbing, no tenderness, strength  L=R  EDEMA: trace  PULSES: upper extremity pulses present, lower extremity pulses absent  SKIN: no rash, no stasis, no induration  NEUROLOGIC & PSYCHIATRIC:  alert, interactive, oriented to time and place, responds to stimuli, no asterixis    MEDICATIONS  (STANDING):  amiodarone    Tablet 400 milliGRAM(s) Oral two times a day  apixaban 2.5 milliGRAM(s) Oral every 12 hours  aspirin enteric coated 81 milliGRAM(s) Oral daily  atorvastatin 40 milliGRAM(s) Oral at bedtime  clopidogrel Tablet 75 milliGRAM(s) Oral daily  dextrose 5%. 1000 milliLiter(s) (50 mL/Hr) IV Continuous <Continuous>  dextrose 50% Injectable 12.5 Gram(s) IV Push once  dextrose 50% Injectable 25 Gram(s) IV Push once  dextrose 50% Injectable 25 Gram(s) IV Push once  epoetin jayesh Injectable 37611 Unit(s) SubCutaneous every other day  hydrALAZINE 10 milliGRAM(s) Oral three times a day  insulin lispro (HumaLOG) corrective regimen sliding scale   SubCutaneous three times a day before meals  insulin lispro (HumaLOG) corrective regimen sliding scale   SubCutaneous at bedtime  iron sucrose IVPB 200 milliGRAM(s) IV Intermittent every 24 hours  isosorbide   dinitrate Tablet (ISORDIL) 10 milliGRAM(s) Oral three times a day  levothyroxine 25 MICROGram(s) Oral daily  lidocaine   Patch 1 Patch Transdermal daily  metoprolol     tartrate 12.5 milliGRAM(s) Oral two times a day  pantoprazole    Tablet 40 milliGRAM(s) Oral before breakfast  tiotropium 18 MICROgram(s) Capsule 1 Capsule(s) Inhalation daily    MEDICATIONS  (PRN):  dextrose Gel 1 Dose(s) Oral once PRN Blood Glucose LESS THAN 70 milliGRAM(s)/deciliter  glucagon  Injectable 1 milliGRAM(s) IntraMuscular once PRN Glucose LESS THAN 70 milligrams/deciliter      DATA:  136    |  95<L>  |  48<H>  ----------------------------<  139<H>  Ca:9.9   (2018 07:07)  4.5     |  28     |  2.50<H>      eGFR if Non : 16 <L>  eGFR if : 19 <L>                            7.9<L>  6.8   )-----------( 149<L>    ( 2018 07:07 )             26.5<L>    Phos:-- M.3 mg/dL PTH:-- Uric acid:-- Serum Osm:--  Ferritin:-- Iron:-- TIBC:-- Tsat:--  B12:-- TSH:-- ( @ 07:07)    Urinalysis Basic - ( 2018 12:34 )  Color: Yellow / Appearance: Cloudy<!!> / SG: <=1.005 / pH: x  Gluc: x / Ketone: NEGATIVE  / Bili: Negative / Urobili: 0.2 E.U./dL   Blood: x / Protein: NEGATIVE mg/dL / Nitrite: NEGATIVE   Leuk Esterase: Moderate<!!> / RBC: x / WBC Many /HPF<!!>   Sq Epi: x / Non Sq Epi: 0-5 /HPF / Bacteria: Present /HPF<!!>      UProt:-- UCr:73 mg/dL P/C Ratio:-- 24 hour Prot:-- UVol:-- CrCl:--  Tj:-- UOsm:-- UVol:-- UCl:-- UK:-- ( @ 20:14) MEDICAL HISTORY:      89 year old  female admitted with CHF and atrial fibrillation in  the 100's. The history is notable for vasculopathy with multiple cardiac, lower extremity and carotid procedures. n echocardiogram here showed .reduced LVEF. The hospital course has focused on diuresis and rate control.     On admission GFR was reduced, with Scr ~1.5 mg/dl. Baseline Scr data n/a. On admission, anemia was noted with Hgb 8's g/dl.  A beta-microalbumin screen was elevated (6.0). TF sat was 12, Ferritin 169. Hematology is evaluating.    CKD secibcary to nephrosclerosis  Anemia secondary to CKD and iron deficiency  Vasculopathy, coronary, carotid, PVD   Cardiomyopathy with reduced LVEF  long-standing HTN (40 years)  recent DM (few years)  HLD  Emphysema/COPD    ECHO severe global hypokinesis with LVEF 20-25, mod MR, LAE, PHTN    Selected medications at home: amlodipine. torsemide, prednisone 20 mg, Januvia, isordil, ASA, statin, omeprazole, various bronchodilators.       INTERVAL HISTORY:    Successful cardioversion.     's, HR 70's RSR    Scr 1.78, 2.23, 2.58 mg/dl.     Hgb 7.7, 7.9 g/dl.     Weight 179,  180.9.     SUMMARY COMMENTS:     With GFR <20 ml/min, Scr will be very sensitive to hemodynamic changes. The [minor] increase in Scr is noted but no interventon is necessary.     The current duretic regimen has not achieved weight loss (volume reduction). reassess tomorrow, now that heart rhythm is RSR.     Now undergoing IV iron and MARY JANE (EPO) Rx for anemia.           ---------------------------------------------------------------------------------------------------------------------------------------------------------------------------    SUBJECTIVE & OBJECTIVE DATA DOCUMENTATION:     REVIEW OF SYSTEMS SCREENED ORGAN SYSTEMS:  Constitutional: fever, chills, anorexia or fatigue  Pulmonary: difficulty breathing, wheezing, cough  Cardiovascular: exertional dyspnea, chest pain, palpitations, dizziness or leg swelling  Gastrointestinal: abdominal or epigastric pain, nausea, vomiting or hematemesis, diarrhea, melena or bright red blood per rectum.  Genitourinary: dysuria, urgency, frequency, flank pain, difficulty voiding  Skin: No pruritis, rashes or lesions  Neurology: memory loss, dysarthria, extremity weakness, neuropathic pain, headache, visual changes  Dialysis access if present : pain, bleeding, swelling     ROS POSITIVE FINDINGS: negative    PAST MEDICAL & SURGICAL HISTORY:  Anemia  CAD (coronary artery disease)  DM (diabetes mellitus)  Emphysema  Hypercholesteremia  HTN (hypertension)  S/P bladder repair  S/P hysterectomy with oophorectomy  History of bilateral carotid endarterectomy  History of umbilical hernia repair  S/P cataract surgery  S/P TKR (total knee replacement)      PHYSICAL EXAM:  T(C): 36.4 (18 @ 08:42), Max: 37.1 (18 @ 21:30)  HR: 68 (18 @ 08:33)  BP: 100/46 (18 @ 08:33) (93/54 - 128/56)  RR: 17 (18 @ 08:33)  SpO2: 98% (18 @ 08:33)  Wt(kg): --  I&O's Summary    2018 07:01  -  2018 07:00  --------------------------------------------------------  IN: 870 mL / OUT: 1260 mL / NET: -390 mL      Weight     APPEARANCE: In bed, NAD.   HEAD & NECK: normocephalic, no stiff neck, no masses, oral mucosa moist, no scleral icteris  RESPIRATORY: no accessory muscle use, no labored breathing, no dullness, basilar rales and rhonchi, no wheeze  CARDIOVASCULAR: no JVD, IRRR, S1S2,+ gallop,  no murmur, no rub.  ABDOMEN: soft, no tenderness, no masses, no hepatomegally, no splenomegally  : no bladder distension  LYMPHATIC: no lymphadenopathy (neck, axilla, groin)  EXTREMITIES & MUSCULOSKELETAL: no cyanosis, no clubbing, no tenderness, strength  L=R  EDEMA: trace  PULSES: upper extremity pulses present, lower extremity pulses absent  SKIN: no rash, no stasis, no induration  NEUROLOGIC & PSYCHIATRIC:  alert, interactive, oriented to time and place, responds to stimuli, no asterixis    MEDICATIONS  (STANDING):  amiodarone    Tablet 400 milliGRAM(s) Oral two times a day  apixaban 2.5 milliGRAM(s) Oral every 12 hours  aspirin enteric coated 81 milliGRAM(s) Oral daily  atorvastatin 40 milliGRAM(s) Oral at bedtime  clopidogrel Tablet 75 milliGRAM(s) Oral daily  dextrose 5%. 1000 milliLiter(s) (50 mL/Hr) IV Continuous <Continuous>  dextrose 50% Injectable 12.5 Gram(s) IV Push once  dextrose 50% Injectable 25 Gram(s) IV Push once  dextrose 50% Injectable 25 Gram(s) IV Push once  epoetin jayesh Injectable 57667 Unit(s) SubCutaneous every other day  hydrALAZINE 10 milliGRAM(s) Oral three times a day  insulin lispro (HumaLOG) corrective regimen sliding scale   SubCutaneous three times a day before meals  insulin lispro (HumaLOG) corrective regimen sliding scale   SubCutaneous at bedtime  iron sucrose IVPB 200 milliGRAM(s) IV Intermittent every 24 hours  isosorbide   dinitrate Tablet (ISORDIL) 10 milliGRAM(s) Oral three times a day  levothyroxine 25 MICROGram(s) Oral daily  lidocaine   Patch 1 Patch Transdermal daily  metoprolol     tartrate 12.5 milliGRAM(s) Oral two times a day  pantoprazole    Tablet 40 milliGRAM(s) Oral before breakfast  tiotropium 18 MICROgram(s) Capsule 1 Capsule(s) Inhalation daily    MEDICATIONS  (PRN):  dextrose Gel 1 Dose(s) Oral once PRN Blood Glucose LESS THAN 70 milliGRAM(s)/deciliter  glucagon  Injectable 1 milliGRAM(s) IntraMuscular once PRN Glucose LESS THAN 70 milligrams/deciliter      DATA:  136    |  95<L>  |  48<H>  ----------------------------<  139<H>  Ca:9.9   (2018 07:07)  4.5     |  28     |  2.50<H>      eGFR if Non : 16 <L>  eGFR if : 19 <L>                            7.9<L>  6.8   )-----------( 149<L>    ( 2018 07:07 )             26.5<L>    Phos:-- M.3 mg/dL PTH:-- Uric acid:-- Serum Osm:--  Ferritin:-- Iron:-- TIBC:-- Tsat:--  B12:-- TSH:-- ( @ 07:07)    Urinalysis Basic - ( 2018 12:34 )  Color: Yellow / Appearance: Cloudy<!!> / SG: <=1.005 / pH: x  Gluc: x / Ketone: NEGATIVE  / Bili: Negative / Urobili: 0.2 E.U./dL   Blood: x / Protein: NEGATIVE mg/dL / Nitrite: NEGATIVE   Leuk Esterase: Moderate<!!> / RBC: x / WBC Many /HPF<!!>   Sq Epi: x / Non Sq Epi: 0-5 /HPF / Bacteria: Present /HPF<!!>      UProt:-- UCr:73 mg/dL P/C Ratio:-- 24 hour Prot:-- UVol:-- CrCl:--  jT:-- UOsm:-- UVol:-- UCl:-- UK:-- ( @ 20:14) MEDICAL HISTORY:      89 year old  female admitted with CHF and atrial fibrillation in  the 100's. The history is notable for vasculopathy with multiple cardiac, lower extremity and carotid procedures. n echocardiogram here showed .reduced LVEF. The hospital course has focused on diuresis and rate control.     On admission GFR was reduced, with Scr ~1.5 mg/dl. Baseline Scr data n/a. On admission, anemia was noted with Hgb 8's g/dl.  A beta-microalbumin screen was elevated (6.0). TF sat was 12, Ferritin 169. Hematology is evaluating.    CKD secibcary to nephrosclerosis  Anemia secondary to CKD and iron deficiency  Vasculopathy, coronary, carotid, PVD   Cardiomyopathy with reduced LVEF  long-standing HTN (40 years)  recent DM (few years)  HLD  Emphysema/COPD    ECHO severe global hypokinesis with LVEF 20-25, mod MR, LAE, PHTN    Selected medications at home: amlodipine. torsemide, prednisone 20 mg, Januvia, isordil, ASA, statin, omeprazole, various bronchodilators.       INTERVAL HISTORY:    Successful cardioversion.     's, HR 70's RSR  Scr 1.78, 2.23, 2.58 mg/dl.   Hgb 7.7, 7.9 g/dl.   Weight 179,  180.9.     SUMMARY COMMENTS:     With GFR <20 ml/min, Scr will be very sensitive to hemodynamic changes. The [minor] increase in Scr is noted but no interventon is necessary.     The current duretic regimen has not achieved weight loss (volume reduction). reassess tomorrow, now that heart rhythm is RSR.     Now undergoing IV iron and MARY JANE (EPO) Rx for anemia.     Discussed with cardiac team.           ---------------------------------------------------------------------------------------------------------------------------------------------------------------------------    SUBJECTIVE & OBJECTIVE DATA DOCUMENTATION:     REVIEW OF SYSTEMS SCREENED ORGAN SYSTEMS:  Constitutional: fever, chills, anorexia or fatigue  Pulmonary: difficulty breathing, wheezing, cough  Cardiovascular: exertional dyspnea, chest pain, palpitations, dizziness or leg swelling  Gastrointestinal: abdominal or epigastric pain, nausea, vomiting or hematemesis, diarrhea, melena or bright red blood per rectum.  Genitourinary: dysuria, urgency, frequency, flank pain, difficulty voiding  Skin: No pruritis, rashes or lesions  Neurology: memory loss, dysarthria, extremity weakness, neuropathic pain, headache, visual changes  Dialysis access if present : pain, bleeding, swelling     ROS POSITIVE FINDINGS: negative    PAST MEDICAL & SURGICAL HISTORY:  Anemia  CAD (coronary artery disease)  DM (diabetes mellitus)  Emphysema  Hypercholesteremia  HTN (hypertension)  S/P bladder repair  S/P hysterectomy with oophorectomy  History of bilateral carotid endarterectomy  History of umbilical hernia repair  S/P cataract surgery  S/P TKR (total knee replacement)      PHYSICAL EXAM:  T(C): 36.4 (18 @ 08:42), Max: 37.1 (18 @ 21:30)  HR: 68 (18 @ 08:33)  BP: 100/46 (18 @ 08:33) (93/54 - 128/56)  RR: 17 (18 @ 08:33)  SpO2: 98% (18 @ 08:33)  Wt(kg): --  I&O's Summary    2018 07:01  -  2018 07:00  --------------------------------------------------------  IN: 870 mL / OUT: 1260 mL / NET: -390 mL      Weight     APPEARANCE: In bed, NAD.   HEAD & NECK: normocephalic, no stiff neck, no masses, oral mucosa moist, no scleral icteris  RESPIRATORY: no accessory muscle use, no labored breathing, no dullness, basilar rales and rhonchi, no wheeze  CARDIOVASCULAR: no JVD, IRRR, S1S2,+ gallop,  no murmur, no rub.  ABDOMEN: soft, no tenderness, no masses, no hepatomegally, no splenomegally  : no bladder distension  LYMPHATIC: no lymphadenopathy (neck, axilla, groin)  EXTREMITIES & MUSCULOSKELETAL: no cyanosis, no clubbing, no tenderness, strength  L=R  EDEMA: trace  PULSES: upper extremity pulses present, lower extremity pulses absent  SKIN: no rash, no stasis, no induration  NEUROLOGIC & PSYCHIATRIC:  alert, interactive, oriented to time and place, responds to stimuli, no asterixis    MEDICATIONS  (STANDING):  amiodarone    Tablet 400 milliGRAM(s) Oral two times a day  apixaban 2.5 milliGRAM(s) Oral every 12 hours  aspirin enteric coated 81 milliGRAM(s) Oral daily  atorvastatin 40 milliGRAM(s) Oral at bedtime  clopidogrel Tablet 75 milliGRAM(s) Oral daily  dextrose 5%. 1000 milliLiter(s) (50 mL/Hr) IV Continuous <Continuous>  dextrose 50% Injectable 12.5 Gram(s) IV Push once  dextrose 50% Injectable 25 Gram(s) IV Push once  dextrose 50% Injectable 25 Gram(s) IV Push once  epoetin jayesh Injectable 55333 Unit(s) SubCutaneous every other day  hydrALAZINE 10 milliGRAM(s) Oral three times a day  insulin lispro (HumaLOG) corrective regimen sliding scale   SubCutaneous three times a day before meals  insulin lispro (HumaLOG) corrective regimen sliding scale   SubCutaneous at bedtime  iron sucrose IVPB 200 milliGRAM(s) IV Intermittent every 24 hours  isosorbide   dinitrate Tablet (ISORDIL) 10 milliGRAM(s) Oral three times a day  levothyroxine 25 MICROGram(s) Oral daily  lidocaine   Patch 1 Patch Transdermal daily  metoprolol     tartrate 12.5 milliGRAM(s) Oral two times a day  pantoprazole    Tablet 40 milliGRAM(s) Oral before breakfast  tiotropium 18 MICROgram(s) Capsule 1 Capsule(s) Inhalation daily    MEDICATIONS  (PRN):  dextrose Gel 1 Dose(s) Oral once PRN Blood Glucose LESS THAN 70 milliGRAM(s)/deciliter  glucagon  Injectable 1 milliGRAM(s) IntraMuscular once PRN Glucose LESS THAN 70 milligrams/deciliter      DATA:  136    |  95<L>  |  48<H>  ----------------------------<  139<H>  Ca:9.9   (2018 07:07)  4.5     |  28     |  2.50<H>      eGFR if Non : 16 <L>  eGFR if : 19 <L>                            7.9<L>  6.8   )-----------( 149<L>    ( 2018 07:07 )             26.5<L>    Phos:-- M.3 mg/dL PTH:-- Uric acid:-- Serum Osm:--  Ferritin:-- Iron:-- TIBC:-- Tsat:--  B12:-- TSH:-- ( @ 07:07)    Urinalysis Basic - ( 2018 12:34 )  Color: Yellow / Appearance: Cloudy<!!> / SG: <=1.005 / pH: x  Gluc: x / Ketone: NEGATIVE  / Bili: Negative / Urobili: 0.2 E.U./dL   Blood: x / Protein: NEGATIVE mg/dL / Nitrite: NEGATIVE   Leuk Esterase: Moderate<!!> / RBC: x / WBC Many /HPF<!!>   Sq Epi: x / Non Sq Epi: 0-5 /HPF / Bacteria: Present /HPF<!!>      UProt:-- UCr:73 mg/dL P/C Ratio:-- 24 hour Prot:-- UVol:-- CrCl:--  Tj:-- UOsm:-- UVol:-- UCl:-- UK:-- ( @ 20:14)

## 2018-01-26 NOTE — PROGRESS NOTE ADULT - PROBLEM SELECTOR PLAN 2
EP service following  - New onset Afib s/p ANMOL and failed DCCV 1/23/18  -Pt converted to NSR with freq PACs on 1/26/18 AM  -c/w Lopressor 12.5mg BID, Amio 400mg BID load(decrease to 200mg QD on 1/29/18)  -c/w Eliquis 2.5mg BID

## 2018-01-26 NOTE — PROGRESS NOTE ADULT - SUBJECTIVE AND OBJECTIVE BOX
History of Present Illness:  History of Present Illness: 	  This is a 89 y/o female, obese, with HTN, HPL, DM-II, Anemia, diastolic CHF (NL EF), known CAD with previous PCIs, b/l carotid endarterectomies, who presented to her cardiologist with c/o mid sternal chest pressure radiating to the back, associated with SOB, palpitations, LE edema and 4lbs weight gain over 1 week. In the office, her EKG showed new AFib and she was sent to the ER for further evaluation.   In the ER, 1st troponin negative, BNP 3805, BUN 42/1.57, H/H 8.6/27.7, EKG AFib @ 100bpm, LBBB, CXR with some vascular congestion. She received Cardizem 5mg IV x1, Lasix 40mg IV x1, was started on IV heparin and admitted for further management and telemetry monitoring.  No s/s of bleeding noted.    Review of Systems:  Review of Systems: GENERAL, CONSTITUTIONAL : + recent weight gain. Denies fever, chills  EYES, VISION: denies changes in vision   EARS, NOSE, THROAT: denies hearing loss  HEART, CARDIOVASCULAR: + chest pain, palpitations, SOB,  LE edema. Denies claudication  RESPIRATORY: + SOB; Denies cough, wheezing, PND, orthopnea  GASTROINTESTINAL: Denies abdominal pain, heartburn, bloody stool, dark tarry stool  GENITOURINARY: Denies frequent urination, urgency  MUSCULOSKELETAL denies joint pain or swelling, restricted motion, musculoskeletal pain.   SKIN & INTEGUMENTARY Denies rashes, sores, blisters, blisters, growths.  NEUROLOGICAL: Denies numbness or tingling sensations, sensation loss, burning.   PSYCHIATRIC: Denies nervousness, anxiety, depression  ENDOCRINE Denies heat or cold intolerance, excessive thirst HEMATOLOGIC/LYMPHATIC: Denies abnormal bleeding, bleeding of any kind	      Allergies and Intolerances:        Allergies:  	No Known Allergies:     Home Medications:   * Patient Currently Takes Medications as of 17-Jan-2018 02:09 documented in Structured Notes  · 	torsemide 10 mg oral tablet: 1 tab(s) orally once a day, Last Dose Taken:    · 	aspirin 81 mg oral tablet: 1 tab(s) orally once a day, Last Dose Taken:    · 	isosorbide mononitrate 30 mg oral tablet, extended release: 1 tab(s) orally once a day (in the morning), Last Dose Taken:    · 	Januvia 50 mg oral tablet: 1 tab(s) orally once a day, Last Dose Taken:    · 	amLODIPine 5 mg oral tablet: 1 tab(s) orally once a day, Last Dose Taken:    · 	predniSONE 20 mg oral tablet: 1 tab(s) orally once a day, Last Dose Taken:    · 	atorvastatin 20 mg oral tablet: 1 tab(s) orally once a day, Last Dose Taken:    · 	montelukast 10 mg oral tablet: 1 tab(s) orally once a day, Last Dose Taken:    · 	omeprazole 40 mg oral delayed release capsule: 1 cap(s) orally once a day, Last Dose Taken:    · 	Spiriva 18 mcg inhalation capsule: 1 cap(s) inhaled once a day, Last Dose Taken:      .  Patient History:   Past Medical History:  Anemia    CAD (coronary artery disease)    DM (diabetes mellitus)    Emphysema    HTN (hypertension)    Hypercholesteremia.    Past Surgical History:  History of bilateral carotid endarterectomy    History of umbilical hernia repair    S/P bladder repair    S/P cataract surgery    S/P hysterectomy with oophorectomy    S/P TKR (total knee replacement).    Family History:  Mother  Still living? No  Family history of emphysema, Age at diagnosis: 71-80.    Social History:  Social History (marital status, living situation, occupation, tobacco use, alcohol and drug use, and sexual history): Denies tobacco, ETOH or illicit drug use Lives with her daughter, ambulates with a walker	    Tobacco Screening:  · Core Measure Site	No	    Risk Assessment:   Present on Admission:  Deep Venous Thrombosis	no 	  Pulmonary Embolus	no 	    Heart Failure:  Does this patient have a history of or has been diagnosed with heart failure? yes.      Physical Exam:  Physical Exam:  Vital Signs Last 24 Hrs T(C): 36.4 (26 Jan 2018 08:42), Max: 37.1 (25 Jan 2018 21:30) T(F): 97.5 (26 Jan 2018 08:42), Max: 98.8 (25 Jan 2018 21:30) HR: 71 (26 Jan 2018 07:22) (66 - 74) BP: 120/56 (26 Jan 2018 07:22) (93/54 - 128/56)  RR: 16 (26 Jan 2018 07:22) (16 - 16) SpO2: 100% (26 Jan 2018 07:22) (97% - 100%)    Appearance: Normal   HEENT:   Normal oral mucosa, PERRL, EOMI   Neck: Supple, - JVD; No Carotid Bruit   Cardiovascular: Normal S1 S2, No JVD, No murmurs  Respiratory: Lungs bibasilar crackles, no Rhonchi, Wheezing   Gastrointestinal:  Soft, Non-tender, + BS   Skin: No rashes, No ecchymoses, No cyanosis  Extremities: 3+ edema b/l, No clubbing, cyanosis  Vascular: Femoral pulses 1+ b/l without bruit, DP faint, b/l, PT faint b/l  Neurologic: Non-focal Psychiatry: A & O x 3, Mood & affect appropriate	      Labs and Results:  Labs, Radiology, Cardiology, and Other Results:                          7.9   6.8   )-----------( 149      ( 26 Jan 2018 07:07 )            26.5                       	    Assessment and Plan:   Assessment:  · Assessment		  89 y/o female with HTN, HPL, DM-II, CAD/PCIs, diastolic CHF (NL EF), anemia who is admitted with new onset AFib and CHF exacerbation      Problem/Plan - 1:  ·  Problem: Atrial fibrillation.  Plan: New Afib with controlled HR  - Heparin gtt: Dose to keep PTT 60-80    Problem/Plan - 2:  - Monitor CBC closely & transfuse PRBCs to keep Hb > 7g/dl  - f/u SPEP, IPEP, Immunofixation, LDH, Beta-2 microglobulin  -  Epogen 10k sq tiw  - IV iron sucrose

## 2018-01-26 NOTE — PROGRESS NOTE ADULT - ASSESSMENT
89 y/o female with HTN, HLD, DM-II, CAD/PCIs, diastolic CHF (previously normal EF, now reduced to 20-25%), ? LYNDA vs chronic renal insufficiency, and anemia who is admitted with new onset AFib and CHF exacerbation.  she s/p renal angioplasty on 1/24/18 -- on ASA / plavix.    - s/p failed cardioversion on 1/23/18. Started Amio 400 mg BID PO load on 1/23/18. She converted to NSR today; confirmed with 12-lead EKG.  Continue Amio 400 mg BID for 3 more days (till 1/29/18) then decrease it to 200 mg daily.    - Dr. Connolly recommends BiV-pacing for cardiac resynchronization therapy (no ICD given age) for symptomatic improvement of heart failure.  Dr. Dillard is decide if ok to proceed.  If he agrees, ok to keep pt over the weekend for BiV-pacing next week. Date to be determined.   - Continue Eliquis 2.5 mg bid. Once BiV-PPM decision is confirmed and date is scheduled, we will advise on when to hold Eliquis.

## 2018-01-26 NOTE — PROGRESS NOTE ADULT - SUBJECTIVE AND OBJECTIVE BOX
INTERVAL HPI/OVERNIGHT EVENTS:      Patient is a 89y old  Female who presents with a chief complaint of  was seen and examined today. Reports the following symptoms:    CONSTITUTIONAL:  Negative fever or chills, feels well, good appetite  EYES:  Negative  blurry vision or double vision  CARDIOVASCULAR:  Negative for chest pain or palpitations  RESPIRATORY:  Negative for cough, wheezing, or SOB   GASTROINTESTINAL:  Negative for nausea, vomiting, diarrhea, constipation, or abdominal pain  GENITOURINARY:  Negative frequency, urgency or dysuria  NEUROLOGIC:  No headache, confusion, dizziness, lightheadedness    MEDICATIONS  (STANDING):  amiodarone    Tablet 400 milliGRAM(s) Oral two times a day  apixaban 2.5 milliGRAM(s) Oral every 12 hours  aspirin enteric coated 81 milliGRAM(s) Oral daily  atorvastatin 40 milliGRAM(s) Oral at bedtime  clopidogrel Tablet 75 milliGRAM(s) Oral daily  dextrose 5%. 1000 milliLiter(s) (50 mL/Hr) IV Continuous <Continuous>  dextrose 50% Injectable 12.5 Gram(s) IV Push once  dextrose 50% Injectable 25 Gram(s) IV Push once  dextrose 50% Injectable 25 Gram(s) IV Push once  epoetin jayesh Injectable 12590 Unit(s) SubCutaneous every other day  furosemide   Injectable 40 milliGRAM(s) IV Push daily  insulin lispro (HumaLOG) corrective regimen sliding scale   SubCutaneous Before meals and at bedtime  iron sucrose IVPB 200 milliGRAM(s) IV Intermittent every 24 hours  isosorbide   dinitrate Tablet (ISORDIL) 10 milliGRAM(s) Oral three times a day  levothyroxine 25 MICROGram(s) Oral daily  lidocaine   Patch 1 Patch Transdermal daily  metoprolol     tartrate 12.5 milliGRAM(s) Oral two times a day  pantoprazole    Tablet 40 milliGRAM(s) Oral before breakfast  tiotropium 18 MICROgram(s) Capsule 1 Capsule(s) Inhalation daily    MEDICATIONS  (PRN):  dextrose Gel 1 Dose(s) Oral once PRN Blood Glucose LESS THAN 70 milliGRAM(s)/deciliter  glucagon  Injectable 1 milliGRAM(s) IntraMuscular once PRN Glucose LESS THAN 70 milligrams/deciliter        LABS:                        7.9    6.8   )-----------( 149      ( 26 Jan 2018 07:07 )             26.5     01-26    136  |  95<L>  |  48<H>  ----------------------------<  139<H>  4.5   |  28  |  2.50<H>    Ca    9.9      26 Jan 2018 07:07  Mg     2.3     01-26      Hemoglobin A1C, Whole Blood: 7.0 % (01-17-18 @ 05:28)        Thyroid Stimulating Hormone, Serum: 6.350 uIU/mL (01-18 @ 06:38)  Thyroid Stimulating Hormone, Serum: 5.255 uIU/mL (01-17 @ 20:12)      RADIOLOGY & ADDITIONAL TESTS:      Vital Signs Last 24 Hrs  T(C): 36.4 (26 Jan 2018 22:32), Max: 36.7 (26 Jan 2018 12:57)  T(F): 97.6 (26 Jan 2018 22:32), Max: 98.1 (26 Jan 2018 12:57)  HR: 68 (26 Jan 2018 23:13) (66 - 72)  BP: 112/54 (26 Jan 2018 20:35) (94/44 - 128/56)  BP(mean): --  RR: 18 (26 Jan 2018 20:35) (16 - 18)  SpO2: 99% (26 Jan 2018 23:13) (97% - 100%)    CAPILLARY BLOOD GLUCOSE      PHYSICAL EXAM:  Constitutional: wn/wd in NAD.   HEENT: NCAT, MMM, OP clear, EOMI, , no proptosis or lid retraction  Neck: supple, no acanthosis, no thyromegaly or palpable thyroid nodules   Respiratory: Lungs CTAB.  Cardiovascular: regular rhythm, normal S1 and S2, no audible murmurs, no peripheral edema  GI: soft, + bowel sounds, NT/ND, no masses/HSM appreciated.  Neurology: no tremors, DTR 2+  Skin: no visible rashes/lesions  Psychiatric: AAO x 3, normal affect/mood.        A/P: 89yFemale admitted for (    ). Consulted and being followed for Diabetes Type (  ).       Uncontrolled DM Type (  ) -     Please continue           units lantus AM/PM, premeal Lispro  (  ) units TID, and  Lispro moderate/low dose sliding scale 4 times daily (before meals and bedtime).  Please continue consistent carbohydrate (Diabetic) diet, FS ac & hs. Target  - 150.      Case discussed with attending and primary team      Attending attestation:  I have seen and evaluated the patient at the bedside.   Endocrine Nurse Practitioner/Fellow/Resident’s note reviewed, including vital signs, PE and laboratory results.  Direct personal management and extensive interpretation of the data conducted.   I agree with the Nurse Practitioner/Fellow/Resident’s assessment and recommendations as above. INTERVAL HPI/OVERNIGHT EVENTS:      Patient is a 89y old  Female who presents with a chief complaint of SOB was seen and examined today. Reports the following symptoms:    CONSTITUTIONAL:  Negative fever or chills, feels better, good appetite  EYES:  Negative  blurry vision or double vision  CARDIOVASCULAR:  Negative for chest pain or palpitations  RESPIRATORY:  Negative for cough, wheezing, or SOB   GASTROINTESTINAL:  Negative for nausea, vomiting, diarrhea, constipation, or abdominal pain  GENITOURINARY:  Negative frequency, urgency or dysuria  NEUROLOGIC:  No headache, confusion, dizziness, lightheadedness    MEDICATIONS  (STANDING):  amiodarone    Tablet 400 milliGRAM(s) Oral two times a day  apixaban 2.5 milliGRAM(s) Oral every 12 hours  aspirin enteric coated 81 milliGRAM(s) Oral daily  atorvastatin 40 milliGRAM(s) Oral at bedtime  clopidogrel Tablet 75 milliGRAM(s) Oral daily  dextrose 5%. 1000 milliLiter(s) (50 mL/Hr) IV Continuous <Continuous>  dextrose 50% Injectable 12.5 Gram(s) IV Push once  dextrose 50% Injectable 25 Gram(s) IV Push once  dextrose 50% Injectable 25 Gram(s) IV Push once  epoetin jayesh Injectable 00579 Unit(s) SubCutaneous every other day  furosemide   Injectable 40 milliGRAM(s) IV Push daily  insulin lispro (HumaLOG) corrective regimen sliding scale   SubCutaneous Before meals and at bedtime  iron sucrose IVPB 200 milliGRAM(s) IV Intermittent every 24 hours  isosorbide   dinitrate Tablet (ISORDIL) 10 milliGRAM(s) Oral three times a day  levothyroxine 25 MICROGram(s) Oral daily  lidocaine   Patch 1 Patch Transdermal daily  metoprolol     tartrate 12.5 milliGRAM(s) Oral two times a day  pantoprazole    Tablet 40 milliGRAM(s) Oral before breakfast  tiotropium 18 MICROgram(s) Capsule 1 Capsule(s) Inhalation daily    MEDICATIONS  (PRN):  dextrose Gel 1 Dose(s) Oral once PRN Blood Glucose LESS THAN 70 milliGRAM(s)/deciliter  glucagon  Injectable 1 milliGRAM(s) IntraMuscular once PRN Glucose LESS THAN 70 milligrams/deciliter        LABS:                        7.9    6.8   )-----------( 149      ( 26 Jan 2018 07:07 )             26.5     01-26    136  |  95<L>  |  48<H>  ----------------------------<  139<H>  4.5   |  28  |  2.50<H>    Ca    9.9      26 Jan 2018 07:07  Mg     2.3     01-26      Hemoglobin A1C, Whole Blood: 7.0 % (01-17-18 @ 05:28)        Thyroid Stimulating Hormone, Serum: 6.350 uIU/mL (01-18 @ 06:38)  Thyroid Stimulating Hormone, Serum: 5.255 uIU/mL (01-17 @ 20:12)      RADIOLOGY & ADDITIONAL TESTS:      Vital Signs Last 24 Hrs  T(C): 36.4 (26 Jan 2018 22:32), Max: 36.7 (26 Jan 2018 12:57)  T(F): 97.6 (26 Jan 2018 22:32), Max: 98.1 (26 Jan 2018 12:57)  HR: 68 (26 Jan 2018 23:13) (66 - 72)  BP: 112/54 (26 Jan 2018 20:35) (94/44 - 128/56)  BP(mean): --  RR: 18 (26 Jan 2018 20:35) (16 - 18)  SpO2: 99% (26 Jan 2018 23:13) (97% - 100%)    CAPILLARY BLOOD GLUCOSE      PHYSICAL EXAM:  Constitutional: wn/wd in NAD.   HEENT: NCAT, MMM, OP clear, EOMI, no proptosis or lid retraction  Neck: supple, no acanthosis, no thyromegaly or palpable thyroid nodules   Respiratory: Lungs CTAB.  Cardiovascular: regular rhythm, normal S1 and S2, no audible murmurs, no peripheral edema  GI: soft, + bowel sounds, NT/ND, obese  Neurology: no tremors, DTR 1+  Skin: no visible rashes/lesions  Psychiatric: AAO x 3, normal affect/mood.        A/P: 89yFemale admitted for SOB.  Consulted and being followed for Diabetes Type 2 and newly diagnosed hypothyroidism.     DM Type 2 (HbA1c 7%) controlled with diet.    Please continue Lispro moderate/low dose sliding scale 4 times daily (before meals and bedtime).    Also continue consistent carbohydrate (Diabetic) diet, FS ac & hs. Target  - 150 mg/dl.     Hypothyroidism treated with low dose levothyroxine 25 mcg po qAM.                                                                                                                                                                                                                                                                                                                                                                                                                                                                                                                                                                                                                                                                                                                                                                                                                                                                                         Case discussed with attending and primary team.      Attending attestation:  I have seen and evaluated this patient for 30 muites. Assessment and recommendations as above.

## 2018-01-26 NOTE — PROGRESS NOTE ADULT - SUBJECTIVE AND OBJECTIVE BOX
EPS Progress Note    S: no overnight event/ complaints.   Tele: NSR with PAC.   EKG 1/26/18: NSR with PAC.  HR 70 bpm.  LBBB (QRs 164 ms)     O: T(C): 36.4 (01-26-18 @ 08:42), Max: 37.1 (01-25-18 @ 21:30)  HR: 68 (01-26-18 @ 08:33) (66 - 74)  BP: 100/46 (01-26-18 @ 08:33) (93/54 - 128/56)  RR: 17 (01-26-18 @ 08:33) (16 - 17)  SpO2: 98% (01-26-18 @ 08:33) (97% - 100%)    PHYSICAL  General:  NAD        Chest:  Crackles  Cardiac:   + s1/s2, RRR  Abdomen:  +BS , soft ND/NT  Extremities: no LE edema    LABS:                        7.9    6.8   )-----------( 149      ( 26 Jan 2018 07:07 )             26.5     01-26    136  |  95<L>  |  48<H>  ----------------------------<  139<H>  4.5   |  28  |  2.50<H>    Ca    9.9      26 Jan 2018 07:07  Mg     2.3     01-26          MEDICATIONS:  amiodarone    Tablet 400 milliGRAM(s) Oral two times a day  apixaban 2.5 milliGRAM(s) Oral every 12 hours  aspirin enteric coated 81 milliGRAM(s) Oral daily  atorvastatin 40 milliGRAM(s) Oral at bedtime  clopidogrel Tablet 75 milliGRAM(s) Oral daily  epoetin jayesh Injectable 21498 Unit(s) SubCutaneous every other day  glucagon  Injectable 1 milliGRAM(s) IntraMuscular once PRN  hydrALAZINE 10 milliGRAM(s) Oral three times a day  insulin lispro (HumaLOG) corrective regimen sliding scale   SubCutaneous three times a day before meals  insulin lispro (HumaLOG) corrective regimen sliding scale   SubCutaneous at bedtime  iron sucrose IVPB 200 milliGRAM(s) IV Intermittent every 24 hours  isosorbide   dinitrate Tablet (ISORDIL) 10 milliGRAM(s) Oral three times a day  levothyroxine 25 MICROGram(s) Oral daily  lidocaine   Patch 1 Patch Transdermal daily  metoprolol     tartrate 12.5 milliGRAM(s) Oral two times a day  pantoprazole    Tablet 40 milliGRAM(s) Oral before breakfast  tiotropium 18 MICROgram(s) Capsule 1 Capsule(s) Inhalation daily

## 2018-01-26 NOTE — PROGRESS NOTE ADULT - PROBLEM SELECTOR PLAN 8
Renal (Dr. Dominguez following)  Stage III CKD- renal function uptrending 34/1.47-->38/1.78 post cath to 2.50 today  -Renal US 1/22 consistent with medical renal disease.   **Patient and family informed high risk for ANGELICA/need for possible HD post contrast exposure with angiogram---reported understanding

## 2018-01-26 NOTE — PROGRESS NOTE ADULT - SUBJECTIVE AND OBJECTIVE BOX
Interventional Cardiology PA Adult Progress Note    Subjective Assessment: Pt seen this AM with family at bedside. Reports slight SOB but denies any CP, palpitations, PND, Orthopnea.  	  MEDICATIONS:  amiodarone    Tablet 400 milliGRAM(s) Oral two times a day  furosemide   Injectable 40 milliGRAM(s) IV Push daily  isosorbide   dinitrate Tablet (ISORDIL) 10 milliGRAM(s) Oral three times a day  metoprolol     tartrate 12.5 milliGRAM(s) Oral two times a day      tiotropium 18 MICROgram(s) Capsule 1 Capsule(s) Inhalation daily      pantoprazole    Tablet 40 milliGRAM(s) Oral before breakfast    atorvastatin 40 milliGRAM(s) Oral at bedtime  dextrose 50% Injectable 12.5 Gram(s) IV Push once  dextrose 50% Injectable 25 Gram(s) IV Push once  dextrose 50% Injectable 25 Gram(s) IV Push once  dextrose Gel 1 Dose(s) Oral once PRN  glucagon  Injectable 1 milliGRAM(s) IntraMuscular once PRN  insulin lispro (HumaLOG) corrective regimen sliding scale   SubCutaneous three times a day before meals  insulin lispro (HumaLOG) corrective regimen sliding scale   SubCutaneous at bedtime  levothyroxine 25 MICROGram(s) Oral daily    apixaban 2.5 milliGRAM(s) Oral every 12 hours  aspirin enteric coated 81 milliGRAM(s) Oral daily  clopidogrel Tablet 75 milliGRAM(s) Oral daily  dextrose 5%. 1000 milliLiter(s) IV Continuous <Continuous>  epoetin jayesh Injectable 68470 Unit(s) SubCutaneous every other day  iron sucrose IVPB 200 milliGRAM(s) IV Intermittent every 24 hours  lidocaine   Patch 1 Patch Transdermal daily      	    [PHYSICAL EXAM:  TELEMETRY: SR 1AVB PACs 60s-80s  T(C): 36.7 (01-26-18 @ 12:57), Max: 37.1 (01-25-18 @ 21:30)  HR: 68 (01-26-18 @ 12:21) (66 - 73)  BP: 111/52 (01-26-18 @ 12:21) (95/45 - 128/56)  RR: 18 (01-26-18 @ 12:21) (16 - 18)  SpO2: 98% (01-26-18 @ 12:21) (97% - 100%)  Wt(kg): --  I&O's Summary    25 Jan 2018 07:01  -  26 Jan 2018 07:00  --------------------------------------------------------  IN: 870 mL / OUT: 1260 mL / NET: -390 mL    26 Jan 2018 07:01  -  26 Jan 2018 15:08  --------------------------------------------------------  IN: 120 mL / OUT: 0 mL / NET: 120 mL                                            Appearance: Normal	  HEENT:   Normal oral mucosa, PERRL, EOMI	  Neck: Supple- JVD; Carotid Bruit   Cardiovascular: irregular + S1, +S2   Respiratory: +rhonchi b/l  Gastrointestinal:  Soft, Non-tender, + BS	  Skin: No rashes, No ecchymoses, No cyanosis  Extremities: +1 edema b/l  Vascular: Peripheral pulses palpable 2+ bilaterally  Neurologic: Non-focal  Psychiatry: A & O x 3, Mood & affect appropriate      	    ECG:  	  RADIOLOGY:   DIAGNOSTIC TESTING:  [ ] Echocardiogram:  [ ]  Catheterization:  [ ] Stress Test:    [ ] ANMOL  OTHER: 	    LABS:	 	  CARDIAC MARKERS:                                  7.9    6.8   )-----------( 149      ( 26 Jan 2018 07:07 )             26.5     01-26    136  |  95<L>  |  48<H>  ----------------------------<  139<H>  4.5   |  28  |  2.50<H>    Ca    9.9      26 Jan 2018 07:07  Mg     2.3     01-26      proBNP:   Lipid Profile:   HgA1c:   TSH:       DVT ppx: Eliquis renally dosed    Dispo: Gently diuresing and monitoring renal fxn and BP. Possible BI-V pacer(no ICD) in near future(possibly next wk) as d/w EP and Dr. Monson.

## 2018-01-26 NOTE — PROGRESS NOTE ADULT - PROBLEM SELECTOR PLAN 1
- Right heart cath 1/24 revealed moderate to severe pulmonary HTN.  - Cr rising but still overloaded. Resumed Lasix 40mg IVP daily   - Strict Is and Os and Daily weights emphasized (no nava in place) -400cc o/n  - Hydralazine stopped 2/2 hypotension. c/w Isordil 10mg TID, Lopressor 12.5mg BId  - no ACE/ARB due to worsening renal function.  - Echo 1/18 revealed moderately dilated LA, moderate MR, mild to moderate TR, evidence of pulmonary hypertension (PASP 57 mmHg), severe global hypokinesis of the LV, EF 20- 25%. (newly depressed)

## 2018-01-26 NOTE — PROGRESS NOTE ADULT - PROBLEM SELECTOR PLAN 10
-+UA/Urine culture with MDR Ecoli. Patient currently asymptomatic, afebrile, will hold off antibiotic treatment.    PT evaluated patient: MINDA vs HPT pending stair negotiation  Dispo: Gently diuresing and monitoring renal fxn and BP. Possible BI-V pacer(no ICD) in near future(possibly next wk) as d/w EP and Dr. Monson.

## 2018-01-26 NOTE — PROGRESS NOTE ADULT - PROBLEM SELECTOR PLAN 9
-s/p renal angiogram 1/24 with successful PTA of mid right renal artery ISR.   -continue ASA/Plavix daily

## 2018-01-26 NOTE — PROGRESS NOTE ADULT - PROBLEM SELECTOR PLAN 4
Dr. Fry following   - Newly diagnosed hypothyroidism  -c/w Levothyroxine 25mcg PO daily. F/U TFT in 6 weeks.

## 2018-01-26 NOTE — PROGRESS NOTE ADULT - PROBLEM SELECTOR PLAN 5
Pulm (Dr. Alonso melendrez)   -NO EVIDENCE OF COPD. The patient was exposed to biomass.  Continue Spiriva and as needed albuterol. Continue oxygen supplementation. Due to patient age no need for CT scan of chest.  Hold on steroids

## 2018-01-27 LAB
ANION GAP SERPL CALC-SCNC: 11 MMOL/L — SIGNIFICANT CHANGE UP (ref 5–17)
BUN SERPL-MCNC: 51 MG/DL — HIGH (ref 7–23)
CALCIUM SERPL-MCNC: 9.6 MG/DL — SIGNIFICANT CHANGE UP (ref 8.4–10.5)
CHLORIDE SERPL-SCNC: 96 MMOL/L — SIGNIFICANT CHANGE UP (ref 96–108)
CO2 SERPL-SCNC: 32 MMOL/L — HIGH (ref 22–31)
CREAT SERPL-MCNC: 2.26 MG/DL — HIGH (ref 0.5–1.3)
GLUCOSE BLDC GLUCOMTR-MCNC: 145 MG/DL — HIGH (ref 70–99)
GLUCOSE BLDC GLUCOMTR-MCNC: 145 MG/DL — HIGH (ref 70–99)
GLUCOSE BLDC GLUCOMTR-MCNC: 158 MG/DL — HIGH (ref 70–99)
GLUCOSE BLDC GLUCOMTR-MCNC: 160 MG/DL — HIGH (ref 70–99)
GLUCOSE SERPL-MCNC: 138 MG/DL — HIGH (ref 70–99)
HCT VFR BLD CALC: 26.1 % — LOW (ref 34.5–45)
HGB BLD-MCNC: 7.8 G/DL — LOW (ref 11.5–15.5)
MAGNESIUM SERPL-MCNC: 2.1 MG/DL — SIGNIFICANT CHANGE UP (ref 1.6–2.6)
MCHC RBC-ENTMCNC: 29.1 PG — SIGNIFICANT CHANGE UP (ref 27–34)
MCHC RBC-ENTMCNC: 29.9 G/DL — LOW (ref 32–36)
MCV RBC AUTO: 97.4 FL — SIGNIFICANT CHANGE UP (ref 80–100)
PLATELET # BLD AUTO: 163 K/UL — SIGNIFICANT CHANGE UP (ref 150–400)
POTASSIUM SERPL-MCNC: 4.1 MMOL/L — SIGNIFICANT CHANGE UP (ref 3.5–5.3)
POTASSIUM SERPL-SCNC: 4.1 MMOL/L — SIGNIFICANT CHANGE UP (ref 3.5–5.3)
RBC # BLD: 2.68 M/UL — LOW (ref 3.8–5.2)
RBC # FLD: 17.5 % — HIGH (ref 10.3–16.9)
SODIUM SERPL-SCNC: 139 MMOL/L — SIGNIFICANT CHANGE UP (ref 135–145)
WBC # BLD: 7.1 K/UL — SIGNIFICANT CHANGE UP (ref 3.8–10.5)
WBC # FLD AUTO: 7.1 K/UL — SIGNIFICANT CHANGE UP (ref 3.8–10.5)

## 2018-01-27 PROCEDURE — 99233 SBSQ HOSP IP/OBS HIGH 50: CPT

## 2018-01-27 RX ORDER — ISOSORBIDE MONONITRATE 60 MG/1
30 TABLET, EXTENDED RELEASE ORAL DAILY
Qty: 0 | Refills: 0 | Status: DISCONTINUED | OUTPATIENT
Start: 2018-01-28 | End: 2018-01-28

## 2018-01-27 RX ORDER — FUROSEMIDE 40 MG
80 TABLET ORAL DAILY
Qty: 0 | Refills: 0 | Status: DISCONTINUED | OUTPATIENT
Start: 2018-01-28 | End: 2018-01-28

## 2018-01-27 RX ORDER — METOPROLOL TARTRATE 50 MG
12.5 TABLET ORAL ONCE
Qty: 0 | Refills: 0 | Status: COMPLETED | OUTPATIENT
Start: 2018-01-27 | End: 2018-01-27

## 2018-01-27 RX ORDER — METOPROLOL TARTRATE 50 MG
25 TABLET ORAL DAILY
Qty: 0 | Refills: 0 | Status: DISCONTINUED | OUTPATIENT
Start: 2018-01-27 | End: 2018-01-28

## 2018-01-27 RX ADMIN — ISOSORBIDE DINITRATE 10 MILLIGRAM(S): 5 TABLET ORAL at 06:17

## 2018-01-27 RX ADMIN — AMIODARONE HYDROCHLORIDE 400 MILLIGRAM(S): 400 TABLET ORAL at 06:16

## 2018-01-27 RX ADMIN — ATORVASTATIN CALCIUM 40 MILLIGRAM(S): 80 TABLET, FILM COATED ORAL at 21:52

## 2018-01-27 RX ADMIN — Medication 81 MILLIGRAM(S): at 11:34

## 2018-01-27 RX ADMIN — LIDOCAINE 1 PATCH: 4 CREAM TOPICAL at 00:19

## 2018-01-27 RX ADMIN — PANTOPRAZOLE SODIUM 40 MILLIGRAM(S): 20 TABLET, DELAYED RELEASE ORAL at 06:16

## 2018-01-27 RX ADMIN — Medication 12.5 MILLIGRAM(S): at 06:15

## 2018-01-27 RX ADMIN — ISOSORBIDE DINITRATE 10 MILLIGRAM(S): 5 TABLET ORAL at 14:35

## 2018-01-27 RX ADMIN — Medication 25 MICROGRAM(S): at 06:15

## 2018-01-27 RX ADMIN — IRON SUCROSE 110 MILLIGRAM(S): 20 INJECTION, SOLUTION INTRAVENOUS at 23:13

## 2018-01-27 RX ADMIN — LIDOCAINE 1 PATCH: 4 CREAM TOPICAL at 11:35

## 2018-01-27 RX ADMIN — Medication 2: at 21:51

## 2018-01-27 RX ADMIN — TIOTROPIUM BROMIDE 1 CAPSULE(S): 18 CAPSULE ORAL; RESPIRATORY (INHALATION) at 11:34

## 2018-01-27 RX ADMIN — Medication 12.5 MILLIGRAM(S): at 19:36

## 2018-01-27 RX ADMIN — CLOPIDOGREL BISULFATE 75 MILLIGRAM(S): 75 TABLET, FILM COATED ORAL at 11:34

## 2018-01-27 RX ADMIN — APIXABAN 2.5 MILLIGRAM(S): 2.5 TABLET, FILM COATED ORAL at 11:34

## 2018-01-27 RX ADMIN — Medication 2: at 11:34

## 2018-01-27 RX ADMIN — Medication 40 MILLIGRAM(S): at 06:16

## 2018-01-27 RX ADMIN — AMIODARONE HYDROCHLORIDE 400 MILLIGRAM(S): 400 TABLET ORAL at 18:32

## 2018-01-27 RX ADMIN — APIXABAN 2.5 MILLIGRAM(S): 2.5 TABLET, FILM COATED ORAL at 22:25

## 2018-01-27 NOTE — PROGRESS NOTE ADULT - PROBLEM SELECTOR PLAN 8
Renal (Dr. Dominguez following)  Stage III CKD- renal function uptrending 34/1.47-->38/1.78 post cath 2.50 << 2.26 today.  -Renal US 1/22 consistent with medical renal disease.   **Patient and family informed high risk for ANGELICA/need for possible HD post contrast exposure with angiogram---reported understanding

## 2018-01-27 NOTE — PROGRESS NOTE ADULT - PROBLEM SELECTOR PLAN 2
EP service following  - New onset Afib s/p ANMOL and failed DCCV 1/23/18  -Pt converted to NSR with freq PACs on 1/26/18 AM  -c/w Toprol 25 mg daily, Amio 400mg BID load(d/c pt. tomorrow on 200mg as per dr. shepard)  -c/w Eliquis 2.5mg BID

## 2018-01-27 NOTE — PROGRESS NOTE ADULT - PROBLEM SELECTOR PROBLEM 3
CAD (coronary artery disease)
PAH (pulmonary artery hypertension)

## 2018-01-27 NOTE — PROGRESS NOTE ADULT - SUBJECTIVE AND OBJECTIVE BOX
Interventional Cardiology PA Adult Progress Note    Subjective Assessment: Pt. seen and examined at bedside this am. Pt. denies any CP, SOB, dizziness and palpitations.  	  MEDICATIONS:  amiodarone    Tablet 400 milliGRAM(s) Oral two times a day  furosemide   Injectable 40 milliGRAM(s) IV Push daily  isosorbide   dinitrate Tablet (ISORDIL) 10 milliGRAM(s) Oral three times a day  metoprolol     tartrate 12.5 milliGRAM(s) Oral two times a day  tiotropium 18 MICROgram(s) Capsule 1 Capsule(s) Inhalation daily  pantoprazole    Tablet 40 milliGRAM(s) Oral before breakfast  atorvastatin 40 milliGRAM(s) Oral at bedtime  dextrose 50% Injectable 12.5 Gram(s) IV Push once  dextrose 50% Injectable 25 Gram(s) IV Push once  dextrose 50% Injectable 25 Gram(s) IV Push once  dextrose Gel 1 Dose(s) Oral once PRN  glucagon  Injectable 1 milliGRAM(s) IntraMuscular once PRN  insulin lispro (HumaLOG) corrective regimen sliding scale   SubCutaneous Before meals and at bedtime  levothyroxine 25 MICROGram(s) Oral daily    apixaban 2.5 milliGRAM(s) Oral every 12 hours  aspirin enteric coated 81 milliGRAM(s) Oral daily  clopidogrel Tablet 75 milliGRAM(s) Oral daily  dextrose 5%. 1000 milliLiter(s) IV Continuous <Continuous>  epoetin jayesh Injectable 81358 Unit(s) SubCutaneous every other day  iron sucrose IVPB 200 milliGRAM(s) IV Intermittent every 24 hours  lidocaine   Patch 1 Patch Transdermal daily      	    [PHYSICAL EXAM:  TELEMETRY:  T(C): 36.1 (01-27-18 @ 14:43), Max: 36.4 (01-26-18 @ 22:32)  HR: 67 (01-27-18 @ 14:35) (63 - 75)  BP: 130/56 (01-27-18 @ 14:35) (94/44 - 130/56)  RR: 16 (01-27-18 @ 14:35) (16 - 18)  SpO2: 99% (01-27-18 @ 14:35) (96% - 100%)  Wt(kg): --  I&O's Summary    26 Jan 2018 07:01  -  27 Jan 2018 07:00  --------------------------------------------------------  IN: 600 mL / OUT: 475 mL / NET: 125 mL    27 Jan 2018 07:01  -  27 Jan 2018 15:53  --------------------------------------------------------  IN: 0 mL / OUT: 180 mL / NET: -180 mL        Rubalcava:  Central/PICC/Mid Line:                                         Appearance: Normal	  HEENT:   Normal oral mucosa, PERRL, EOMI	  Neck: Supple, + JVD/ - JVD; Carotid Bruit   Cardiovascular: Normal S1 S2, No JVD, No murmurs,   Respiratory: Lungs clear to auscultation/Decreased Breath Sounds/No Rales, Rhonchi, Wheezing	  Gastrointestinal:  Soft, Non-tender, + BS	  Skin: No rashes, No ecchymoses, No cyanosis  Extremities: Normal range of motion, No clubbing, cyanosis or edema  Vascular: Peripheral pulses palpable 2+ bilaterally  Neurologic: Non-focal  Psychiatry: A & O x 3, Mood & affect appropriate      	    ECG:  	  RADIOLOGY:   DIAGNOSTIC TESTING:  [ ] Echocardiogram:  [ ]  Catheterization:  [ ] Stress Test:    [ ] ANMOL  OTHER: 	    LABS:	 	  CARDIAC MARKERS:                                  7.8    7.1   )-----------( 163      ( 27 Jan 2018 07:06 )             26.1     01-27    139  |  96  |  51<H>  ----------------------------<  138<H>  4.1   |  32<H>  |  2.26<H>    Ca    9.6      27 Jan 2018 07:06  Mg     2.1     01-27      proBNP:   Lipid Profile:   HgA1c:   TSH:       ASSESSMENT/PLAN: 	        DVT ppx:  Dispo: Interventional Cardiology PA Adult Progress Note    Subjective Assessment: Pt. seen and examined at bedside this am. Pt. denies any CP, SOB, dizziness and palpitations.  	  MEDICATIONS:  amiodarone    Tablet 400 milliGRAM(s) Oral two times a day  furosemide   Injectable 40 milliGRAM(s) IV Push daily  isosorbide   dinitrate Tablet (ISORDIL) 10 milliGRAM(s) Oral three times a day  metoprolol     tartrate 12.5 milliGRAM(s) Oral two times a day  tiotropium 18 MICROgram(s) Capsule 1 Capsule(s) Inhalation daily  pantoprazole    Tablet 40 milliGRAM(s) Oral before breakfast  atorvastatin 40 milliGRAM(s) Oral at bedtime  dextrose 50% Injectable 12.5 Gram(s) IV Push once  dextrose 50% Injectable 25 Gram(s) IV Push once  dextrose 50% Injectable 25 Gram(s) IV Push once  dextrose Gel 1 Dose(s) Oral once PRN  glucagon  Injectable 1 milliGRAM(s) IntraMuscular once PRN  insulin lispro (HumaLOG) corrective regimen sliding scale   SubCutaneous Before meals and at bedtime  levothyroxine 25 MICROGram(s) Oral daily    apixaban 2.5 milliGRAM(s) Oral every 12 hours  aspirin enteric coated 81 milliGRAM(s) Oral daily  clopidogrel Tablet 75 milliGRAM(s) Oral daily  dextrose 5%. 1000 milliLiter(s) IV Continuous <Continuous>  epoetin jayesh Injectable 80486 Unit(s) SubCutaneous every other day  iron sucrose IVPB 200 milliGRAM(s) IV Intermittent every 24 hours  lidocaine   Patch 1 Patch Transdermal daily      	    [PHYSICAL EXAM:  TELEMETRY:  T(C): 36.1 (01-27-18 @ 14:43), Max: 36.4 (01-26-18 @ 22:32)  HR: 67 (01-27-18 @ 14:35) (63 - 75)  BP: 130/56 (01-27-18 @ 14:35) (94/44 - 130/56)  RR: 16 (01-27-18 @ 14:35) (16 - 18)  SpO2: 99% (01-27-18 @ 14:35) (96% - 100%)  Wt(kg): --  I&O's Summary    26 Jan 2018 07:01  -  27 Jan 2018 07:00  --------------------------------------------------------  IN: 600 mL / OUT: 475 mL / NET: 125 mL    27 Jan 2018 07:01  -  27 Jan 2018 15:53  --------------------------------------------------------  IN: 0 mL / OUT: 180 mL / NET: -180 mL        Rubalcava:  Central/PICC/Mid Line:                                         General: NAD  neck: no JVD  CV: ireegularly irregular. s1 and s2 present  pulm: CTA B/L, no w/r/r  GI: soft, NT/ND, +BS X 4  Extrmeties: non-pitting pedal edema.  Neuro: A0 X 3, no focal deficits.  	    ECG:  	  RADIOLOGY:   DIAGNOSTIC TESTING:  [ ] Echocardiogram:  [ ]  Catheterization:  [ ] Stress Test:    [ ] ANMOL  OTHER: 	    LABS:	 	  CARDIAC MARKERS:                                  7.8    7.1   )-----------( 163      ( 27 Jan 2018 07:06 )             26.1     01-27    139  |  96  |  51<H>  ----------------------------<  138<H>  4.1   |  32<H>  |  2.26<H>    Ca    9.6      27 Jan 2018 07:06  Mg     2.1     01-27      proBNP:   Lipid Profile:   HgA1c:   TSH:       ASSESSMENT/PLAN: 	        DVT ppx:  Dispo:

## 2018-01-27 NOTE — PROGRESS NOTE ADULT - PROBLEM SELECTOR PLAN 7
Heme (Dr. Elliott following)  - Iron deficiency anemia- H/H 8.3/28. Will monitor CBC closely & transfuse PRBCs to keep Hb > 7g/dl. S/p IV Iron x3 days and Epogen 10,000 units x3 days.  - F/U SPEP, IPEP, Immunofixation, LDH, Beta-2 microglobulin.

## 2018-01-27 NOTE — PROGRESS NOTE ADULT - SUBJECTIVE AND OBJECTIVE BOX
History of Present Illness:  History of Present Illness: 	  This is a 89 y/o female, obese, with HTN, HPL, DM-II, Anemia, diastolic CHF (NL EF), known CAD with previous PCIs, b/l carotid endarterectomies, who presented to her cardiologist with c/o mid sternal chest pressure radiating to the back, associated with SOB, palpitations, LE edema and 4lbs weight gain over 1 week. In the office, her EKG showed new AFib and she was sent to the ER for further evaluation.   In the ER, 1st troponin negative, BNP 3805, BUN 42/1.57, H/H 8.6/27.7, EKG AFib @ 100bpm, LBBB, CXR with some vascular congestion. She received Cardizem 5mg IV x1, Lasix 40mg IV x1, was started on IV heparin and admitted for further management and telemetry monitoring.  No s/s of bleeding noted.    Review of Systems:  Review of Systems: GENERAL, CONSTITUTIONAL : + recent weight gain. Denies fever, chills  EYES, VISION: denies changes in vision   EARS, NOSE, THROAT: denies hearing loss  HEART, CARDIOVASCULAR: + chest pain, palpitations, SOB,  LE edema. Denies claudication  RESPIRATORY: + SOB; Denies cough, wheezing, PND, orthopnea  GASTROINTESTINAL: Denies abdominal pain, heartburn, bloody stool, dark tarry stool  GENITOURINARY: Denies frequent urination, urgency  MUSCULOSKELETAL denies joint pain or swelling, restricted motion, musculoskeletal pain.   SKIN & INTEGUMENTARY Denies rashes, sores, blisters, blisters, growths.  NEUROLOGICAL: Denies numbness or tingling sensations, sensation loss, burning.   PSYCHIATRIC: Denies nervousness, anxiety, depression  ENDOCRINE Denies heat or cold intolerance, excessive thirst HEMATOLOGIC/LYMPHATIC: Denies abnormal bleeding, bleeding of any kind	      Allergies and Intolerances:        Allergies:  	No Known Allergies:     Home Medications:   * Patient Currently Takes Medications as of 17-Jan-2018 02:09 documented in Structured Notes  · 	torsemide 10 mg oral tablet: 1 tab(s) orally once a day, Last Dose Taken:    · 	aspirin 81 mg oral tablet: 1 tab(s) orally once a day, Last Dose Taken:    · 	isosorbide mononitrate 30 mg oral tablet, extended release: 1 tab(s) orally once a day (in the morning), Last Dose Taken:    · 	Januvia 50 mg oral tablet: 1 tab(s) orally once a day, Last Dose Taken:    · 	amLODIPine 5 mg oral tablet: 1 tab(s) orally once a day, Last Dose Taken:    · 	predniSONE 20 mg oral tablet: 1 tab(s) orally once a day, Last Dose Taken:    · 	atorvastatin 20 mg oral tablet: 1 tab(s) orally once a day, Last Dose Taken:    · 	montelukast 10 mg oral tablet: 1 tab(s) orally once a day, Last Dose Taken:    · 	omeprazole 40 mg oral delayed release capsule: 1 cap(s) orally once a day, Last Dose Taken:    · 	Spiriva 18 mcg inhalation capsule: 1 cap(s) inhaled once a day, Last Dose Taken:      .  Patient History:   Past Medical History:  Anemia    CAD (coronary artery disease)    DM (diabetes mellitus)    Emphysema    HTN (hypertension)    Hypercholesteremia.    Past Surgical History:  History of bilateral carotid endarterectomy    History of umbilical hernia repair    S/P bladder repair    S/P cataract surgery    S/P hysterectomy with oophorectomy    S/P TKR (total knee replacement).    Family History:  Mother  Still living? No  Family history of emphysema, Age at diagnosis: 71-80.    Social History:  Social History (marital status, living situation, occupation, tobacco use, alcohol and drug use, and sexual history): Denies tobacco, ETOH or illicit drug use Lives with her daughter, ambulates with a walker	    Tobacco Screening:  · Core Measure Site	No	    Risk Assessment:   Present on Admission:  Deep Venous Thrombosis	no 	  Pulmonary Embolus	no 	    Heart Failure:  Does this patient have a history of or has been diagnosed with heart failure? yes.      Physical Exam:  Physical Exam:  Vital Signs Last 24 Hrs T(C): 36.1 (27 Jan 2018 14:43), Max: 36.4 (26 Jan 2018 22:32) T(F): 97 (27 Jan 2018 14:43), Max: 97.6 (26 Jan 2018 22:32) HR: 67 (27 Jan 2018 14:35) (63 - 75) BP: 130/56 (27 Jan 2018 14:35) (94/44 - 130/56) RR: 16 (27 Jan 2018 14:35) (16 - 18) SpO2: 99% (27 Jan 2018 14:35) (96% - 100%)    Appearance: Normal   HEENT:   Normal oral mucosa, PERRL, EOMI   Neck: Supple, - JVD; No Carotid Bruit   Cardiovascular: Normal S1 S2, No JVD, No murmurs  Respiratory: Lungs bibasilar crackles, no Rhonchi, Wheezing   Gastrointestinal:  Soft, Non-tender, + BS   Skin: No rashes, No ecchymoses, No cyanosis  Extremities: 3+ edema b/l, No clubbing, cyanosis  Vascular: Femoral pulses 1+ b/l without bruit, DP faint, b/l, PT faint b/l  Neurologic: Non-focal Psychiatry: A & O x 3, Mood & affect appropriate	      Labs and Results:  Labs, Radiology, Cardiology, and Other Results:                       7.8   7.1   )-----------( 163      ( 27 Jan 2018 07:06 )            26.1                      	    Assessment and Plan:   Assessment:  · Assessment		  89 y/o female with HTN, HPL, DM-II, CAD/PCIs, diastolic CHF (NL EF), anemia who is admitted with new onset AFib and CHF exacerbation      Problem/Plan - 1:  ·  Problem: Atrial fibrillation.  Plan: New Afib with controlled HR  - Heparin gtt: Dose to keep PTT 60-80    Problem/Plan - 2:  - Monitor CBC closely & transfuse PRBCs to keep Hb > 7g/dl  - f/u SPEP, IPEP, Immunofixation, LDH, Beta-2 microglobulin  -  Epogen 10k sq tiw  - IV iron sucrose

## 2018-01-27 NOTE — PROGRESS NOTE ADULT - SUBJECTIVE AND OBJECTIVE BOX
MEDICAL HISTORY:      89 year old  female admitted with CHF and atrial fibrillation in  the 100's. The history is notable for vasculopathy with multiple cardiac, lower extremity and carotid procedures. n echocardiogram here showed .reduced LVEF. The hospital course has focused on diuresis and rate control.     On admission GFR was reduced, with Scr ~1.5 mg/dl. Baseline Scr data n/a. On admission, anemia was noted with Hgb 8's g/dl.  A beta-microalbumin screen was elevated (6.0). TF sat was 12, Ferritin 169. Hematology is evaluating.    CKD secibcary to nephrosclerosis  Anemia secondary to CKD and iron deficiency  Vasculopathy, coronary, carotid, PVD   Cardiomyopathy with reduced LVEF  long-standing HTN (40 years)  recent DM (few years)  HLD  Emphysema/COPD    ECHO severe global hypokinesis with LVEF 20-25, mod MR, LAE, PHTN    Selected medications at home: amlodipine. torsemide, prednisone 20 mg, Januvia, isordil, ASA, statin, omeprazole, various bronchodilators.     INTERVAL HISTORY:    Remains in RSR (48 hours).   BP 1113/75. HR 80's RSR  Scr 3.18. 3.30, 3.48  Na 131 stable  Weight 82.0, 82.0    SUMMARY COMMENTS:                   ---------------------------------------------------------------------------------------------------------------------------------------------------------------------------    SUBJECTIVE & OBJECTIVE DATA DOCUMENTATION:     REVIEW OF SYSTEMS SCREENED ORGAN SYSTEMS:  Constitutional: fever, chills, anorexia or fatigue  Pulmonary: difficulty breathing, wheezing, cough  Cardiovascular: exertional dyspnea, chest pain, palpitations, dizziness or leg swelling  Gastrointestinal: abdominal or epigastric pain, nausea, vomiting or hematemesis, diarrhea, melena or bright red blood per rectum.  Genitourinary: dysuria, urgency, frequency, flank pain, difficulty voiding  Skin: No pruritis, rashes or lesions  Neurology: memory loss, dysarthria, extremity weakness, neuropathic pain, headache, visual changes  Dialysis access if present : pain, bleeding, swelling     ROS POSITIVE FINDINGS: cough and some SOB    PAST MEDICAL & SURGICAL HISTORY:  Anemia  CAD (coronary artery disease)  DM (diabetes mellitus)  Emphysema  Hypercholesteremia  HTN (hypertension)  S/P bladder repair  S/P hysterectomy with oophorectomy  History of bilateral carotid endarterectomy  History of umbilical hernia repair  S/P cataract surgery  S/P TKR (total knee replacement)      PHYSICAL EXAM:  T(C): 35.7 (18 @ 11:40), Max: 36.7 (18 @ 12:57)  HR: 74 (18 @ 11:41)  BP: 106/51 (18 @ 11:41) (94/44 - 121/56)  RR: 16 (18 @ 11:41)  SpO2: 99% (18 @ 11:41)  Wt(kg): --  I&O's Summary    2018 07:  -  2018 07:00  --------------------------------------------------------  IN: 600 mL / OUT: 475 mL / NET: 125 mL    2018 07:  -  2018 12:43  --------------------------------------------------------  IN: 0 mL / OUT: 180 mL / NET: -180 mL      Weight     APPEARANCE: In bed, NAD.   HEAD & NECK: normocephalic, no stiff neck, no masses, oral mucosa moist, no scleral icteris  RESPIRATORY: no accessory muscle use, no labored breathing, no dullness, basilar rales and rhonchi, + wheeze  CARDIOVASCULAR: no JVD, IRRR, S1S2,+ gallop,  no murmur, no rub.  ABDOMEN: soft, no tenderness, no masses, no hepatomegally, no splenomegally  : no bladder distension  LYMPHATIC: no lymphadenopathy (neck, axilla, groin)  EXTREMITIES & MUSCULOSKELETAL: no cyanosis, no clubbing, no tenderness, strength  L=R  EDEMA: trace  PULSES: upper extremity pulses present, lower extremity pulses absent  SKIN: no rash, no stasis, no induration  NEUROLOGIC & PSYCHIATRIC:  alert, interactive, oriented to time and place, responds to stimuli, no asterixis      MEDICATIONS  (STANDING):  amiodarone    Tablet 400 milliGRAM(s) Oral two times a day  apixaban 2.5 milliGRAM(s) Oral every 12 hours  aspirin enteric coated 81 milliGRAM(s) Oral daily  atorvastatin 40 milliGRAM(s) Oral at bedtime  clopidogrel Tablet 75 milliGRAM(s) Oral daily  dextrose 5%. 1000 milliLiter(s) (50 mL/Hr) IV Continuous <Continuous>  dextrose 50% Injectable 12.5 Gram(s) IV Push once  dextrose 50% Injectable 25 Gram(s) IV Push once  dextrose 50% Injectable 25 Gram(s) IV Push once  epoetin jayesh Injectable 19366 Unit(s) SubCutaneous every other day  furosemide   Injectable 40 milliGRAM(s) IV Push daily  insulin lispro (HumaLOG) corrective regimen sliding scale   SubCutaneous Before meals and at bedtime  iron sucrose IVPB 200 milliGRAM(s) IV Intermittent every 24 hours  isosorbide   dinitrate Tablet (ISORDIL) 10 milliGRAM(s) Oral three times a day  levothyroxine 25 MICROGram(s) Oral daily  lidocaine   Patch 1 Patch Transdermal daily  metoprolol     tartrate 12.5 milliGRAM(s) Oral two times a day  pantoprazole    Tablet 40 milliGRAM(s) Oral before breakfast  tiotropium 18 MICROgram(s) Capsule 1 Capsule(s) Inhalation daily    MEDICATIONS  (PRN):  dextrose Gel 1 Dose(s) Oral once PRN Blood Glucose LESS THAN 70 milliGRAM(s)/deciliter  glucagon  Injectable 1 milliGRAM(s) IntraMuscular once PRN Glucose LESS THAN 70 milligrams/deciliter      DATA:  139    |  96     |  51<H>  ----------------------------<  138<H>  Ca:9.6   (2018 07:06)  4.1     |  32<H>  |  2.26<H>      eGFR if Non : 18 <L>  eGFR if : 21 <L>                            7.8<L>  7.1   )-----------( 163      ( 2018 07:06 )             26.1<L>    Phos:-- M.1 mg/dL PTH:-- Uric acid:-- Serum Osm:--  Ferritin:-- Iron:-- TIBC:-- Tsat:--  B12:-- TSH:-- ( @ 07:06)      UProt:-- UCr:73 mg/dL P/C Ratio:-- 24 hour Prot:-- UVol:-- CrCl:--  Tj:-- UOsm:-- UVol:-- UCl:-- UK:-- ( @ 20:14) MEDICAL HISTORY:      89 year old  female admitted with CHF and atrial fibrillation in  the 100's. The history is notable for vasculopathy with multiple cardiac, lower extremity and carotid procedures. n echocardiogram here showed .reduced LVEF. The hospital course has focused on diuresis and rate control.     On admission GFR was reduced, with Scr ~1.5 mg/dl. Baseline Scr data n/a. On admission, anemia was noted with Hgb 8's g/dl.  A beta-microalbumin screen was elevated (6.0). TF sat was 12, Ferritin 169. Hematology is evaluating.    CKD secibcary to nephrosclerosis  Anemia secondary to CKD and iron deficiency  Vasculopathy, coronary, carotid, PVD   Cardiomyopathy with reduced LVEF  long-standing HTN (40 years)  recent DM (few years)  HLD  Emphysema/COPD    ECHO severe global hypokinesis with LVEF 20-25, mod MR, LAE, PHTN    Selected medications at home: amlodipine. torsemide, prednisone 20 mg, Januvia, isordil, ASA, statin, omeprazole, various bronchodilators.     INTERVAL HISTORY:    Remains in RSR (48 hours).   BP 1113/75. HR 80's RSR  Scr 3.18. 3.30, 3.48  Na 131 stable  Hgb 7.8.   Weight 82.0, 82.0    SUMMARY COMMENTS:     c/o "cough" and lungs notable for wheezing. This hospitalization, home steroids (for COPD) were stopped and beta blockers used for cardiac reasons. Not clear if the beta blockers are causing an exacerbation of COPD with wheezing. Suggest pulmonary consult and advice. Weight has not decreased and I am not sure if there is a component of pulmonary volume overload that relates to the symptoms.     Anemia secondary to CKD and iron deficiency. Now receiving IV iron and EPO.     Creatinine a bit higher, which appears hemodynamic and not new kidney damage.     Discussed with surgical team.                 ---------------------------------------------------------------------------------------------------------------------------------------------------------------------------    SUBJECTIVE & OBJECTIVE DATA DOCUMENTATION:     REVIEW OF SYSTEMS SCREENED ORGAN SYSTEMS:  Constitutional: fever, chills, anorexia or fatigue  Pulmonary: difficulty breathing, wheezing, cough  Cardiovascular: exertional dyspnea, chest pain, palpitations, dizziness or leg swelling  Gastrointestinal: abdominal or epigastric pain, nausea, vomiting or hematemesis, diarrhea, melena or bright red blood per rectum.  Genitourinary: dysuria, urgency, frequency, flank pain, difficulty voiding  Skin: No pruritis, rashes or lesions  Neurology: memory loss, dysarthria, extremity weakness, neuropathic pain, headache, visual changes  Dialysis access if present : pain, bleeding, swelling     ROS POSITIVE FINDINGS: cough and some SOB    PAST MEDICAL & SURGICAL HISTORY:  Anemia  CAD (coronary artery disease)  DM (diabetes mellitus)  Emphysema  Hypercholesteremia  HTN (hypertension)  S/P bladder repair  S/P hysterectomy with oophorectomy  History of bilateral carotid endarterectomy  History of umbilical hernia repair  S/P cataract surgery  S/P TKR (total knee replacement)      PHYSICAL EXAM:  T(C): 35.7 (18 @ 11:40), Max: 36.7 (18 @ 12:57)  HR: 74 (18 @ 11:41)  BP: 106/51 (18 @ 11:41) (94/44 - 121/56)  RR: 16 (18 @ 11:41)  SpO2: 99% (18 @ 11:41)  Wt(kg): --  I&O's Summary    2018 07:  -  2018 07:00  --------------------------------------------------------  IN: 600 mL / OUT: 475 mL / NET: 125 mL    2018 07:  -  2018 12:43  --------------------------------------------------------  IN: 0 mL / OUT: 180 mL / NET: -180 mL      Weight     APPEARANCE: In bed, NAD.   HEAD & NECK: normocephalic, no stiff neck, no masses, oral mucosa moist, no scleral icteris  RESPIRATORY: no accessory muscle use, no labored breathing, no dullness, basilar rales and rhonchi, + wheeze  CARDIOVASCULAR: no JVD, IRRR, S1S2,+ gallop,  no murmur, no rub.  ABDOMEN: soft, no tenderness, no masses, no hepatomegally, no splenomegally  : no bladder distension  LYMPHATIC: no lymphadenopathy (neck, axilla, groin)  EXTREMITIES & MUSCULOSKELETAL: no cyanosis, no clubbing, no tenderness, strength  L=R  EDEMA: trace  PULSES: upper extremity pulses present, lower extremity pulses absent  SKIN: no rash, no stasis, no induration  NEUROLOGIC & PSYCHIATRIC:  alert, interactive, oriented to time and place, responds to stimuli, no asterixis      MEDICATIONS  (STANDING):  amiodarone    Tablet 400 milliGRAM(s) Oral two times a day  apixaban 2.5 milliGRAM(s) Oral every 12 hours  aspirin enteric coated 81 milliGRAM(s) Oral daily  atorvastatin 40 milliGRAM(s) Oral at bedtime  clopidogrel Tablet 75 milliGRAM(s) Oral daily  dextrose 5%. 1000 milliLiter(s) (50 mL/Hr) IV Continuous <Continuous>  dextrose 50% Injectable 12.5 Gram(s) IV Push once  dextrose 50% Injectable 25 Gram(s) IV Push once  dextrose 50% Injectable 25 Gram(s) IV Push once  epoetin jayesh Injectable 95207 Unit(s) SubCutaneous every other day  furosemide   Injectable 40 milliGRAM(s) IV Push daily  insulin lispro (HumaLOG) corrective regimen sliding scale   SubCutaneous Before meals and at bedtime  iron sucrose IVPB 200 milliGRAM(s) IV Intermittent every 24 hours  isosorbide   dinitrate Tablet (ISORDIL) 10 milliGRAM(s) Oral three times a day  levothyroxine 25 MICROGram(s) Oral daily  lidocaine   Patch 1 Patch Transdermal daily  metoprolol     tartrate 12.5 milliGRAM(s) Oral two times a day  pantoprazole    Tablet 40 milliGRAM(s) Oral before breakfast  tiotropium 18 MICROgram(s) Capsule 1 Capsule(s) Inhalation daily    MEDICATIONS  (PRN):  dextrose Gel 1 Dose(s) Oral once PRN Blood Glucose LESS THAN 70 milliGRAM(s)/deciliter  glucagon  Injectable 1 milliGRAM(s) IntraMuscular once PRN Glucose LESS THAN 70 milligrams/deciliter      DATA:  139    |  96     |  51<H>  ----------------------------<  138<H>  Ca:9.6   (2018 07:06)  4.1     |  32<H>  |  2.26<H>      eGFR if Non : 18 <L>  eGFR if : 21 <L>                            7.8<L>  7.1   )-----------( 163      ( 2018 07:06 )             26.1<L>    Phos:-- M.1 mg/dL PTH:-- Uric acid:-- Serum Osm:--  Ferritin:-- Iron:-- TIBC:-- Tsat:--  B12:-- TSH:-- ( @ 07:06)      UProt:-- UCr:73 mg/dL P/C Ratio:-- 24 hour Prot:-- UVol:-- CrCl:--  Tj:-- UOsm:-- UVol:-- UCl:-- UK:-- ( @ 20:14)

## 2018-01-27 NOTE — PROGRESS NOTE ADULT - ATTENDING COMMENTS
Covering for Dr. Crabtree. Agree with PA note above. In short 89 yo F h/o HTN, HLD, DM, CAD/PCI, systolic heart failure, KIRA, p/w new onset AFIB with ADHF s/p failed DCCV with self-conversion to SR with frequent APC.  - Pt appears euvolemic currently on exam. Transition to Lasix po. Cont current medical management. For outpatient bi-v upgrade. She is medically optimized for discharge pending reinstatement of her home services.

## 2018-01-27 NOTE — PROGRESS NOTE ADULT - PROBLEM SELECTOR PLAN 10
-+UA/Urine culture with MDR Ecoli. Patient currently asymptomatic, afebrile, will hold off antibiotic treatment.    Dispo: d/c fernando after HHA reinstated; SW working on it today. Pt. to f/u with Dr. Connolly feb 22nd @ 3:15 pm for Possible BI-V pacer(no ICD) in near future; -+UA/Urine culture with MDR Ecoli. Patient currently asymptomatic, afebrile, will hold off antibiotic treatment.    Dispo: d/c [facility] after HHA reinstated; SW working on it today. Pt. on Home O2. Prescription for portable oxygen given to . Pt. to f/u with Dr. Connolly feb 22nd @ 3:15 pm for Possible BI-V pacer(no ICD) in near future;

## 2018-01-27 NOTE — PROGRESS NOTE ADULT - PROBLEM SELECTOR PLAN 1
- Right heart cath 1/24 revealed moderate to severe pulmonary HTN.  - Lasix 40mg IVP daily switched to Lasix 80 mg PO/   - Strict Is and Os and Daily weights   - Hydralazine stopped 2/2 hypotension.  Isordil 10mg TID switched to Imdur 30 mg daily and  Lopressor 12.5mg BId switched to Toprol 25 mg daily.  - no ACE/ARB due to worsening renal function.  - Echo 1/18 revealed moderately dilated LA, moderate MR, mild to moderate TR, evidence of pulmonary hypertension (PASP 57 mmHg), severe global hypokinesis of the LV, EF 20- 25%. (newly depressed) - Right heart cath 1/24 revealed moderate to severe pulmonary HTN.  - Lasix 40mg IVP daily switched to Lasix 80 mg PO.  - Strict Is and Os and Daily weights   - Hydralazine stopped 2/2 hypotension.  Isordil 10mg TID switched to Imdur 30 mg daily and  Lopressor 12.5mg BId switched to Toprol 25 mg daily.  - no ACE/ARB due to worsening renal function.  - Echo 1/18 revealed moderately dilated LA, moderate MR, mild to moderate TR, evidence of pulmonary hypertension (PASP 57 mmHg), severe global hypokinesis of the LV, EF 20- 25%. (newly depressed)

## 2018-01-27 NOTE — PROGRESS NOTE ADULT - SUBJECTIVE AND OBJECTIVE BOX
INTERVAL HPI/OVERNIGHT EVENTS:      Patient is a 89y old  Female who presents with a chief complaint of  was seen and examined today. Reports the following symptoms:    CONSTITUTIONAL:  Negative fever or chills, feels well, good appetite  EYES:  Negative  blurry vision or double vision  CARDIOVASCULAR:  Negative for chest pain or palpitations  RESPIRATORY:  Negative for cough, wheezing, or SOB   GASTROINTESTINAL:  Negative for nausea, vomiting, diarrhea, constipation, or abdominal pain  GENITOURINARY:  Negative frequency, urgency or dysuria  NEUROLOGIC:  No headache, confusion, dizziness, lightheadedness    MEDICATIONS  (STANDING):  amiodarone    Tablet 400 milliGRAM(s) Oral two times a day  apixaban 2.5 milliGRAM(s) Oral every 12 hours  aspirin enteric coated 81 milliGRAM(s) Oral daily  atorvastatin 40 milliGRAM(s) Oral at bedtime  clopidogrel Tablet 75 milliGRAM(s) Oral daily  dextrose 5%. 1000 milliLiter(s) (50 mL/Hr) IV Continuous <Continuous>  dextrose 50% Injectable 12.5 Gram(s) IV Push once  dextrose 50% Injectable 25 Gram(s) IV Push once  dextrose 50% Injectable 25 Gram(s) IV Push once  epoetin jayesh Injectable 72351 Unit(s) SubCutaneous every other day  insulin lispro (HumaLOG) corrective regimen sliding scale   SubCutaneous Before meals and at bedtime  iron sucrose IVPB 200 milliGRAM(s) IV Intermittent every 24 hours  levothyroxine 25 MICROGram(s) Oral daily  lidocaine   Patch 1 Patch Transdermal daily  metoprolol succinate ER 25 milliGRAM(s) Oral daily  pantoprazole    Tablet 40 milliGRAM(s) Oral before breakfast  tiotropium 18 MICROgram(s) Capsule 1 Capsule(s) Inhalation daily    MEDICATIONS  (PRN):  dextrose Gel 1 Dose(s) Oral once PRN Blood Glucose LESS THAN 70 milliGRAM(s)/deciliter  glucagon  Injectable 1 milliGRAM(s) IntraMuscular once PRN Glucose LESS THAN 70 milligrams/deciliter        LABS:                        7.8    7.1   )-----------( 163      ( 27 Jan 2018 07:06 )             26.1     01-27    139  |  96  |  51<H>  ----------------------------<  138<H>  4.1   |  32<H>  |  2.26<H>    Ca    9.6      27 Jan 2018 07:06  Mg     2.1     01-27      Hemoglobin A1C, Whole Blood: 7.0 % (01-17-18 @ 05:28)        Thyroid Stimulating Hormone, Serum: 6.350 uIU/mL (01-18 @ 06:38)  Thyroid Stimulating Hormone, Serum: 5.255 uIU/mL (01-17 @ 20:12)      RADIOLOGY & ADDITIONAL TESTS:      Vital Signs Last 24 Hrs  T(C): 36.3 (27 Jan 2018 21:23), Max: 36.3 (27 Jan 2018 21:23)  T(F): 97.4 (27 Jan 2018 21:23), Max: 97.4 (27 Jan 2018 21:23)  HR: 62 (27 Jan 2018 20:15) (61 - 75)  BP: 112/53 (27 Jan 2018 20:15) (106/51 - 130/56)  BP(mean): --  RR: 16 (27 Jan 2018 20:15) (16 - 18)  SpO2: 100% (27 Jan 2018 20:15) (96% - 100%)    CAPILLARY BLOOD GLUCOSE      PHYSICAL EXAM:  Constitutional: wn/wd in NAD.   HEENT: NCAT, MMM, OP clear, EOMI, , no proptosis or lid retraction  Neck: supple, no acanthosis, no thyromegaly or palpable thyroid nodules   Respiratory: Lungs CTAB.  Cardiovascular: regular rhythm, normal S1 and S2, no audible murmurs, no peripheral edema  GI: soft, + bowel sounds, NT/ND, no masses/HSM appreciated.  Neurology: no tremors, DTR 2+  Skin: no visible rashes/lesions  Psychiatric: AAO x 3, normal affect/mood.        A/P: 89yFemale admitted for (    ). Consulted and being followed for Diabetes Type (  ).       Uncontrolled DM Type (  ) -     Please continue           units lantus AM/PM, premeal Lispro  (  ) units TID, and  Lispro moderate/low dose sliding scale 4 times daily (before meals and bedtime).  Please continue consistent carbohydrate (Diabetic) diet, FS ac & hs. Target  - 150.      Case discussed with attending and primary team      Attending attestation:  I have seen and evaluated the patient at the bedside.   Endocrine Nurse Practitioner/Fellow/Resident’s note reviewed, including vital signs, PE and laboratory results.  Direct personal management and extensive interpretation of the data conducted.   I agree with the Nurse Practitioner/Fellow/Resident’s assessment and recommendations as above. INTERVAL HPI/OVERNIGHT EVENTS:      Patient is a 89y old female who presents with a chief complaint of SOB/CHF was seen and examined today. Reports the following symptoms:    CONSTITUTIONAL:  Negative fever or chills, feels better, good appetite  EYES:  Negative  blurry vision or double vision  CARDIOVASCULAR:  Negative for chest pain or palpitations  RESPIRATORY:  Negative for cough, wheezing, or SOB   GASTROINTESTINAL:  Negative for nausea, vomiting, diarrhea, constipation, or abdominal pain  GENITOURINARY:  Negative frequency, urgency or dysuria  NEUROLOGIC:  No headache, confusion, dizziness, lightheadedness    MEDICATIONS  (STANDING):  amiodarone    Tablet 400 milliGRAM(s) Oral two times a day  apixaban 2.5 milliGRAM(s) Oral every 12 hours  aspirin enteric coated 81 milliGRAM(s) Oral daily  atorvastatin 40 milliGRAM(s) Oral at bedtime  clopidogrel Tablet 75 milliGRAM(s) Oral daily  dextrose 5%. 1000 milliLiter(s) (50 mL/Hr) IV Continuous <Continuous>  dextrose 50% Injectable 12.5 Gram(s) IV Push once  dextrose 50% Injectable 25 Gram(s) IV Push once  dextrose 50% Injectable 25 Gram(s) IV Push once  epoetin jayesh Injectable 94255 Unit(s) SubCutaneous every other day  insulin lispro (HumaLOG) corrective regimen sliding scale   SubCutaneous Before meals and at bedtime  iron sucrose IVPB 200 milliGRAM(s) IV Intermittent every 24 hours  levothyroxine 25 MICROGram(s) Oral daily  lidocaine   Patch 1 Patch Transdermal daily  metoprolol succinate ER 25 milliGRAM(s) Oral daily  pantoprazole    Tablet 40 milliGRAM(s) Oral before breakfast  tiotropium 18 MICROgram(s) Capsule 1 Capsule(s) Inhalation daily    MEDICATIONS  (PRN):  dextrose Gel 1 Dose(s) Oral once PRN Blood Glucose LESS THAN 70 milliGRAM(s)/deciliter  glucagon  Injectable 1 milliGRAM(s) IntraMuscular once PRN Glucose LESS THAN 70 milligrams/deciliter        LABS:                        7.8    7.1   )-----------( 163      ( 27 Jan 2018 07:06 )             26.1     01-27    139  |  96  |  51<H>  ----------------------------<  138<H>  4.1   |  32<H>  |  2.26<H>    Ca    9.6      27 Jan 2018 07:06  Mg     2.1     01-27      Hemoglobin A1C, Whole Blood: 7.0 % (01-17-18 @ 05:28)        Thyroid Stimulating Hormone, Serum: 6.350 uIU/mL (01-18 @ 06:38)  Thyroid Stimulating Hormone, Serum: 5.255 uIU/mL (01-17 @ 20:12)      RADIOLOGY & ADDITIONAL TESTS:      Vital Signs Last 24 Hrs  T(C): 36.3 (27 Jan 2018 21:23), Max: 36.3 (27 Jan 2018 21:23)  T(F): 97.4 (27 Jan 2018 21:23), Max: 97.4 (27 Jan 2018 21:23)  HR: 62 (27 Jan 2018 20:15) (61 - 75)  BP: 112/53 (27 Jan 2018 20:15) (106/51 - 130/56)  BP(mean): --  RR: 16 (27 Jan 2018 20:15) (16 - 18)  SpO2: 100% (27 Jan 2018 20:15) (96% - 100%)    CAPILLARY BLOOD GLUCOSE      PHYSICAL EXAM:  Constitutional: wn/wd in NAD.   HEENT: NCAT, MMM, OP clear, EOMI, no proptosis or lid retraction  Neck: supple, no acanthosis, no thyromegaly or palpable thyroid nodules   Respiratory: Lungs CTAB.  Cardiovascular: regular rhythm, normal S1 and S2, no audible murmurs, no peripheral edema  GI: soft, + bowel sounds, NT/ND  Neurology: no tremors, DTR 2+  Skin: no visible rashes/lesions  Psychiatric: AAO x 3, normal affect/mood.        A/P: 89yFemale admitted for SOB. Consulted and being followed for Diabetes Type 2 and newly hypothyroidism.     DM Type 2 (HbA1c 7%) managed with diet in hospital.    Please continue Lispro moderate/low dose sliding scale 4 times daily (before meals and bedtime).    Also continue consistent carbohydrate (Diabetic) diet, FS ac & hs. Target  - 150 mg/dl.      Hypothyroidism:  Continue levothyroxine 25 mcg po qAM    Case discussed with attending and primary team.      Attending attestation:  I have seen and evaluated the patient at the bedside.   Nurse Practitioner/Fellow/Resident’s note reviewed, including vital signs, PE and laboratory results.  Direct personal management and extensive interpretation of the data conducted.   Assessment and recommendations as above.

## 2018-01-28 VITALS
HEART RATE: 69 BPM | RESPIRATION RATE: 16 BRPM | DIASTOLIC BLOOD PRESSURE: 58 MMHG | OXYGEN SATURATION: 100 % | SYSTOLIC BLOOD PRESSURE: 118 MMHG

## 2018-01-28 LAB
ANION GAP SERPL CALC-SCNC: 13 MMOL/L — SIGNIFICANT CHANGE UP (ref 5–17)
BUN SERPL-MCNC: 52 MG/DL — HIGH (ref 7–23)
CALCIUM SERPL-MCNC: 9.5 MG/DL — SIGNIFICANT CHANGE UP (ref 8.4–10.5)
CHLORIDE SERPL-SCNC: 98 MMOL/L — SIGNIFICANT CHANGE UP (ref 96–108)
CO2 SERPL-SCNC: 30 MMOL/L — SIGNIFICANT CHANGE UP (ref 22–31)
CREAT SERPL-MCNC: 2.32 MG/DL — HIGH (ref 0.5–1.3)
GLUCOSE BLDC GLUCOMTR-MCNC: 130 MG/DL — HIGH (ref 70–99)
GLUCOSE BLDC GLUCOMTR-MCNC: 181 MG/DL — HIGH (ref 70–99)
GLUCOSE SERPL-MCNC: 126 MG/DL — HIGH (ref 70–99)
HCT VFR BLD CALC: 25.7 % — LOW (ref 34.5–45)
HGB BLD-MCNC: 7.8 G/DL — LOW (ref 11.5–15.5)
MAGNESIUM SERPL-MCNC: 2.1 MG/DL — SIGNIFICANT CHANGE UP (ref 1.6–2.6)
MCHC RBC-ENTMCNC: 29.3 PG — SIGNIFICANT CHANGE UP (ref 27–34)
MCHC RBC-ENTMCNC: 30.4 G/DL — LOW (ref 32–36)
MCV RBC AUTO: 96.6 FL — SIGNIFICANT CHANGE UP (ref 80–100)
PLATELET # BLD AUTO: 163 K/UL — SIGNIFICANT CHANGE UP (ref 150–400)
POTASSIUM SERPL-MCNC: 4.2 MMOL/L — SIGNIFICANT CHANGE UP (ref 3.5–5.3)
POTASSIUM SERPL-SCNC: 4.2 MMOL/L — SIGNIFICANT CHANGE UP (ref 3.5–5.3)
RBC # BLD: 2.66 M/UL — LOW (ref 3.8–5.2)
RBC # FLD: 18.5 % — HIGH (ref 10.3–16.9)
SODIUM SERPL-SCNC: 141 MMOL/L — SIGNIFICANT CHANGE UP (ref 135–145)
WBC # BLD: 6.5 K/UL — SIGNIFICANT CHANGE UP (ref 3.8–10.5)
WBC # FLD AUTO: 6.5 K/UL — SIGNIFICANT CHANGE UP (ref 3.8–10.5)

## 2018-01-28 PROCEDURE — 86900 BLOOD TYPING SEROLOGIC ABO: CPT

## 2018-01-28 PROCEDURE — 93005 ELECTROCARDIOGRAM TRACING: CPT

## 2018-01-28 PROCEDURE — 36415 COLL VENOUS BLD VENIPUNCTURE: CPT

## 2018-01-28 PROCEDURE — 92610 EVALUATE SWALLOWING FUNCTION: CPT | Mod: GN

## 2018-01-28 PROCEDURE — 84481 FREE ASSAY (FT-3): CPT

## 2018-01-28 PROCEDURE — 86334 IMMUNOFIX E-PHORESIS SERUM: CPT

## 2018-01-28 PROCEDURE — 83615 LACTATE (LD) (LDH) ENZYME: CPT

## 2018-01-28 PROCEDURE — 99239 HOSP IP/OBS DSCHRG MGMT >30: CPT

## 2018-01-28 PROCEDURE — 82043 UR ALBUMIN QUANTITATIVE: CPT

## 2018-01-28 PROCEDURE — C1725: CPT

## 2018-01-28 PROCEDURE — 84443 ASSAY THYROID STIM HORMONE: CPT

## 2018-01-28 PROCEDURE — 82306 VITAMIN D 25 HYDROXY: CPT

## 2018-01-28 PROCEDURE — 83970 ASSAY OF PARATHORMONE: CPT

## 2018-01-28 PROCEDURE — 85025 COMPLETE CBC W/AUTO DIFF WBC: CPT

## 2018-01-28 PROCEDURE — 82232 ASSAY OF BETA-2 PROTEIN: CPT

## 2018-01-28 PROCEDURE — 81001 URINALYSIS AUTO W/SCOPE: CPT

## 2018-01-28 PROCEDURE — 85027 COMPLETE CBC AUTOMATED: CPT

## 2018-01-28 PROCEDURE — 85610 PROTHROMBIN TIME: CPT

## 2018-01-28 PROCEDURE — 83036 HEMOGLOBIN GLYCOSYLATED A1C: CPT

## 2018-01-28 PROCEDURE — C1887: CPT

## 2018-01-28 PROCEDURE — 96374 THER/PROPH/DIAG INJ IV PUSH: CPT

## 2018-01-28 PROCEDURE — 82728 ASSAY OF FERRITIN: CPT

## 2018-01-28 PROCEDURE — 94640 AIRWAY INHALATION TREATMENT: CPT

## 2018-01-28 PROCEDURE — 80048 BASIC METABOLIC PNL TOTAL CA: CPT

## 2018-01-28 PROCEDURE — 80053 COMPREHEN METABOLIC PANEL: CPT

## 2018-01-28 PROCEDURE — 84155 ASSAY OF PROTEIN SERUM: CPT

## 2018-01-28 PROCEDURE — 97116 GAIT TRAINING THERAPY: CPT

## 2018-01-28 PROCEDURE — 84100 ASSAY OF PHOSPHORUS: CPT

## 2018-01-28 PROCEDURE — 76770 US EXAM ABDO BACK WALL COMP: CPT

## 2018-01-28 PROCEDURE — 87186 SC STD MICRODIL/AGAR DIL: CPT

## 2018-01-28 PROCEDURE — 86376 MICROSOMAL ANTIBODY EACH: CPT

## 2018-01-28 PROCEDURE — C1769: CPT

## 2018-01-28 PROCEDURE — 97161 PT EVAL LOW COMPLEX 20 MIN: CPT

## 2018-01-28 PROCEDURE — 74220 X-RAY XM ESOPHAGUS 1CNTRST: CPT

## 2018-01-28 PROCEDURE — 93306 TTE W/DOPPLER COMPLETE: CPT

## 2018-01-28 PROCEDURE — 82553 CREATINE MB FRACTION: CPT

## 2018-01-28 PROCEDURE — 84484 ASSAY OF TROPONIN QUANT: CPT

## 2018-01-28 PROCEDURE — 86850 RBC ANTIBODY SCREEN: CPT

## 2018-01-28 PROCEDURE — 86901 BLOOD TYPING SEROLOGIC RH(D): CPT

## 2018-01-28 PROCEDURE — 82310 ASSAY OF CALCIUM: CPT

## 2018-01-28 PROCEDURE — 99285 EMERGENCY DEPT VISIT HI MDM: CPT | Mod: 25

## 2018-01-28 PROCEDURE — 83735 ASSAY OF MAGNESIUM: CPT

## 2018-01-28 PROCEDURE — 71045 X-RAY EXAM CHEST 1 VIEW: CPT

## 2018-01-28 PROCEDURE — 85730 THROMBOPLASTIN TIME PARTIAL: CPT

## 2018-01-28 PROCEDURE — 84165 PROTEIN E-PHORESIS SERUM: CPT

## 2018-01-28 PROCEDURE — C1894: CPT

## 2018-01-28 PROCEDURE — 82550 ASSAY OF CK (CPK): CPT

## 2018-01-28 PROCEDURE — 84439 ASSAY OF FREE THYROXINE: CPT

## 2018-01-28 PROCEDURE — 96375 TX/PRO/DX INJ NEW DRUG ADDON: CPT

## 2018-01-28 PROCEDURE — C1753: CPT

## 2018-01-28 PROCEDURE — 83550 IRON BINDING TEST: CPT

## 2018-01-28 PROCEDURE — 83880 ASSAY OF NATRIURETIC PEPTIDE: CPT

## 2018-01-28 PROCEDURE — 87086 URINE CULTURE/COLONY COUNT: CPT

## 2018-01-28 PROCEDURE — 94660 CPAP INITIATION&MGMT: CPT

## 2018-01-28 PROCEDURE — 82962 GLUCOSE BLOOD TEST: CPT

## 2018-01-28 PROCEDURE — 93970 EXTREMITY STUDY: CPT

## 2018-01-28 PROCEDURE — 93312 ECHO TRANSESOPHAGEAL: CPT

## 2018-01-28 PROCEDURE — 82040 ASSAY OF SERUM ALBUMIN: CPT

## 2018-01-28 RX ORDER — AMIODARONE HYDROCHLORIDE 400 MG/1
1 TABLET ORAL
Qty: 30 | Refills: 1 | OUTPATIENT
Start: 2018-01-28 | End: 2018-03-28

## 2018-01-28 RX ORDER — ATORVASTATIN CALCIUM 80 MG/1
1 TABLET, FILM COATED ORAL
Qty: 30 | Refills: 2 | OUTPATIENT
Start: 2018-01-28 | End: 2018-04-27

## 2018-01-28 RX ORDER — ASPIRIN/CALCIUM CARB/MAGNESIUM 324 MG
1 TABLET ORAL
Qty: 0 | Refills: 0 | COMMUNITY

## 2018-01-28 RX ORDER — METOPROLOL TARTRATE 50 MG
1 TABLET ORAL
Qty: 30 | Refills: 2 | OUTPATIENT
Start: 2018-01-28 | End: 2018-04-27

## 2018-01-28 RX ORDER — CLOPIDOGREL BISULFATE 75 MG/1
1 TABLET, FILM COATED ORAL
Qty: 30 | Refills: 11 | OUTPATIENT
Start: 2018-01-28 | End: 2019-01-22

## 2018-01-28 RX ORDER — ATORVASTATIN CALCIUM 80 MG/1
1 TABLET, FILM COATED ORAL
Qty: 0 | Refills: 0 | COMMUNITY

## 2018-01-28 RX ORDER — ASPIRIN/CALCIUM CARB/MAGNESIUM 324 MG
1 TABLET ORAL
Qty: 30 | Refills: 11 | OUTPATIENT
Start: 2018-01-28 | End: 2019-01-22

## 2018-01-28 RX ORDER — AMIODARONE HYDROCHLORIDE 400 MG/1
1 TABLET ORAL
Qty: 30 | Refills: 0 | OUTPATIENT
Start: 2018-01-28 | End: 2018-02-26

## 2018-01-28 RX ORDER — APIXABAN 2.5 MG/1
1 TABLET, FILM COATED ORAL
Qty: 60 | Refills: 2 | OUTPATIENT
Start: 2018-01-28 | End: 2018-04-27

## 2018-01-28 RX ORDER — FUROSEMIDE 40 MG
1 TABLET ORAL
Qty: 30 | Refills: 2 | OUTPATIENT
Start: 2018-01-28 | End: 2018-04-27

## 2018-01-28 RX ORDER — FUROSEMIDE 40 MG
1 TABLET ORAL
Qty: 0 | Refills: 2 | COMMUNITY
Start: 2018-01-28 | End: 2018-04-27

## 2018-01-28 RX ORDER — LEVOTHYROXINE SODIUM 125 MCG
1 TABLET ORAL
Qty: 30 | Refills: 2 | OUTPATIENT
Start: 2018-01-28 | End: 2018-04-27

## 2018-01-28 RX ADMIN — Medication 2: at 11:47

## 2018-01-28 RX ADMIN — LIDOCAINE 1 PATCH: 4 CREAM TOPICAL at 11:46

## 2018-01-28 RX ADMIN — Medication 25 MICROGRAM(S): at 06:19

## 2018-01-28 RX ADMIN — ISOSORBIDE MONONITRATE 30 MILLIGRAM(S): 60 TABLET, EXTENDED RELEASE ORAL at 10:23

## 2018-01-28 RX ADMIN — Medication 25 MILLIGRAM(S): at 06:19

## 2018-01-28 RX ADMIN — CLOPIDOGREL BISULFATE 75 MILLIGRAM(S): 75 TABLET, FILM COATED ORAL at 10:23

## 2018-01-28 RX ADMIN — APIXABAN 2.5 MILLIGRAM(S): 2.5 TABLET, FILM COATED ORAL at 11:46

## 2018-01-28 RX ADMIN — TIOTROPIUM BROMIDE 1 CAPSULE(S): 18 CAPSULE ORAL; RESPIRATORY (INHALATION) at 10:23

## 2018-01-28 RX ADMIN — Medication 81 MILLIGRAM(S): at 10:23

## 2018-01-28 RX ADMIN — PANTOPRAZOLE SODIUM 40 MILLIGRAM(S): 20 TABLET, DELAYED RELEASE ORAL at 06:19

## 2018-01-28 RX ADMIN — ERYTHROPOIETIN 10000 UNIT(S): 10000 INJECTION, SOLUTION INTRAVENOUS; SUBCUTANEOUS at 11:47

## 2018-01-28 RX ADMIN — AMIODARONE HYDROCHLORIDE 400 MILLIGRAM(S): 400 TABLET ORAL at 06:19

## 2018-01-28 RX ADMIN — Medication 80 MILLIGRAM(S): at 06:20

## 2018-01-28 RX ADMIN — LIDOCAINE 1 PATCH: 4 CREAM TOPICAL at 00:03

## 2018-01-28 NOTE — PROGRESS NOTE ADULT - PROVIDER SPECIALTY LIST ADULT
Cardiology
Electrophysiology
Endocrinology
Heme/Onc
Intervent Cardiology
Nephrology
Pulmonology
Electrophysiology
Heme/Onc
Heme/Onc
Nephrology
Nephrology
Electrophysiology
Endocrinology
Infectious Disease
Infectious Disease

## 2018-01-28 NOTE — PROGRESS NOTE ADULT - NSHPATTENDINGPLANDISCUSS_GEN_ALL_CORE
as above
primary team.
Cardiovascular PA
Primary team
cardio

## 2018-01-28 NOTE — PROGRESS NOTE ADULT - SUBJECTIVE AND OBJECTIVE BOX
INTERVAL HPI/OVERNIGHT EVENTS:      Patient is a 89y old  Female who presents with a chief complaint of Atrial Fibrillation in the setting of Congestive Heart Failure (01-19-18)  was seen and examined today. Reports the following symptoms:    CONSTITUTIONAL:  Negative fever or chills, feels well, good appetite  EYES:  Negative  blurry vision or double vision  CARDIOVASCULAR:  Negative for chest pain or palpitations  RESPIRATORY:  SOB standing up  GASTROINTESTINAL:  Negative for nausea, vomiting, diarrhea, constipation, or abdominal pain  GENITOURINARY:  Negative frequency, urgency or dysuria  NEUROLOGIC:  No headache, confusion, dizziness, lightheadedness    MEDICATIONS  (STANDING):  amiodarone    Tablet 400 milliGRAM(s) Oral two times a day  apixaban 2.5 milliGRAM(s) Oral every 12 hours  aspirin enteric coated 81 milliGRAM(s) Oral daily  atorvastatin 40 milliGRAM(s) Oral at bedtime  clopidogrel Tablet 75 milliGRAM(s) Oral daily  dextrose 5%. 1000 milliLiter(s) (50 mL/Hr) IV Continuous <Continuous>  dextrose 50% Injectable 12.5 Gram(s) IV Push once  dextrose 50% Injectable 25 Gram(s) IV Push once  dextrose 50% Injectable 25 Gram(s) IV Push once  epoetin jayesh Injectable 73549 Unit(s) SubCutaneous every other day  furosemide    Tablet 80 milliGRAM(s) Oral daily  insulin lispro (HumaLOG) corrective regimen sliding scale   SubCutaneous Before meals and at bedtime  iron sucrose IVPB 200 milliGRAM(s) IV Intermittent every 24 hours  isosorbide   mononitrate ER Tablet (IMDUR) 30 milliGRAM(s) Oral daily  levothyroxine 25 MICROGram(s) Oral daily  lidocaine   Patch 1 Patch Transdermal daily  metoprolol succinate ER 25 milliGRAM(s) Oral daily  pantoprazole    Tablet 40 milliGRAM(s) Oral before breakfast  tiotropium 18 MICROgram(s) Capsule 1 Capsule(s) Inhalation daily    MEDICATIONS  (PRN):  dextrose Gel 1 Dose(s) Oral once PRN Blood Glucose LESS THAN 70 milliGRAM(s)/deciliter  glucagon  Injectable 1 milliGRAM(s) IntraMuscular once PRN Glucose LESS THAN 70 milligrams/deciliter        LABS:                        7.8    6.5   )-----------( 163      ( 28 Jan 2018 07:48 )             25.7     01-28    141  |  98  |  52<H>  ----------------------------<  126<H>  4.2   |  30  |  2.32<H>    Ca    9.5      28 Jan 2018 07:48  Mg     2.1     01-28      Hemoglobin A1C, Whole Blood: 7.0 % (01-17-18 @ 05:28)        Thyroid Stimulating Hormone, Serum: 6.350 uIU/mL (01-18 @ 06:38)  Thyroid Stimulating Hormone, Serum: 5.255 uIU/mL (01-17 @ 20:12)      RADIOLOGY & ADDITIONAL TESTS:      Vital Signs Last 24 Hrs  T(C): 36.3 (28 Jan 2018 08:31), Max: 36.5 (28 Jan 2018 02:10)  T(F): 97.3 (28 Jan 2018 08:31), Max: 97.7 (28 Jan 2018 02:10)  HR: 65 (28 Jan 2018 09:19) (61 - 74)  BP: 114/53 (28 Jan 2018 08:30) (103/51 - 130/56)  BP(mean): --  RR: 16 (28 Jan 2018 08:30) (16 - 18)  SpO2: 100% (28 Jan 2018 09:19) (95% - 100%)    CAPILLARY BLOOD GLUCOSE      PHYSICAL EXAM:  Constitutional: Obese female with SOB  HEENT: NCAT, MMM, OP clear, EOMI, no proptosis or lid retraction  Neck: supple, no acanthosis, no thyromegaly or palpable thyroid nodules   Respiratory: Lungs CTAB.  Cardiovascular: regular rhythm, normal S1 and S2, mild peripheral edema  GI: soft, + bowel sounds, NT/ND  Neurology: no tremors, DTR 1+  Skin: no visible rashes/lesions  Psychiatric: AAO x 3, normal affect/mood.        A/P: 89yFemale admitted for SOB/CHF.  Consulted and being followed for Diabetes Type 2 and newly diagnosed hypothyroidism.     DM Type 2 (HbA1c 7%) controlled with in hospital diet-     Please continue Lispro moderate/low dose sliding scale 4 times daily (before meals and bedtime).    Also continue consistent carbohydrate (Diabetic) diet, FS ac & hs. Target  - 150 mg/dl    Hypothyroidism:  Patient started on amiodarone.  She is on daily low dose levothyroxine.  Check TSH, free T4, free T3.  Continue levothyroxine 25 mcg po qAM    Outpatient F/U with PCP.    Case discussed with attending and primary team.      Attending attestation:  I have seen and evaluated the patient at the bedside.   Nurse Practitioner/Fellow/Resident’s note reviewed, including vital signs, PE and laboratory results.  Direct personal management and extensive interpretation of the data conducted.  Assessment and recommendations as above.

## 2018-01-29 ENCOUNTER — EMERGENCY (EMERGENCY)
Facility: HOSPITAL | Age: 83
LOS: 1 days | Discharge: HOME | End: 2018-01-29
Attending: EMERGENCY MEDICINE | Admitting: EMERGENCY MEDICINE
Payer: MEDICARE

## 2018-01-29 VITALS
TEMPERATURE: 99 F | HEIGHT: 66 IN | RESPIRATION RATE: 18 BRPM | SYSTOLIC BLOOD PRESSURE: 124 MMHG | HEART RATE: 88 BPM | DIASTOLIC BLOOD PRESSURE: 56 MMHG | WEIGHT: 179.9 LBS | OXYGEN SATURATION: 99 %

## 2018-01-29 DIAGNOSIS — Z90.710 ACQUIRED ABSENCE OF BOTH CERVIX AND UTERUS: Chronic | ICD-10-CM

## 2018-01-29 DIAGNOSIS — Z96.659 PRESENCE OF UNSPECIFIED ARTIFICIAL KNEE JOINT: Chronic | ICD-10-CM

## 2018-01-29 DIAGNOSIS — R04.0 EPISTAXIS: ICD-10-CM

## 2018-01-29 DIAGNOSIS — E11.9 TYPE 2 DIABETES MELLITUS WITHOUT COMPLICATIONS: ICD-10-CM

## 2018-01-29 DIAGNOSIS — Z98.890 OTHER SPECIFIED POSTPROCEDURAL STATES: Chronic | ICD-10-CM

## 2018-01-29 DIAGNOSIS — I10 ESSENTIAL (PRIMARY) HYPERTENSION: ICD-10-CM

## 2018-01-29 DIAGNOSIS — E78.00 PURE HYPERCHOLESTEROLEMIA, UNSPECIFIED: ICD-10-CM

## 2018-01-29 DIAGNOSIS — Z91.041 RADIOGRAPHIC DYE ALLERGY STATUS: ICD-10-CM

## 2018-01-29 DIAGNOSIS — Z79.899 OTHER LONG TERM (CURRENT) DRUG THERAPY: ICD-10-CM

## 2018-01-29 DIAGNOSIS — Z79.82 LONG TERM (CURRENT) USE OF ASPIRIN: ICD-10-CM

## 2018-01-29 DIAGNOSIS — I25.10 ATHEROSCLEROTIC HEART DISEASE OF NATIVE CORONARY ARTERY WITHOUT ANGINA PECTORIS: ICD-10-CM

## 2018-01-29 DIAGNOSIS — Z96.659 PRESENCE OF UNSPECIFIED ARTIFICIAL KNEE JOINT: ICD-10-CM

## 2018-01-29 DIAGNOSIS — Z90.710 ACQUIRED ABSENCE OF BOTH CERVIX AND UTERUS: ICD-10-CM

## 2018-01-29 DIAGNOSIS — Z98.49 CATARACT EXTRACTION STATUS, UNSPECIFIED EYE: Chronic | ICD-10-CM

## 2018-01-29 DIAGNOSIS — Z79.02 LONG TERM (CURRENT) USE OF ANTITHROMBOTICS/ANTIPLATELETS: ICD-10-CM

## 2018-01-29 PROCEDURE — 99282 EMERGENCY DEPT VISIT SF MDM: CPT | Mod: 25

## 2018-01-29 PROCEDURE — 30901 CONTROL OF NOSEBLEED: CPT | Mod: LT

## 2018-01-29 PROCEDURE — 99283 EMERGENCY DEPT VISIT LOW MDM: CPT | Mod: 25

## 2018-01-29 NOTE — ED PROVIDER NOTE - MEDICAL DECISION MAKING DETAILS
Impression: left epistaxis, now resolved, 2/2 anterior venous bleeding. Pt on home o2, likely causing dry mucosa. Nose cauterized with no further bleeding. Pt is hds, asymptomatic otherwise. Case d/w Dr. Dillard; will see pt in office today. Pt to restart eliquis tonight. Family also advised on applying vaseline to nares and use humidifier. Will give ent for f/u.

## 2018-01-29 NOTE — ED ADULT NURSE REASSESSMENT NOTE - NS ED NURSE REASSESS COMMENT FT1
O2 sat = 91% on room air. per family, pt uses home O2. as per dionicio Jorgensen to apply humidified O2 via nasal cannula. will continue to monitor.

## 2018-01-29 NOTE — ED ADULT TRIAGE NOTE - CHIEF COMPLAINT QUOTE
Patients daughter and nephew stated patient has had a nose bleed from left nostril that started yesterday. Pts daughter reports, "After the doctors instructions it stopped last night at 8:30 but it started again today at 6AM." Patient take daily Eliquis. Patient denies any pain, HA, visual changes, numbness or tingling to extremities, runny nose, sore throat, productive cough.

## 2018-01-29 NOTE — ED ADULT NURSE NOTE - OBJECTIVE STATEMENT
Pt presents to ED A&Ox3 c/o nose bleed since yesterday. per pt family, nose bleed started around 6 pm, called her doctor and was able to stop the bleeding. pt then applied oxygen via nasal cannula last night and nose bleed started again and has been unable to stop. pt was advised to come to ED by her doctor. per family, pt was discharged from [facility] and was recently started on eliquis. denies dizziness, headache, vision changes, weakness. pt with tissue inserted in left nostril at this time, bleeding controlled.

## 2018-01-29 NOTE — ED ADULT TRIAGE NOTE - ARRIVAL INFO ADDITIONAL COMMENTS
minimal amount of bleeding noted to left nostril, patient reports having a piece of cotton in left nostril at this time.

## 2018-01-29 NOTE — ED ADULT NURSE NOTE - CHPI ED SYMPTOMS NEG
no fever/no blurred vision/no vomiting/no syncope/no weakness/no change in level of consciousness/no chills/no loss of consciousness/no nausea/no numbness

## 2018-01-29 NOTE — ED PROVIDER NOTE - PHYSICAL EXAMINATION
VITAL SIGNS: I have reviewed nursing notes and confirm.  CONSTITUTIONAL: Well-developed; well-nourished; in no acute distress.   SKIN:  warm and dry, no acute rash.   HEAD:  normocephalic, atraumatic.  EYES: PERRL, EOM intact; conjunctiva and sclera clear.  ENT: No nasal discharge; airway clear. + area of dried blood to anterior left nare, no active bleeding. No blood to posterior pharynx.  NECK: Supple; non tender.  CARD: S1, S2 normal; no murmurs, gallops, or rubs. Regular rate and rhythm.   RESP:  + dry crackles to lung bases b/l, no wheezes/ rhonchi. No tachypnea/ accessory muscle use.  ABD: Normal bowel sounds; soft; non-distended; non-tender; no guarding/ rebound.  EXT: Mild pitting edema to b/l lower ext. 2+ pulses b/l. + healing ecchymosis to left wrist from previous cath.  NEURO: Alert, oriented, grossly unremarkable  PSYCH: Cooperative, mood and affect appropriate.

## 2018-01-29 NOTE — ED PROVIDER NOTE - OBJECTIVE STATEMENT
Pt is a 89yo f, h/o htn, hld, dm, cad s/p pci, dchf, asthma/ emphysema on home o2, who p/w epistaxis from left nostril starting yesterday. Pt was dc'd from [facility] yesterday (pt was admitted for new onset afib, chf and underwent renal angiogram), and developed spontaneous epistaxis x 2 hrs and recurred again this am. + large clots. Pt tried applying ice to nose with no improvement. Stopped taking eliquis as per recommendation of Dr. Rowe. No cp, sob, palp, dizziness, weakness.

## 2018-01-30 NOTE — ED PROCEDURE NOTE - CPROC ED NASAL DETAIL1
The area was cauterized with silver nitrate./The source of the bleeding was clearly identified./The bleeding stopped.

## 2018-01-31 DIAGNOSIS — D50.9 IRON DEFICIENCY ANEMIA, UNSPECIFIED: ICD-10-CM

## 2018-01-31 DIAGNOSIS — I13.0 HYPERTENSIVE HEART AND CHRONIC KIDNEY DISEASE WITH HEART FAILURE AND STAGE 1 THROUGH STAGE 4 CHRONIC KIDNEY DISEASE, OR UNSPECIFIED CHRONIC KIDNEY DISEASE: ICD-10-CM

## 2018-01-31 DIAGNOSIS — N26.9 RENAL SCLEROSIS, UNSPECIFIED: ICD-10-CM

## 2018-01-31 DIAGNOSIS — Z16.24 RESISTANCE TO MULTIPLE ANTIBIOTICS: ICD-10-CM

## 2018-01-31 DIAGNOSIS — N39.0 URINARY TRACT INFECTION, SITE NOT SPECIFIED: ICD-10-CM

## 2018-01-31 DIAGNOSIS — N18.3 CHRONIC KIDNEY DISEASE, STAGE 3 (MODERATE): ICD-10-CM

## 2018-01-31 DIAGNOSIS — D63.1 ANEMIA IN CHRONIC KIDNEY DISEASE: ICD-10-CM

## 2018-01-31 DIAGNOSIS — E66.9 OBESITY, UNSPECIFIED: ICD-10-CM

## 2018-01-31 DIAGNOSIS — I44.7 LEFT BUNDLE-BRANCH BLOCK, UNSPECIFIED: ICD-10-CM

## 2018-01-31 DIAGNOSIS — I50.23 ACUTE ON CHRONIC SYSTOLIC (CONGESTIVE) HEART FAILURE: ICD-10-CM

## 2018-01-31 DIAGNOSIS — I48.91 UNSPECIFIED ATRIAL FIBRILLATION: ICD-10-CM

## 2018-01-31 DIAGNOSIS — I34.0 NONRHEUMATIC MITRAL (VALVE) INSUFFICIENCY: ICD-10-CM

## 2018-01-31 DIAGNOSIS — B96.20 UNSPECIFIED ESCHERICHIA COLI [E. COLI] AS THE CAUSE OF DISEASES CLASSIFIED ELSEWHERE: ICD-10-CM

## 2018-01-31 DIAGNOSIS — Z95.5 PRESENCE OF CORONARY ANGIOPLASTY IMPLANT AND GRAFT: ICD-10-CM

## 2018-01-31 DIAGNOSIS — I70.0 ATHEROSCLEROSIS OF AORTA: ICD-10-CM

## 2018-01-31 DIAGNOSIS — Z91.041 RADIOGRAPHIC DYE ALLERGY STATUS: ICD-10-CM

## 2018-01-31 DIAGNOSIS — Z96.659 PRESENCE OF UNSPECIFIED ARTIFICIAL KNEE JOINT: ICD-10-CM

## 2018-01-31 DIAGNOSIS — K22.8 OTHER SPECIFIED DISEASES OF ESOPHAGUS: ICD-10-CM

## 2018-01-31 DIAGNOSIS — Z79.84 LONG TERM (CURRENT) USE OF ORAL HYPOGLYCEMIC DRUGS: ICD-10-CM

## 2018-01-31 DIAGNOSIS — D69.6 THROMBOCYTOPENIA, UNSPECIFIED: ICD-10-CM

## 2018-01-31 DIAGNOSIS — I73.9 PERIPHERAL VASCULAR DISEASE, UNSPECIFIED: ICD-10-CM

## 2018-01-31 DIAGNOSIS — E03.9 HYPOTHYROIDISM, UNSPECIFIED: ICD-10-CM

## 2018-01-31 DIAGNOSIS — I07.1 RHEUMATIC TRICUSPID INSUFFICIENCY: ICD-10-CM

## 2018-01-31 DIAGNOSIS — Z79.82 LONG TERM (CURRENT) USE OF ASPIRIN: ICD-10-CM

## 2018-01-31 DIAGNOSIS — I25.10 ATHEROSCLEROTIC HEART DISEASE OF NATIVE CORONARY ARTERY WITHOUT ANGINA PECTORIS: ICD-10-CM

## 2018-01-31 DIAGNOSIS — R13.10 DYSPHAGIA, UNSPECIFIED: ICD-10-CM

## 2018-01-31 DIAGNOSIS — E11.9 TYPE 2 DIABETES MELLITUS WITHOUT COMPLICATIONS: ICD-10-CM

## 2018-01-31 DIAGNOSIS — I70.1 ATHEROSCLEROSIS OF RENAL ARTERY: ICD-10-CM

## 2018-01-31 DIAGNOSIS — I27.20 PULMONARY HYPERTENSION, UNSPECIFIED: ICD-10-CM

## 2018-01-31 DIAGNOSIS — I77.1 STRICTURE OF ARTERY: ICD-10-CM

## 2018-02-22 ENCOUNTER — APPOINTMENT (OUTPATIENT)
Dept: HEART AND VASCULAR | Facility: CLINIC | Age: 83
End: 2018-02-22

## 2018-03-13 NOTE — DISCHARGE NOTE ADULT - CASE MANAGER'S NAME
Faith Magallon RN CM

## 2018-04-07 ENCOUNTER — EMERGENCY (EMERGENCY)
Facility: HOSPITAL | Age: 83
LOS: 1 days | Discharge: HOME | End: 2018-04-07
Attending: EMERGENCY MEDICINE | Admitting: EMERGENCY MEDICINE
Payer: MEDICARE

## 2018-04-07 VITALS
SYSTOLIC BLOOD PRESSURE: 89 MMHG | RESPIRATION RATE: 20 BRPM | WEIGHT: 169.98 LBS | HEIGHT: 66 IN | DIASTOLIC BLOOD PRESSURE: 54 MMHG | TEMPERATURE: 98 F | HEART RATE: 69 BPM | OXYGEN SATURATION: 95 %

## 2018-04-07 DIAGNOSIS — Z96.659 PRESENCE OF UNSPECIFIED ARTIFICIAL KNEE JOINT: Chronic | ICD-10-CM

## 2018-04-07 DIAGNOSIS — Z98.890 OTHER SPECIFIED POSTPROCEDURAL STATES: Chronic | ICD-10-CM

## 2018-04-07 DIAGNOSIS — Z98.49 CATARACT EXTRACTION STATUS, UNSPECIFIED EYE: Chronic | ICD-10-CM

## 2018-04-07 DIAGNOSIS — Z90.710 ACQUIRED ABSENCE OF BOTH CERVIX AND UTERUS: Chronic | ICD-10-CM

## 2018-04-07 LAB
ALBUMIN SERPL ELPH-MCNC: 4 G/DL — SIGNIFICANT CHANGE UP (ref 3.3–5)
ALP SERPL-CCNC: 92 U/L — SIGNIFICANT CHANGE UP (ref 40–120)
ALT FLD-CCNC: 14 U/L — SIGNIFICANT CHANGE UP (ref 10–45)
ANION GAP SERPL CALC-SCNC: 14 MMOL/L — SIGNIFICANT CHANGE UP (ref 5–17)
ANISOCYTOSIS BLD QL: SLIGHT — SIGNIFICANT CHANGE UP
APPEARANCE UR: (no result)
AST SERPL-CCNC: 23 U/L — SIGNIFICANT CHANGE UP (ref 10–40)
BACTERIA # UR AUTO: (no result) /HPF
BASOPHILS NFR BLD AUTO: 1 % — SIGNIFICANT CHANGE UP (ref 0–2)
BILIRUB SERPL-MCNC: 0.2 MG/DL — SIGNIFICANT CHANGE UP (ref 0.2–1.2)
BILIRUB UR-MCNC: NEGATIVE — SIGNIFICANT CHANGE UP
BUN SERPL-MCNC: 65 MG/DL — HIGH (ref 7–23)
CALCIUM SERPL-MCNC: 9.7 MG/DL — SIGNIFICANT CHANGE UP (ref 8.4–10.5)
CHLORIDE SERPL-SCNC: 98 MMOL/L — SIGNIFICANT CHANGE UP (ref 96–108)
CO2 SERPL-SCNC: 28 MMOL/L — SIGNIFICANT CHANGE UP (ref 22–31)
COLOR SPEC: YELLOW — SIGNIFICANT CHANGE UP
COMMENT - URINE: SIGNIFICANT CHANGE UP
COMMENT - URINE: SIGNIFICANT CHANGE UP
CREAT SERPL-MCNC: 2.54 MG/DL — HIGH (ref 0.5–1.3)
DACRYOCYTES BLD QL SMEAR: SLIGHT — SIGNIFICANT CHANGE UP
DIFF PNL FLD: NEGATIVE — SIGNIFICANT CHANGE UP
EPI CELLS # UR: SIGNIFICANT CHANGE UP /HPF (ref 0–5)
EXTRA BLUE TOP TUBE: SIGNIFICANT CHANGE UP
EXTRA SST TUBE: SIGNIFICANT CHANGE UP
GLUCOSE SERPL-MCNC: 132 MG/DL — HIGH (ref 70–99)
GLUCOSE UR QL: NEGATIVE — SIGNIFICANT CHANGE UP
HCT VFR BLD CALC: 33.9 % — LOW (ref 34.5–45)
HGB BLD-MCNC: 10.8 G/DL — LOW (ref 11.5–15.5)
HYPOCHROMIA BLD QL: SLIGHT — SIGNIFICANT CHANGE UP
KETONES UR-MCNC: NEGATIVE — SIGNIFICANT CHANGE UP
LACTATE SERPL-SCNC: 1 MMOL/L — SIGNIFICANT CHANGE UP (ref 0.5–2)
LEUKOCYTE ESTERASE UR-ACNC: (no result)
LG PLATELETS BLD QL AUTO: PRESENT — SIGNIFICANT CHANGE UP
LYMPHOCYTES # BLD AUTO: 16 % — SIGNIFICANT CHANGE UP (ref 13–44)
MANUAL SMEAR VERIFICATION: SIGNIFICANT CHANGE UP
MCHC RBC-ENTMCNC: 30.4 PG — SIGNIFICANT CHANGE UP (ref 27–34)
MCHC RBC-ENTMCNC: 31.9 G/DL — LOW (ref 32–36)
MCV RBC AUTO: 95.5 FL — SIGNIFICANT CHANGE UP (ref 80–100)
MONOCYTES NFR BLD AUTO: 13 % — SIGNIFICANT CHANGE UP (ref 2–14)
NEUTROPHILS NFR BLD AUTO: 63 % — SIGNIFICANT CHANGE UP (ref 43–77)
NEUTS BAND # BLD: 7 % — SIGNIFICANT CHANGE UP
NITRITE UR-MCNC: POSITIVE
OVALOCYTES BLD QL SMEAR: SLIGHT — SIGNIFICANT CHANGE UP
PH UR: 6.5 — SIGNIFICANT CHANGE UP (ref 5–8)
PLAT MORPH BLD: (no result)
PLATELET # BLD AUTO: 119 K/UL — LOW (ref 150–400)
POIKILOCYTOSIS BLD QL AUTO: SLIGHT — SIGNIFICANT CHANGE UP
POLYCHROMASIA BLD QL SMEAR: SLIGHT — SIGNIFICANT CHANGE UP
POTASSIUM SERPL-MCNC: 4.3 MMOL/L — SIGNIFICANT CHANGE UP (ref 3.5–5.3)
POTASSIUM SERPL-SCNC: 4.3 MMOL/L — SIGNIFICANT CHANGE UP (ref 3.5–5.3)
PROT SERPL-MCNC: 7.1 G/DL — SIGNIFICANT CHANGE UP (ref 6–8.3)
PROT UR-MCNC: NEGATIVE MG/DL — SIGNIFICANT CHANGE UP
RBC # BLD: 3.55 M/UL — LOW (ref 3.8–5.2)
RBC # FLD: 15.3 % — SIGNIFICANT CHANGE UP (ref 10.3–16.9)
RBC BLD AUTO: (no result)
RBC CASTS # UR COMP ASSIST: < 5 /HPF — SIGNIFICANT CHANGE UP
SODIUM SERPL-SCNC: 140 MMOL/L — SIGNIFICANT CHANGE UP (ref 135–145)
SP GR SPEC: <=1.005 — SIGNIFICANT CHANGE UP (ref 1–1.03)
UROBILINOGEN FLD QL: 0.2 E.U./DL — SIGNIFICANT CHANGE UP
WBC # BLD: 4.8 K/UL — SIGNIFICANT CHANGE UP (ref 3.8–10.5)
WBC # FLD AUTO: 4.8 K/UL — SIGNIFICANT CHANGE UP (ref 3.8–10.5)
WBC UR QL: > 10 /HPF

## 2018-04-07 PROCEDURE — 83605 ASSAY OF LACTIC ACID: CPT

## 2018-04-07 PROCEDURE — 96374 THER/PROPH/DIAG INJ IV PUSH: CPT

## 2018-04-07 PROCEDURE — 99284 EMERGENCY DEPT VISIT MOD MDM: CPT | Mod: 25

## 2018-04-07 PROCEDURE — 74176 CT ABD & PELVIS W/O CONTRAST: CPT

## 2018-04-07 PROCEDURE — 99284 EMERGENCY DEPT VISIT MOD MDM: CPT

## 2018-04-07 PROCEDURE — 74176 CT ABD & PELVIS W/O CONTRAST: CPT | Mod: 26

## 2018-04-07 PROCEDURE — 82962 GLUCOSE BLOOD TEST: CPT

## 2018-04-07 PROCEDURE — 80053 COMPREHEN METABOLIC PANEL: CPT

## 2018-04-07 PROCEDURE — 81001 URINALYSIS AUTO W/SCOPE: CPT

## 2018-04-07 PROCEDURE — 85025 COMPLETE CBC W/AUTO DIFF WBC: CPT

## 2018-04-07 RX ORDER — SODIUM CHLORIDE 9 MG/ML
1000 INJECTION INTRAMUSCULAR; INTRAVENOUS; SUBCUTANEOUS ONCE
Qty: 0 | Refills: 0 | Status: COMPLETED | OUTPATIENT
Start: 2018-04-07 | End: 2018-04-07

## 2018-04-07 RX ORDER — IOHEXOL 300 MG/ML
50 INJECTION, SOLUTION INTRAVENOUS ONCE
Qty: 0 | Refills: 0 | Status: COMPLETED | OUTPATIENT
Start: 2018-04-07 | End: 2018-04-07

## 2018-04-07 RX ORDER — CEFTRIAXONE 500 MG/1
1 INJECTION, POWDER, FOR SOLUTION INTRAMUSCULAR; INTRAVENOUS ONCE
Qty: 0 | Refills: 0 | Status: COMPLETED | OUTPATIENT
Start: 2018-04-07 | End: 2018-04-07

## 2018-04-07 RX ADMIN — CEFTRIAXONE 100 GRAM(S): 500 INJECTION, POWDER, FOR SOLUTION INTRAMUSCULAR; INTRAVENOUS at 21:00

## 2018-04-07 RX ADMIN — SODIUM CHLORIDE 1000 MILLILITER(S): 9 INJECTION INTRAMUSCULAR; INTRAVENOUS; SUBCUTANEOUS at 20:48

## 2018-04-07 RX ADMIN — IOHEXOL 50 MILLILITER(S): 300 INJECTION, SOLUTION INTRAVENOUS at 20:20

## 2018-04-07 NOTE — ED PROVIDER NOTE - OBJECTIVE STATEMENT
89 y/o f with PMH of CAD, DM, HTN, HLD, atrial fib presents to ED with daughter with c/o diarrhea x 3 days and lower abd pain.  Daughter states diarrhea stopped yesterday but abd pain started today.  Denies nausea, vomiting, fever, recent travel or sick contacts.  Last hospitalization in January.  Pt not recently on antibiotics.  NO chest pain or shortness of breath.  Surgical hx: c section and hernia repair.

## 2018-04-07 NOTE — ED PROVIDER NOTE - MEDICAL DECISION MAKING DETAILS
pt with diarrhea resolved yesterday, now with suprapubic abd pain - pe - tender over suprapubic area, workup shows + u/a ct with no other acute findings, bp stable in ED and improved from home BP - antibiotics and fluids in ED - will send home on keflex for UTI

## 2018-04-07 NOTE — ED ADULT NURSE NOTE - OBJECTIVE STATEMENT
pt to ER w/ report of abd pain for three days w/ diarrhea and nausea.  Pt denies cp/sob/f/c.  Pt on home O2 @2L.  Pt initially noted to be hypotensive, BP remeasured WNL.  IV access established, labs drawn and sent. Breathing unlabored, skin warm and dry. Will continue to monitor.

## 2018-04-07 NOTE — ED ADULT TRIAGE NOTE - CHIEF COMPLAINT QUOTE
Patient c/o abdominal pain , nausea ,diarrhea for 3 days , also with  low blood pressure noticed  today  . Denies any vomiting , fever nor chills .

## 2018-04-08 VITALS
DIASTOLIC BLOOD PRESSURE: 58 MMHG | SYSTOLIC BLOOD PRESSURE: 124 MMHG | HEART RATE: 71 BPM | RESPIRATION RATE: 18 BRPM | TEMPERATURE: 98 F | OXYGEN SATURATION: 100 %

## 2018-04-08 RX ORDER — CEPHALEXIN 500 MG
1 CAPSULE ORAL
Qty: 28 | Refills: 0 | OUTPATIENT
Start: 2018-04-08 | End: 2018-04-14

## 2018-04-08 RX ORDER — ACETAMINOPHEN 500 MG
975 TABLET ORAL ONCE
Qty: 0 | Refills: 0 | Status: COMPLETED | OUTPATIENT
Start: 2018-04-08 | End: 2018-04-08

## 2018-04-11 DIAGNOSIS — E78.00 PURE HYPERCHOLESTEROLEMIA, UNSPECIFIED: ICD-10-CM

## 2018-04-11 DIAGNOSIS — E11.9 TYPE 2 DIABETES MELLITUS WITHOUT COMPLICATIONS: ICD-10-CM

## 2018-04-11 DIAGNOSIS — I25.10 ATHEROSCLEROTIC HEART DISEASE OF NATIVE CORONARY ARTERY WITHOUT ANGINA PECTORIS: ICD-10-CM

## 2018-04-11 DIAGNOSIS — Z91.041 RADIOGRAPHIC DYE ALLERGY STATUS: ICD-10-CM

## 2018-04-11 DIAGNOSIS — Z79.899 OTHER LONG TERM (CURRENT) DRUG THERAPY: ICD-10-CM

## 2018-04-11 DIAGNOSIS — E78.5 HYPERLIPIDEMIA, UNSPECIFIED: ICD-10-CM

## 2018-04-11 DIAGNOSIS — R10.30 LOWER ABDOMINAL PAIN, UNSPECIFIED: ICD-10-CM

## 2018-04-11 DIAGNOSIS — N39.0 URINARY TRACT INFECTION, SITE NOT SPECIFIED: ICD-10-CM

## 2018-04-11 DIAGNOSIS — R19.7 DIARRHEA, UNSPECIFIED: ICD-10-CM

## 2018-04-11 DIAGNOSIS — I10 ESSENTIAL (PRIMARY) HYPERTENSION: ICD-10-CM

## 2018-04-11 DIAGNOSIS — Z79.82 LONG TERM (CURRENT) USE OF ASPIRIN: ICD-10-CM

## 2018-04-13 NOTE — DISCHARGE NOTE ADULT - NS AS DC FU [facility] CONTRAINDICATIONS ACEI/ARB
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    Plan:  1  Foregut perforation   - post-pyloric keofed in position   - cont cycled feeds   - CT scan w/ NJ contrast and IV contrast to establish baseline scan prior to d/c    2  DVT Prophylaxis   - SQH   - SCDs    3  Disposition   - discharge today and f/u in 3 weeks    Hardeep Subramanian MD PGY-4  7:20 AM  04/13/18      Subjective:  Drain pulled from perisplenic region  Otherwise stable  Objective:  Patient Vitals for the past 24 hrs:   BP Temp Temp src Pulse Resp SpO2   04/13/18 0400 - - - - - 94 %   04/12/18 2352 124/59 98 3 °F (36 8 °C) Oral 60 18 93 %   04/12/18 2347 97/64 98 1 °F (36 7 °C) Oral 93 18 95 %   04/12/18 1600 - - - - - 98 %   04/12/18 1526 126/63 98 6 °F (37 °C) Oral 84 19 98 %   04/12/18 0757 118/70 98 6 °F (37 °C) Oral 73 20 97 %          Diet Orders            Start     Ordered    04/12/18 0854  Diet Enteral/Parenteral; Tube Feeding No Oral Diet; Jevity 1 2 Claude; 84; 110; Water; Every 4 hours  Diet effective midnight     Comments:  84 mL/hr for 20 hours then off for 4 hours   Question Answer Comment   Diet Type Enteral/Parenteral    Enteral/Parenteral Tube Feeding No Oral Diet    Tube Feeding Formula: Jevity 1 2 Claude    Tube Feeding Continuous rate (mL/hr): 84    Tube Feeding water flush (mL): 110    Water Flush type: Water    Water flush frequency: Every 4 hours    RD to adjust diet per protocol?  No        04/12/18 0853          Intake/Output Summary (Last 24 hours) at 04/13/18 0720  Last data filed at 04/13/18 9932   Gross per 24 hour   Intake             3350 ml   Output             1520 ml   Net             1830 ml   UOP 1 5  ADENIKE 70 serous  Stool 2x    Physical Exam:  General: NAD, keofed in position  Cardiovascular: RRR  Respiratory: breath sounds b/l  Abdomen: soft, ND, ND incision midline c/d/ w/ ADENIKE  Extremities: no edema    Medications:    Current Facility-Administered Medications:  acetaminophen 650 mg Rectal Once PRN Jacob Sargent MD   bisacodyl 10 mg Rectal Daily PRN Altamease AURELIO Argueta   heparin (porcine) 5,000 Units Subcutaneous [facility] Nani Cheung MD   insulin lispro 1-5 Units Subcutaneous Q6H [address] Maria Luz Mathur PA-C   ketorolac 15 mg Intravenous Q6H PRN Altamelynn Argueta PA-C   LORazepam 0 5 mg Intravenous Q6H PRN Eduarda Marinelli MD   metoclopramide 10 mg Intravenous Q6H PRN Jacob Sargent MD   ondansetron 4 mg Intravenous Q6H PRN Nani Cheung MD   pancrelipase (Lip-Prot-Amyl) 24,000 Units Oral Once Sunshine Ma   pantoprazole 40 mg Intravenous Q24H Jose Antonio Caballero MD        acetaminophen 650 mg Once PRN   bisacodyl 10 mg Daily PRN   ketorolac 15 mg Q6H PRN   LORazepam 0 5 mg Q6H PRN   metoclopramide 10 mg Q6H PRN   ondansetron 4 mg Q6H PRN     Laboratory results:   CBC: No results found for: WBC, HGB, HCT, MCV, PLT, ADJUSTEDWBC, MCH, MCHC, RDW, MPV, NRBC, CMP: No results found for: NA, K, CL, CO2, ANIONGAP, BUN, CREATININE, GLUCOSE, CALCIUM, AST, ALT, ALKPHOS, PROT, ALBUMIN, BILITOT, EGFR, Coagulation: No results found for: PT, INR, APTT, Urinalysis: No results found for: COLORU, CLARITYU, SPECGRAV, PHUR, LEUKOCYTESUR, NITRITE, PROTEINUA, GLUCOSEU, KETONESU, BILIRUBINUR, BLOODU, Amylase: No results found for: AMYLASE, Lipase: No results found for: LIPASE    VTE Pharmacologic Prophylaxis: Heparin  VTE Mechanical Prophylaxis: sequential compression device Yes:

## 2018-05-21 ENCOUNTER — INPATIENT (INPATIENT)
Facility: HOSPITAL | Age: 83
LOS: 1 days | Discharge: HOME | DRG: 202 | End: 2018-05-23
Attending: STUDENT IN AN ORGANIZED HEALTH CARE EDUCATION/TRAINING PROGRAM | Admitting: STUDENT IN AN ORGANIZED HEALTH CARE EDUCATION/TRAINING PROGRAM
Payer: MEDICARE

## 2018-05-21 VITALS
SYSTOLIC BLOOD PRESSURE: 158 MMHG | OXYGEN SATURATION: 98 % | DIASTOLIC BLOOD PRESSURE: 72 MMHG | RESPIRATION RATE: 20 BRPM | TEMPERATURE: 99 F | HEART RATE: 77 BPM

## 2018-05-21 DIAGNOSIS — J44.9 CHRONIC OBSTRUCTIVE PULMONARY DISEASE, UNSPECIFIED: ICD-10-CM

## 2018-05-21 DIAGNOSIS — I10 ESSENTIAL (PRIMARY) HYPERTENSION: ICD-10-CM

## 2018-05-21 DIAGNOSIS — Z98.890 OTHER SPECIFIED POSTPROCEDURAL STATES: Chronic | ICD-10-CM

## 2018-05-21 DIAGNOSIS — Z90.710 ACQUIRED ABSENCE OF BOTH CERVIX AND UTERUS: Chronic | ICD-10-CM

## 2018-05-21 DIAGNOSIS — E11.9 TYPE 2 DIABETES MELLITUS WITHOUT COMPLICATIONS: ICD-10-CM

## 2018-05-21 DIAGNOSIS — E03.9 HYPOTHYROIDISM, UNSPECIFIED: ICD-10-CM

## 2018-05-21 DIAGNOSIS — Z98.49 CATARACT EXTRACTION STATUS, UNSPECIFIED EYE: Chronic | ICD-10-CM

## 2018-05-21 DIAGNOSIS — E78.00 PURE HYPERCHOLESTEROLEMIA, UNSPECIFIED: ICD-10-CM

## 2018-05-21 DIAGNOSIS — Z96.659 PRESENCE OF UNSPECIFIED ARTIFICIAL KNEE JOINT: Chronic | ICD-10-CM

## 2018-05-21 DIAGNOSIS — N18.3 CHRONIC KIDNEY DISEASE, STAGE 3 (MODERATE): ICD-10-CM

## 2018-05-21 DIAGNOSIS — D64.9 ANEMIA, UNSPECIFIED: ICD-10-CM

## 2018-05-21 DIAGNOSIS — J45.901 UNSPECIFIED ASTHMA WITH (ACUTE) EXACERBATION: ICD-10-CM

## 2018-05-21 DIAGNOSIS — I25.10 ATHEROSCLEROTIC HEART DISEASE OF NATIVE CORONARY ARTERY WITHOUT ANGINA PECTORIS: ICD-10-CM

## 2018-05-21 DIAGNOSIS — Z29.9 ENCOUNTER FOR PROPHYLACTIC MEASURES, UNSPECIFIED: ICD-10-CM

## 2018-05-21 LAB
ALBUMIN SERPL ELPH-MCNC: 4.5 G/DL — SIGNIFICANT CHANGE UP (ref 3.3–5)
ALP SERPL-CCNC: 110 U/L — SIGNIFICANT CHANGE UP (ref 40–120)
ALT FLD-CCNC: 11 U/L — SIGNIFICANT CHANGE UP (ref 10–45)
ANION GAP SERPL CALC-SCNC: 14 MMOL/L — SIGNIFICANT CHANGE UP (ref 5–17)
AST SERPL-CCNC: 16 U/L — SIGNIFICANT CHANGE UP (ref 10–40)
BASOPHILS NFR BLD AUTO: 0.1 % — SIGNIFICANT CHANGE UP (ref 0–2)
BILIRUB SERPL-MCNC: 0.4 MG/DL — SIGNIFICANT CHANGE UP (ref 0.2–1.2)
BUN SERPL-MCNC: 40 MG/DL — HIGH (ref 7–23)
CALCIUM SERPL-MCNC: 9.5 MG/DL — SIGNIFICANT CHANGE UP (ref 8.4–10.5)
CHLORIDE SERPL-SCNC: 101 MMOL/L — SIGNIFICANT CHANGE UP (ref 96–108)
CK MB CFR SERPL CALC: 1.2 NG/ML — SIGNIFICANT CHANGE UP (ref 0–6.7)
CK SERPL-CCNC: 62 U/L — SIGNIFICANT CHANGE UP (ref 25–170)
CO2 SERPL-SCNC: 26 MMOL/L — SIGNIFICANT CHANGE UP (ref 22–31)
CREAT SERPL-MCNC: 1.44 MG/DL — HIGH (ref 0.5–1.3)
EOSINOPHIL NFR BLD AUTO: 1 % — SIGNIFICANT CHANGE UP (ref 0–6)
GLUCOSE BLDC GLUCOMTR-MCNC: 213 MG/DL — HIGH (ref 70–99)
GLUCOSE BLDC GLUCOMTR-MCNC: 232 MG/DL — HIGH (ref 70–99)
GLUCOSE SERPL-MCNC: 162 MG/DL — HIGH (ref 70–99)
HCT VFR BLD CALC: 31.8 % — LOW (ref 34.5–45)
HGB BLD-MCNC: 10.1 G/DL — LOW (ref 11.5–15.5)
LYMPHOCYTES # BLD AUTO: 7.4 % — LOW (ref 13–44)
MCHC RBC-ENTMCNC: 30.4 PG — SIGNIFICANT CHANGE UP (ref 27–34)
MCHC RBC-ENTMCNC: 31.8 G/DL — LOW (ref 32–36)
MCV RBC AUTO: 95.8 FL — SIGNIFICANT CHANGE UP (ref 80–100)
MONOCYTES NFR BLD AUTO: 8 % — SIGNIFICANT CHANGE UP (ref 2–14)
NEUTROPHILS NFR BLD AUTO: 83.5 % — HIGH (ref 43–77)
NT-PROBNP SERPL-SCNC: 4528 PG/ML — HIGH (ref 0–300)
PLATELET # BLD AUTO: 143 K/UL — LOW (ref 150–400)
POTASSIUM SERPL-MCNC: 4.3 MMOL/L — SIGNIFICANT CHANGE UP (ref 3.5–5.3)
POTASSIUM SERPL-SCNC: 4.3 MMOL/L — SIGNIFICANT CHANGE UP (ref 3.5–5.3)
PROT SERPL-MCNC: 7.8 G/DL — SIGNIFICANT CHANGE UP (ref 6–8.3)
RAPID RVP RESULT: DETECTED
RBC # BLD: 3.32 M/UL — LOW (ref 3.8–5.2)
RBC # FLD: 14.9 % — SIGNIFICANT CHANGE UP (ref 10.3–16.9)
RV+EV RNA SPEC QL NAA+PROBE: DETECTED
SODIUM SERPL-SCNC: 141 MMOL/L — SIGNIFICANT CHANGE UP (ref 135–145)
TROPONIN T SERPL-MCNC: <0.01 NG/ML — SIGNIFICANT CHANGE UP (ref 0–0.01)
WBC # BLD: 8.7 K/UL — SIGNIFICANT CHANGE UP (ref 3.8–10.5)
WBC # FLD AUTO: 8.7 K/UL — SIGNIFICANT CHANGE UP (ref 3.8–10.5)

## 2018-05-21 PROCEDURE — 99223 1ST HOSP IP/OBS HIGH 75: CPT | Mod: GC

## 2018-05-21 PROCEDURE — 93010 ELECTROCARDIOGRAM REPORT: CPT

## 2018-05-21 PROCEDURE — 71045 X-RAY EXAM CHEST 1 VIEW: CPT | Mod: 26

## 2018-05-21 PROCEDURE — 99285 EMERGENCY DEPT VISIT HI MDM: CPT | Mod: 25

## 2018-05-21 RX ORDER — IPRATROPIUM/ALBUTEROL SULFATE 18-103MCG
3 AEROSOL WITH ADAPTER (GRAM) INHALATION ONCE
Qty: 0 | Refills: 0 | Status: COMPLETED | OUTPATIENT
Start: 2018-05-21 | End: 2018-05-21

## 2018-05-21 RX ORDER — DEXTROSE 50 % IN WATER 50 %
25 SYRINGE (ML) INTRAVENOUS ONCE
Qty: 0 | Refills: 0 | Status: DISCONTINUED | OUTPATIENT
Start: 2018-05-21 | End: 2018-05-23

## 2018-05-21 RX ORDER — IPRATROPIUM/ALBUTEROL SULFATE 18-103MCG
3 AEROSOL WITH ADAPTER (GRAM) INHALATION EVERY 6 HOURS
Qty: 0 | Refills: 0 | Status: DISCONTINUED | OUTPATIENT
Start: 2018-05-21 | End: 2018-05-23

## 2018-05-21 RX ORDER — ASPIRIN/CALCIUM CARB/MAGNESIUM 324 MG
81 TABLET ORAL DAILY
Qty: 0 | Refills: 0 | Status: DISCONTINUED | OUTPATIENT
Start: 2018-05-21 | End: 2018-05-22

## 2018-05-21 RX ORDER — DEXTROSE 50 % IN WATER 50 %
15 SYRINGE (ML) INTRAVENOUS ONCE
Qty: 0 | Refills: 0 | Status: DISCONTINUED | OUTPATIENT
Start: 2018-05-21 | End: 2018-05-23

## 2018-05-21 RX ORDER — DEXTROSE 50 % IN WATER 50 %
12.5 SYRINGE (ML) INTRAVENOUS ONCE
Qty: 0 | Refills: 0 | Status: DISCONTINUED | OUTPATIENT
Start: 2018-05-21 | End: 2018-05-23

## 2018-05-21 RX ORDER — FUROSEMIDE 40 MG
80 TABLET ORAL EVERY 24 HOURS
Qty: 0 | Refills: 0 | Status: DISCONTINUED | OUTPATIENT
Start: 2018-05-21 | End: 2018-05-23

## 2018-05-21 RX ORDER — TIOTROPIUM BROMIDE 18 UG/1
1 CAPSULE ORAL; RESPIRATORY (INHALATION) DAILY
Qty: 0 | Refills: 0 | Status: DISCONTINUED | OUTPATIENT
Start: 2018-05-21 | End: 2018-05-21

## 2018-05-21 RX ORDER — MONTELUKAST 4 MG/1
10 TABLET, CHEWABLE ORAL DAILY
Qty: 0 | Refills: 0 | Status: DISCONTINUED | OUTPATIENT
Start: 2018-05-21 | End: 2018-05-23

## 2018-05-21 RX ORDER — AZITHROMYCIN 500 MG/1
500 TABLET, FILM COATED ORAL ONCE
Qty: 0 | Refills: 0 | Status: COMPLETED | OUTPATIENT
Start: 2018-05-21 | End: 2018-05-21

## 2018-05-21 RX ORDER — SITAGLIPTIN 50 MG/1
1 TABLET, FILM COATED ORAL
Qty: 0 | Refills: 0 | COMMUNITY

## 2018-05-21 RX ORDER — AMLODIPINE BESYLATE 2.5 MG/1
5 TABLET ORAL DAILY
Qty: 0 | Refills: 0 | Status: DISCONTINUED | OUTPATIENT
Start: 2018-05-21 | End: 2018-05-23

## 2018-05-21 RX ORDER — ATORVASTATIN CALCIUM 80 MG/1
40 TABLET, FILM COATED ORAL AT BEDTIME
Qty: 0 | Refills: 0 | Status: DISCONTINUED | OUTPATIENT
Start: 2018-05-21 | End: 2018-05-23

## 2018-05-21 RX ORDER — CLOPIDOGREL BISULFATE 75 MG/1
75 TABLET, FILM COATED ORAL DAILY
Qty: 0 | Refills: 0 | Status: DISCONTINUED | OUTPATIENT
Start: 2018-05-21 | End: 2018-05-23

## 2018-05-21 RX ORDER — ISOSORBIDE MONONITRATE 60 MG/1
30 TABLET, EXTENDED RELEASE ORAL DAILY
Qty: 0 | Refills: 0 | Status: DISCONTINUED | OUTPATIENT
Start: 2018-05-21 | End: 2018-05-23

## 2018-05-21 RX ORDER — GLUCAGON INJECTION, SOLUTION 0.5 MG/.1ML
1 INJECTION, SOLUTION SUBCUTANEOUS ONCE
Qty: 0 | Refills: 0 | Status: DISCONTINUED | OUTPATIENT
Start: 2018-05-21 | End: 2018-05-23

## 2018-05-21 RX ORDER — LEVOTHYROXINE SODIUM 125 MCG
50 TABLET ORAL DAILY
Qty: 0 | Refills: 0 | Status: DISCONTINUED | OUTPATIENT
Start: 2018-05-21 | End: 2018-05-23

## 2018-05-21 RX ORDER — SODIUM CHLORIDE 9 MG/ML
1000 INJECTION, SOLUTION INTRAVENOUS
Qty: 0 | Refills: 0 | Status: DISCONTINUED | OUTPATIENT
Start: 2018-05-21 | End: 2018-05-23

## 2018-05-21 RX ORDER — APIXABAN 2.5 MG/1
2.5 TABLET, FILM COATED ORAL EVERY 12 HOURS
Qty: 0 | Refills: 0 | Status: DISCONTINUED | OUTPATIENT
Start: 2018-05-21 | End: 2018-05-23

## 2018-05-21 RX ORDER — INSULIN LISPRO 100/ML
VIAL (ML) SUBCUTANEOUS
Qty: 0 | Refills: 0 | Status: DISCONTINUED | OUTPATIENT
Start: 2018-05-21 | End: 2018-05-23

## 2018-05-21 RX ORDER — PANTOPRAZOLE SODIUM 20 MG/1
40 TABLET, DELAYED RELEASE ORAL
Qty: 0 | Refills: 0 | Status: DISCONTINUED | OUTPATIENT
Start: 2018-05-21 | End: 2018-05-23

## 2018-05-21 RX ORDER — METOPROLOL TARTRATE 50 MG
25 TABLET ORAL DAILY
Qty: 0 | Refills: 0 | Status: DISCONTINUED | OUTPATIENT
Start: 2018-05-21 | End: 2018-05-23

## 2018-05-21 RX ORDER — BUDESONIDE, MICRONIZED 100 %
0.5 POWDER (GRAM) MISCELLANEOUS DAILY
Qty: 0 | Refills: 0 | Status: DISCONTINUED | OUTPATIENT
Start: 2018-05-21 | End: 2018-05-22

## 2018-05-21 RX ORDER — LIDOCAINE 4 G/100G
1 CREAM TOPICAL DAILY
Qty: 0 | Refills: 0 | Status: DISCONTINUED | OUTPATIENT
Start: 2018-05-21 | End: 2018-05-23

## 2018-05-21 RX ORDER — CEFTRIAXONE 500 MG/1
1 INJECTION, POWDER, FOR SOLUTION INTRAMUSCULAR; INTRAVENOUS ONCE
Qty: 0 | Refills: 0 | Status: COMPLETED | OUTPATIENT
Start: 2018-05-21 | End: 2018-05-21

## 2018-05-21 RX ADMIN — Medication 3 MILLILITER(S): at 17:28

## 2018-05-21 RX ADMIN — Medication 3 MILLILITER(S): at 13:32

## 2018-05-21 RX ADMIN — APIXABAN 2.5 MILLIGRAM(S): 2.5 TABLET, FILM COATED ORAL at 18:13

## 2018-05-21 RX ADMIN — Medication 4: at 18:04

## 2018-05-21 RX ADMIN — AZITHROMYCIN 255 MILLIGRAM(S): 500 TABLET, FILM COATED ORAL at 13:30

## 2018-05-21 RX ADMIN — Medication 3 MILLILITER(S): at 21:22

## 2018-05-21 RX ADMIN — Medication 4: at 22:10

## 2018-05-21 RX ADMIN — Medication 3 MILLILITER(S): at 11:48

## 2018-05-21 RX ADMIN — Medication 125 MILLIGRAM(S): at 11:48

## 2018-05-21 RX ADMIN — CEFTRIAXONE 100 GRAM(S): 500 INJECTION, POWDER, FOR SOLUTION INTRAMUSCULAR; INTRAVENOUS at 14:58

## 2018-05-21 NOTE — ED PROVIDER NOTE - OBJECTIVE STATEMENT
89 y/o f hx HTN, DM, HLD, afib on eliquis, diastolic CHF, CAD, asthma (never intubated), CKD stage III presents c/o cough x 2 weeks productive of yellow sputum.  Pt stating also feeling some chest tightness, using her inhaler without improvement.  Pt denies fever, chills, recent travel, sick contacts, all other ROS negative.

## 2018-05-21 NOTE — ED PROVIDER NOTE - ATTENDING CONTRIBUTION TO CARE
Pt is a 89yo f, h/o htn, dm, hld, afib on eliquis, dchf, ckd, asthma, bib family for cough x 2 wks, as well as chest tightness/ sob. No f/c. No leg pain/ swelling. Afebrile. HDS. WNWD f in nad. + s1, s2, rrr. Lungs with mild exp wheezes b/l, no rales/ rhonchi. Abd soft, nt/nd, no guarding/ rebound. No leg edema. 2+ pulses b/l. CXR appears similar to previous with increased haziness to rul and lll; read by radiology as possible infiltrate to rul. No leukocytosis on labs. Cr improved from prior in April. + Pt is a 89yo f, h/o htn, dm, hld, afib on eliquis, dchf, ckd, asthma, bib family for cough x 2 wks, as well as chest tightness/ sob. No f/c. No leg pain/ swelling. Afebrile. HDS. WNWD f in nad. + s1, s2, rrr. Lungs with mild exp wheezes b/l, no rales/ rhonchi. Abd soft, nt/nd, no guarding/ rebound. No leg edema. 2+ pulses b/l. CXR appears similar to previous with increased haziness to rul and lll; read by radiology as possible infiltrate to rul. No leukocytosis on labs. Cr improved from prior in April. Pt given ceftriaxone, zithromax, solumedrol, duonebs w/ mild improvement. Will admit for continued neb txs, iv steroids, iv abx.

## 2018-05-21 NOTE — H&P ADULT - NSHPLABSRESULTS_GEN_ALL_CORE
LABS:                         10.1   8.7   )-----------( 143      ( 21 May 2018 11:48 )             31.8     05-21    141  |  101  |  40<H>  ----------------------------<  162<H>  4.3   |  26  |  1.44<H>    Ca    9.5      21 May 2018 11:48    TPro  7.8  /  Alb  4.5  /  TBili  0.4  /  DBili  x   /  AST  16  /  ALT  11  /  AlkPhos  110  05-21    CARDIAC MARKERS ( 21 May 2018 11:48 )  x     / <0.01 ng/mL / 62 U/L / x     / 1.2 ng/mL      < from: Xray Chest 1 View- PORTABLE-Urgent (05.21.18 @ 12:29) >    INTERPRETATION:  XR CHEST PORTABLE URGENT 1V dated 5/21/2018 12:29 PM    CLINICAL INFORMATION: Female, 89 years old.  cough.    PRIOR STUDIES: 1/21/2018.    FINDINGS: Heart size, mediastinal and hilar contours are unchanged. May   be increasing right upper lung zone opacity and a developing right upper   lobe pneumonia cannot excluded. There is been resolution of pulmonary   venous congestion and bibasilar pleural effusions. Tissues and osseous   structures are within normal limits.    IMPRESSION:  Query developing right upper lobe pneumonia. A repeat two-view chest is   suggested for more complete evaluation    < end of copied text >    RADIOLOGY, EKG & ADDITIONAL TESTS: Reviewed.

## 2018-05-21 NOTE — H&P ADULT - PROBLEM SELECTOR PLAN 6
-Continue home Amlodipine 5 mg   -Continue home Metoprolol 25 mg -Continue home Amlodipine 5 mg   -Continue home Metoprolol 25 mg  -Continue Imdur

## 2018-05-21 NOTE — H&P ADULT - PROBLEM SELECTOR PLAN 3
CAD w/ history of stents place.  Denies MI.  - Continue Clopidogrel 75 mg daily  - Continue Aspirin 81 mg daily    Chronic Systolic HFrEF 25% 2/2 ischemic cardiomyopathy  - Continue Atorvastatin 40 mg daily  - Obtain collateral regarding absence of indicated ace/arb therapy  - Continue Aspirin 81 mg daily CAD w/ history of stents place.  Denies MI.  - Continue Clopidogrel 75 mg daily  - Continue Aspirin 81 mg daily  - Obtain collateral on why pt is on triple therapy    Chronic Systolic HFrEF 25% 2/2 ischemic cardiomyopathy  - Continue Atorvastatin 40 mg daily  - Obtain collateral regarding absence of indicated ace/arb therapy  - Continue Aspirin 81 mg daily  - Continue metoprolol 25 mg CAD w/ history of stents place.  Denies MI.  - Continue Clopidogrel 75 mg daily  - Continue Aspirin 81 mg daily  - Obtain collateral on why pt is on triple therapy    Chronic Systolic HFrEF 25% 2/2 ischemic cardiomyopathy  - Continue Atorvastatin 40 mg daily  - Obtain collateral regarding absence of indicated ace/arb therapy  - Continue Aspirin 81 mg daily  - Continue metoprolol 25 mg  - Continue Imdur

## 2018-05-21 NOTE — H&P ADULT - PROBLEM SELECTOR PLAN 1
Asthma exacerbation 2/2 Rhinovirus. S/p Cef and azithro x 1 in ED.  Low suspicion for bacterial pneumonia.  Pt afebrile with no leukocytosis and non-toxic appearance, SIRS 0, however cannot rule out with CXR finding of RUL opacity and neutrophilic predominance on differential.  Plan to hold antibiotics and observe.  - Prednisone 40 mg daily   - Continue Duonebs q 6  - Continue home Budesonide 0.5 mg  - Continue home Spiriva 18mcg   - Continue home montelukast 10 mg  - Robitussin PRN for symptomatic management of cough Asthma exacerbation 2/2 Rhinovirus. S/p Cef and azithro x 1 in ED.  Low suspicion for bacterial pneumonia.  Pt afebrile with no leukocytosis and non-toxic appearance, SIRS 0, however cannot rule out with CXR finding of RUL opacity and neutrophilic predominance on differential.  Plan to hold antibiotics and observe.  - Prednisone 40 mg daily   - Continue Duonebs q 6  - Continue home Budesonide 0.5 mg  - Continue home montelukast 10 mg  - Hycodan PRN for symptomatic management of cough Asthma exacerbation 2/2 Rhinovirus. S/p Cef and azithro x 1 in ED.  Low suspicion for bacterial pneumonia.  Pt afebrile with no leukocytosis and non-toxic appearance, SIRS 0, however cannot rule out with CXR finding of RUL opacity and neutrophilic predominance on differential.  Plan to hold antibiotics and observe.  - Prednisone 40 mg daily   - Continue Duonebs q 6  - Continue home Budesonide 0.5 mg  - Continue home montelukast 10 mg  - Hycodan PRN for symptomatic management of cough  - F/u procalcitonin

## 2018-05-21 NOTE — H&P ADULT - PROBLEM SELECTOR PLAN 10
F: none, caution, pt EF 25%  E: replete PRN  N: DASH with consistent carbohydrates    VTE ppx: Hep subq  GI ppx: pantoprazole 40 mg daily    Code status: FULL CODE    Dispo: [facility] F: none, caution, pt EF 25%  E: replete PRN  N: DASH with consistent carbohydrates    VTE ppx: Eliquis  GI ppx: pantoprazole 40 mg daily    Code status: FULL CODE    Dispo: [facility]

## 2018-05-21 NOTE — H&P ADULT - NSHPPHYSICALEXAM_GEN_ALL_CORE
VITAL SIGNS:  T(C): 37.6 (05-21-18 @ 14:15), Max: 37.6 (05-21-18 @ 12:24)  T(F): 99.7 (05-21-18 @ 14:15), Max: 99.7 (05-21-18 @ 14:15)  HR: 85 (05-21-18 @ 14:15) (77 - 91)  BP: 139/68 (05-21-18 @ 14:15) (139/68 - 176/85)  BP(mean): --  RR: 20 (05-21-18 @ 14:15) (20 - 20)  SpO2: 95% (05-21-18 @ 14:15) (95% - 98%)  Wt(kg): --    PHYSICAL EXAM:    Constitutional: WDWN resting comfortably in bed; NAD; persistent wet sounding cough  Head: NC/AT  Eyes: PERRL, EOMI, clear conjunctiva  ENT: no nasal discharge; uvula midline, no oropharyngeal erythema or exudates; MMM  Neck: supple; no JVD or thyromegaly  Respiratory: b/l scattered rhonchi and faint expiratory wheezes, negative egophony, RR 16-18, no increased WOB on RA  Cardiac: +S1/S2; RRR; no M/R/G; PMI non-displaced  Gastrointestinal: soft, ND, no rebound or guarding, tenderness to palpation of epigastrium; +BSx4  Back: spine midline, no bony tenderness or step-offs; no CVAT B/L  Extremities: WWP, no clubbing or cyanosis; no peripheral edema  Musculoskeletal: NROM x4; no joint swelling, tenderness or erythema  Vascular: 2+ radial, femoral, DP/PT pulses B/L  Dermatologic: skin warm, dry and intact; no rashes, wounds  Lymphatic: shotty anterior cervical LAD  Neurologic: AAOx3; CNII-XII grossly intact; no focal deficits  Psychiatric: affect and characteristics of appearance, verbalizations, behaviors are appropriate

## 2018-05-21 NOTE — ED ADULT NURSE NOTE - OBJECTIVE STATEMENT
Patient w/ 2 weeks productive cough, no fevers or chills, mild SOB and chest pain on coughing. Patient w/ 2 weeks productive cough, no fevers or chills, mild SOB and chest pain on coughing.  Bilateral lungs with some rales.  PMhx. Asthma, Emphysema, CAD

## 2018-05-21 NOTE — H&P ADULT - ATTENDING COMMENTS
Pt seen and examined at bedside on 5/21/2018 @ 2100    Agree with JONATHAN RODRIGUEZ as above. Patient reports that overall she feels improved since coming to the ED. Currently feels comfortable and that her cough is improved.    VS, Labs, FH, SH, allergies, medications, imaging reviewed. I personally reviewed the EKG - prolonged QtC. Agree with physical exam as above - pt in NAD, in bed, chest - cta b/l, good inspiratory effort, no wheezes    A/P: 89 y/o female with HTN, HLD, NIDDM, Anemia, hypothyroidism,  afib on eliquis, systolic CHF (25%), known CAD with previous PCIs, b/l carotid endarterectomies, asthma and emphysema who presented to the ED c/o cough admitted for asthma exacerbation 2/2 rhinovirus.    **Cough  -Pt + for Rhinovirus on RVP, possible developing infiltrate on CXR  -Pt afebrile and meeting 0/4 SIRS criteria, s/p ceftriaxone/azithromycin in ED  -Can hold off continuing antibiotics at this point as patient is dosed for 24 hours - repeat CXR PA/Lateral in AM if infiltrate present restart antibiotics  -f/u cultures  -c/w antitussive therapy    Plan otherwise as outlined above..... Pt seen and examined at bedside on 5/21/2018 @ 2100    Agree with JONATHAN RODRIGUEZ as above. Patient reports that overall she feels improved since coming to the ED. Currently feels comfortable and that her cough is improved.    VS, Labs, FH, SH, allergies, medications, imaging reviewed. I personally reviewed the EKG - prolonged QtC. Agree with physical exam as above - pt in NAD, in bed, chest - cta b/l, good inspiratory effort, no wheezes    A/P: 89 y/o female with HTN, HLD, NIDDM, Anemia, hypothyroidism,  afib on eliquis, systolic CHF (25%), known CAD with previous PCIs, b/l carotid endarterectomies, asthma and emphysema who presented to the ED c/o cough admitted for asthma exacerbation 2/2 rhinovirus.    **Cough  -Pt + for Rhinovirus on RVP, possible developing infiltrate on CXR  -Pt afebrile and meeting 0/4 SIRS criteria, s/p ceftriaxone/azithromycin in ED  -Can hold off continuing antibiotics at this point as patient is dosed for 24 hours - repeat CXR PA/Lateral in AM if infiltrate present restart antibiotics  -f/u cultures  -c/w antitussive therapy  -Can c/w treatment for COPD/asthma - Prednisone, inhaler, Duoneb therapy as above    Plan otherwise as outlined above.....

## 2018-05-21 NOTE — ED ADULT NURSE REASSESSMENT NOTE - NS ED NURSE REASSESS COMMENT FT1
Patient a/oX 3, intermittent productive cough noted, no chest pain or SOB complaint.  Vital signs stable.  Additional Neb TX Albuterol adminsitered, Azithromycin IVPB ongoing, no adverse rxn.  Ceftriaxone IVPB to be adminsitered next.  Xray done.  For admit.  ENdorsement and care received by STUART Ramírez ED holding in stable condition pending room assignment.

## 2018-05-21 NOTE — H&P ADULT - PROBLEM SELECTOR PLAN 9
Iron studies in 1/18 consistent with ACD.  Hgb 10, at baseline.  - Continue to monitor  - Maintain active T&S  - Transfuse Hgb<8    #Diarrhea: 4 loose brown BM daily.  Suspect 2/2 laxative use.  No abx use in past 10 mo.  - Hold senna  - CTM Iron studies in 1/18 consistent with ACD.  Hgb 10, at baseline.  - Continue to monitor  - Maintain active T&S  - Transfuse Hgb<8    #Diarrhea: 4 loose brown BM daily.  Suspect 2/2 laxative use vs viral syndrome.  No abx use in past 1 mo.  - Hold senna  - CTM

## 2018-05-21 NOTE — H&P ADULT - PROBLEM SELECTOR PLAN 2
Pt on 2L home O2 at night and PRN during day.  On exam pt saturating well and in no distress on room air.  No evidence of exacerbation at this time.  - Continue supplemental O2 PRN  - Management as above    Afib: EKG currently shows NSR with 1st degree AV block and LBBB (old)  - Continue rate control with Metoprolol 25 mg  - Continue anticoagulation with Eliquis 2.5 BID Pt on 2L home O2 at night and PRN during day.  On exam pt saturating well and in no distress on room air.  No evidence of exacerbation at this time.  - Continue supplemental O2 PRN  - Management as above    Afib: EKG currently shows NSR with 1st degree AV block and LBBB (old)  - Continue rate control with Metoprolol 25 mg  - Continue anticoagulation with Eliquis 2.5 BID  - F/u AM EKG for prolonged qtc

## 2018-05-21 NOTE — ED ADULT NURSE REASSESSMENT NOTE - NS ED NURSE REASSESS COMMENT FT1
Patient a/ox 3, anxious, c/o of productive cough w/ white to yellow phlegm, chest pain and headache on coughing, no fevers, no SOB.  EKG sinus rhythm w/ 1st degree HB, other vitals stable.  Left AC PIV #20 in place, all labs sent, no complicaitons.  Solumedrol 125mg IVP, Albuterol X1 med neb administered.  Xray pending.  REsults and disposition pending.

## 2018-05-21 NOTE — PATIENT PROFILE ADULT. - PMH
Anemia    CAD (coronary artery disease)    DM (diabetes mellitus)    Emphysema    HTN (hypertension)    Hypercholesteremia    Hypothyroidism, unspecified type

## 2018-05-21 NOTE — ED PROVIDER NOTE - MEDICAL DECISION MAKING DETAILS
89 y/o f hx HTN, DM, HLD, afib on eliquis, diastolic CHF, CAD, asthma (never intubated), CKD stage III presents with productive cough x 2 weeks, wheezing; pt afebrile, wheezing on exam, given nebs and solumedrol with minimal improvement.  Labs show no leukocytosis although + left shift, cxr read as possible developing RUL pna.  Pt given dose of ceftriaxone/azithromycin, will admit for further treatment.

## 2018-05-22 DIAGNOSIS — B34.9 VIRAL INFECTION, UNSPECIFIED: ICD-10-CM

## 2018-05-22 DIAGNOSIS — I50.22 CHRONIC SYSTOLIC (CONGESTIVE) HEART FAILURE: ICD-10-CM

## 2018-05-22 DIAGNOSIS — I48.91 UNSPECIFIED ATRIAL FIBRILLATION: ICD-10-CM

## 2018-05-22 DIAGNOSIS — J45.41 MODERATE PERSISTENT ASTHMA WITH (ACUTE) EXACERBATION: ICD-10-CM

## 2018-05-22 LAB
ANION GAP SERPL CALC-SCNC: 12 MMOL/L — SIGNIFICANT CHANGE UP (ref 5–17)
ANISOCYTOSIS BLD QL: SLIGHT — SIGNIFICANT CHANGE UP
BUN SERPL-MCNC: 38 MG/DL — HIGH (ref 7–23)
CALCIUM SERPL-MCNC: 9.9 MG/DL — SIGNIFICANT CHANGE UP (ref 8.4–10.5)
CHLORIDE SERPL-SCNC: 102 MMOL/L — SIGNIFICANT CHANGE UP (ref 96–108)
CO2 SERPL-SCNC: 26 MMOL/L — SIGNIFICANT CHANGE UP (ref 22–31)
CREAT SERPL-MCNC: 1.3 MG/DL — SIGNIFICANT CHANGE UP (ref 0.5–1.3)
GLUCOSE BLDC GLUCOMTR-MCNC: 152 MG/DL — HIGH (ref 70–99)
GLUCOSE BLDC GLUCOMTR-MCNC: 194 MG/DL — HIGH (ref 70–99)
GLUCOSE BLDC GLUCOMTR-MCNC: 202 MG/DL — HIGH (ref 70–99)
GLUCOSE BLDC GLUCOMTR-MCNC: 214 MG/DL — HIGH (ref 70–99)
GLUCOSE SERPL-MCNC: 154 MG/DL — HIGH (ref 70–99)
HCT VFR BLD CALC: 32.8 % — LOW (ref 34.5–45)
HGB BLD-MCNC: 10.3 G/DL — LOW (ref 11.5–15.5)
LYMPHOCYTES # BLD AUTO: 4 % — LOW (ref 13–44)
LYMPHOCYTES # BLD AUTO: 4.4 % — LOW (ref 13–44)
MAGNESIUM SERPL-MCNC: 2.4 MG/DL — SIGNIFICANT CHANGE UP (ref 1.6–2.6)
MANUAL DIF COMMENT BLD-IMP: SIGNIFICANT CHANGE UP
MANUAL SMEAR VERIFICATION: SIGNIFICANT CHANGE UP
MCHC RBC-ENTMCNC: 29.9 PG — SIGNIFICANT CHANGE UP (ref 27–34)
MCHC RBC-ENTMCNC: 31.4 G/DL — LOW (ref 32–36)
MCV RBC AUTO: 95.3 FL — SIGNIFICANT CHANGE UP (ref 80–100)
MONOCYTES NFR BLD AUTO: 1 % — LOW (ref 2–14)
MONOCYTES NFR BLD AUTO: 6.2 % — SIGNIFICANT CHANGE UP (ref 2–14)
NEUTROPHILS NFR BLD AUTO: 89.4 % — HIGH (ref 43–77)
NEUTROPHILS NFR BLD AUTO: 92 % — HIGH (ref 43–77)
NEUTS BAND # BLD: 3 % — SIGNIFICANT CHANGE UP
OVALOCYTES BLD QL SMEAR: SLIGHT — SIGNIFICANT CHANGE UP
PLAT MORPH BLD: NORMAL — SIGNIFICANT CHANGE UP
PLATELET # BLD AUTO: 146 K/UL — LOW (ref 150–400)
POTASSIUM SERPL-MCNC: 4.8 MMOL/L — SIGNIFICANT CHANGE UP (ref 3.5–5.3)
POTASSIUM SERPL-SCNC: 4.8 MMOL/L — SIGNIFICANT CHANGE UP (ref 3.5–5.3)
PROCALCITONIN SERPL-MCNC: <0.05 — SIGNIFICANT CHANGE UP (ref 0–0.04)
RBC # BLD: 3.44 M/UL — LOW (ref 3.8–5.2)
RBC # FLD: 14.9 % — SIGNIFICANT CHANGE UP (ref 10.3–16.9)
RBC BLD AUTO: (no result)
SODIUM SERPL-SCNC: 140 MMOL/L — SIGNIFICANT CHANGE UP (ref 135–145)
WBC # BLD: 11.4 K/UL — HIGH (ref 3.8–10.5)
WBC # FLD AUTO: 11.4 K/UL — HIGH (ref 3.8–10.5)

## 2018-05-22 PROCEDURE — 71046 X-RAY EXAM CHEST 2 VIEWS: CPT | Mod: 26

## 2018-05-22 PROCEDURE — 99233 SBSQ HOSP IP/OBS HIGH 50: CPT | Mod: GC

## 2018-05-22 RX ADMIN — Medication 80 MILLIGRAM(S): at 05:34

## 2018-05-22 RX ADMIN — Medication 81 MILLIGRAM(S): at 11:46

## 2018-05-22 RX ADMIN — PANTOPRAZOLE SODIUM 40 MILLIGRAM(S): 20 TABLET, DELAYED RELEASE ORAL at 05:34

## 2018-05-22 RX ADMIN — ISOSORBIDE MONONITRATE 30 MILLIGRAM(S): 60 TABLET, EXTENDED RELEASE ORAL at 11:47

## 2018-05-22 RX ADMIN — Medication 50 MICROGRAM(S): at 05:34

## 2018-05-22 RX ADMIN — LIDOCAINE 1 PATCH: 4 CREAM TOPICAL at 11:46

## 2018-05-22 RX ADMIN — ATORVASTATIN CALCIUM 40 MILLIGRAM(S): 80 TABLET, FILM COATED ORAL at 21:39

## 2018-05-22 RX ADMIN — Medication 3 MILLILITER(S): at 16:44

## 2018-05-22 RX ADMIN — CLOPIDOGREL BISULFATE 75 MILLIGRAM(S): 75 TABLET, FILM COATED ORAL at 11:46

## 2018-05-22 RX ADMIN — Medication 3 MILLILITER(S): at 10:03

## 2018-05-22 RX ADMIN — Medication 25 MILLIGRAM(S): at 05:34

## 2018-05-22 RX ADMIN — APIXABAN 2.5 MILLIGRAM(S): 2.5 TABLET, FILM COATED ORAL at 05:34

## 2018-05-22 RX ADMIN — Medication 4: at 21:39

## 2018-05-22 RX ADMIN — Medication 3 MILLILITER(S): at 05:34

## 2018-05-22 RX ADMIN — MONTELUKAST 10 MILLIGRAM(S): 4 TABLET, CHEWABLE ORAL at 11:46

## 2018-05-22 RX ADMIN — Medication 2: at 11:53

## 2018-05-22 RX ADMIN — APIXABAN 2.5 MILLIGRAM(S): 2.5 TABLET, FILM COATED ORAL at 17:22

## 2018-05-22 RX ADMIN — Medication 3 MILLILITER(S): at 21:39

## 2018-05-22 RX ADMIN — Medication 40 MILLIGRAM(S): at 05:36

## 2018-05-22 RX ADMIN — Medication 2: at 08:41

## 2018-05-22 RX ADMIN — LIDOCAINE 1 PATCH: 4 CREAM TOPICAL at 12:00

## 2018-05-22 RX ADMIN — AMLODIPINE BESYLATE 5 MILLIGRAM(S): 2.5 TABLET ORAL at 05:34

## 2018-05-22 RX ADMIN — Medication 4: at 17:22

## 2018-05-22 NOTE — CONSULT NOTE ADULT - SUBJECTIVE AND OBJECTIVE BOX
Patient is a 89y old  Female who presents with a chief complaint of Shortness of breath (21 May 2018 14:46)       HPI:  89 y/o female with HTN, HLD, NIDDM, Anemia, hypothyroidism,  afib on eliquis, systolic CHF (25%), known CAD with previous PCIs, b/l carotid endarterectomies, asthma and emphysema who presented to the ED c/o cough.  Cough worsening x 2 weeks a/w occasional production of yellow sputum and chest tightness.  Pt also with substernal and epigastric pain for one week, non-radiating, non-exertional, worse with cough.  No diaphoresis, N/V.  Also c/o headache with coughing and generalized malaise. Denies sick contacts, weight loss.    In ED Vital Signs Last 24 Hrs  TMax: 99.7, HR: 77 - 91, BP: 139/68 - 176/85, RR: 20, SpO2: 95% - 98% on RA.  RVP + Rhinovirus.  Given Cef and Azithro x 1, methylprednisolone 125mg. (21 May 2018 14:46)      PAST MEDICAL & SURGICAL HISTORY:  Hypothyroidism, unspecified type  Anemia  CAD (coronary artery disease)  DM (diabetes mellitus)  Emphysema  Hypercholesteremia  HTN (hypertension)  S/P bladder repair  S/P hysterectomy with oophorectomy  History of bilateral carotid endarterectomy  History of umbilical hernia repair  S/P cataract surgery  S/P TKR (total knee replacement)      MEDICATIONS  (STANDING):  ALBUTerol/ipratropium for Nebulization 3 milliLiter(s) Nebulizer every 6 hours  amLODIPine   Tablet 5 milliGRAM(s) Oral daily  apixaban 2.5 milliGRAM(s) Oral every 12 hours  aspirin enteric coated 81 milliGRAM(s) Oral daily  atorvastatin 40 milliGRAM(s) Oral at bedtime  buDESOnide   0.5 milliGRAM(s) Respule 0.5 milliGRAM(s) Inhalation daily  clopidogrel Tablet 75 milliGRAM(s) Oral daily  dextrose 5%. 1000 milliLiter(s) (50 mL/Hr) IV Continuous <Continuous>  dextrose 50% Injectable 12.5 Gram(s) IV Push once  dextrose 50% Injectable 25 Gram(s) IV Push once  dextrose 50% Injectable 25 Gram(s) IV Push once  furosemide    Tablet 80 milliGRAM(s) Oral every 24 hours  HYDROcodone/homatropine Syrup 5 milliLiter(s) Oral every 6 hours  insulin lispro (HumaLOG) corrective regimen sliding scale   SubCutaneous Before meals and at bedtime  isosorbide   mononitrate ER Tablet (IMDUR) 30 milliGRAM(s) Oral daily  levothyroxine 50 MICROGram(s) Oral daily  lidocaine   Patch 1 Patch Transdermal daily  metoprolol succinate ER 25 milliGRAM(s) Oral daily  montelukast 10 milliGRAM(s) Oral daily  pantoprazole    Tablet 40 milliGRAM(s) Oral before breakfast  predniSONE   Tablet 40 milliGRAM(s) Oral daily    MEDICATIONS  (PRN):  dextrose 40% Gel 15 Gram(s) Oral once PRN Blood Glucose LESS THAN 70 milliGRAM(s)/deciliter  glucagon  Injectable 1 milliGRAM(s) IntraMuscular once PRN Glucose LESS THAN 70 milligrams/deciliter      Social History: lives with her family on 1st floor apartment in [place], [place], has home attendant 8 am to 6 pm    Functional Level Prior to Admission: reports ADL independent, walks with a walker    FAMILY HISTORY:  Family history of emphysema (Mother)      CBC Full  -  ( 22 May 2018 08:20 )  WBC Count : x  Hemoglobin : x  Hematocrit : x  Platelet Count - Automated : x  Mean Cell Volume : x  Mean Cell Hemoglobin : x  Mean Cell Hemoglobin Concentration : x  Auto Neutrophil # : x  Auto Lymphocyte # : x  Auto Monocyte # : x  Auto Eosinophil # : x  Auto Basophil # : x  Auto Neutrophil % : 92.0 %  Auto Lymphocyte % : 4.0 %  Auto Monocyte % : 1.0 %  Auto Eosinophil % : x  Auto Basophil % : x      05-22    140  |  102  |  38<H>  ----------------------------<  154<H>  4.8   |  26  |  1.30    Ca    9.9      22 May 2018 06:39  Mg     2.4     05-22    TPro  7.8  /  Alb  4.5  /  TBili  0.4  /  DBili  x   /  AST  16  /  ALT  11  /  AlkPhos  110  05-21            Radiology:    < from: Xray Chest 1 View- PORTABLE-Urgent (05.21.18 @ 12:29) >  EXAM:  XR CHEST PORTABLE URGENT 1V                          PROCEDURE DATE:  05/21/2018          INTERPRETATION:  XR CHEST PORTABLE URGENT 1V dated 5/21/2018 12:29 PM    CLINICAL INFORMATION: Female, 89 years old.  cough.    PRIOR STUDIES: 1/21/2018.    FINDINGS: Heart size, mediastinal and hilar contours are unchanged. May   be increasing right upper lung zone opacity and a developing right upper   lobe pneumonia cannot excluded. There is been resolution of pulmonary   venous congestion and bibasilar pleural effusions. Tissues and osseous   structures are within normal limits.    IMPRESSION:  Query developing right upper lobe pneumonia. A repeat two-view chest is   suggested for more complete evaluation.                Vital Signs Last 24 Hrs  T(C): 37 (22 May 2018 05:01), Max: 37.6 (21 May 2018 12:24)  T(F): 98.6 (22 May 2018 05:01), Max: 99.7 (21 May 2018 14:15)  HR: 75 (22 May 2018 05:01) (71 - 91)  BP: 148/76 (22 May 2018 05:01) (124/53 - 176/85)  BP(mean): --  RR: 16 (22 May 2018 05:01) (16 - 20)  SpO2: 98% (22 May 2018 05:01) (95% - 98%)    REVIEW OF SYSTEMS:    CONSTITUTIONAL: No fever, weight loss, or fatigue  EYES: No eye pain, visual disturbances, or discharge  ENMT:  No difficulty hearing, tinnitus, vertigo; No sinus or throat pain  NECK: No pain or stiffness  BREASTS: No pain, masses, or nipple discharge  RESPIRATORY: shortness of breath, improved since admission  CARDIOVASCULAR: No chest pain, palpitations, dizziness, or leg swelling  GASTROINTESTINAL: No abdominal or epigastric pain. No nausea, vomiting, or hematemesis; No diarrhea or constipation. No melena or hematochezia.  GENITOURINARY: No dysuria, frequency, hematuria, or incontinence  NEUROLOGICAL: No headaches, memory loss, loss of strength, numbness, or tremors  SKIN: No itching, burning, rashes, or lesions   LYMPH NODES: No enlarged glands  ENDOCRINE: No heat or cold intolerance; No hair loss  MUSCULOSKELETAL: No joint pain or swelling; No muscle, back, or extremity pain  PSYCHIATRIC: No depression, anxiety, mood swings, or difficulty sleeping  HEME/LYMPH: No easy bruising, or bleeding gums  ALLERGY AND IMMUNOLOGIC: No hives or eczema  VASCULAR: no swelling, erythema    Physical Exam: WDWN elderly  woman lying in semi Carrillo's position, feels better    Head: normocephalic, atraumatic    Eyes: PERRLA, EOMI, no nystagmus, sclera anicteric    ENT: nasal discharge, uvula midline, no oropharyngeal erythema/exudate    Neck: supple, negative JVD, negative carotid bruits, no thyromegaly    Chest: right upper lobe rhonchi, mild exp wheezes    Cardiovascular: regular rate and rhythm, neg murmurs/rubs/gallops    Abdomen: soft, non distended, non tender, negative rebound/guarding, normal bowel sounds, neg hepatosplenomegaly    Extremities: WWP, neg cyanosis/clubbing/edema, negative calf tenderness to palpation, negative Julio's sign    :     Neurologic Exam:    Alert and oriented to person, place, date/year, speech fluent w/o dysarthria, repetition intact, comprehension intact,     Cranial Nerves:     II:                       pupils equal, round and reactive to light, visual fields intact   III/ IV/VI:             extraocular movements intact, neg nystagmus, ptosis  V:                       facial sensation intact, V1-3 normal  VII:                     face symmetric, no droop, normal eye closure and smile  VIII:                    hearing intact to finger rub bilaterally  IX/ X:                 soft palate rise symmetrical  XI:                      head turning, shoulder shrug normal  XII:                     tongue midline    Motor Exam:    Upper Extremities:        Right:   4+/5                                       Left:     4+/5      Lower Extremities:         Right     4/5                                       Left:       4/5    Sensory:              intact to LT/PP in all UE/LE dermatomes    DTR:                   = biceps/     triceps/     brachioradialis                            = patella/   medial hamstring/    ankle                            neg clonus                            neg Babinski                            neg Hoffmans    Finger to Nose:  wnl    Heel to Shin:       wnl    Rapid Alternating movements:   wnl    Joint Position Sense:  intact    Romberg:  not tested    Tandem Walking:  not tested    Gait:  not tested        PM&R Impression:    1) acute asthma exacerbation  2) deconditioned  3) no focal weakness      Recommendations:    1) Physical therapy focusing on therapeutic exercises, bed mobility/transfer out of bed evaluation, progressive ambulation with assistive devices.    2) Anticipated Disposition Plan/Recommendations:  d/c home, home physical therapy, resume home care services

## 2018-05-22 NOTE — PROGRESS NOTE ADULT - ASSESSMENT
The patient is a 89-year-old woman with PMH asthma, emphysema, Afib, CAD, systolic CHF, HTN, HLD, NIDDM, anemia, hypothyroidism, and PSH PCIs and b/l carotid endarterectomies who presented in ED with worsening cough, productive of yellow sputum x 2 weeks. She had a positive RVP and CXR showed possible developing RUL pneumonia. The patient is a 89-year-old woman with PMH asthma, emphysema, Afib, CAD, systolic CHF, HTN, HLD, NIDDM, anemia, hypothyroidism, and PSH PCIs and b/l carotid endarterectomies who presented in ED with worsening cough, productive of yellow sputum x 2 weeks. She had a positive RVP and CXR showed possible developing RUL pneumonia. Repeat CXR showed resolution of RUL opacity.

## 2018-05-22 NOTE — PHYSICAL THERAPY INITIAL EVALUATION ADULT - CRITERIA FOR SKILLED THERAPEUTIC INTERVENTIONS
therapy frequency/functional limitations in following categories/anticipated discharge recommendation/rehab potential/impairments found

## 2018-05-22 NOTE — PROGRESS NOTE ADULT - PROBLEM SELECTOR PLAN 7
- c/w home med levothyroxine 50 mcg PO qd - amlodipine 5 mg qd  - furosemide 80 mg PO every other day

## 2018-05-22 NOTE — PROGRESS NOTE ADULT - PROBLEM SELECTOR PLAN 9
- d/c ASA  - apixaban 2.5 mg PO q12h  - clopidogrel 75 mg PO qd  - metoprolol 25 mg PO qd - Hb stable  - maintain T+S  - transfuse if Hb<8

## 2018-05-22 NOTE — PROGRESS NOTE ADULT - PROBLEM SELECTOR PLAN 3
- supplemental O2 2L NC  - as above - HFrEF (25%)  - isosorbide mononitrate 30 mg PO qd  - furosemide 80 mg PO every other day    11. CKD stage 3  - monitor with daily BMPs

## 2018-05-22 NOTE — PROGRESS NOTE ADULT - PROBLEM SELECTOR PLAN 1
- prednisone 40 mg PO qd  - duo nebs 3 ml q6h  - hold home budesonide while on systemic steroids  - c/w home montelukast 10 mg PO qd  - Hycodan 5 ml PO q6h prn for cough

## 2018-05-22 NOTE — PHYSICAL THERAPY INITIAL EVALUATION ADULT - ADDITIONAL COMMENTS
Patient lives with daughter in elevator apartment building, primarily household ambulator, uses [employer] and [employer] services for community, has home health aid 8am-5pm 7 days and daughter assists at night. Patient ambulates with RW and has shower chair, transport chair and grab bars. Patient uses O2 as needed at home. Patient demonstrated Home exercise program .

## 2018-05-22 NOTE — PROGRESS NOTE ADULT - PROBLEM SELECTOR PLAN 6
- amlodipine 5 mg qd  - furosemide 80 mg PO every other day  - hydralazine - amlodipine 5 mg qd  - furosemide 80 mg PO every other day - home med januvia while hospitalized  - ISS

## 2018-05-22 NOTE — PROGRESS NOTE ADULT - SUBJECTIVE AND OBJECTIVE BOX
Subjective: Ms. Ferrer was seen and examined at bedside by MSRafal Hilton. The patient's son and daughter were both present. the patient reported that she felt better. She was no longer producing sputum, but she still has a cough that bothers her. She reported no events overnight and reported a new onset headache that was associated with coughing.    Objective:  Vital Signs Last 24 Hrs  T(C): 37 (22 May 2018 05:01), Max: 37.6 (21 May 2018 12:24)  T(F): 98.6 (22 May 2018 05:01), Max: 99.7 (21 May 2018 14:15)  HR: 75 (22 May 2018 05:01) (71 - 91)  BP: 148/76 (22 May 2018 05:01) (124/53 - 176/85)  BP(mean): --  RR: 16 (22 May 2018 05:01) (16 - 20)  SpO2: 98% (22 May 2018 05:01) (95% - 98%)    General: WNWD elderly woman sitting comfortably in bed  HEENT: NCAT, EOMI, PERRL  CV: S1/2+, RRR, no MRG  Resp: b/l rhonchi and expiratory wheezing, no increased WOB on RA  GI: NTND, soft, no guarding or rebound, no TTP, normoactive bowel sounds in all quadrants  Ext: WWP, no edema, 2 <1cm nodules on LLE that are TTP  Vasc: palpable radial pulses b/l  Lymph:    Labs:                        10.3   11.4  )-----------( 146      ( 22 May 2018 06:39 )             32.8   05-22    140  |  102  |  38<H>  ----------------------------<  154<H>  4.8   |  26  |  1.30    Ca    9.9      22 May 2018 06:39  Mg     2.4     05-22    TPro  7.8  /  Alb  4.5  /  TBili  0.4  /  DBili  x   /  AST  16  /  ALT  11  /  AlkPhos  110  05-21    RapRVP: detected    Imaging:  CXR: "Query developing right upper lobe pneumonia. A repeat two-view chest is suggested for more complete evaluation." Subjective: Ms. Ferrer was seen and examined at bedside by MSRafal Hilton. The patient's son and daughter were both present. the patient reported that she felt better. She was no longer producing sputum, but she still has a cough that bothers her. She reported no events overnight and reported a new onset headache that was associated with coughing.    Objective:  Vital Signs Last 24 Hrs  T(C): 37 (22 May 2018 05:01), Max: 37.6 (21 May 2018 12:24)  T(F): 98.6 (22 May 2018 05:01), Max: 99.7 (21 May 2018 14:15)  HR: 75 (22 May 2018 05:01) (71 - 91)  BP: 148/76 (22 May 2018 05:01) (124/53 - 176/85)  BP(mean): --  RR: 16 (22 May 2018 05:01) (16 - 20)  SpO2: 98% (22 May 2018 05:01) (95% - 98%)    General: WNWD elderly woman sitting comfortably in bed  HEENT: NCAT, EOMI, PERRL  CV: S1/2+, RRR, no MRG  Resp: b/l rhonchi and expiratory wheezing, no increased WOB on RA  GI: NTND, soft, no guarding or rebound, no TTP, normoactive bowel sounds in all quadrants  Ext: WWP, no edema, 2 <1cm nodules on LLE that are TTP  Vasc: palpable radial pulses b/l  Lymph: tender mobile LAD on L posterior cervical chain    Labs:                        10.3   11.4  )-----------( 146      ( 22 May 2018 06:39 )             32.8   05-22    140  |  102  |  38<H>  ----------------------------<  154<H>  4.8   |  26  |  1.30    Ca    9.9      22 May 2018 06:39  Mg     2.4     05-22    TPro  7.8  /  Alb  4.5  /  TBili  0.4  /  DBili  x   /  AST  16  /  ALT  11  /  AlkPhos  110  05-21    RapRVP: detected    Imaging:  CXR: "Query developing right upper lobe pneumonia. A repeat two-view chest is suggested for more complete evaluation." (5/21/18)  CXR: "Previously noted right upper lung zone asymmetric airspace opacities no longer identified compatible with a compositional shadow. Findings compatible with active airway disease." (5/22/18)

## 2018-05-22 NOTE — CONSULT NOTE ADULT - ASSESSMENT
89 y/o female with HTN, HLD, NIDDM, Anemia, hypothyroidism,  afib on eliquis, systolic CHF (25%), known CAD with previous PCIs, b/l carotid endarterectomies, asthma and emphysema who presented to the ED c/o cough admitted for asthma exacerbation 2/2 rhinovirus.    Problem/Plan - 1:  ·  Problem: Asthma exacerbation.  Plan: Asthma exacerbation 2/2 Rhinovirus. S/p Cef and azithro x 1 in ED.  Low suspicion for bacterial pneumonia.  Pt afebrile with no leukocytosis and non-toxic appearance, SIRS 0, however cannot rule out with CXR finding of RUL opacity and neutrophilic predominance on differential.  Plan to hold antibiotics and observe.  - Prednisone 40 mg daily   - Continue Duonebs q 6  - Continue home Budesonide 0.5 mg  - Continue home montelukast 10 mg  - Hycodan PRN for symptomatic management of cough

## 2018-05-22 NOTE — PROGRESS NOTE ADULT - PROBLEM SELECTOR PLAN 5
- home home med januvia while hospitalized  - ISS - home med januvia while hospitalized  - ISS - atorvastatin 40 mg PO qhs

## 2018-05-23 ENCOUNTER — TRANSCRIPTION ENCOUNTER (OUTPATIENT)
Age: 83
End: 2018-05-23

## 2018-05-23 VITALS — WEIGHT: 177.47 LBS

## 2018-05-23 LAB
ANION GAP SERPL CALC-SCNC: 14 MMOL/L — SIGNIFICANT CHANGE UP (ref 5–17)
BUN SERPL-MCNC: 48 MG/DL — HIGH (ref 7–23)
CALCIUM SERPL-MCNC: 9.7 MG/DL — SIGNIFICANT CHANGE UP (ref 8.4–10.5)
CHLORIDE SERPL-SCNC: 102 MMOL/L — SIGNIFICANT CHANGE UP (ref 96–108)
CO2 SERPL-SCNC: 27 MMOL/L — SIGNIFICANT CHANGE UP (ref 22–31)
CREAT SERPL-MCNC: 1.78 MG/DL — HIGH (ref 0.5–1.3)
GLUCOSE BLDC GLUCOMTR-MCNC: 146 MG/DL — HIGH (ref 70–99)
GLUCOSE SERPL-MCNC: 190 MG/DL — HIGH (ref 70–99)
HCT VFR BLD CALC: 30.4 % — LOW (ref 34.5–45)
HGB BLD-MCNC: 9.4 G/DL — LOW (ref 11.5–15.5)
MAGNESIUM SERPL-MCNC: 2.3 MG/DL — SIGNIFICANT CHANGE UP (ref 1.6–2.6)
MCHC RBC-ENTMCNC: 29.2 PG — SIGNIFICANT CHANGE UP (ref 27–34)
MCHC RBC-ENTMCNC: 30.9 G/DL — LOW (ref 32–36)
MCV RBC AUTO: 94.4 FL — SIGNIFICANT CHANGE UP (ref 80–100)
PHOSPHATE SERPL-MCNC: 3.8 MG/DL — SIGNIFICANT CHANGE UP (ref 2.5–4.5)
PLATELET # BLD AUTO: 159 K/UL — SIGNIFICANT CHANGE UP (ref 150–400)
POTASSIUM SERPL-MCNC: 4.7 MMOL/L — SIGNIFICANT CHANGE UP (ref 3.5–5.3)
POTASSIUM SERPL-SCNC: 4.7 MMOL/L — SIGNIFICANT CHANGE UP (ref 3.5–5.3)
RBC # BLD: 3.22 M/UL — LOW (ref 3.8–5.2)
RBC # FLD: 15.6 % — SIGNIFICANT CHANGE UP (ref 10.3–16.9)
SODIUM SERPL-SCNC: 143 MMOL/L — SIGNIFICANT CHANGE UP (ref 135–145)
WBC # BLD: 10.1 K/UL — SIGNIFICANT CHANGE UP (ref 3.8–10.5)
WBC # FLD AUTO: 10.1 K/UL — SIGNIFICANT CHANGE UP (ref 3.8–10.5)

## 2018-05-23 PROCEDURE — 99239 HOSP IP/OBS DSCHRG MGMT >30: CPT

## 2018-05-23 PROCEDURE — 83735 ASSAY OF MAGNESIUM: CPT

## 2018-05-23 PROCEDURE — 87633 RESP VIRUS 12-25 TARGETS: CPT

## 2018-05-23 PROCEDURE — 87581 M.PNEUMON DNA AMP PROBE: CPT

## 2018-05-23 PROCEDURE — 96374 THER/PROPH/DIAG INJ IV PUSH: CPT

## 2018-05-23 PROCEDURE — 85027 COMPLETE CBC AUTOMATED: CPT

## 2018-05-23 PROCEDURE — 85025 COMPLETE CBC W/AUTO DIFF WBC: CPT

## 2018-05-23 PROCEDURE — 87486 CHLMYD PNEUM DNA AMP PROBE: CPT

## 2018-05-23 PROCEDURE — 87798 DETECT AGENT NOS DNA AMP: CPT

## 2018-05-23 PROCEDURE — 84484 ASSAY OF TROPONIN QUANT: CPT

## 2018-05-23 PROCEDURE — 82553 CREATINE MB FRACTION: CPT

## 2018-05-23 PROCEDURE — 71045 X-RAY EXAM CHEST 1 VIEW: CPT

## 2018-05-23 PROCEDURE — 93005 ELECTROCARDIOGRAM TRACING: CPT | Mod: XU

## 2018-05-23 PROCEDURE — 83880 ASSAY OF NATRIURETIC PEPTIDE: CPT

## 2018-05-23 PROCEDURE — 36415 COLL VENOUS BLD VENIPUNCTURE: CPT

## 2018-05-23 PROCEDURE — 71046 X-RAY EXAM CHEST 2 VIEWS: CPT

## 2018-05-23 PROCEDURE — 84100 ASSAY OF PHOSPHORUS: CPT

## 2018-05-23 PROCEDURE — 80048 BASIC METABOLIC PNL TOTAL CA: CPT

## 2018-05-23 PROCEDURE — 97161 PT EVAL LOW COMPLEX 20 MIN: CPT

## 2018-05-23 PROCEDURE — 84145 PROCALCITONIN (PCT): CPT

## 2018-05-23 PROCEDURE — 96375 TX/PRO/DX INJ NEW DRUG ADDON: CPT

## 2018-05-23 PROCEDURE — 99285 EMERGENCY DEPT VISIT HI MDM: CPT | Mod: 25

## 2018-05-23 PROCEDURE — 82550 ASSAY OF CK (CPK): CPT

## 2018-05-23 PROCEDURE — 94640 AIRWAY INHALATION TREATMENT: CPT

## 2018-05-23 PROCEDURE — 80053 COMPREHEN METABOLIC PANEL: CPT

## 2018-05-23 PROCEDURE — 87040 BLOOD CULTURE FOR BACTERIA: CPT

## 2018-05-23 PROCEDURE — 82962 GLUCOSE BLOOD TEST: CPT

## 2018-05-23 RX ORDER — POLYETHYLENE GLYCOL 3350 17 G/17G
17 POWDER, FOR SOLUTION ORAL DAILY
Qty: 0 | Refills: 0 | Status: DISCONTINUED | OUTPATIENT
Start: 2018-05-23 | End: 2018-05-23

## 2018-05-23 RX ORDER — FUROSEMIDE 40 MG
40 TABLET ORAL DAILY
Qty: 0 | Refills: 0 | Status: CANCELLED | OUTPATIENT
Start: 2018-05-23 | End: 2018-05-23

## 2018-05-23 RX ADMIN — Medication 50 MICROGRAM(S): at 05:44

## 2018-05-23 RX ADMIN — ISOSORBIDE MONONITRATE 30 MILLIGRAM(S): 60 TABLET, EXTENDED RELEASE ORAL at 11:17

## 2018-05-23 RX ADMIN — Medication 40 MILLIGRAM(S): at 05:43

## 2018-05-23 RX ADMIN — PANTOPRAZOLE SODIUM 40 MILLIGRAM(S): 20 TABLET, DELAYED RELEASE ORAL at 05:43

## 2018-05-23 RX ADMIN — Medication 3 MILLILITER(S): at 11:17

## 2018-05-23 RX ADMIN — Medication 4: at 13:02

## 2018-05-23 RX ADMIN — Medication 80 MILLIGRAM(S): at 05:44

## 2018-05-23 RX ADMIN — AMLODIPINE BESYLATE 5 MILLIGRAM(S): 2.5 TABLET ORAL at 05:44

## 2018-05-23 RX ADMIN — POLYETHYLENE GLYCOL 3350 17 GRAM(S): 17 POWDER, FOR SOLUTION ORAL at 05:51

## 2018-05-23 RX ADMIN — LIDOCAINE 1 PATCH: 4 CREAM TOPICAL at 11:17

## 2018-05-23 RX ADMIN — APIXABAN 2.5 MILLIGRAM(S): 2.5 TABLET, FILM COATED ORAL at 08:37

## 2018-05-23 RX ADMIN — MONTELUKAST 10 MILLIGRAM(S): 4 TABLET, CHEWABLE ORAL at 11:16

## 2018-05-23 RX ADMIN — Medication 25 MILLIGRAM(S): at 05:45

## 2018-05-23 RX ADMIN — Medication 3 MILLILITER(S): at 03:03

## 2018-05-23 RX ADMIN — CLOPIDOGREL BISULFATE 75 MILLIGRAM(S): 75 TABLET, FILM COATED ORAL at 11:16

## 2018-05-23 NOTE — DISCHARGE NOTE ADULT - CARE PROVIDER_API CALL
REBEKAH PULLIAM  [address] [place] [address]  Phone: [phone]  Fax: [phone]   )    -    Phillip Dillard; PhD), Cardiovascular Disease; Interventional Cardiology  [address] [place] [address]  Phone: [phone]  Fax: [phone]

## 2018-05-23 NOTE — DISCHARGE NOTE ADULT - PROVIDER TOKENS
FREE:[LAST:[SHASHA],FIRST:[[employer]],PHONE:[phone]],FAX:[phone]   [phone]    -],ADDRESS:[[address] [place] [address]]],TOKEN:'[id number]'

## 2018-05-23 NOTE — PROGRESS NOTE ADULT - ASSESSMENT
The patient is a 89-year-old woman with PMH asthma, emphysema, Afib, CAD, systolic CHF, HTN, HLD, NIDDM, anemia, hypothyroidism, and PSH PCIs and b/l carotid endarterectomies who presented in ED with worsening cough, productive of yellow sputum x 2 weeks. She had a positive RVP and CXR showed possible developing RUL pneumonia. Repeat CXR showed resolution of RUL opacity.    Problem/Plan - 1:  ·  Problem: Moderate persistent asthma with acute exacerbation. Plan: - prednisone 40 mg PO qd  - duo nebs 3 ml q6h  - hold home budesonide while on systemic steroids  - c/w home montelukast 10 mg PO qd  - Hycodan 5 ml PO q6h prn for cough.     Problem/Plan - 2:  ·  Problem: Viral infection.  Plan: - repeat CXR showed no acute infiltrate  - supportive care  - as above.

## 2018-05-23 NOTE — DISCHARGE NOTE ADULT - PATIENT PORTAL LINK FT
You can access the [facility] Patient Portal, offered by [facility], by registering with the following website: [email or web address]

## 2018-05-23 NOTE — PROGRESS NOTE ADULT - SUBJECTIVE AND OBJECTIVE BOX
Subjective Subjective: The patient was seen and examined at bedside by AUBREY Hilton. The patient reported feeling better. She reported no new symptoms and no events overnight.

## 2018-05-23 NOTE — DISCHARGE NOTE ADULT - MEDICATION SUMMARY - MEDICATIONS TO STOP TAKING
I will STOP taking the medications listed below when I get home from the hospital:    aspirin 81 mg oral tablet  -- 1 tab(s) by mouth once a day    torsemide 10 mg oral tablet  -- 1 tab(s) by mouth once a day

## 2018-05-23 NOTE — DISCHARGE NOTE ADULT - PLAN OF CARE
Improve and Prevent Another Exacerbation You came to the hospital with shortness of breath. You were given breathing treatments with Duonebs and you were given prednisone 40 mg every day. You likely had an Asthma exacerbation related to Rhinovirus infection. When you go home, continue your asthma and emphysema medicines. Also take prednisone 40 mg every day for 5 days. Please follow up with your primary care doctor, Dr. Alexus Ventura. You have an appointment with her on Thursday May 31 at 1215PM. You had no heart symptoms during your stay in the hospital. Please continue your heart medicines at home, but please do not take the aspirin any more. The medical team at [facility] spoke with Dr. Dillard, your cardiologist, and we agree that you do not need it. Please follow up with Dr. Dillard. You have an appointment with him on Tuesday, May 29 at 345PM.

## 2018-05-23 NOTE — PROGRESS NOTE ADULT - SUBJECTIVE AND OBJECTIVE BOX
Physical Medicine and Rehabilitation Progress Note:    Patient is a 89y old  Female who presents with a chief complaint of Shortness of breath (23 May 2018 11:27)      HPI:  89 y/o female with HTN, HLD, NIDDM, Anemia, hypothyroidism,  afib on eliquis, systolic CHF (25%), known CAD with previous PCIs, b/l carotid endarterectomies, asthma and emphysema who presented to the ED c/o cough.  Cough worsening x 2 weeks a/w occasional production of yellow sputum and chest tightness.  Pt also with substernal and epigastric pain for one week, non-radiating, non-exertional, worse with cough.  No diaphoresis, N/V.  Also c/o headache with coughing and generalized malaise. Denies sick contacts, weight loss.    In ED Vital Signs Last 24 Hrs  TMax: 99.7, HR: 77 - 91, BP: 139/68 - 176/85, RR: 20, SpO2: 95% - 98% on RA.  RVP + Rhinovirus.  Given Cef and Azithro x 1, methylprednisolone 125mg. (21 May 2018 14:46)                            9.4    10.1  )-----------( 159      ( 23 May 2018 07:22 )             30.4       05-23    143  |  102  |  48<H>  ----------------------------<  190<H>  4.7   |  27  |  1.78<H>    Ca    9.7      23 May 2018 07:23  Phos  3.8     05-23  Mg     2.3     05-23      Vital Signs Last 24 Hrs  T(C): 36.8 (23 May 2018 08:41), Max: 36.8 (23 May 2018 08:41)  T(F): 98.2 (23 May 2018 08:41), Max: 98.2 (23 May 2018 08:41)  HR: 89 (23 May 2018 11:15) (66 - 91)  BP: 125/78 (23 May 2018 11:15) (115/70 - 145/77)  BP(mean): --  RR: 18 (23 May 2018 08:41) (17 - 18)  SpO2: 95% (23 May 2018 08:41) (93% - 98%)    MEDICATIONS  (STANDING):  ALBUTerol/ipratropium for Nebulization 3 milliLiter(s) Nebulizer every 6 hours  amLODIPine   Tablet 5 milliGRAM(s) Oral daily  apixaban 2.5 milliGRAM(s) Oral every 12 hours  atorvastatin 40 milliGRAM(s) Oral at bedtime  clopidogrel Tablet 75 milliGRAM(s) Oral daily  dextrose 5%. 1000 milliLiter(s) (50 mL/Hr) IV Continuous <Continuous>  dextrose 50% Injectable 12.5 Gram(s) IV Push once  dextrose 50% Injectable 25 Gram(s) IV Push once  dextrose 50% Injectable 25 Gram(s) IV Push once  furosemide    Tablet 80 milliGRAM(s) Oral every 24 hours  HYDROcodone/homatropine Syrup 5 milliLiter(s) Oral every 6 hours  insulin lispro (HumaLOG) corrective regimen sliding scale   SubCutaneous Before meals and at bedtime  isosorbide   mononitrate ER Tablet (IMDUR) 30 milliGRAM(s) Oral daily  levothyroxine 50 MICROGram(s) Oral daily  lidocaine   Patch 1 Patch Transdermal daily  metoprolol succinate ER 25 milliGRAM(s) Oral daily  montelukast 10 milliGRAM(s) Oral daily  pantoprazole    Tablet 40 milliGRAM(s) Oral before breakfast  predniSONE   Tablet 40 milliGRAM(s) Oral daily    MEDICATIONS  (PRN):  dextrose 40% Gel 15 Gram(s) Oral once PRN Blood Glucose LESS THAN 70 milliGRAM(s)/deciliter  glucagon  Injectable 1 milliGRAM(s) IntraMuscular once PRN Glucose LESS THAN 70 milligrams/deciliter  guaiFENesin   Syrup  (Sugar-Free) 100 milliGRAM(s) Oral every 6 hours PRN Cough  polyethylene glycol 3350 17 Gram(s) Oral daily PRN Constipation    Currently Undergoing Physical Therapy at bedside.    Initial Functional Status Assessment:    Previous Level of Function:     · Ambulation Skills	needs device and assist; RW	  · Transfer Skills	needs device and assist	  · ADL Skills	needs device and assist	  · Additional Comments	Patient lives with daughter in elevator apartment building, primarily household ambulator, uses [employer] and [employer] car services for community, has home health aid 8am-5pm 7 days and daughter assists at night. Patient ambulates with RW and has shower chair, transport chair and grab bars. Patient uses O2 as needed at home. Patient demonstrated Home exercise program .	    Cognitive Status Examination:   · Orientation	oriented to person, place, time and situation	  · Level of Consciousness	alert	  · Follows Commands and Answers Questions	100% of the time	    Range of Motion Exam:   · Range of Motion Examination	no ROM deficits were identified	    Manual Muscle Testing:   · Manual Muscle Testing Results	grossly assessed due to  functional mobility, >3/5 BUE & BLE	    Bed Mobility: Rolling/Turning:     · Level of [place]	independent	    Bed Mobility: Scooting/Bridging:     · Level of [place]	independent	    Bed Mobility: Sit to Supine:     · Level of [place]	independent	  · Physical Assist/Nonphysical Assist	set-up required	  · Assistive Device	bed rails	    Bed Mobility: Supine to Sit:     · Level of [place]	independent	  · Physical Assist/Nonphysical Assist	set-up required	  · Assistive Device	bed rails	    Transfer: Sit to Stand:     · Level of [place]	supervision	  · Physical Assist/Nonphysical Assist	supervision	  · Assistive Device	rolling walker	    Transfer: Stand to Sit:     · Level of [place]	supervision	  · Physical Assist/Nonphysical Assist	supervision	  · Assistive Device	rolling walker	    Sit/Stand Transfer Safety Analysis:     · Impairments Contributing to Impaired Transfers	impaired balance	    Gait Skills:     · Level of [place]	contact guard	  · Physical Assist/Nonphysical Assist	supervision; verbal cues; 1 person assist	  · Assistive Device	rolling walker; O2	  · Gait Distance	100 feet	    Gait Analysis:     · Gait Pattern Used	swing-to gait	  · Gait Deviations Noted	decreased airam; decreased stride length; decreased step length	  · Impairments Contributing to Gait Deviations	impaired balance	    Clinical Impressions:   · Criteria for Skilled Therapeutic Interventions	impairments found; functional limitations in following categories; therapy frequency; rehab potential; anticipated discharge recommendation	  · Functional Limitations in Following Categories (describe specific limitations)	home management; self-care	  · Rehab Potential	good, to achieve stated therapy goals	  · Therapy Frequency	2-3x/week	            PM&R Impression: as above    Disposition Plan Recommendations: d/c home, resume home care services, no post d/c rehab needs

## 2018-05-23 NOTE — DISCHARGE NOTE ADULT - MEDICATION SUMMARY - MEDICATIONS TO TAKE
I will START or STAY ON the medications listed below when I get home from the hospital:    oxygen  -- inhaled once   -- Indication: For Asthma exacerbation    budesonide 0.5 mg/2 mL inhalation suspension  -- 2 milliliter(s) inhaled 2 times a day  -- Indication: For Asthma exacerbation    predniSONE 20 mg oral tablet  -- 2 tab(s) by mouth once a day  -- Indication: For Asthma exacerbation    isosorbide mononitrate 30 mg oral tablet, extended release  -- 1 tab(s) by mouth once a day (in the morning)  -- Indication: For COngestive heart failure    Eliquis 2.5 mg oral tablet  -- 1 tab(s) by mouth every 12 hours  -- Indication: For Atrial fibrillation    Januvia 50 mg oral tablet  -- 2 tab(s) by mouth once a day  -- Indication: For Type 2 Diabetes    atorvastatin 40 mg oral tablet  -- 1 tab(s) by mouth once a day (at bedtime)  -- Indication: For Hypercholesteremia    clopidogrel 75 mg oral tablet  -- 1 tab(s) by mouth once a day  -- Indication: For CAD (coronary artery disease)    metoprolol succinate 25 mg oral tablet, extended release  -- 1 tab(s) by mouth once a day  -- Indication: For CAD (coronary artery disease)    Spiriva 18 mcg inhalation capsule  -- 1 cap(s) inhaled once a day  -- Indication: For Asthma exacerbation    amLODIPine 5 mg oral tablet  -- 1 tab(s) by mouth once a day  -- Indication: For Hypertension    Lasix 80 mg oral tablet  -- 1 tab(s) by mouth every other day  -- Indication: For COngestive Heart Failure    montelukast 10 mg oral tablet  -- 1 tab(s) by mouth once a day  -- Indication: For Asthma exacerbation    omeprazole 40 mg oral delayed release capsule  -- 1 cap(s) by mouth once a day  -- Indication: For GERD    levothyroxine 50 mcg (0.05 mg) oral tablet  -- 1 tab(s) by mouth once a day  -- Indication: For Hypothyroidism, unspecified type

## 2018-05-23 NOTE — DISCHARGE NOTE ADULT - CARE PLAN
Principal Discharge DX:	Asthma exacerbation  Goal:	Improve and Prevent Another Exacerbation  Assessment and plan of treatment:	You came to the hospital with shortness of breath. You were given breathing treatments with Duonebs and you were given prednisone 40 mg every day. You likely had an Asthma exacerbation related to Rhinovirus infection. When you go home, continue your asthma and emphysema medicines. Also take prednisone 40 mg every day for 5 days. Please follow up with your primary care doctor, Dr. Alexus Ventura. You have an appointment with her on Thursday May 31 at 1215PM.  Secondary Diagnosis:	CAD (coronary artery disease)  Assessment and plan of treatment:	You had no heart symptoms during your stay in the hospital. Please continue your heart medicines at home, but please do not take the aspirin any more. The medical team at [facility] spoke with Dr. Dillard, your cardiologist, and we agree that you do not need it. Please follow up with Dr. Dillard. You have an appointment with him on Tuesday, May 29 at 345PM.

## 2018-05-23 NOTE — DISCHARGE NOTE ADULT - HOSPITAL COURSE
The patient is a 89-year-old woman with PMH HTN HLD, NIDDM, anemia, hypothyroidism, AFib on eliquis, systolic CHF (EF 25%), CAD with previous PCIs (most recent Jan 2018), b/l carotid endarterectomies, asthma, and emphysema, who presented to the ED with cough productive of yellow sputum x2 weeks. She reported that the cough was associated with chest tightness and headache. She reported substernal and epigastric pain x 1 week, which was nonradiating, nonexertional, and worse with cough. She denied subjective fevers, diaphoresis, n/v. She reported generalized malaise, no sick contacts, and no weight loss. In the ED, her vital signs we within normal limits. She was given duo nebs, ceftriaxone, azithromycin, and methylprednisolone. CXR showed developing RUL infiltrate, and RVP was positive. She was admitted for asthma exacerbation secondary to rhinovirus infection. The following day, the patient reported improved breathing. Repeat CXR showed resolution of the lung opacity. She desaturated with ambulation, but had negative orthostatics. She was kept for an additional night to monitor her breathing. The following day the patient reported improved breathing, and was able to ambulate as well as she could at home. She was discharged home with follow-up appointments with her PCP and cardiologist. The patient is a 89-year-old woman with PMH HTN HLD, NIDDM, anemia, hypothyroidism, AFib on eliquis, systolic CHF (EF 25%), CAD with previous PCIs (most recent Jan 2018), b/l carotid endarterectomies, asthma, and emphysema, who presented to the ED with cough productive of yellow sputum x2 weeks. She reported that the cough was associated with chest tightness and headache. She reported substernal and epigastric pain x 1 week, which was nonradiating, nonexertional, and worse with cough. She denied subjective fevers, diaphoresis, n/v. She reported generalized malaise, no sick contacts, and no weight loss. In the ED, her vital signs we within normal limits. She was given duonebs, ceftriaxone 1g IV x1, azithromycin 500mg Iv x1, and methylprednisolone 125 mg IV x1. CXR showed developing RUL infiltrate, and RVP was positive. She was admitted for asthma exacerbation secondary to rhinovirus infection. The following day, the patient reported improved breathing. Repeat CXR showed resolution of the lung opacity. She desaturated with ambulation, but had negative orthostatics. She was kept for an additional night to monitor her breathing. The following day the patient reported improved breathing, and was able to ambulate as well as she could at home. She was discharged home with follow-up appointments with her PCP and cardiologist.

## 2018-05-26 LAB
CULTURE RESULTS: SIGNIFICANT CHANGE UP
CULTURE RESULTS: SIGNIFICANT CHANGE UP
SPECIMEN SOURCE: SIGNIFICANT CHANGE UP
SPECIMEN SOURCE: SIGNIFICANT CHANGE UP

## 2018-06-04 DIAGNOSIS — J45.901 UNSPECIFIED ASTHMA WITH (ACUTE) EXACERBATION: ICD-10-CM

## 2018-06-04 DIAGNOSIS — E03.9 HYPOTHYROIDISM, UNSPECIFIED: ICD-10-CM

## 2018-06-04 DIAGNOSIS — J44.9 CHRONIC OBSTRUCTIVE PULMONARY DISEASE, UNSPECIFIED: ICD-10-CM

## 2018-06-04 DIAGNOSIS — I48.91 UNSPECIFIED ATRIAL FIBRILLATION: ICD-10-CM

## 2018-06-04 DIAGNOSIS — Z90.710 ACQUIRED ABSENCE OF BOTH CERVIX AND UTERUS: ICD-10-CM

## 2018-06-04 DIAGNOSIS — I44.0 ATRIOVENTRICULAR BLOCK, FIRST DEGREE: ICD-10-CM

## 2018-06-04 DIAGNOSIS — Z91.041 RADIOGRAPHIC DYE ALLERGY STATUS: ICD-10-CM

## 2018-06-04 DIAGNOSIS — R19.7 DIARRHEA, UNSPECIFIED: ICD-10-CM

## 2018-06-04 DIAGNOSIS — E78.00 PURE HYPERCHOLESTEROLEMIA, UNSPECIFIED: ICD-10-CM

## 2018-06-04 DIAGNOSIS — D63.1 ANEMIA IN CHRONIC KIDNEY DISEASE: ICD-10-CM

## 2018-06-04 DIAGNOSIS — Z96.659 PRESENCE OF UNSPECIFIED ARTIFICIAL KNEE JOINT: ICD-10-CM

## 2018-06-04 DIAGNOSIS — I13.0 HYPERTENSIVE HEART AND CHRONIC KIDNEY DISEASE WITH HEART FAILURE AND STAGE 1 THROUGH STAGE 4 CHRONIC KIDNEY DISEASE, OR UNSPECIFIED CHRONIC KIDNEY DISEASE: ICD-10-CM

## 2018-06-04 DIAGNOSIS — B97.89 OTHER VIRAL AGENTS AS THE CAUSE OF DISEASES CLASSIFIED ELSEWHERE: ICD-10-CM

## 2018-06-04 DIAGNOSIS — R06.02 SHORTNESS OF BREATH: ICD-10-CM

## 2018-06-04 DIAGNOSIS — Z90.722 ACQUIRED ABSENCE OF OVARIES, BILATERAL: ICD-10-CM

## 2018-06-04 DIAGNOSIS — E11.9 TYPE 2 DIABETES MELLITUS WITHOUT COMPLICATIONS: ICD-10-CM

## 2018-06-04 DIAGNOSIS — I44.7 LEFT BUNDLE-BRANCH BLOCK, UNSPECIFIED: ICD-10-CM

## 2018-06-04 DIAGNOSIS — Z98.49 CATARACT EXTRACTION STATUS, UNSPECIFIED EYE: ICD-10-CM

## 2018-06-04 DIAGNOSIS — N18.3 CHRONIC KIDNEY DISEASE, STAGE 3 (MODERATE): ICD-10-CM

## 2018-06-04 DIAGNOSIS — I25.10 ATHEROSCLEROTIC HEART DISEASE OF NATIVE CORONARY ARTERY WITHOUT ANGINA PECTORIS: ICD-10-CM

## 2018-06-04 DIAGNOSIS — I50.30 UNSPECIFIED DIASTOLIC (CONGESTIVE) HEART FAILURE: ICD-10-CM

## 2018-06-04 DIAGNOSIS — Z98.61 CORONARY ANGIOPLASTY STATUS: ICD-10-CM

## 2018-09-14 PROBLEM — E03.9 HYPOTHYROIDISM, UNSPECIFIED: Chronic | Status: ACTIVE | Noted: 2018-05-21

## 2018-09-14 PROBLEM — D64.9 ANEMIA, UNSPECIFIED: Chronic | Status: ACTIVE | Noted: 2018-01-17

## 2018-10-03 ENCOUNTER — APPOINTMENT (OUTPATIENT)
Dept: PULMONOLOGY | Facility: CLINIC | Age: 83
End: 2018-10-03
Payer: MEDICARE

## 2018-10-03 VITALS
DIASTOLIC BLOOD PRESSURE: 65 MMHG | WEIGHT: 190 LBS | SYSTOLIC BLOOD PRESSURE: 143 MMHG | HEIGHT: 65 IN | BODY MASS INDEX: 31.65 KG/M2 | RESPIRATION RATE: 16 BRPM | OXYGEN SATURATION: 91 % | HEART RATE: 72 BPM

## 2018-10-03 DIAGNOSIS — J98.4 OTHER DISORDERS OF LUNG: ICD-10-CM

## 2018-10-03 DIAGNOSIS — Z86.79 PERSONAL HISTORY OF OTHER DISEASES OF THE CIRCULATORY SYSTEM: ICD-10-CM

## 2018-10-03 DIAGNOSIS — Z86.39 PERSONAL HISTORY OF OTHER ENDOCRINE, NUTRITIONAL AND METABOLIC DISEASE: ICD-10-CM

## 2018-10-03 DIAGNOSIS — Z99.81 DEPENDENCE ON SUPPLEMENTAL OXYGEN: ICD-10-CM

## 2018-10-03 DIAGNOSIS — Z87.09 PERSONAL HISTORY OF OTHER DISEASES OF THE RESPIRATORY SYSTEM: ICD-10-CM

## 2018-10-03 DIAGNOSIS — E11.65 TYPE 2 DIABETES MELLITUS WITH HYPERGLYCEMIA: ICD-10-CM

## 2018-10-03 DIAGNOSIS — D63.8 ANEMIA IN OTHER CHRONIC DISEASES CLASSIFIED ELSEWHERE: ICD-10-CM

## 2018-10-03 PROCEDURE — 94727 GAS DIL/WSHOT DETER LNG VOL: CPT

## 2018-10-03 PROCEDURE — 94729 DIFFUSING CAPACITY: CPT

## 2018-10-03 PROCEDURE — 94060 EVALUATION OF WHEEZING: CPT

## 2018-10-03 PROCEDURE — 99203 OFFICE O/P NEW LOW 30 MIN: CPT | Mod: 25

## 2018-10-03 RX ORDER — CIPROFLOXACIN HYDROCHLORIDE 250 MG/1
250 TABLET, FILM COATED ORAL
Qty: 14 | Refills: 0 | Status: DISCONTINUED | COMMUNITY
Start: 2018-08-20

## 2018-10-03 RX ORDER — SITAGLIPTIN 100 MG/1
100 TABLET, FILM COATED ORAL
Qty: 90 | Refills: 0 | Status: ACTIVE | COMMUNITY
Start: 2018-07-16

## 2018-10-03 RX ORDER — LEVOTHYROXINE SODIUM 0.03 MG/1
25 TABLET ORAL
Qty: 90 | Refills: 0 | Status: ACTIVE | COMMUNITY
Start: 2018-04-20

## 2018-10-03 RX ORDER — BENZONATATE 100 MG/1
100 CAPSULE ORAL
Qty: 21 | Refills: 0 | Status: DISCONTINUED | COMMUNITY
Start: 2018-05-24

## 2018-10-03 RX ORDER — AMOXICILLIN 500 MG/1
500 CAPSULE ORAL
Qty: 4 | Refills: 0 | Status: DISCONTINUED | COMMUNITY
Start: 2018-04-05

## 2018-10-03 RX ORDER — MONTELUKAST 10 MG/1
10 TABLET, FILM COATED ORAL
Qty: 90 | Refills: 0 | Status: DISCONTINUED | COMMUNITY
Start: 2018-04-20

## 2018-10-03 RX ORDER — PANTOPRAZOLE 40 MG/1
40 TABLET, DELAYED RELEASE ORAL
Qty: 30 | Refills: 0 | Status: ACTIVE | COMMUNITY
Start: 2018-04-20

## 2018-10-03 RX ORDER — FLUTICASONE PROPIONATE 50 UG/1
50 SPRAY, METERED NASAL
Qty: 16 | Refills: 0 | Status: ACTIVE | COMMUNITY
Start: 2018-05-24

## 2018-10-03 RX ORDER — APIXABAN 2.5 MG/1
2.5 TABLET, FILM COATED ORAL
Qty: 60 | Refills: 0 | Status: ACTIVE | COMMUNITY
Start: 2018-06-19

## 2018-10-03 RX ORDER — PREDNISONE 20 MG/1
20 TABLET ORAL
Qty: 10 | Refills: 0 | Status: DISCONTINUED | COMMUNITY
Start: 2018-05-23

## 2018-10-03 RX ORDER — LORATADINE 10 MG/1
10 TABLET ORAL
Qty: 30 | Refills: 0 | Status: ACTIVE | COMMUNITY
Start: 2018-05-24

## 2018-10-03 RX ORDER — AZITHROMYCIN 250 MG/1
250 TABLET, FILM COATED ORAL
Qty: 6 | Refills: 0 | Status: DISCONTINUED | COMMUNITY
Start: 2018-05-24

## 2018-10-03 RX ORDER — METOPROLOL SUCCINATE 25 MG/1
25 TABLET, EXTENDED RELEASE ORAL
Qty: 90 | Refills: 0 | Status: ACTIVE | COMMUNITY
Start: 2018-04-20

## 2018-10-03 RX ORDER — TORSEMIDE 10 MG/1
10 TABLET ORAL
Qty: 90 | Refills: 0 | Status: ACTIVE | COMMUNITY
Start: 2018-05-30

## 2018-10-03 RX ORDER — PREDNISONE 10 MG/1
10 TABLET ORAL DAILY
Qty: 20 | Refills: 0 | Status: ACTIVE | COMMUNITY
Start: 2018-10-03 | End: 1900-01-01

## 2018-10-03 RX ORDER — FUROSEMIDE 80 MG/1
80 TABLET ORAL
Qty: 90 | Refills: 0 | Status: ACTIVE | COMMUNITY
Start: 2018-04-20

## 2018-10-03 RX ORDER — CEPHALEXIN 500 MG/1
500 CAPSULE ORAL
Qty: 28 | Refills: 0 | Status: DISCONTINUED | COMMUNITY
Start: 2018-04-08

## 2018-10-03 RX ORDER — OLOPATADINE HCL 1 MG/ML
0.1 SOLUTION/ DROPS OPHTHALMIC
Qty: 5 | Refills: 0 | Status: ACTIVE | COMMUNITY
Start: 2018-05-29

## 2018-10-03 RX ORDER — CLOTRIMAZOLE AND BETAMETHASONE DIPROPIONATE 10; .5 MG/ML; MG/ML
1-0.05 LOTION TOPICAL
Qty: 30 | Refills: 0 | Status: ACTIVE | COMMUNITY
Start: 2018-10-02

## 2018-10-03 RX ORDER — SENNOSIDES 8.6 MG TABLETS 8.6 MG/1
8.6 TABLET ORAL
Qty: 180 | Refills: 0 | Status: ACTIVE | COMMUNITY
Start: 2018-02-13

## 2018-10-03 RX ORDER — TRIAMCINOLONE ACETONIDE 1 MG/G
0.1 OINTMENT TOPICAL
Qty: 30 | Refills: 0 | Status: ACTIVE | COMMUNITY
Start: 2017-06-08

## 2018-10-03 RX ORDER — LEVOTHYROXINE SODIUM 0.05 MG/1
50 TABLET ORAL
Qty: 30 | Refills: 0 | Status: ACTIVE | COMMUNITY
Start: 2018-05-01

## 2018-10-03 RX ORDER — NYSTATIN 100000 U/G
100000 OINTMENT TOPICAL
Qty: 30 | Refills: 0 | Status: ACTIVE | COMMUNITY
Start: 2017-06-08

## 2018-10-03 RX ORDER — BLOOD SUGAR DIAGNOSTIC
STRIP MISCELLANEOUS
Qty: 100 | Refills: 0 | Status: ACTIVE | COMMUNITY
Start: 2018-06-06

## 2018-10-03 RX ORDER — CLOPIDOGREL BISULFATE 75 MG/1
75 TABLET, FILM COATED ORAL
Qty: 30 | Refills: 0 | Status: ACTIVE | COMMUNITY
Start: 2018-01-28

## 2018-10-03 RX ORDER — TORSEMIDE 20 MG/1
20 TABLET ORAL
Qty: 60 | Refills: 0 | Status: ACTIVE | COMMUNITY
Start: 2018-06-19

## 2018-10-03 RX ORDER — LIDOCAINE 5% 700 MG/1
5 PATCH TOPICAL
Qty: 30 | Refills: 0 | Status: ACTIVE | COMMUNITY
Start: 2018-02-13

## 2018-10-03 RX ORDER — ISOSORBIDE MONONITRATE 30 MG/1
30 TABLET, EXTENDED RELEASE ORAL
Qty: 90 | Refills: 0 | Status: ACTIVE | COMMUNITY
Start: 2018-04-20

## 2018-11-17 NOTE — PHYSICAL THERAPY INITIAL EVALUATION ADULT - GAIT DEVIATIONS NOTED, PT EVAL
NURSING ADMISSION NOTE      Patient admitted via Cart  Oriented to room. Safety precautions initiated. Bed in low position. Call light in reach. Pt's VSS. Afebrile. Pt able to stand and sit from cart to bed. PERRLA.   GCS 14.  Pt c/o pain to he decreased airam/decreased weight-shifting ability/decreased step length/increased time in double stance

## 2018-12-24 ENCOUNTER — APPOINTMENT (OUTPATIENT)
Dept: PULMONOLOGY | Facility: CLINIC | Age: 83
End: 2018-12-24

## 2019-01-04 NOTE — SWALLOW BEDSIDE ASSESSMENT ADULT - SPECIFY REASON(S)
History     Chief Complaint   Patient presents with     Fever     Cough     HPI  History obtained from mother    Yris is a 2 year old otherwise well boy who presents at  5:23 PM with his mom and grandfather for fever and cough. He has had various colds most of the time since November. His cough is now worse again since yesterday, and his nose seems to be very stuffy. He has had fevers, Tm 38.3C, and has had one episode of post-tussive emesis. He has also had loose stool but no avelina diarrhea. His mom also has some congestion.     PMHx:  Past Medical History:   Diagnosis Date     NO ACTIVE PROBLEMS      History reviewed. No pertinent surgical history.  These were reviewed with the patient/family.    MEDICATIONS were reviewed and are as follows:   No current facility-administered medications for this encounter.      Current Outpatient Medications   Medication     Acetaminophen (TYLENOL PO)     ALLERGIES:  Patient has no known allergies.    IMMUNIZATIONS:  UTD including flu, by report.    SOCIAL HISTORY: Yris lives with his family.  He goes to day care.     I have reviewed the Medications, Allergies, Past Medical and Surgical History, and Social History in the Epic system.    Review of Systems  Please see HPI for pertinent positives and negatives.  All other systems reviewed and found to be negative.      Physical Exam   Heart Rate: 143  Temp: 100.6  F (38.1  C)  Resp: 28  Weight: 15.1 kg (33 lb 4.6 oz)  SpO2: 97 %    Physical Exam  Appearance: Alert and appropriate, well developed, nontoxic, with moist mucous membranes. Active and playful.   HEENT: Head: Normocephalic and atraumatic. Eyes: PERRL, EOM grossly intact, conjunctivae and sclerae clear. Ears: Tympanic membranes initially obscured by cerumen. After removal of cerumen with a curette, noted to be clear bilaterally, without inflammation or effusion; partial view on the left. Nose: Congested. Mouth/Throat: No oral lesions, MMM.   Neck: Supple, no masses,  89 yo female with multiple medical problems including HTN, DM2, CHF, and CAD, admitted with chest pain and SOB.  W/U c/w CHF exacerbation and newly diagnosed a fib.  Pt initially on BiPAP, now on NC. no meningismus. No significant cervical lymphadenopathy.  Pulmonary: No grunting, flaring, retractions or stridor. Good air entry, scattered expiratory wheezes on forced expiration.  Cardiovascular: Regular rate and rhythm, normal S1 and S2.  Normal symmetric peripheral pulses and brisk cap refill.  Abdominal: Normal bowel sounds, soft, nontender, nondistended, with no masses and no hepatosplenomegaly.  Neurologic: Alert and oriented, cranial nerves II-XII grossly intact, moving all extremities equally with grossly normal coordination.   Extremities/Back: No deformity, WWP.   Skin: No significant rashes, ecchymoses, or lacerations on exposed skin.   Genitourinary: Normal male external genitalia, mirtha 1, with no masses, tenderness, or edema.  Rectal: Deferred      ED Course      Procedures    No results found for this or any previous visit (from the past 24 hour(s)).    Medications   ibuprofen (ADVIL/MOTRIN) suspension 160 mg (160 mg Oral Given 1/3/19 1723)   ipratropium - albuterol 0.5 mg/2.5 mg/3 mL (DUONEB) neb solution 3 mL (3 mLs Nebulization Given 1/3/19 1757)     Chart reviewed, supported history as above.    He was given a Duoneb, after which his lung exam was entirely unchanged. He remained comfortable, without respiratory distress.     Critical care time:  none       Assessments & Plan (with Medical Decision Making)   Yris is a 2 year old otherwise well boy who presents with a URI, with some wheezing indicating a possible element of bronchiolitis, as well. He did not respond to a trial of albuterol via nebulizer.  He shows no evidence of pneumonia, meningitis, UTI, otitis media, or other serious or treatable bacterial infection, and he is not dehydrated.  His oxygen saturations are adequate on room air, and he does not have respiratory distress or tachypnea to a degree to require hospital admission.    Plan:  - Discharge to home  - Nasal suction as needed  - Acetaminophen or ibuprofen as needed for  pain or fever  - Return if he has trouble breathing, he appears blue or pale, he won't drink, he can't keep down liquids, he feels much worse, or any other concerns  - Follow up with PCP if he is not improving in a few days.      I have reviewed the nursing notes.    I have reviewed the findings, diagnosis, plan and need for follow up with the patient.     Medication List      There are no discharge medications for this visit.         Final diagnoses:   Bronchiolitis       1/3/2019   [facility] EMERGENCY DEPARTMENT     Sharon Michel MD  01/03/19 2021

## 2019-01-09 ENCOUNTER — APPOINTMENT (OUTPATIENT)
Dept: PULMONOLOGY | Facility: CLINIC | Age: 84
End: 2019-01-09

## 2019-01-21 ENCOUNTER — INPATIENT (INPATIENT)
Facility: HOSPITAL | Age: 84
LOS: 6 days | Discharge: HOME | DRG: 242 | End: 2019-01-28
Attending: INTERNAL MEDICINE | Admitting: INTERNAL MEDICINE
Payer: MEDICARE

## 2019-01-21 VITALS
TEMPERATURE: 98 F | RESPIRATION RATE: 20 BRPM | SYSTOLIC BLOOD PRESSURE: 183 MMHG | OXYGEN SATURATION: 95 % | DIASTOLIC BLOOD PRESSURE: 80 MMHG | WEIGHT: 194.01 LBS | HEART RATE: 77 BPM

## 2019-01-21 DIAGNOSIS — Z98.890 OTHER SPECIFIED POSTPROCEDURAL STATES: Chronic | ICD-10-CM

## 2019-01-21 DIAGNOSIS — Z96.659 PRESENCE OF UNSPECIFIED ARTIFICIAL KNEE JOINT: Chronic | ICD-10-CM

## 2019-01-21 DIAGNOSIS — Z90.710 ACQUIRED ABSENCE OF BOTH CERVIX AND UTERUS: Chronic | ICD-10-CM

## 2019-01-21 DIAGNOSIS — E11.9 TYPE 2 DIABETES MELLITUS WITHOUT COMPLICATIONS: ICD-10-CM

## 2019-01-21 DIAGNOSIS — R63.8 OTHER SYMPTOMS AND SIGNS CONCERNING FOOD AND FLUID INTAKE: ICD-10-CM

## 2019-01-21 DIAGNOSIS — I50.23 ACUTE ON CHRONIC SYSTOLIC (CONGESTIVE) HEART FAILURE: ICD-10-CM

## 2019-01-21 DIAGNOSIS — Z98.49 CATARACT EXTRACTION STATUS, UNSPECIFIED EYE: Chronic | ICD-10-CM

## 2019-01-21 DIAGNOSIS — I48.0 PAROXYSMAL ATRIAL FIBRILLATION: ICD-10-CM

## 2019-01-21 DIAGNOSIS — Z02.9 ENCOUNTER FOR ADMINISTRATIVE EXAMINATIONS, UNSPECIFIED: ICD-10-CM

## 2019-01-21 DIAGNOSIS — J43.9 EMPHYSEMA, UNSPECIFIED: ICD-10-CM

## 2019-01-21 DIAGNOSIS — E03.9 HYPOTHYROIDISM, UNSPECIFIED: ICD-10-CM

## 2019-01-21 DIAGNOSIS — I10 ESSENTIAL (PRIMARY) HYPERTENSION: ICD-10-CM

## 2019-01-21 DIAGNOSIS — N39.0 URINARY TRACT INFECTION, SITE NOT SPECIFIED: ICD-10-CM

## 2019-01-21 DIAGNOSIS — I25.10 ATHEROSCLEROTIC HEART DISEASE OF NATIVE CORONARY ARTERY WITHOUT ANGINA PECTORIS: ICD-10-CM

## 2019-01-21 LAB
ALBUMIN SERPL ELPH-MCNC: 4.3 G/DL — SIGNIFICANT CHANGE UP (ref 3.3–5)
ALP SERPL-CCNC: 128 U/L — HIGH (ref 40–120)
ALT FLD-CCNC: 44 U/L — SIGNIFICANT CHANGE UP (ref 10–45)
ANION GAP SERPL CALC-SCNC: 13 MMOL/L — SIGNIFICANT CHANGE UP (ref 5–17)
ANISOCYTOSIS BLD QL: SLIGHT — SIGNIFICANT CHANGE UP
APPEARANCE UR: CLEAR — SIGNIFICANT CHANGE UP
APTT BLD: 32.6 SEC — SIGNIFICANT CHANGE UP (ref 27.5–36.3)
AST SERPL-CCNC: 47 U/L — HIGH (ref 10–40)
BACTERIA # UR AUTO: ABNORMAL /HPF
BILIRUB SERPL-MCNC: 0.6 MG/DL — SIGNIFICANT CHANGE UP (ref 0.2–1.2)
BILIRUB UR-MCNC: NEGATIVE — SIGNIFICANT CHANGE UP
BUN SERPL-MCNC: 47 MG/DL — HIGH (ref 7–23)
CALCIUM SERPL-MCNC: 10 MG/DL — SIGNIFICANT CHANGE UP (ref 8.4–10.5)
CHLORIDE SERPL-SCNC: 104 MMOL/L — SIGNIFICANT CHANGE UP (ref 96–108)
CK MB CFR SERPL CALC: 2 NG/ML — SIGNIFICANT CHANGE UP (ref 0–6.7)
CK SERPL-CCNC: 123 U/L — SIGNIFICANT CHANGE UP (ref 25–170)
CO2 SERPL-SCNC: 27 MMOL/L — SIGNIFICANT CHANGE UP (ref 22–31)
COLOR SPEC: YELLOW — SIGNIFICANT CHANGE UP
CREAT SERPL-MCNC: 1.62 MG/DL — HIGH (ref 0.5–1.3)
DIFF PNL FLD: ABNORMAL
EOSINOPHIL NFR BLD AUTO: 1 % — SIGNIFICANT CHANGE UP (ref 0–6)
EPI CELLS # UR: ABNORMAL /HPF (ref 0–5)
GLUCOSE SERPL-MCNC: 204 MG/DL — HIGH (ref 70–99)
GLUCOSE UR QL: NEGATIVE — SIGNIFICANT CHANGE UP
HCT VFR BLD CALC: 33 % — LOW (ref 34.5–45)
HGB BLD-MCNC: 9.8 G/DL — LOW (ref 11.5–15.5)
HYPOCHROMIA BLD QL: SLIGHT — SIGNIFICANT CHANGE UP
INR BLD: 1.3 — HIGH (ref 0.88–1.16)
KETONES UR-MCNC: NEGATIVE — SIGNIFICANT CHANGE UP
LEUKOCYTE ESTERASE UR-ACNC: ABNORMAL
LYMPHOCYTES # BLD AUTO: 5 % — LOW (ref 13–44)
MAGNESIUM SERPL-MCNC: 2.4 MG/DL — SIGNIFICANT CHANGE UP (ref 1.6–2.6)
MANUAL SMEAR VERIFICATION: SIGNIFICANT CHANGE UP
MCHC RBC-ENTMCNC: 29.2 PG — SIGNIFICANT CHANGE UP (ref 27–34)
MCHC RBC-ENTMCNC: 29.7 G/DL — LOW (ref 32–36)
MCV RBC AUTO: 98.2 FL — SIGNIFICANT CHANGE UP (ref 80–100)
MONOCYTES NFR BLD AUTO: 3 % — SIGNIFICANT CHANGE UP (ref 2–14)
NEUTROPHILS NFR BLD AUTO: 88 % — HIGH (ref 43–77)
NEUTS BAND # BLD: 3 % — SIGNIFICANT CHANGE UP
NITRITE UR-MCNC: NEGATIVE — SIGNIFICANT CHANGE UP
NT-PROBNP SERPL-SCNC: HIGH PG/ML (ref 0–300)
OVALOCYTES BLD QL SMEAR: SLIGHT — SIGNIFICANT CHANGE UP
PH UR: 5.5 — SIGNIFICANT CHANGE UP (ref 5–8)
PLAT MORPH BLD: NORMAL — SIGNIFICANT CHANGE UP
PLATELET # BLD AUTO: 115 K/UL — LOW (ref 150–400)
POIKILOCYTOSIS BLD QL AUTO: SLIGHT — SIGNIFICANT CHANGE UP
POTASSIUM SERPL-MCNC: 4.3 MMOL/L — SIGNIFICANT CHANGE UP (ref 3.5–5.3)
POTASSIUM SERPL-SCNC: 4.3 MMOL/L — SIGNIFICANT CHANGE UP (ref 3.5–5.3)
PROT SERPL-MCNC: 7.2 G/DL — SIGNIFICANT CHANGE UP (ref 6–8.3)
PROT UR-MCNC: NEGATIVE MG/DL — SIGNIFICANT CHANGE UP
PROTHROM AB SERPL-ACNC: 14.8 SEC — HIGH (ref 10–12.9)
RAPID RVP RESULT: SIGNIFICANT CHANGE UP
RBC # BLD: 3.36 M/UL — LOW (ref 3.8–5.2)
RBC # FLD: 14.9 % — SIGNIFICANT CHANGE UP (ref 10.3–16.9)
RBC BLD AUTO: ABNORMAL
RBC CASTS # UR COMP ASSIST: < 5 /HPF — SIGNIFICANT CHANGE UP
SODIUM SERPL-SCNC: 144 MMOL/L — SIGNIFICANT CHANGE UP (ref 135–145)
SP GR SPEC: 1.01 — SIGNIFICANT CHANGE UP (ref 1–1.03)
SPHEROCYTES BLD QL SMEAR: SLIGHT — SIGNIFICANT CHANGE UP
TROPONIN T SERPL-MCNC: <0.01 NG/ML — SIGNIFICANT CHANGE UP (ref 0–0.01)
TROPONIN T SERPL-MCNC: <0.01 NG/ML — SIGNIFICANT CHANGE UP (ref 0–0.01)
UROBILINOGEN FLD QL: 0.2 E.U./DL — SIGNIFICANT CHANGE UP
WBC # BLD: 9.1 K/UL — SIGNIFICANT CHANGE UP (ref 3.8–10.5)
WBC # FLD AUTO: 9.1 K/UL — SIGNIFICANT CHANGE UP (ref 3.8–10.5)
WBC UR QL: ABNORMAL /HPF

## 2019-01-21 PROCEDURE — 71045 X-RAY EXAM CHEST 1 VIEW: CPT | Mod: 26

## 2019-01-21 PROCEDURE — 99222 1ST HOSP IP/OBS MODERATE 55: CPT

## 2019-01-21 PROCEDURE — 93010 ELECTROCARDIOGRAM REPORT: CPT

## 2019-01-21 PROCEDURE — 99285 EMERGENCY DEPT VISIT HI MDM: CPT | Mod: 25

## 2019-01-21 RX ORDER — INSULIN LISPRO 100/ML
VIAL (ML) SUBCUTANEOUS
Qty: 0 | Refills: 0 | Status: DISCONTINUED | OUTPATIENT
Start: 2019-01-21 | End: 2019-01-28

## 2019-01-21 RX ORDER — APIXABAN 2.5 MG/1
2.5 TABLET, FILM COATED ORAL EVERY 12 HOURS
Qty: 0 | Refills: 0 | Status: DISCONTINUED | OUTPATIENT
Start: 2019-01-21 | End: 2019-01-24

## 2019-01-21 RX ORDER — OMEPRAZOLE 10 MG/1
1 CAPSULE, DELAYED RELEASE ORAL
Qty: 0 | Refills: 0 | COMMUNITY

## 2019-01-21 RX ORDER — DEXTROSE 50 % IN WATER 50 %
25 SYRINGE (ML) INTRAVENOUS ONCE
Qty: 0 | Refills: 0 | Status: DISCONTINUED | OUTPATIENT
Start: 2019-01-21 | End: 2019-01-28

## 2019-01-21 RX ORDER — ISOSORBIDE MONONITRATE 60 MG/1
30 TABLET, EXTENDED RELEASE ORAL DAILY
Qty: 0 | Refills: 0 | Status: DISCONTINUED | OUTPATIENT
Start: 2019-01-22 | End: 2019-01-22

## 2019-01-21 RX ORDER — FUROSEMIDE 40 MG
80 TABLET ORAL EVERY 12 HOURS
Qty: 0 | Refills: 0 | Status: DISCONTINUED | OUTPATIENT
Start: 2019-01-21 | End: 2019-01-25

## 2019-01-21 RX ORDER — TIOTROPIUM BROMIDE 18 UG/1
1 CAPSULE ORAL; RESPIRATORY (INHALATION) DAILY
Qty: 0 | Refills: 0 | Status: DISCONTINUED | OUTPATIENT
Start: 2019-01-21 | End: 2019-01-28

## 2019-01-21 RX ORDER — SODIUM CHLORIDE 9 MG/ML
1000 INJECTION, SOLUTION INTRAVENOUS
Qty: 0 | Refills: 0 | Status: DISCONTINUED | OUTPATIENT
Start: 2019-01-21 | End: 2019-01-28

## 2019-01-21 RX ORDER — AMLODIPINE BESYLATE 2.5 MG/1
1 TABLET ORAL
Qty: 0 | Refills: 0 | COMMUNITY

## 2019-01-21 RX ORDER — METOPROLOL TARTRATE 50 MG
25 TABLET ORAL DAILY
Qty: 0 | Refills: 0 | Status: DISCONTINUED | OUTPATIENT
Start: 2019-01-22 | End: 2019-01-26

## 2019-01-21 RX ORDER — CIPROFLOXACIN LACTATE 400MG/40ML
250 VIAL (ML) INTRAVENOUS EVERY 12 HOURS
Qty: 0 | Refills: 0 | Status: COMPLETED | OUTPATIENT
Start: 2019-01-21 | End: 2019-01-22

## 2019-01-21 RX ORDER — LEVOTHYROXINE SODIUM 125 MCG
100 TABLET ORAL DAILY
Qty: 0 | Refills: 0 | Status: DISCONTINUED | OUTPATIENT
Start: 2019-01-22 | End: 2019-01-28

## 2019-01-21 RX ORDER — PANTOPRAZOLE SODIUM 20 MG/1
40 TABLET, DELAYED RELEASE ORAL
Qty: 0 | Refills: 0 | Status: DISCONTINUED | OUTPATIENT
Start: 2019-01-22 | End: 2019-01-28

## 2019-01-21 RX ORDER — BUDESONIDE, MICRONIZED 100 %
0.5 POWDER (GRAM) MISCELLANEOUS
Qty: 0 | Refills: 0 | Status: DISCONTINUED | OUTPATIENT
Start: 2019-01-21 | End: 2019-01-28

## 2019-01-21 RX ORDER — ATORVASTATIN CALCIUM 80 MG/1
40 TABLET, FILM COATED ORAL AT BEDTIME
Qty: 0 | Refills: 0 | Status: DISCONTINUED | OUTPATIENT
Start: 2019-01-21 | End: 2019-01-28

## 2019-01-21 RX ORDER — MONTELUKAST 4 MG/1
10 TABLET, CHEWABLE ORAL DAILY
Qty: 0 | Refills: 0 | Status: DISCONTINUED | OUTPATIENT
Start: 2019-01-21 | End: 2019-01-28

## 2019-01-21 RX ORDER — FUROSEMIDE 40 MG
60 TABLET ORAL ONCE
Qty: 0 | Refills: 0 | Status: DISCONTINUED | OUTPATIENT
Start: 2019-01-21 | End: 2019-01-21

## 2019-01-21 RX ORDER — FUROSEMIDE 40 MG
80 TABLET ORAL ONCE
Qty: 0 | Refills: 0 | Status: COMPLETED | OUTPATIENT
Start: 2019-01-21 | End: 2019-01-21

## 2019-01-21 RX ORDER — DEXTROSE 50 % IN WATER 50 %
15 SYRINGE (ML) INTRAVENOUS ONCE
Qty: 0 | Refills: 0 | Status: DISCONTINUED | OUTPATIENT
Start: 2019-01-21 | End: 2019-01-28

## 2019-01-21 RX ORDER — GLUCAGON INJECTION, SOLUTION 0.5 MG/.1ML
1 INJECTION, SOLUTION SUBCUTANEOUS ONCE
Qty: 0 | Refills: 0 | Status: DISCONTINUED | OUTPATIENT
Start: 2019-01-21 | End: 2019-01-28

## 2019-01-21 RX ORDER — DEXTROSE 50 % IN WATER 50 %
12.5 SYRINGE (ML) INTRAVENOUS ONCE
Qty: 0 | Refills: 0 | Status: DISCONTINUED | OUTPATIENT
Start: 2019-01-21 | End: 2019-01-28

## 2019-01-21 RX ADMIN — APIXABAN 2.5 MILLIGRAM(S): 2.5 TABLET, FILM COATED ORAL at 18:25

## 2019-01-21 RX ADMIN — Medication 2: at 16:34

## 2019-01-21 RX ADMIN — Medication 80 MILLIGRAM(S): at 18:24

## 2019-01-21 RX ADMIN — Medication 250 MILLIGRAM(S): at 18:25

## 2019-01-21 RX ADMIN — Medication 0.5 MILLIGRAM(S): at 18:25

## 2019-01-21 RX ADMIN — Medication 80 MILLIGRAM(S): at 11:35

## 2019-01-21 RX ADMIN — ATORVASTATIN CALCIUM 40 MILLIGRAM(S): 80 TABLET, FILM COATED ORAL at 21:08

## 2019-01-21 NOTE — H&P ADULT - PROBLEM SELECTOR PLAN 5
F/u TSH in AM  -c/w Levothyroxine 100mcg qd SBP 180s on arrival to ED, now normotensive to 110s  -c/w Imdur 30mg qd  -c/w Toprol 25mg qd

## 2019-01-21 NOTE — H&P ADULT - NSHPREVIEWOFSYSTEMS_GEN_ALL_CORE
GENERAL, CONSTITUTIONAL : denies fever, chills. + recent weight gain 7 lbs since 1 week  EYES, VISION: denies changes in vision   EARS, NOSE, THROAT: denies hearing loss  HEART, CARDIOVASCULAR: denies chest pain, arrhythmia, claudication. palpitations. + SOB,  LE edema  RESPIRATORY: Denies wheezing. + dry cough, SOB, PND, orthopnea  GASTROINTESTINAL: Denies abdominal pain, epigastric pain, nausea, vomiting, or hematemesis, diarrhea, constipation, melena or hematochezia.  GENITOURINARY: Denies frequent urination, urgency  MUSCULOSKELETAL denies joint pain or swelling, restricted motion, musculoskeletal pain.   SKIN & INTEGUMENTARY Denies rashes, sores, blisters, blisters, growths.  NEUROLOGICAL: Denies numbness or tingling sensations, sensation loss, burning.   PSYCHIATRIC: Denies nervousness, anxiety, depression  ENDOCRINE Denies heat or cold intolerance, excessive thirst  HEMATOLOGIC/LYMPHATIC: Denies abnormal bleeding, bleeding of any kind

## 2019-01-21 NOTE — H&P ADULT - PROBLEM SELECTOR PLAN 6
Remote Hx of CAD s/p PCI. Daughter unclear when was last PCI.    CKD  -Hx Renal artery stenosis s/p PTA 1/2018  -Monitor BUN/crea F/u TSH in AM  -c/w Levothyroxine 100mcg qd

## 2019-01-21 NOTE — ED ADULT TRIAGE NOTE - OTHER COMPLAINTS
c/o sob, hager, cough, bilateral edema bilateral worsening x2 weeks. denies fever nor chills, no cp. Pt on home o2 of 3L. ekg in progress

## 2019-01-21 NOTE — H&P ADULT - PROBLEM SELECTOR PLAN 8
F: No IVF  N: DASH/TLC/DM diet  E: Replete lytes PRN K<4, Mg<2  P: DVT PPX: on Eliquis  C: FULL CODE  Dispo: Admit to tele 5 [facility] Takes Januvia at home  -F/u HgA1c in AM  -c/w [facility]  -Diabetic diet

## 2019-01-21 NOTE — H&P ADULT - PROBLEM SELECTOR PLAN 9
D/c pending clinical progress    PT eval pending F: No IVF  N: DASH/TLC/DM diet  E: Replete lytes PRN K<4, Mg<2  P: DVT PPX: on Eliquis  C: FULL CODE  Dispo: Admit to tele 5 [facility]

## 2019-01-21 NOTE — ED PROVIDER NOTE - DIAGNOSTIC INTERPRETATION
CXR wet read: The heart appears enlarged,  (+) pulmonary vascular congestion.  The chest wall and surrounding bony structures appear normal.

## 2019-01-21 NOTE — H&P ADULT - PMH
Anemia    CAD (coronary artery disease)    Chronic systolic CHF (congestive heart failure)    DM (diabetes mellitus)    Emphysema    HTN (hypertension)    Hypercholesteremia    Hypothyroidism, unspecified type    Renal artery stenosis

## 2019-01-21 NOTE — H&P ADULT - ASSESSMENT
89-year-old Persian speaking F, with PMHx of AFib on Eliquis, chronic systolic CHF (EF 20-25% on echo 1/2018), CAD s/p remote PCI, HTN, HLD, DMT2, KIRA s/p R renal artery PTA 1/2018, CKD (baseline crea 1-2) anemia, hypothyroidism, b/l carotid endarterectomies, asthma, and emphysema on 2L home O2, who presented c/o progressive worsening SOB, AGUIRRE, cece LE swelling x 2 weeks. Admitted to tele 5 [facility] for acute on chronic systolic CHF exacerbation.

## 2019-01-21 NOTE — H&P ADULT - PSH
History of bilateral carotid endarterectomy    History of umbilical hernia repair    S/P bladder repair    S/P cataract surgery    S/P hysterectomy with oophorectomy    S/P percutaneous angioplasty of renal artery  1/2018  S/P TKR (total knee replacement)

## 2019-01-21 NOTE — ED PROVIDER NOTE - PROGRESS NOTE DETAILS
Given 80 mg IV Lasix. Case discussed with pt's cardiologist Dr. Rowe and pt to be admitted to be diuresed and admitted to service cardiology. Case discussed with cardiologist on call and pt admitted.

## 2019-01-21 NOTE — ED PROVIDER NOTE - MEDICAL DECISION MAKING DETAILS
89-year-old Czech speaking F (prefers daughter to translate, pt lives with daughter), with PMHx of AFib on Eliquis, chronic systolic CHF (EF 20-25% on echo 1/2018), CAD s/p remote PCI, HTN, HLD, DMT2, KIRA s/p R renal artery PTA 1/2018, CKD (baseline crea 1-2) anemia, hypothyroidism, b/l carotid endarterectomies, asthma, and emphysema on 2L home O2, p/w progressive worsening SOB x 2 weeks. Daughter reports over the last 2 weeks pt has had worsening sob and AGUIRRE where pt gets sob walking 3-4 steps. Also reports cece LE swelling extending to thighs, periorbital swelling, and weight gain of 7lbs over the last week. She was recently seen at her cardiologist Dr Dillard' office last week and was told to switch Torsemide 10mg qd to Lasix 80mg qd. But pt was seen by PMD Dr Ventura and was being treated for UTI vs pyelonephritis where she was told to take Cipro x 7 days, and then start the new dose of Lasix after and so pt did not start her lasix dose ever. Last night daughter got concerned due to increased orthopea, where pt had to sleep sitting up in home hospital bed. Reports dry cough. No CP, fevers, chills, abd pain. Given 80 mg IV Lasix. Case discussed with pt's cardiologist Dr. Rowe and pt to be admitted to be diuresed and admitted to service cardiology. Case discussed with cardiologist on call and pt admitted to cardiology/tele. Stable in ED.

## 2019-01-21 NOTE — H&P ADULT - NSHPPHYSICALEXAM_GEN_ALL_CORE
Appearance: Normal	  HEENT:   Normal oral mucosa	  Neck: Supple, + JVD  Cardiovascular: Normal S1 S2, + JVD  Respiratory: Lungs w/ bibasilar crackles/No Rhonchi, Wheezing	  Gastrointestinal:  Soft, Non-tender, + BS	  Skin: No rashes, No ecchymoses, No cyanosis  Extremities: Normal range of motion, No clubbing, cyanosis. Jimy LE 3+ edema extending to jimy thighs  Vascular:  DP 1+ b/l, PT 1+ b/l  Neurologic: Non-focal  Psychiatry: A & O x 3, Mood & affect appropriate

## 2019-01-21 NOTE — H&P ADULT - PROBLEM SELECTOR PLAN 7
Takes Januvia at home  -F/u HgA1c in AM  -c/w [facility]  -Diabetic diet Remote Hx of CAD s/p PCI. Daughter unclear when was last PCI.  -Off ASA/Plavix   -c/w Lipitor 40mg qd  -c/w Toprol 25mg qd    CKD, stage 3. Baseline appears 1.4-1.5 from previous [facility] admissions  -Hx Renal artery stenosis s/p PTA 1/2018  -Monitor BUN/crea  -Avoid nephrotoxic agents

## 2019-01-21 NOTE — H&P ADULT - HISTORY OF PRESENT ILLNESS
89-year-old woman with PMHx of AFib on Eliquis, systolic CHF (EF 20-25% on echo 1/2018), CAD s/p remote PCI, HTN, HLD, DMT2, KIRA s/p R renal artery PTA 1/2018, anemia, hypothyroidism, b/l carotid endarterectomies, asthma, and emphysema on 2L home O2, who presented c/o SOB. 89-year-old Hebrew speaking F, with PMHx of AFib on Eliquis, chronic systolic CHF (EF 20-25% on echo 1/2018), CAD s/p remote PCI, HTN, HLD, DMT2, KIRA s/p R renal artery PTA 1/2018, CKD (baseline crea 1-2) anemia, hypothyroidism, b/l carotid endarterectomies, asthma, and emphysema on 2L home O2, who presented c/o progressive worsening SOB x 2 weeks. History obtained from daughter, whom pt lives with. Daughter reports over the last 2 weeks pt has had worsening sob and AGUIRRE where pt gets sob walking 3-4 steps. Also reports cece LE swelling extending to thighs, periorbital swelling, and weight gain of 7lbs over the last week. She was recently seen at her cardiologist Dr Dillard' office last week and was told to switch Torsemide 10mg qd to Lasix 80mg qd. But pt was seen by PMD Dr Ventura and was being treated for UTI vs pyelonephritis where she was told to take Cipro x 7 days, and then start the new dose of Lasix. Last night daughter got concerned due to increased orthopea, where pt had to sleep sitting up in home hospital bed, where at baseline she sleeps at approx 30% angle. Reports dry cough and pleuritic pain. Recently traveled to [facility] in December [place] 3 weeks, [place] [facility] [place] Denies syncope, lightheadedness, dizziness, fever, chills.   In ED: /80-> 144/93, HR 78, R 20, SpO2 97% 2L, T 97.9. BUN/Crea 47/1.62, Alk Phos 128, AST 47, BNP 66175, Trop neg x 1. UA w/ small leuk estrase. CXR w/ pulm vasc congestion, EKG SR 80s, 1st degree HB, LBBB (unchanged). Given Lasix 80mg IV. Admitted to tele 5 [facility] for acute on chronic systolic CHF exacerbation. 89-year-old Mongolian speaking F, with PMHx of AFib on Eliquis, chronic systolic CHF (EF 20-25% on echo 1/2018), CAD s/p remote PCI, HTN, HLD, DMT2, KIRA s/p R renal artery PTA 1/2018, CKD (baseline crea 1-2) anemia, hypothyroidism, b/l carotid endarterectomies, asthma, and emphysema on 2L home O2, who presented c/o progressive worsening SOB x 2 weeks. History obtained from daughter, whom pt lives with. Daughter reports over the last 2 weeks pt has had worsening sob and AGUIRRE where pt gets sob walking 3-4 steps. Also reports cece LE swelling extending to thighs, periorbital swelling, and weight gain of 7lbs over the last week. She was recently seen at her cardiologist Dr Dillard' office last week and was told to switch Torsemide 10mg qd to Lasix 80mg qd. But pt was seen by PMD Dr Ventura and was being treated for UTI vs pyelonephritis where she was told to take Cipro x 7 days, and then start the new dose of Lasix. Last night daughter got concerned due to increased orthopea, where pt had to sleep sitting up in home hospital bed, where at baseline she sleeps at approx 30% angle. Reports dry cough and pleuritic pain. Recently traveled to [facility] in December for 3 weeks, but [place] [facility] [place] Reports compliant w/ low salt diet and medications. But reports drinks at least 7 bottles of water per day. Denies syncope, lightheadedness, dizziness, fever, chills.   In ED: /80-> 144/93, HR 78, R 20, SpO2 97% 2L, T 97.9. BUN/Crea 47/1.62, Alk Phos 128, AST 47, BNP 17945, Trop neg x 1. UA w/ small leuk estrase. CXR w/ pulm vasc congestion, EKG SR 80s, 1st degree HB, LBBB (unchanged). Given Lasix 80mg IV. Admitted to tele 5 [facility] for acute on chronic systolic CHF exacerbation.

## 2019-01-21 NOTE — ED PROVIDER NOTE - OBJECTIVE STATEMENT
sw stathoplus last tuesya and dtarted on 80 mg lasix but fid nt fill it. also on abx cipto for "kideny inf" also ?afib on Eliwuis 89-year-old Mongolian speaking F (prefers daughter to translate, pt lives with daughter), with PMHx of AFib on Eliquis, chronic systolic CHF (EF 20-25% on echo 1/2018), CAD s/p remote PCI, HTN, HLD, DMT2, KIRA s/p R renal artery PTA 1/2018, CKD (baseline crea 1-2) anemia, hypothyroidism, b/l carotid endarterectomies, asthma, and emphysema on 2L home O2, p/w progressive worsening SOB x 2 weeks. Daughter reports over the last 2 weeks pt has had worsening sob and AGUIRRE where pt gets sob walking 3-4 steps. Also reports cece LE swelling extending to thighs, periorbital swelling, and weight gain of 7lbs over the last week. She was recently seen at her cardiologist Dr Dillard' office last week and was told to switch Torsemide 10mg qd to Lasix 80mg qd. But pt was seen by PMD Dr Ventura and was being treated for UTI vs pyelonephritis where she was told to take Cipro x 7 days, and then start the new dose of Lasix after and so pt did not start her lasix dose ever. Last night daughter got concerned due to increased orthopea, where pt had to sleep sitting up in home hospital bed. Reports dry cough. No CP, fevers, chills, abd pain. 89-year-old Tajik speaking F (prefers daughter to translate, pt lives with daughter), with PMHx of AFib on Eliquis, chronic systolic CHF (EF 20-25% on echo 1/2018), CAD s/p remote PCI (several cardiac stents per daughter), HTN, HLD, DMT2, KIRA s/p R renal artery PTA 1/2018, CKD (baseline creatinine 1-2) anemia, hypothyroidism, b/l carotid endarterectomies, asthma, and emphysema on 2L home O2, p/w progressive worsening SOB x 2 weeks. Daughter reports over the last 2 weeks pt has had worsening sob and AGUIRRE where pt gets sob walking 3-4 steps. Also reports cece LE swelling extending to thighs, periorbital swelling, and weight gain of 7lbs over the last week. She was recently seen at her cardiologist Dr Dillard' office last week and was told to switch Torsemide 10mg qd to Lasix 80mg qd. But pt was seen by PMD Dr Ventura and was being treated for UTI vs pyelonephritis where she was told to take Cipro x 7 days, and then start the new dose of Lasix after and so pt did not start her lasix dose ever. Last night daughter got concerned due to increased orthopea, where pt had to sleep sitting up in home hospital bed. Reports dry cough. No CP, fevers, chills, abd pain.

## 2019-01-21 NOTE — H&P ADULT - PROBLEM SELECTOR PLAN 1
C/o C/o progressive worsening SOB, cece LE swelling extending to thighs x 2 weeks, periorbital swelling, and weight gain of 7lbs over the last week, increased orthopnea. CXR w/ pulm congestion, R pleural effusion. BNP 13K. Echo 1/2018 w/ EF 20-25%, mod dilated LA, mod MR, pulm HTN, PASP 57, mild conc LVH, severe LV global hypokinesis. Etiology possibly 2/2 increased fluid intake vs infectious process. S/p Lasix 80mg IV in ED.  -C/w diuresis Lasix 80mg IV BID  -c/w home Toprol 25mg qd  -c/w Imdur 30mg qd  -Pending Echo in AM  -Trop neg x 1, trend tonight  -F/u EKG in AM  -F/u RVP as pt reports coughing C/o progressive worsening SOB, cece LE swelling extending to thighs x 2 weeks, periorbital swelling, and weight gain of 7lbs over the last week, increased orthopnea. RVP negative. CXR w/ pulm congestion, R pleural effusion. BNP 13K. Echo 1/2018 w/ EF 20-25%, mod dilated LA, mod MR, pulm HTN, PASP 57, mild conc LVH, severe LV global hypokinesis. Etiology possibly 2/2 increased fluid intake vs infectious process. S/p Lasix 80mg IV in ED.  -C/w diuresis Lasix 80mg IV BID  -c/w home Toprol 25mg qd  -c/w Imdur 30mg qd  -Pending Echo in AM  -Trop neg x 1, trend tonight  -F/u EKG in AM

## 2019-01-21 NOTE — H&P ADULT - NSHPLABSRESULTS_GEN_ALL_CORE
9.8    9.1   )-----------( 115      ( 2019 11:33 )             33.0           144  |  104  |  47<H>  ----------------------------<  204<H>  4.3   |  27  |  1.62<H>    Ca    10.0      2019 11:33  Mg     2.4         TPro  7.2  /  Alb  4.3  /  TBili  0.6  /  DBili  x   /  AST  47<H>  /  ALT  44  /  AlkPhos  128<H>        PT/INR - ( 2019 11:33 )   PT: 14.8 sec;   INR: 1.30          PTT - ( 2019 11:33 )  PTT:32.6 sec    CARDIAC MARKERS ( 2019 11:33 )  x     / <0.01 ng/mL / x     / x     / x            Urinalysis Basic - ( 2019 12:17 )    Color: Yellow / Appearance: Clear / S.010 / pH: x  Gluc: x / Ketone: NEGATIVE  / Bili: Negative / Urobili: 0.2 E.U./dL   Blood: x / Protein: NEGATIVE mg/dL / Nitrite: NEGATIVE   Leuk Esterase: Small / RBC: < 5 /HPF / WBC Many /HPF   Sq Epi: x / Non Sq Epi: 5-10 /HPF / Bacteria: Many /HPF

## 2019-01-21 NOTE — H&P ADULT - PROBLEM SELECTOR PLAN 3
On home O2 2L NC at all times per daughter. Not in acute exacerbation.  -c/w Spiriva INL  -c/w Budesonide INL  -c/w Singulair Recent hx of dysuria, foul smelling odor and was placed on Cipro for "kidney infection" by PMD  -c/w Cipro 250mg BID for total 7 days course, last day 1/23  -F/u UCx

## 2019-01-21 NOTE — H&P ADULT - PROBLEM SELECTOR PLAN 4
SBP 180s on arrival to ED, now normotensive to 110s  -c/w Imdur 30mg qd  -c/w Toprol 25mg qd On home O2 2L NC at all times per daughter. Not in acute exacerbation.  -c/w Spiriva INL  -c/w Budesonide INL  -c/w Singulair

## 2019-01-21 NOTE — ED ADULT NURSE NOTE - OBJECTIVE STATEMENT
Pt CO SOB worsening x1 month with associated Bilat Pedal edema.  Cambodian speaking pt with family who translates states "She has emphysema and CHF."  Pt noted with no labored breathing.  EKG obtained in triage.  Pt denies N/V/D, CP, dizziness and Fevers.

## 2019-01-22 LAB
ALBUMIN SERPL ELPH-MCNC: 4 G/DL — SIGNIFICANT CHANGE UP (ref 3.3–5)
ALP SERPL-CCNC: 129 U/L — HIGH (ref 40–120)
ALT FLD-CCNC: 53 U/L — HIGH (ref 10–45)
ANION GAP SERPL CALC-SCNC: 12 MMOL/L — SIGNIFICANT CHANGE UP (ref 5–17)
AST SERPL-CCNC: 48 U/L — HIGH (ref 10–40)
BASOPHILS NFR BLD AUTO: 0.3 % — SIGNIFICANT CHANGE UP (ref 0–2)
BILIRUB SERPL-MCNC: 0.5 MG/DL — SIGNIFICANT CHANGE UP (ref 0.2–1.2)
BUN SERPL-MCNC: 47 MG/DL — HIGH (ref 7–23)
CALCIUM SERPL-MCNC: 10 MG/DL — SIGNIFICANT CHANGE UP (ref 8.4–10.5)
CHLORIDE SERPL-SCNC: 103 MMOL/L — SIGNIFICANT CHANGE UP (ref 96–108)
CHOLEST SERPL-MCNC: 103 MG/DL — SIGNIFICANT CHANGE UP (ref 10–199)
CO2 SERPL-SCNC: 30 MMOL/L — SIGNIFICANT CHANGE UP (ref 22–31)
CREAT SERPL-MCNC: 1.54 MG/DL — HIGH (ref 0.5–1.3)
EOSINOPHIL NFR BLD AUTO: 1.3 % — SIGNIFICANT CHANGE UP (ref 0–6)
GLUCOSE SERPL-MCNC: 119 MG/DL — HIGH (ref 70–99)
HBA1C BLD-MCNC: 6.5 % — HIGH (ref 4–5.6)
HCT VFR BLD CALC: 31.6 % — LOW (ref 34.5–45)
HDLC SERPL-MCNC: 57 MG/DL — SIGNIFICANT CHANGE UP
HGB BLD-MCNC: 9.5 G/DL — LOW (ref 11.5–15.5)
LIPID PNL WITH DIRECT LDL SERPL: 33 MG/DL — SIGNIFICANT CHANGE UP
LYMPHOCYTES # BLD AUTO: 11.6 % — LOW (ref 13–44)
MAGNESIUM SERPL-MCNC: 2.2 MG/DL — SIGNIFICANT CHANGE UP (ref 1.6–2.6)
MCHC RBC-ENTMCNC: 29.5 PG — SIGNIFICANT CHANGE UP (ref 27–34)
MCHC RBC-ENTMCNC: 30.1 G/DL — LOW (ref 32–36)
MCV RBC AUTO: 98.1 FL — SIGNIFICANT CHANGE UP (ref 80–100)
MONOCYTES NFR BLD AUTO: 10.7 % — SIGNIFICANT CHANGE UP (ref 2–14)
NEUTROPHILS NFR BLD AUTO: 76.1 % — SIGNIFICANT CHANGE UP (ref 43–77)
PLATELET # BLD AUTO: 106 K/UL — LOW (ref 150–400)
POTASSIUM SERPL-MCNC: 4.2 MMOL/L — SIGNIFICANT CHANGE UP (ref 3.5–5.3)
POTASSIUM SERPL-SCNC: 4.2 MMOL/L — SIGNIFICANT CHANGE UP (ref 3.5–5.3)
PROT SERPL-MCNC: 7.3 G/DL — SIGNIFICANT CHANGE UP (ref 6–8.3)
RBC # BLD: 3.22 M/UL — LOW (ref 3.8–5.2)
RBC # FLD: 14.9 % — SIGNIFICANT CHANGE UP (ref 10.3–16.9)
SODIUM SERPL-SCNC: 145 MMOL/L — SIGNIFICANT CHANGE UP (ref 135–145)
TOTAL CHOLESTEROL/HDL RATIO MEASUREMENT: 1.8 RATIO — LOW (ref 3.3–7.1)
TRIGL SERPL-MCNC: 66 MG/DL — SIGNIFICANT CHANGE UP (ref 10–149)
TSH SERPL-MCNC: 1.42 UIU/ML — SIGNIFICANT CHANGE UP (ref 0.35–4.94)
WBC # BLD: 7.8 K/UL — SIGNIFICANT CHANGE UP (ref 3.8–10.5)
WBC # FLD AUTO: 7.8 K/UL — SIGNIFICANT CHANGE UP (ref 3.8–10.5)

## 2019-01-22 PROCEDURE — 99223 1ST HOSP IP/OBS HIGH 75: CPT

## 2019-01-22 PROCEDURE — 99232 SBSQ HOSP IP/OBS MODERATE 35: CPT

## 2019-01-22 PROCEDURE — 71045 X-RAY EXAM CHEST 1 VIEW: CPT | Mod: 26

## 2019-01-22 PROCEDURE — 93306 TTE W/DOPPLER COMPLETE: CPT | Mod: 26

## 2019-01-22 RX ORDER — ISOSORBIDE MONONITRATE 60 MG/1
60 TABLET, EXTENDED RELEASE ORAL DAILY
Qty: 0 | Refills: 0 | Status: DISCONTINUED | OUTPATIENT
Start: 2019-01-22 | End: 2019-01-26

## 2019-01-22 RX ORDER — ISOSORBIDE MONONITRATE 60 MG/1
30 TABLET, EXTENDED RELEASE ORAL ONCE
Qty: 0 | Refills: 0 | Status: DISCONTINUED | OUTPATIENT
Start: 2019-01-22 | End: 2019-01-22

## 2019-01-22 RX ADMIN — MONTELUKAST 10 MILLIGRAM(S): 4 TABLET, CHEWABLE ORAL at 11:31

## 2019-01-22 RX ADMIN — PANTOPRAZOLE SODIUM 40 MILLIGRAM(S): 20 TABLET, DELAYED RELEASE ORAL at 06:00

## 2019-01-22 RX ADMIN — Medication 80 MILLIGRAM(S): at 17:12

## 2019-01-22 RX ADMIN — TIOTROPIUM BROMIDE 1 CAPSULE(S): 18 CAPSULE ORAL; RESPIRATORY (INHALATION) at 11:32

## 2019-01-22 RX ADMIN — Medication 250 MILLIGRAM(S): at 17:13

## 2019-01-22 RX ADMIN — ATORVASTATIN CALCIUM 40 MILLIGRAM(S): 80 TABLET, FILM COATED ORAL at 21:09

## 2019-01-22 RX ADMIN — Medication 80 MILLIGRAM(S): at 05:58

## 2019-01-22 RX ADMIN — Medication 0.5 MILLIGRAM(S): at 17:13

## 2019-01-22 RX ADMIN — APIXABAN 2.5 MILLIGRAM(S): 2.5 TABLET, FILM COATED ORAL at 17:13

## 2019-01-22 RX ADMIN — Medication 2: at 11:30

## 2019-01-22 RX ADMIN — Medication 100 MICROGRAM(S): at 05:59

## 2019-01-22 RX ADMIN — Medication 250 MILLIGRAM(S): at 05:58

## 2019-01-22 RX ADMIN — Medication 25 MILLIGRAM(S): at 06:00

## 2019-01-22 RX ADMIN — ISOSORBIDE MONONITRATE 60 MILLIGRAM(S): 60 TABLET, EXTENDED RELEASE ORAL at 11:30

## 2019-01-22 RX ADMIN — Medication 0.5 MILLIGRAM(S): at 05:58

## 2019-01-22 RX ADMIN — APIXABAN 2.5 MILLIGRAM(S): 2.5 TABLET, FILM COATED ORAL at 05:58

## 2019-01-22 NOTE — DIETITIAN INITIAL EVALUATION ADULT. - PROBLEM SELECTOR PLAN 4
On home O2 2L NC at all times per daughter. Not in acute exacerbation.  -c/w Spiriva INL  -c/w Budesonide INL  -c/w Singulair

## 2019-01-22 NOTE — PROGRESS NOTE ADULT - PROBLEM SELECTOR PLAN 10
D/c pending clinical progress    PT eval pending D/c pending clinical progress    PT rec home w/ home PT. HHA x 10 hrs daily. Will need resumption of HHA services prior to DC.

## 2019-01-22 NOTE — PHYSICAL THERAPY INITIAL EVALUATION ADULT - LEVEL OF INDEPENDENCE: GAIT, REHAB EVAL
[facility],   Interventional Radiology Discharge Instructions Following Thoracentesis        AFTER YOU GO HOME:  ? Resume previous diet and medications  ? Limit strenuous physical activity such as lifting, straining, or exercising for 48 hours.  You may resume normal activity in 24 hours  ? Change gauze at the puncture site as needed to keep site dry.  Leaking of additional fluid is not uncommon during the first 24-48 hours    CALL THE PHYSICIAN:  ? If you develop a fever, shortness of breath, chest pain, cough up blood, excessive bleeding from the thoracentesis site, severe lightheadedness, or fainting call you doctor immediately or come to the closest Emergency Department.  Do not drive yourself.   ? If you have questions or concerns regarding your procedure call the radiologist.      ADDITIONAL INSTRUCTIONS ***      [facility] INTERVENTIONAL RADIOLOGY DEPARTMENT  Procedure Physician:  Dr. Jacinto                                Date of procedure: October 30, 2017    Telephone Numbers: [phone]       Monday-Friday 8:00 am to 4:30 pm                                                   [phone]   After 4:30 pm Monday - Friday, Ask for the Interventional Radiologist on call.  Someone is on call 24 hrs/day  [facility] toll free number: [phone]    Monday-Friday 8:00 am to 4:30 pm  [facility] Emergency Dept: [phone]  _           contact guard

## 2019-01-22 NOTE — PROGRESS NOTE ADULT - PROBLEM SELECTOR PLAN 2
Currently SR 70s  -c/w Eliquis 2.5mg BID  -c/w Toprol 25mg qd Currently SR 60-70s  -c/w Eliquis 2.5mg BID  -c/w Toprol 25mg qd Currently SR 60-70s  -c/w Eliquis 2.5mg BID  -c/w Toprol 25mg qd    #Transaminitis  Elevated Alk Phos 129, AST/ALT 48/53. Likely hepatic congestion 2/2 CHF exacerbation.  -Monitor LFTs  -F/u GGT  -Obtain RUQ US

## 2019-01-22 NOTE — PROGRESS NOTE ADULT - PROBLEM SELECTOR PLAN 4
On home O2 2L NC at all times per daughter. Not in acute exacerbation.  -c/w Spiriva INL  -c/w Budesonide INL  -c/w Singulair On home O2 2L NC at all times per daughter. Not in acute exacerbation.  -c/w Spiriva INH  -c/w Budesonide INLH  -c/w Singulair

## 2019-01-22 NOTE — DIETITIAN INITIAL EVALUATION ADULT. - PERTINENT MEDS FT
Ciprofloxacin, Isosorbide mononitrate ER, Levothyroxine, Metoprolol succinate ER, Pantoprazole, Atorvastatin, Furosemide, Humalog SS

## 2019-01-22 NOTE — PHYSICAL THERAPY INITIAL EVALUATION ADULT - PERTINENT HX OF CURRENT PROBLEM, REHAB EVAL
89F who presented c/o progressive worsening SOB, AGUIRRE, cece LE swelling x 2 weeks. Admitted to tele 5 [facility] for acute on chronic systolic CHF exacerbation

## 2019-01-22 NOTE — PROGRESS NOTE ADULT - SUBJECTIVE AND OBJECTIVE BOX
CARDIOLOGY NP PROGRESS NOTE    Subjective: Pt seen and examined at bedside. Reports feeling sob improving. Slept well last night per daughter. Denies chest pain, sob at rest, lightheadedness, dizziness, palpitations.  Remainder ROS otherwise negative.    Overnight Events: none    TELEMETRY: SR 60s, frequent PACs    EKG:      VITAL SIGNS:  T(C): 35.8 (01-22-19 @ 05:25), Max: 36.9 (01-21-19 @ 22:58)  HR: 74 (01-22-19 @ 08:16) (62 - 78)  BP: 159/77 (01-22-19 @ 08:16) (116/62 - 183/80)  RR: 16 (01-22-19 @ 08:16) (16 - 20)  SpO2: 92% (01-22-19 @ 08:16) (92% - 100%)  Wt(kg): --    I&O's Summary    21 Jan 2019 07:01  -  22 Jan 2019 07:00  --------------------------------------------------------  IN: 300 mL / OUT: 2700 mL / NET: -2400 mL          PHYSICAL EXAM:    General: A/ox 3, No acute Distress  Neck: Supple, + JVD  Cardiac: S1 S2, No M/R/G  Pulmonary: Lungs w/ bibasilar crackles, Breathing unlabored on 2L NC, No Rhonchi/Wheezing  Abdomen: Soft, Non -tender, +BS x 4 quads  Extremities: No Rashes, 1+ cece LE edema  extending to thighs, L>R  Neuro: A/o x 3, No focal deficits          LABS:                          9.5    7.8   )-----------( 106      ( 22 Jan 2019 05:27 )             31.6                              01-22    145  |  103  |  47<H>  ----------------------------<  119<H>  4.2   |  30  |  1.54<H>    Ca    10.0      22 Jan 2019 05:27  Mg     2.2     01-22    TPro  7.3  /  Alb  4.0  /  TBili  0.5  /  DBili  x   /  AST  48<H>  /  ALT  53<H>  /  AlkPhos  129<H>  01-22    LIVER FUNCTIONS - ( 22 Jan 2019 05:27 )  Alb: 4.0 g/dL / Pro: 7.3 g/dL / ALK PHOS: 129 U/L / ALT: 53 U/L / AST: 48 U/L / GGT: x         PT/INR - ( 21 Jan 2019 11:33 )   PT: 14.8 sec;   INR: 1.30          PTT - ( 21 Jan 2019 11:33 )  PTT:32.6 sec  CAPILLARY BLOOD GLUCOSE      POCT Blood Glucose.: 120 mg/dL (22 Jan 2019 07:16)  POCT Blood Glucose.: 122 mg/dL (21 Jan 2019 21:04)  POCT Blood Glucose.: 151 mg/dL (21 Jan 2019 16:24)    CARDIAC MARKERS ( 21 Jan 2019 16:35 )  x     / <0.01 ng/mL / 123 U/L / x     / 2.0 ng/mL  CARDIAC MARKERS ( 21 Jan 2019 11:33 )  x     / <0.01 ng/mL / x     / x     / x              Allergies:  contrast media (iodine-based) (Angioedema)  pineapple (Unknown)    MEDICATIONS  (STANDING):  apixaban 2.5 milliGRAM(s) Oral every 12 hours  atorvastatin 40 milliGRAM(s) Oral at bedtime  buDESOnide   0.5 milliGRAM(s) Respule 0.5 milliGRAM(s) Inhalation two times a day  ciprofloxacin     Tablet 250 milliGRAM(s) Oral every 12 hours  dextrose 5%. 1000 milliLiter(s) (50 mL/Hr) IV Continuous <Continuous>  dextrose 50% Injectable 12.5 Gram(s) IV Push once  dextrose 50% Injectable 25 Gram(s) IV Push once  dextrose 50% Injectable 25 Gram(s) IV Push once  furosemide   Injectable 80 milliGRAM(s) IV Push every 12 hours  insulin lispro (HumaLOG) corrective regimen sliding scale   SubCutaneous Before meals and at bedtime  isosorbide   mononitrate ER Tablet (IMDUR) 30 milliGRAM(s) Oral daily  levothyroxine 100 MICROGram(s) Oral daily  metoprolol succinate ER 25 milliGRAM(s) Oral daily  montelukast 10 milliGRAM(s) Oral daily  pantoprazole    Tablet 40 milliGRAM(s) Oral before breakfast  tiotropium 18 MICROgram(s) Capsule 1 Capsule(s) Inhalation daily    MEDICATIONS  (PRN):  dextrose 40% Gel 15 Gram(s) Oral once PRN Blood Glucose LESS THAN 70 milliGRAM(s)/deciliter  glucagon  Injectable 1 milliGRAM(s) IntraMuscular once PRN Glucose LESS THAN 70 milligrams/deciliter        DIAGNOSTIC TESTS:

## 2019-01-22 NOTE — PROGRESS NOTE ADULT - PROBLEM SELECTOR PLAN 5
SBP 180s on arrival to ED, now normotensive to 110s  -c/w Imdur 30mg qd  -c/w Toprol 25mg qd SBP 180s on arrival to ED. -160s currently.  -Increased Imdur 30 to 60mg qd  -c/w Toprol 25mg qd

## 2019-01-22 NOTE — PROGRESS NOTE ADULT - PROBLEM SELECTOR PLAN 9
F: No IVF  N: DASH/TLC/DM diet  E: Replete lytes PRN K<4, Mg<2  P: DVT PPX: on Eliquis  C: FULL CODE  Dispo: Admit to tele 5 L[facility] F: No IVF  N: DASH/TLC/DM diet, soft mechanical  E: Replete lytes PRN K<4, Mg<2  P: DVT PPX: on Eliquis  C: FULL CODE  Dispo: Admit to tele 5 [facility]

## 2019-01-22 NOTE — DIETITIAN INITIAL EVALUATION ADULT. - PROBLEM SELECTOR PLAN 7
Remote Hx of CAD s/p PCI. Daughter unclear when was last PCI.  -Off ASA/Plavix   -c/w Lipitor 40mg qd  -c/w Toprol 25mg qd    CKD, stage 3. Baseline appears 1.4-1.5 from previous [facility] admissions  -Hx Renal artery stenosis s/p PTA 1/2018  -Monitor BUN/crea  -Avoid nephrotoxic agents

## 2019-01-22 NOTE — PROGRESS NOTE ADULT - PROBLEM SELECTOR PLAN 7
Remote Hx of CAD s/p PCI. Daughter unclear when was last PCI.  -Off ASA/Plavix   -c/w Lipitor 40mg qd  -c/w Toprol 25mg qd    CKD, stage 3. Baseline appears 1.4-1.5 from previous [facility] admissions  -Hx Renal artery stenosis s/p PTA 1/2018  -Monitor BUN/crea  -Avoid nephrotoxic agents Remote Hx of CAD s/p remote PCI x 5. Daughter unclear when was last PCI.  -Off ASA/Plavix per Dr Dillard  -c/w Lipitor 40mg qd  -c/w Toprol 25mg qd    #CKD, stage 3. Baseline appears 1.4-1.5 from previous [facility] admissions  -Hx Renal artery stenosis s/p PTA 1/2018  -Monitor BUN/crea  -Avoid nephrotoxic agents  -Renally dose meds

## 2019-01-22 NOTE — PROGRESS NOTE ADULT - PROBLEM SELECTOR PLAN 1
C/o progressive worsening SOB, cece LE swelling extending to thighs x 2 weeks, periorbital swelling, and weight gain of 7lbs over the last week, increased orthopnea. RVP negative. CXR w/ pulm congestion, R pleural effusion. BNP 13K. Echo 1/2018 w/ EF 20-25%, mod dilated LA, mod MR, pulm HTN, PASP 57, mild conc LVH, severe LV global hypokinesis. Etiology possibly 2/2 increased fluid intake vs infectious process. S/p Lasix 80mg IV in ED.  -C/w diuresis Lasix 80mg IV BID  -c/w home Toprol 25mg qd  -c/w Imdur 30mg qd  -Pending Echo in AM  -Trop neg x 1, trend tonight  -F/u EKG in AM C/o progressive worsening SOB, cece LE swelling extending to thighs x 2 weeks, periorbital swelling, and weight gain of 7lbs over the last week, increased orthopnea. RVP negative. Trop neg x2. CXR w/ pulm congestion, R pleural effusion. BNP 13K. Echo 1/2018 w/ EF 20-25%, mod dilated LA, mod MR, pulm HTN, PASP 57, mild conc LVH, severe LV global hypokinesis. Etiology possibly 2/2 increased fluid intake vs infectious process. S/p Lasix 80mg IV in ED.  -C/w diuresis Lasix 80mg IV BID. Goal net neg 2L/day  -c/w home Toprol 25mg qd  -Increase Imdur to 60mg qd  -Pending Echo today  -F/u EKG in AM. SR w/ PACs, 1st degree HB, known LBBB  -Consider EP eval for BiV ICD if repeat echo w/ depressed EF <30%. Per Cardiologist Dr Dillard recent outpt Echo w/ EF 40% C/o progressive worsening SOB, cece LE swelling extending to thighs x 2 weeks, periorbital swelling, and weight gain of 7lbs over the last week, increased orthopnea. RVP negative. Trop neg x2. CXR w/ pulm congestion, R pleural effusion. BNP 13K. Echo 1/2018 w/ EF 20-25%, mod dilated LA, mod MR, pulm HTN, PASP 57, mild conc LVH, severe LV global hypokinesis. Etiology possibly 2/2 increased fluid intake vs infectious process. S/p Lasix 80mg IV in ED.  -C/w diuresis Lasix 80mg IV BID. Goal net neg 2L/day  -c/w home Toprol 25mg qd  -Increase Imdur to 60mg qd  -Pending Echo today  -F/u EKG in AM. SR w/ PACs, 1st degree HB, known LBBB  -Consider EP eval (Dr Connolly) for BiV ICD if repeat echo w/ depressed EF <30%. Per Cardiologist Dr Dillard recent outpt Echo w/ EF 40%

## 2019-01-22 NOTE — DIETITIAN INITIAL EVALUATION ADULT. - PROBLEM SELECTOR PLAN 1
C/o progressive worsening SOB, cece LE swelling extending to thighs x 2 weeks, periorbital swelling, and weight gain of 7lbs over the last week, increased orthopnea. RVP negative. CXR w/ pulm congestion, R pleural effusion. BNP 13K. Echo 1/2018 w/ EF 20-25%, mod dilated LA, mod MR, pulm HTN, PASP 57, mild conc LVH, severe LV global hypokinesis. Etiology possibly 2/2 increased fluid intake vs infectious process. S/p Lasix 80mg IV in ED.  -C/w diuresis Lasix 80mg IV BID  -c/w home Toprol 25mg qd  -c/w Imdur 30mg qd  -Pending Echo in AM  -Trop neg x 1, trend tonight  -F/u EKG in AM

## 2019-01-22 NOTE — PROGRESS NOTE ADULT - PROBLEM SELECTOR PLAN 3
Recent hx of dysuria, foul smelling odor and was placed on Cipro for "kidney infection" by PMD  -c/w Cipro 250mg BID for total 7 days course, last day 1/23  -F/u UCx

## 2019-01-22 NOTE — DIETITIAN INITIAL EVALUATION ADULT. - ENERGY NEEDS
Height: 5'3" Weight: 189.8 lbs (1/21), 180.7 lbs (1/22),  lbs+/-10%, %%, BMI 33.6,  IBW used to calculate EER as per pt's current body weight >120% of IBW   Estimated nutrient needs based on [facility] SOC for maintenance in older adults   FR 1000 mL/d per team 2/2 CHF

## 2019-01-22 NOTE — DIETITIAN INITIAL EVALUATION ADULT. - DIET TYPE
dysphagia 2, mechanical soft, thin liquids/DASH/TLC (sodium and cholesterol restricted diet)/1000ml/consistent carbohydrate (no snacks)

## 2019-01-22 NOTE — DIETITIAN INITIAL EVALUATION ADULT. - OTHER INFO
Nutrition consult by NP appreciated. 89 y.o F from home with PMHx of Afib on Eliquis, Chronic sCHF EF 20-25%, CAD s/p remote PCI, HTN, HLD, DM, KIRA s/p R renal artery PTA 1/2018, CKD baseline Cr 1-2, Anemia, Hypothyroidism, Asthma, Emphysema. Pt p/w SOB x 2 weeks with B/L LE edema, admitted for acute on chronic systolic CHF exacerbation. Daughter cooks at home, reports good adherence to low sodium diet but admits that pt drinks about seven 8 fl ounces per day which exceeds 1000 mL fluid restrictions 2/2 CHF. Overall good appetite, takes MVI at home, dry Wt ~180-182 lbs. 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 Currently tolerating Dysphagia 2 Mech Soft, CSTCHO, DASH/TLC diet, FR 1000 mL/d well with good >75% PO intake of meals. Denies N/V/D/C or pain. +BM 1/21. Skin intact. Nutrition edu provided to daughter on fluid restriction and management for CHF. RD will f/u per protocol.

## 2019-01-22 NOTE — PHYSICAL THERAPY INITIAL EVALUATION ADULT - ADDITIONAL COMMENTS
Patient previously required assistant from for all ADLs from home attendant who is present 10 hours/day 7 days/week. Patient would only leave the home for doctors appointments as reported by her daughter. She lives in a first floor apartment with no steps to enter.

## 2019-01-22 NOTE — PROGRESS NOTE ADULT - PROBLEM SELECTOR PLAN 8
Takes Januvia at home  -F/u HgA1c in AM  -[facility]  -Diabetic diet Takes Januvia at home  -HgA1c 6.5  -c/[facility]  -Diabetic diet

## 2019-01-23 LAB
-  AMIKACIN: SIGNIFICANT CHANGE UP
-  AMPICILLIN/SULBACTAM: SIGNIFICANT CHANGE UP
-  AMPICILLIN: SIGNIFICANT CHANGE UP
-  CEFAZOLIN: SIGNIFICANT CHANGE UP
-  CEFTRIAXONE: SIGNIFICANT CHANGE UP
-  CIPROFLOXACIN: SIGNIFICANT CHANGE UP
-  GENTAMICIN: SIGNIFICANT CHANGE UP
-  NITROFURANTOIN: SIGNIFICANT CHANGE UP
-  PIPERACILLIN/TAZOBACTAM: SIGNIFICANT CHANGE UP
-  TOBRAMYCIN: SIGNIFICANT CHANGE UP
-  TRIMETHOPRIM/SULFAMETHOXAZOLE: SIGNIFICANT CHANGE UP
ALBUMIN SERPL ELPH-MCNC: 3.6 G/DL — SIGNIFICANT CHANGE UP (ref 3.3–5)
ALP SERPL-CCNC: 107 U/L — SIGNIFICANT CHANGE UP (ref 40–120)
ALT FLD-CCNC: 43 U/L — SIGNIFICANT CHANGE UP (ref 10–45)
ANION GAP SERPL CALC-SCNC: 12 MMOL/L — SIGNIFICANT CHANGE UP (ref 5–17)
AST SERPL-CCNC: 37 U/L — SIGNIFICANT CHANGE UP (ref 10–40)
BILIRUB SERPL-MCNC: 0.4 MG/DL — SIGNIFICANT CHANGE UP (ref 0.2–1.2)
BUN SERPL-MCNC: 46 MG/DL — HIGH (ref 7–23)
CALCIUM SERPL-MCNC: 9.4 MG/DL — SIGNIFICANT CHANGE UP (ref 8.4–10.5)
CHLORIDE SERPL-SCNC: 101 MMOL/L — SIGNIFICANT CHANGE UP (ref 96–108)
CO2 SERPL-SCNC: 34 MMOL/L — HIGH (ref 22–31)
CREAT SERPL-MCNC: 1.49 MG/DL — HIGH (ref 0.5–1.3)
CULTURE RESULTS: SIGNIFICANT CHANGE UP
GLUCOSE SERPL-MCNC: 142 MG/DL — HIGH (ref 70–99)
HCT VFR BLD CALC: 29.6 % — LOW (ref 34.5–45)
HGB BLD-MCNC: 8.9 G/DL — LOW (ref 11.5–15.5)
MAGNESIUM SERPL-MCNC: 2.1 MG/DL — SIGNIFICANT CHANGE UP (ref 1.6–2.6)
MCHC RBC-ENTMCNC: 28.5 PG — SIGNIFICANT CHANGE UP (ref 27–34)
MCHC RBC-ENTMCNC: 30.1 G/DL — LOW (ref 32–36)
MCV RBC AUTO: 94.9 FL — SIGNIFICANT CHANGE UP (ref 80–100)
METHOD TYPE: SIGNIFICANT CHANGE UP
ORGANISM # SPEC MICROSCOPIC CNT: SIGNIFICANT CHANGE UP
ORGANISM # SPEC MICROSCOPIC CNT: SIGNIFICANT CHANGE UP
PLATELET # BLD AUTO: 118 K/UL — LOW (ref 150–400)
POTASSIUM SERPL-MCNC: 4 MMOL/L — SIGNIFICANT CHANGE UP (ref 3.5–5.3)
POTASSIUM SERPL-SCNC: 4 MMOL/L — SIGNIFICANT CHANGE UP (ref 3.5–5.3)
PROT SERPL-MCNC: 7 G/DL — SIGNIFICANT CHANGE UP (ref 6–8.3)
RBC # BLD: 3.12 M/UL — LOW (ref 3.8–5.2)
RBC # FLD: 14.9 % — SIGNIFICANT CHANGE UP (ref 10.3–16.9)
SODIUM SERPL-SCNC: 147 MMOL/L — HIGH (ref 135–145)
SPECIMEN SOURCE: SIGNIFICANT CHANGE UP
WBC # BLD: 6.6 K/UL — SIGNIFICANT CHANGE UP (ref 3.8–10.5)
WBC # FLD AUTO: 6.6 K/UL — SIGNIFICANT CHANGE UP (ref 3.8–10.5)

## 2019-01-23 PROCEDURE — 71045 X-RAY EXAM CHEST 1 VIEW: CPT | Mod: 26

## 2019-01-23 PROCEDURE — 76705 ECHO EXAM OF ABDOMEN: CPT | Mod: 26

## 2019-01-23 PROCEDURE — 99232 SBSQ HOSP IP/OBS MODERATE 35: CPT

## 2019-01-23 PROCEDURE — 93010 ELECTROCARDIOGRAM REPORT: CPT

## 2019-01-23 RX ORDER — HYDRALAZINE HCL 50 MG
25 TABLET ORAL EVERY 8 HOURS
Qty: 0 | Refills: 0 | Status: DISCONTINUED | OUTPATIENT
Start: 2019-01-23 | End: 2019-01-23

## 2019-01-23 RX ORDER — HYDRALAZINE HCL 50 MG
10 TABLET ORAL EVERY 8 HOURS
Qty: 0 | Refills: 0 | Status: DISCONTINUED | OUTPATIENT
Start: 2019-01-23 | End: 2019-01-24

## 2019-01-23 RX ADMIN — APIXABAN 2.5 MILLIGRAM(S): 2.5 TABLET, FILM COATED ORAL at 06:16

## 2019-01-23 RX ADMIN — Medication 25 MILLIGRAM(S): at 06:16

## 2019-01-23 RX ADMIN — ATORVASTATIN CALCIUM 40 MILLIGRAM(S): 80 TABLET, FILM COATED ORAL at 22:00

## 2019-01-23 RX ADMIN — Medication 0.5 MILLIGRAM(S): at 19:48

## 2019-01-23 RX ADMIN — TIOTROPIUM BROMIDE 1 CAPSULE(S): 18 CAPSULE ORAL; RESPIRATORY (INHALATION) at 11:15

## 2019-01-23 RX ADMIN — ISOSORBIDE MONONITRATE 60 MILLIGRAM(S): 60 TABLET, EXTENDED RELEASE ORAL at 11:15

## 2019-01-23 RX ADMIN — Medication 100 MICROGRAM(S): at 06:16

## 2019-01-23 RX ADMIN — Medication 80 MILLIGRAM(S): at 06:18

## 2019-01-23 RX ADMIN — Medication 0.5 MILLIGRAM(S): at 06:16

## 2019-01-23 RX ADMIN — Medication 2: at 06:51

## 2019-01-23 RX ADMIN — PANTOPRAZOLE SODIUM 40 MILLIGRAM(S): 20 TABLET, DELAYED RELEASE ORAL at 06:16

## 2019-01-23 RX ADMIN — Medication 80 MILLIGRAM(S): at 19:48

## 2019-01-23 RX ADMIN — MONTELUKAST 10 MILLIGRAM(S): 4 TABLET, CHEWABLE ORAL at 11:15

## 2019-01-23 RX ADMIN — Medication 4: at 22:00

## 2019-01-23 RX ADMIN — APIXABAN 2.5 MILLIGRAM(S): 2.5 TABLET, FILM COATED ORAL at 19:48

## 2019-01-23 RX ADMIN — Medication 10 MILLIGRAM(S): at 19:52

## 2019-01-23 NOTE — PROGRESS NOTE ADULT - PROBLEM SELECTOR PLAN 7
Remote Hx of CAD s/p remote PCI x 5. Daughter unclear when was last PCI.  -Off ASA/Plavix per Dr Dillard  -c/w Lipitor 40mg qd  -c/w Toprol 25mg qd    #CKD, stage 3. Baseline appears 1.4-1.5 from previous [facility] admissions  -Hx Renal artery stenosis s/p PTA 1/2018  -Monitor BUN/crea  -Avoid nephrotoxic agents  -Renally dose meds

## 2019-01-23 NOTE — PROGRESS NOTE ADULT - PROBLEM SELECTOR PLAN 9
F: No IVF  N: DASH/TLC/DM diet, soft mechanical  E: Replete lytes PRN K<4, Mg<2  P: DVT PPX: on Eliquis  C: FULL CODE  Dispo: Admit to tele 5 [facility]

## 2019-01-23 NOTE — PROGRESS NOTE ADULT - PROBLEM SELECTOR PLAN 3
Recent hx of dysuria, foul smelling odor and was placed on Cipro for "kidney infection" by PMD  -c/w Cipro 250mg BID for total 7 days course, last day 1/23  -F/u UCx Recent hx of dysuria, foul smelling odor and was placed on Cipro for "kidney infection" by PMD  -S/p Cipro 250mg BID for total 7 days course, completed 1/22  -F/u UCx

## 2019-01-23 NOTE — PROGRESS NOTE ADULT - PROBLEM SELECTOR PLAN 10
D/c pending clinical progress    PT rec home w/ home PT. HHA x 10 hrs daily. Will need resumption of HHA services prior to DC.

## 2019-01-23 NOTE — PROGRESS NOTE ADULT - PROBLEM SELECTOR PLAN 4
On home O2 2L NC at all times per daughter. Not in acute exacerbation.  -c/w Spiriva INH  -c/w Budesonide INLH  -c/w Singulair

## 2019-01-23 NOTE — PROGRESS NOTE ADULT - PROBLEM SELECTOR PLAN 5
SBP 180s on arrival to ED. -160s currently.  -Increased Imdur 30 to 60mg qd  -c/w Toprol 25mg qd SBP 180s on arrival to ED. -160s currently.  -Increased Imdur to 60mg qd  -c/w Toprol 25mg qd  -Consider starting ACE/ARB vs Hydralazine

## 2019-01-23 NOTE — PROGRESS NOTE ADULT - PROBLEM SELECTOR PLAN 2
Currently SR 60-70s  -c/w Eliquis 2.5mg BID  -c/w Toprol 25mg qd    #Transaminitis  Elevated Alk Phos 129, AST/ALT 48/53. Likely hepatic congestion 2/2 CHF exacerbation.  -Monitor LFTs  -F/u GGT  -Obtain RUQ US Currently rate controlled 60-70s  -c/w Toprol 25mg qd  -c/w Eliquis 2.5mg BID  -If pt agreeable to BiV PPM vs ICD, will need to HOLD Eliquis x 1-2 days      #Transaminitis  Elevated Alk Phos 129, AST/ALT 48/53. Likely hepatic congestion 2/2 CHF exacerbation.  -LFTs resolved  -GGT elevated  -Obtain RUQ US

## 2019-01-23 NOTE — PROGRESS NOTE ADULT - SUBJECTIVE AND OBJECTIVE BOX
CARDIOLOGY NP PROGRESS NOTE    Subjective: Pt seen and examined at bedside. Reports feeling better, but has some chest tightness when laying down in bed, improves with sitting up. Denies chest pain, sob, lightheadedness, dizziness, palpitations, fever, chills.  Remainder ROS otherwise negative.    Overnight Events: Diuresing well, net neg 2L over last 24hrs    TELEMETRY: Afib 60s        VITAL SIGNS:  T(C): 36.2 (01-23-19 @ 10:02), Max: 36.6 (01-23-19 @ 05:42)  HR: 68 (01-23-19 @ 08:02) (64 - 68)  BP: 150/54 (01-23-19 @ 08:02) (139/61 - 172/72)  RR: 15 (01-23-19 @ 08:02) (15 - 18)  SpO2: 97% (01-23-19 @ 08:02) (95% - 100%)  Wt(kg): --    I&O's Summary    22 Jan 2019 07:01  -  23 Jan 2019 07:00  --------------------------------------------------------  IN: 700 mL / OUT: 2750 mL / NET: -2050 mL          PHYSICAL EXAM:    General: A/ox 3, No acute Distress  Neck: Supple, + JVD (improving)  Cardiac: S1 S2, No M/R/G  Pulmonary: Lungs w/ bibasilar crackles, Breathing unlabored on 2L NC, No Rhonchi/Wheezing  Abdomen: Soft, Non -tender, +BS x 4 quads  Extremities: No Rashes, trace cece LE edema, L>R  Neuro: A/o x 3, No focal deficits          LABS:                          8.9    6.6   )-----------( 118      ( 23 Jan 2019 06:16 )             29.6                              01-23    147<H>  |  101  |  46<H>  ----------------------------<  142<H>  4.0   |  34<H>  |  1.49<H>    Ca    9.4      23 Jan 2019 06:15  Mg     2.1     01-23    TPro  7.0  /  Alb  3.6  /  TBili  0.4  /  DBili  x   /  AST  37  /  ALT  43  /  AlkPhos  107  01-23    LIVER FUNCTIONS - ( 23 Jan 2019 06:15 )  Alb: 3.6 g/dL / Pro: 7.0 g/dL / ALK PHOS: 107 U/L / ALT: 43 U/L / AST: 37 U/L / GGT: x                                 PT/INR - ( 21 Jan 2019 11:33 )   PT: 14.8 sec;   INR: 1.30          PTT - ( 21 Jan 2019 11:33 )  PTT:32.6 sec  CAPILLARY BLOOD GLUCOSE      POCT Blood Glucose.: 155 mg/dL (23 Jan 2019 06:46)  POCT Blood Glucose.: 148 mg/dL (22 Jan 2019 21:24)  POCT Blood Glucose.: 108 mg/dL (22 Jan 2019 16:20)    CARDIAC MARKERS ( 21 Jan 2019 16:35 )  x     / <0.01 ng/mL / 123 U/L / x     / 2.0 ng/mL  CARDIAC MARKERS ( 21 Jan 2019 11:33 )  x     / <0.01 ng/mL / x     / x     / x              Allergies:  contrast media (iodine-based) (Angioedema)  pineapple (Unknown)    MEDICATIONS  (STANDING):  apixaban 2.5 milliGRAM(s) Oral every 12 hours  atorvastatin 40 milliGRAM(s) Oral at bedtime  buDESOnide   0.5 milliGRAM(s) Respule 0.5 milliGRAM(s) Inhalation two times a day  dextrose 5%. 1000 milliLiter(s) (50 mL/Hr) IV Continuous <Continuous>  dextrose 50% Injectable 12.5 Gram(s) IV Push once  dextrose 50% Injectable 25 Gram(s) IV Push once  dextrose 50% Injectable 25 Gram(s) IV Push once  furosemide   Injectable 80 milliGRAM(s) IV Push every 12 hours  insulin lispro (HumaLOG) corrective regimen sliding scale   SubCutaneous Before meals and at bedtime  isosorbide   mononitrate ER Tablet (IMDUR) 60 milliGRAM(s) Oral daily  levothyroxine 100 MICROGram(s) Oral daily  metoprolol succinate ER 25 milliGRAM(s) Oral daily  montelukast 10 milliGRAM(s) Oral daily  pantoprazole    Tablet 40 milliGRAM(s) Oral before breakfast  tiotropium 18 MICROgram(s) Capsule 1 Capsule(s) Inhalation daily    MEDICATIONS  (PRN):  dextrose 40% Gel 15 Gram(s) Oral once PRN Blood Glucose LESS THAN 70 milliGRAM(s)/deciliter  glucagon  Injectable 1 milliGRAM(s) IntraMuscular once PRN Glucose LESS THAN 70 milligrams/deciliter        DIAGNOSTIC TESTS:

## 2019-01-23 NOTE — PROGRESS NOTE ADULT - PROBLEM SELECTOR PLAN 1
C/o progressive worsening SOB, cece LE swelling extending to thighs x 2 weeks, periorbital swelling, and weight gain of 7lbs over the last week, increased orthopnea. RVP negative. Trop neg x2. CXR w/ pulm congestion, R pleural effusion. BNP 13K. Echo 1/2018 w/ EF 20-25%, mod dilated LA, mod MR, pulm HTN, PASP 57, mild conc LVH, severe LV global hypokinesis. Etiology possibly 2/2 increased fluid intake vs infectious process. S/p Lasix 80mg IV in ED.  -C/w diuresis Lasix 80mg IV BID. Goal net neg 2L/day  -c/w home Toprol 25mg qd  -Increased Imdur to 60mg qd  -Consider initiating ACEi/ARB vs Hydralazine   -F/u EKG in AM. SR w/ PACs, 1st degree HB, known LBBB  -Echo 1/22/19 w/ EF 30%, severe LV global HK, mild-mod MR, mod pulm HTN, PASP 55  -EP consulted (Dr Connolly) for BiV ICD vs PPM for depressed EF 30% and LBBB. Pt and family to speak w/ outpt cardiologist and to decide whether to proceed C/o progressive worsening SOB, cece LE swelling extending to thighs x 2 weeks, periorbital swelling, and weight gain of 7lbs over the last week, increased orthopnea. RVP negative. Trop neg x2. CXR w/ pulm congestion, R pleural effusion. BNP 13K. Echo 1/2018 w/ EF 20-25%, mod dilated LA, mod MR, pulm HTN, PASP 57, mild conc LVH, severe LV global hypokinesis. Etiology possibly 2/2 increased fluid intake vs infectious process. S/p Lasix 80mg IV in ED.  -C/w diuresis Lasix 80mg IV BID. Goal net neg 2L/day  -c/w home Toprol 25mg qd  -Increased Imdur to 60mg qd  -Consider initiating ACEi/ARB vs Hydralazine   -F/u EKG in AM. SR w/ PACs, 1st degree HB, known LBBB  -Echo 1/22/19 w/ EF 30%, severe LV global HK, mild-mod MR, mod pulm HTN, PASP 55  -EP consulted (Dr Connolly) for BiV ICD vs PPM for depressed EF 30% and LBBB. Pt and family to speak w/ outpt cardiologist and to decide whether to proceed (aiming for Friday)

## 2019-01-24 LAB
ANION GAP SERPL CALC-SCNC: 10 MMOL/L — SIGNIFICANT CHANGE UP (ref 5–17)
BLD GP AB SCN SERPL QL: NEGATIVE — SIGNIFICANT CHANGE UP
BUN SERPL-MCNC: 46 MG/DL — HIGH (ref 7–23)
CALCIUM SERPL-MCNC: 9.3 MG/DL — SIGNIFICANT CHANGE UP (ref 8.4–10.5)
CHLORIDE SERPL-SCNC: 98 MMOL/L — SIGNIFICANT CHANGE UP (ref 96–108)
CO2 SERPL-SCNC: 35 MMOL/L — HIGH (ref 22–31)
CREAT SERPL-MCNC: 1.49 MG/DL — HIGH (ref 0.5–1.3)
GLUCOSE SERPL-MCNC: 115 MG/DL — HIGH (ref 70–99)
HCT VFR BLD CALC: 31.3 % — LOW (ref 34.5–45)
HGB BLD-MCNC: 9.4 G/DL — LOW (ref 11.5–15.5)
IRON SATN MFR SERPL: 13 % — LOW (ref 14–50)
IRON SATN MFR SERPL: 28 UG/DL — LOW (ref 30–160)
MAGNESIUM SERPL-MCNC: 1.9 MG/DL — SIGNIFICANT CHANGE UP (ref 1.6–2.6)
MCHC RBC-ENTMCNC: 28.6 PG — SIGNIFICANT CHANGE UP (ref 27–34)
MCHC RBC-ENTMCNC: 30 G/DL — LOW (ref 32–36)
MCV RBC AUTO: 95.1 FL — SIGNIFICANT CHANGE UP (ref 80–100)
PLATELET # BLD AUTO: 136 K/UL — LOW (ref 150–400)
POTASSIUM SERPL-MCNC: 3.8 MMOL/L — SIGNIFICANT CHANGE UP (ref 3.5–5.3)
POTASSIUM SERPL-SCNC: 3.8 MMOL/L — SIGNIFICANT CHANGE UP (ref 3.5–5.3)
RBC # BLD: 3.29 M/UL — LOW (ref 3.8–5.2)
RBC # FLD: 14.8 % — SIGNIFICANT CHANGE UP (ref 10.3–16.9)
RH IG SCN BLD-IMP: POSITIVE — SIGNIFICANT CHANGE UP
SODIUM SERPL-SCNC: 143 MMOL/L — SIGNIFICANT CHANGE UP (ref 135–145)
TIBC SERPL-MCNC: 217 UG/DL — LOW (ref 220–430)
UIBC SERPL-MCNC: 189 UG/DL — SIGNIFICANT CHANGE UP (ref 110–370)
WBC # BLD: 6.6 K/UL — SIGNIFICANT CHANGE UP (ref 3.8–10.5)
WBC # FLD AUTO: 6.6 K/UL — SIGNIFICANT CHANGE UP (ref 3.8–10.5)

## 2019-01-24 PROCEDURE — 99232 SBSQ HOSP IP/OBS MODERATE 35: CPT

## 2019-01-24 PROCEDURE — 71045 X-RAY EXAM CHEST 1 VIEW: CPT | Mod: 26

## 2019-01-24 RX ORDER — POLYETHYLENE GLYCOL 3350 17 G/17G
17 POWDER, FOR SOLUTION ORAL
Qty: 0 | Refills: 0 | Status: DISCONTINUED | OUTPATIENT
Start: 2019-01-24 | End: 2019-01-28

## 2019-01-24 RX ORDER — LIDOCAINE 4 G/100G
1 CREAM TOPICAL DAILY
Qty: 0 | Refills: 0 | Status: DISCONTINUED | OUTPATIENT
Start: 2019-01-24 | End: 2019-01-28

## 2019-01-24 RX ORDER — HYDRALAZINE HCL 50 MG
25 TABLET ORAL
Qty: 0 | Refills: 0 | Status: DISCONTINUED | OUTPATIENT
Start: 2019-01-24 | End: 2019-01-26

## 2019-01-24 RX ORDER — DOCUSATE SODIUM 100 MG
100 CAPSULE ORAL
Qty: 0 | Refills: 0 | Status: DISCONTINUED | OUTPATIENT
Start: 2019-01-24 | End: 2019-01-28

## 2019-01-24 RX ORDER — FERROUS SULFATE 325(65) MG
325 TABLET ORAL DAILY
Qty: 0 | Refills: 0 | Status: DISCONTINUED | OUTPATIENT
Start: 2019-01-24 | End: 2019-01-28

## 2019-01-24 RX ORDER — SENNA PLUS 8.6 MG/1
2 TABLET ORAL AT BEDTIME
Qty: 0 | Refills: 0 | Status: DISCONTINUED | OUTPATIENT
Start: 2019-01-24 | End: 2019-01-28

## 2019-01-24 RX ORDER — LIDOCAINE 4 G/100G
1 CREAM TOPICAL EVERY 24 HOURS
Qty: 0 | Refills: 0 | Status: DISCONTINUED | OUTPATIENT
Start: 2019-01-24 | End: 2019-01-28

## 2019-01-24 RX ADMIN — APIXABAN 2.5 MILLIGRAM(S): 2.5 TABLET, FILM COATED ORAL at 06:29

## 2019-01-24 RX ADMIN — Medication 2: at 21:40

## 2019-01-24 RX ADMIN — LIDOCAINE 1 PATCH: 4 CREAM TOPICAL at 19:43

## 2019-01-24 RX ADMIN — SENNA PLUS 2 TABLET(S): 8.6 TABLET ORAL at 21:53

## 2019-01-24 RX ADMIN — Medication 0.5 MILLIGRAM(S): at 18:28

## 2019-01-24 RX ADMIN — ISOSORBIDE MONONITRATE 60 MILLIGRAM(S): 60 TABLET, EXTENDED RELEASE ORAL at 11:39

## 2019-01-24 RX ADMIN — LIDOCAINE 1 PATCH: 4 CREAM TOPICAL at 16:33

## 2019-01-24 RX ADMIN — MONTELUKAST 10 MILLIGRAM(S): 4 TABLET, CHEWABLE ORAL at 11:39

## 2019-01-24 RX ADMIN — Medication 25 MILLIGRAM(S): at 06:29

## 2019-01-24 RX ADMIN — Medication 25 MILLIGRAM(S): at 13:22

## 2019-01-24 RX ADMIN — PANTOPRAZOLE SODIUM 40 MILLIGRAM(S): 20 TABLET, DELAYED RELEASE ORAL at 06:29

## 2019-01-24 RX ADMIN — Medication 80 MILLIGRAM(S): at 06:29

## 2019-01-24 RX ADMIN — POLYETHYLENE GLYCOL 3350 17 GRAM(S): 17 POWDER, FOR SOLUTION ORAL at 19:46

## 2019-01-24 RX ADMIN — Medication 100 MILLIGRAM(S): at 19:46

## 2019-01-24 RX ADMIN — Medication 325 MILLIGRAM(S): at 13:22

## 2019-01-24 RX ADMIN — Medication 100 MICROGRAM(S): at 06:29

## 2019-01-24 RX ADMIN — ATORVASTATIN CALCIUM 40 MILLIGRAM(S): 80 TABLET, FILM COATED ORAL at 21:53

## 2019-01-24 RX ADMIN — Medication 10 MILLIGRAM(S): at 12:24

## 2019-01-24 RX ADMIN — Medication 10 MILLIGRAM(S): at 02:01

## 2019-01-24 RX ADMIN — Medication 80 MILLIGRAM(S): at 18:28

## 2019-01-24 RX ADMIN — Medication 0.5 MILLIGRAM(S): at 06:29

## 2019-01-24 RX ADMIN — TIOTROPIUM BROMIDE 1 CAPSULE(S): 18 CAPSULE ORAL; RESPIRATORY (INHALATION) at 11:39

## 2019-01-24 RX ADMIN — Medication 4: at 11:38

## 2019-01-24 RX ADMIN — Medication 25 MILLIGRAM(S): at 21:53

## 2019-01-24 RX ADMIN — LIDOCAINE 1 PATCH: 4 CREAM TOPICAL at 16:34

## 2019-01-24 RX ADMIN — Medication 10 MILLIGRAM(S): at 10:46

## 2019-01-24 NOTE — CHART NOTE - NSCHARTNOTEFT_GEN_A_CORE
Called by nurse for pain in left leg and left neck pain. Went and saw patient at bedside, no pain with movement of left leg with pressure, only with deep palpation and walking does she have pain. Likely due to bone spur or arthritis given symptoms. No signs of swelling, erythema, infection, thrombophlebitis. Patient with CKD and CHF exacerbation, will suggest tylenol for pain.   Neck pain with extremely tense left side of neck, pain to deep palpation, relieved with minimal massage. WIll wive heat packs, consult palliative for massage therapy. Called by nurse for pain in left leg and left neck pain. Went and saw patient at bedside, no pain with movement of left leg with pressure, only with deep palpation and walking does she have pain. Likely due to bone spur or arthritis given symptoms. No signs of swelling, erythema, infection, thrombophlebitis. Patient with CKD and CHF exacerbation, will suggest tylenol for pain.   Neck pain with extremely tense left side of neck, pain to deep palpation, relieved with minimal massage. WIll give heat packs, topical lidocaine patch, consult palliative for massage therapy.

## 2019-01-24 NOTE — PROGRESS NOTE ADULT - PROBLEM SELECTOR PLAN 3
Recent hx of dysuria, foul smelling odor and was placed on Cipro for "kidney infection" by PMD  -S/p Cipro 250mg BID for total 7 days course, completed 1/22  -F/u UCx

## 2019-01-24 NOTE — PROGRESS NOTE ADULT - SUBJECTIVE AND OBJECTIVE BOX
INTERVAL HPI/OVERNIGHT EVENTS:  Patient was seen and examined at bedside. As per nurse and patient, no o/n events, patient resting comfortably. No complaints at this time, still with some SOB with movement and rest but improved. Patient denies: fever, chills, dizziness, weakness, HA, Changes in vision, CP, palpitations, cough, N/V/D/C, dysuria. Patient states she does have constipation for two days. ROS otherwise negative.    VITAL SIGNS:  T(F): 98 (01-24-19 @ 10:36)  HR: 74 (01-24-19 @ 08:21)  BP: 146/60 (01-24-19 @ 08:21)  RR: 24 (01-24-19 @ 08:21)  SpO2: 96% (01-24-19 @ 08:21)  Wt(kg): --    PHYSICAL EXAM:    Constitutional: WDWN, NAD  HEENT: PERRL, EOMI, sclera non-icteric, neck supple, trachea midline, no masses, +JVD , MMM  Respiratory: CTA b/l anteriorly, however crackles bilateral posteriorly to mid lobes, good air entry b/l, without accessory muscle use and no intercostal retractions  Cardiovascular: Irregularly irregular, normal S1S2, no M/R/G  Gastrointestinal: soft, NTND, no masses palpable, BS normal  Extremities: Warm, well perfused, pulses equal bilateral upper and lower extremities, no edema, no clubbing; Painful to palpation bilaterally, no erythema  Neurological: AAOx3, CN Grossly intact  Skin: Normal temperature, warm, dry    MEDICATIONS  (STANDING):  atorvastatin 40 milliGRAM(s) Oral at bedtime  buDESOnide   0.5 milliGRAM(s) Respule 0.5 milliGRAM(s) Inhalation two times a day  dextrose 5%. 1000 milliLiter(s) (50 mL/Hr) IV Continuous <Continuous>  dextrose 50% Injectable 12.5 Gram(s) IV Push once  dextrose 50% Injectable 25 Gram(s) IV Push once  dextrose 50% Injectable 25 Gram(s) IV Push once  furosemide   Injectable 80 milliGRAM(s) IV Push every 12 hours  hydrALAZINE 25 milliGRAM(s) Oral <User Schedule>  insulin lispro (HumaLOG) corrective regimen sliding scale   SubCutaneous Before meals and at bedtime  isosorbide   mononitrate ER Tablet (IMDUR) 60 milliGRAM(s) Oral daily  levothyroxine 100 MICROGram(s) Oral daily  metoprolol succinate ER 25 milliGRAM(s) Oral daily  montelukast 10 milliGRAM(s) Oral daily  pantoprazole    Tablet 40 milliGRAM(s) Oral before breakfast  tiotropium 18 MICROgram(s) Capsule 1 Capsule(s) Inhalation daily    MEDICATIONS  (PRN):  bisacodyl Suppository 10 milliGRAM(s) Rectal daily PRN Constipation  dextrose 40% Gel 15 Gram(s) Oral once PRN Blood Glucose LESS THAN 70 milliGRAM(s)/deciliter  glucagon  Injectable 1 milliGRAM(s) IntraMuscular once PRN Glucose LESS THAN 70 milligrams/deciliter      Allergies    contrast media (iodine-based) (Angioedema)  pineapple (Unknown)    Intolerances        LABS:                        9.4    6.6   )-----------( 136      ( 24 Jan 2019 06:03 )             31.3     01-24    143  |  98  |  46<H>  ----------------------------<  115<H>  3.8   |  35<H>  |  1.49<H>    Ca    9.3      24 Jan 2019 06:03  Mg     1.9     01-24    TPro  7.0  /  Alb  3.6  /  TBili  0.4  /  DBili  x   /  AST  37  /  ALT  43  /  AlkPhos  107  01-23          RADIOLOGY & ADDITIONAL TESTS:  Reviewed

## 2019-01-24 NOTE — PROGRESS NOTE ADULT - PROBLEM SELECTOR PLAN 9
F: No IVF  N: DASH/TLC/DM diet, soft mechanical  E: Replete lytes PRN K<4, Mg<2  P: DVT PPX: HOLD eliquis as above, restart on eliquis post procedure or transition to heparin carlos alberto  C: FULL CODE  Dispo: Continue care on tele 5 [facility]

## 2019-01-24 NOTE — PROGRESS NOTE ADULT - PROBLEM SELECTOR PLAN 1
SOB improved, still with crackles, requires more diuresis but will be stable for procedure tomorrow  -C/w diuresis Lasix 80mg IV BID. Goal net neg 2L/day  - Add metolazone 10mg once today  -c/w home Toprol 25mg qd  -Increased Imdur to 60mg qd  -Uptitrate Hydral to 25 TID  -EKG SR w/ PACs, 1st degree HB, known LBBB  -Echo 1/22/19 w/ EF 30%, severe LV global HK, mild-mod MR, mod pulm HTN, PASP 55  -EP consulted (Dr Connolly) for BiV ICD vs PPM for depressed EF 30% and LBBB. BiV in AM

## 2019-01-24 NOTE — PROGRESS NOTE ADULT - PROBLEM SELECTOR PLAN 7
Remote Hx of CAD s/p remote PCI x 5. Daughter unclear when was last PCI.  -Off ASA/Plavix per Dr Dillard  -c/w Lipitor 40mg qd  -c/w Toprol 25mg qd    #CKD, stage 3. Baseline appears 1.4-1.5 from previous [facility] admissions  -Hx Renal artery stenosis s/p PTA 1/2018  -Monitor BUN/crea  -Avoid nephrotoxic agents  -Renally dose meds

## 2019-01-24 NOTE — PROGRESS NOTE ADULT - PROBLEM SELECTOR PLAN 5
SBP 180s on arrival to ED. -160s currently.  -Increased Imdur to 60mg qd  -c/w Toprol 25mg qd  - Hydral uptitration as above

## 2019-01-24 NOTE — PROGRESS NOTE ADULT - PROBLEM SELECTOR PLAN 2
Currently rate controlled 60-70s  -c/w Toprol 25mg qd  -HOLD eliquis bid today for procedure tomorrow, restart after procedure or switch to heparin guttae  -pt agreeable to BiV PPM vs ICD, will need to HOLD Eliquis today      #Transaminitis  Elevated Alk Phos 129, AST/ALT 48/53. Likely hepatic congestion 2/2 CHF exacerbation.  -LFTs resolved  -GGT elevated  -Obtain RUQ US

## 2019-01-25 DIAGNOSIS — I95.9 HYPOTENSION, UNSPECIFIED: ICD-10-CM

## 2019-01-25 DIAGNOSIS — M62.838 OTHER MUSCLE SPASM: ICD-10-CM

## 2019-01-25 DIAGNOSIS — M19.90 UNSPECIFIED OSTEOARTHRITIS, UNSPECIFIED SITE: ICD-10-CM

## 2019-01-25 DIAGNOSIS — Z51.5 ENCOUNTER FOR PALLIATIVE CARE: ICD-10-CM

## 2019-01-25 DIAGNOSIS — M54.2 CERVICALGIA: ICD-10-CM

## 2019-01-25 LAB
ANION GAP SERPL CALC-SCNC: 12 MMOL/L — SIGNIFICANT CHANGE UP (ref 5–17)
ANION GAP SERPL CALC-SCNC: 13 MMOL/L — SIGNIFICANT CHANGE UP (ref 5–17)
APTT BLD: 31.4 SEC — SIGNIFICANT CHANGE UP (ref 27.5–36.3)
BUN SERPL-MCNC: 50 MG/DL — HIGH (ref 7–23)
BUN SERPL-MCNC: 51 MG/DL — HIGH (ref 7–23)
CALCIUM SERPL-MCNC: 9.2 MG/DL — SIGNIFICANT CHANGE UP (ref 8.4–10.5)
CALCIUM SERPL-MCNC: 9.6 MG/DL — SIGNIFICANT CHANGE UP (ref 8.4–10.5)
CHLORIDE SERPL-SCNC: 96 MMOL/L — SIGNIFICANT CHANGE UP (ref 96–108)
CHLORIDE SERPL-SCNC: 96 MMOL/L — SIGNIFICANT CHANGE UP (ref 96–108)
CO2 SERPL-SCNC: 34 MMOL/L — HIGH (ref 22–31)
CO2 SERPL-SCNC: 35 MMOL/L — HIGH (ref 22–31)
CREAT SERPL-MCNC: 1.85 MG/DL — HIGH (ref 0.5–1.3)
CREAT SERPL-MCNC: 1.94 MG/DL — HIGH (ref 0.5–1.3)
GLUCOSE SERPL-MCNC: 132 MG/DL — HIGH (ref 70–99)
GLUCOSE SERPL-MCNC: 160 MG/DL — HIGH (ref 70–99)
HCT VFR BLD CALC: 30.6 % — LOW (ref 34.5–45)
HCT VFR BLD CALC: 32.2 % — LOW (ref 34.5–45)
HGB BLD-MCNC: 9.4 G/DL — LOW (ref 11.5–15.5)
HGB BLD-MCNC: 9.8 G/DL — LOW (ref 11.5–15.5)
INR BLD: 1.37 — HIGH (ref 0.88–1.16)
MAGNESIUM SERPL-MCNC: 2 MG/DL — SIGNIFICANT CHANGE UP (ref 1.6–2.6)
MCHC RBC-ENTMCNC: 28.7 PG — SIGNIFICANT CHANGE UP (ref 27–34)
MCHC RBC-ENTMCNC: 29.3 PG — SIGNIFICANT CHANGE UP (ref 27–34)
MCHC RBC-ENTMCNC: 30.4 G/DL — LOW (ref 32–36)
MCHC RBC-ENTMCNC: 30.7 G/DL — LOW (ref 32–36)
MCV RBC AUTO: 94.4 FL — SIGNIFICANT CHANGE UP (ref 80–100)
MCV RBC AUTO: 95.3 FL — SIGNIFICANT CHANGE UP (ref 80–100)
PHOSPHATE SERPL-MCNC: 4.3 MG/DL — SIGNIFICANT CHANGE UP (ref 2.5–4.5)
PLATELET # BLD AUTO: 145 K/UL — LOW (ref 150–400)
PLATELET # BLD AUTO: 149 K/UL — LOW (ref 150–400)
POTASSIUM SERPL-MCNC: 3.6 MMOL/L — SIGNIFICANT CHANGE UP (ref 3.5–5.3)
POTASSIUM SERPL-MCNC: 3.7 MMOL/L — SIGNIFICANT CHANGE UP (ref 3.5–5.3)
POTASSIUM SERPL-SCNC: 3.6 MMOL/L — SIGNIFICANT CHANGE UP (ref 3.5–5.3)
POTASSIUM SERPL-SCNC: 3.7 MMOL/L — SIGNIFICANT CHANGE UP (ref 3.5–5.3)
PROTHROM AB SERPL-ACNC: 15.6 SEC — HIGH (ref 10–12.9)
RBC # BLD: 3.21 M/UL — LOW (ref 3.8–5.2)
RBC # BLD: 3.41 M/UL — LOW (ref 3.8–5.2)
RBC # FLD: 14.5 % — SIGNIFICANT CHANGE UP (ref 10.3–16.9)
RBC # FLD: 15.1 % — SIGNIFICANT CHANGE UP (ref 10.3–16.9)
SODIUM SERPL-SCNC: 142 MMOL/L — SIGNIFICANT CHANGE UP (ref 135–145)
SODIUM SERPL-SCNC: 144 MMOL/L — SIGNIFICANT CHANGE UP (ref 135–145)
WBC # BLD: 7.4 K/UL — SIGNIFICANT CHANGE UP (ref 3.8–10.5)
WBC # BLD: 8.8 K/UL — SIGNIFICANT CHANGE UP (ref 3.8–10.5)
WBC # FLD AUTO: 7.4 K/UL — SIGNIFICANT CHANGE UP (ref 3.8–10.5)
WBC # FLD AUTO: 8.8 K/UL — SIGNIFICANT CHANGE UP (ref 3.8–10.5)

## 2019-01-25 PROCEDURE — 93306 TTE W/DOPPLER COMPLETE: CPT | Mod: 26

## 2019-01-25 PROCEDURE — 99232 SBSQ HOSP IP/OBS MODERATE 35: CPT

## 2019-01-25 PROCEDURE — 99223 1ST HOSP IP/OBS HIGH 75: CPT

## 2019-01-25 PROCEDURE — 33249 INSJ/RPLCMT DEFIB W/LEAD(S): CPT

## 2019-01-25 PROCEDURE — 71045 X-RAY EXAM CHEST 1 VIEW: CPT | Mod: 26

## 2019-01-25 PROCEDURE — 33225 L VENTRIC PACING LEAD ADD-ON: CPT

## 2019-01-25 PROCEDURE — 93010 ELECTROCARDIOGRAM REPORT: CPT

## 2019-01-25 PROCEDURE — 93641 EP EVL 1/2CHMB PAC CVDFB TST: CPT | Mod: 26

## 2019-01-25 RX ORDER — SODIUM CHLORIDE 9 MG/ML
500 INJECTION INTRAMUSCULAR; INTRAVENOUS; SUBCUTANEOUS ONCE
Qty: 0 | Refills: 0 | Status: COMPLETED | OUTPATIENT
Start: 2019-01-25 | End: 2019-01-25

## 2019-01-25 RX ORDER — ACETAMINOPHEN 500 MG
650 TABLET ORAL EVERY 8 HOURS
Qty: 0 | Refills: 0 | Status: DISCONTINUED | OUTPATIENT
Start: 2019-01-25 | End: 2019-01-28

## 2019-01-25 RX ORDER — SODIUM CHLORIDE 9 MG/ML
250 INJECTION INTRAMUSCULAR; INTRAVENOUS; SUBCUTANEOUS ONCE
Qty: 0 | Refills: 0 | Status: COMPLETED | OUTPATIENT
Start: 2019-01-25 | End: 2019-01-25

## 2019-01-25 RX ORDER — ACETAMINOPHEN 500 MG
650 TABLET ORAL ONCE
Qty: 0 | Refills: 0 | Status: COMPLETED | OUTPATIENT
Start: 2019-01-25 | End: 2019-01-25

## 2019-01-25 RX ORDER — VANCOMYCIN HCL 1 G
1000 VIAL (EA) INTRAVENOUS ONCE
Qty: 0 | Refills: 0 | Status: COMPLETED | OUTPATIENT
Start: 2019-01-26 | End: 2019-01-26

## 2019-01-25 RX ORDER — VANCOMYCIN HCL 1 G
1000 VIAL (EA) INTRAVENOUS ONCE
Qty: 0 | Refills: 0 | Status: COMPLETED | OUTPATIENT
Start: 2019-01-25 | End: 2019-01-25

## 2019-01-25 RX ORDER — FUROSEMIDE 40 MG
40 TABLET ORAL DAILY
Qty: 0 | Refills: 0 | Status: DISCONTINUED | OUTPATIENT
Start: 2019-01-26 | End: 2019-01-26

## 2019-01-25 RX ADMIN — MONTELUKAST 10 MILLIGRAM(S): 4 TABLET, CHEWABLE ORAL at 17:01

## 2019-01-25 RX ADMIN — Medication 650 MILLIGRAM(S): at 18:32

## 2019-01-25 RX ADMIN — Medication 80 MILLIGRAM(S): at 06:41

## 2019-01-25 RX ADMIN — SODIUM CHLORIDE 1000 MILLILITER(S): 9 INJECTION INTRAMUSCULAR; INTRAVENOUS; SUBCUTANEOUS at 20:11

## 2019-01-25 RX ADMIN — Medication 100 MILLIGRAM(S): at 17:04

## 2019-01-25 RX ADMIN — Medication 100 MILLIGRAM(S): at 06:41

## 2019-01-25 RX ADMIN — LIDOCAINE 1 PATCH: 4 CREAM TOPICAL at 04:00

## 2019-01-25 RX ADMIN — SODIUM CHLORIDE 1000 MILLILITER(S): 9 INJECTION INTRAMUSCULAR; INTRAVENOUS; SUBCUTANEOUS at 21:42

## 2019-01-25 RX ADMIN — Medication 100 MICROGRAM(S): at 06:41

## 2019-01-25 RX ADMIN — Medication 650 MILLIGRAM(S): at 08:40

## 2019-01-25 RX ADMIN — Medication 250 MILLIGRAM(S): at 12:15

## 2019-01-25 RX ADMIN — Medication 25 MILLIGRAM(S): at 06:41

## 2019-01-25 RX ADMIN — ISOSORBIDE MONONITRATE 60 MILLIGRAM(S): 60 TABLET, EXTENDED RELEASE ORAL at 17:00

## 2019-01-25 RX ADMIN — Medication 650 MILLIGRAM(S): at 17:00

## 2019-01-25 RX ADMIN — Medication 650 MILLIGRAM(S): at 22:54

## 2019-01-25 RX ADMIN — PANTOPRAZOLE SODIUM 40 MILLIGRAM(S): 20 TABLET, DELAYED RELEASE ORAL at 06:41

## 2019-01-25 RX ADMIN — LIDOCAINE 1 PATCH: 4 CREAM TOPICAL at 17:01

## 2019-01-25 RX ADMIN — ATORVASTATIN CALCIUM 40 MILLIGRAM(S): 80 TABLET, FILM COATED ORAL at 22:55

## 2019-01-25 RX ADMIN — Medication 325 MILLIGRAM(S): at 17:05

## 2019-01-25 RX ADMIN — Medication 2: at 23:05

## 2019-01-25 RX ADMIN — TIOTROPIUM BROMIDE 1 CAPSULE(S): 18 CAPSULE ORAL; RESPIRATORY (INHALATION) at 17:01

## 2019-01-25 RX ADMIN — Medication 0.5 MILLIGRAM(S): at 06:41

## 2019-01-25 RX ADMIN — SODIUM CHLORIDE 500 MILLILITER(S): 9 INJECTION INTRAMUSCULAR; INTRAVENOUS; SUBCUTANEOUS at 19:39

## 2019-01-25 RX ADMIN — Medication 0.5 MILLIGRAM(S): at 17:03

## 2019-01-25 RX ADMIN — Medication 650 MILLIGRAM(S): at 07:41

## 2019-01-25 NOTE — PROGRESS NOTE ADULT - PROBLEM SELECTOR PLAN 8
Remote Hx of CAD s/p remote PCI x 5. Daughter unclear when was last PCI.  -Off ASA/Plavix per Dr Dillard  -c/w Lipitor 40mg qd  -c/w Toprol 25mg qd    #CKD, stage 3. Baseline appears 1.4-1.5 from previous [facility] admissions  -Hx Renal artery stenosis s/p PTA 1/2018  -Monitor BUN/crea  -Avoid nephrotoxic agents  -Renally dose meds

## 2019-01-25 NOTE — PROGRESS NOTE ADULT - PROBLEM SELECTOR PLAN 2
Currently rate controlled 60-70s  -c/w Toprol 25mg qd  -HOLD eliquis bid today for BiV, restart after procedure or switch         #Transaminitis  Elevated Alk Phos 129, AST/ALT 48/53. Likely hepatic congestion 2/2 CHF exacerbation.  -LFTs resolved  -RUQ US with Hepatomegaly, and no sonographic evidence of cholelithiasis or acute cholecystitis.

## 2019-01-25 NOTE — PROGRESS NOTE ADULT - PROBLEM SELECTOR PLAN 2
SOB improved, still with mild bibasilar crackles, NET NEG total 8.3L (2.3L/24hours), uptrend in BUN/Cr.   -Hold lasix in the setting of hypotension  -c/w home Toprol 25mg qd  - Hold BP meds for now in the setting of hypotension  -Echo 1/22/19 w/ EF 30%, severe LV global HK, mild-mod MR, mod pulm HTN, PASP 55  -EP following, BiV ICD today

## 2019-01-25 NOTE — PROGRESS NOTE ADULT - PROBLEM SELECTOR PLAN 9
F: No IVF  N: DASH/TLC/DM diet, soft mechanical  E: Replete lytes PRN K<4, Mg<2  P: DVT PPX: HOLD eliquis as above, restart on eliquis post procedure   C: FULL CODE  Dispo: Continue care on tele 5 [facility]

## 2019-01-25 NOTE — PROGRESS NOTE ADULT - ATTENDING COMMENTS
Patient seen and examined.   Still with bibasilar rales on exam, noted to desat to 92% off NC  Add metolazone prior to next dose of lasix to potentiate diuresis  Cont to monitor Crt  Plan for BiV/PPM in am, Eliquis on hold  BP elevated, hydralazine increased
Patient seen and examined  Feels better with improved lung exam  Switched to po lasix  Plan for Biv/PPM today  Plan for DC this weekend pending improved Crt and euvolemic status  To fu with outpatient cardiologist
Acute on chronic systolic heart failure exacerbation  -Per outpatient cardiologist, patient did not start po torsemide as prescribed  -Diuresing well. Cont with Lasix 80 mg IV bid  -Per cardiologist, last cath was in 2018, no need for further ischemic work up. Prefers to have patient only on eliquis for A fib and not asa/plavix for known CAD  -ECHO with confirmed EF 30, Afib with LBBB on ekg, EP to eval for BiVICD/PPM  -Hx CRI with baseline Crt 1.2, cont to monitor Crt with diuresis  - A fib rate controlled on current dose toprol. Cont AC for now with eliquis   -BP elevated, Imdur increased to 60 mg qd

## 2019-01-25 NOTE — CONSULT NOTE ADULT - PROBLEM SELECTOR RECOMMENDATION 9
The patient reports pain of both legs L>R. Description of pain consistent with acute cramps. Given cardiac history would advise against Flexeril.  Palliative massage therapist to see patient today.  Consider Skelaxin 800mg PO BID PRN Muscle spasm pain as outpatient regimen

## 2019-01-25 NOTE — CONSULT NOTE ADULT - PROBLEM SELECTOR RECOMMENDATION 4
EF: 25-30% Acute exacerbation symptoms improved since admission. Of note the patient would not qualify for hospice benefit at this time, but would benefit from additional services eg. [facility] home heart failure program  Management as per primary team.

## 2019-01-25 NOTE — PROGRESS NOTE ADULT - PROBLEM SELECTOR PLAN 1
Hypotension s/p BiV placement. BP 60/40s - MAP 47. Mentating well and making urine. Pt was NPO and was diuresed with lasix 80mg IV BID for 4 days. Net negative 9L since admission. S/p 500cc bolus without improvement in BP.   - Plan to bring patient over to the CCU for closer monitoring   - Bedside echo performed by fellow with a small pericardial effusion not concerning for the cause of her hypotension   - Additional 500cc bolus of normal saline   - Strict I&Os.   - Hold lasix

## 2019-01-25 NOTE — CONSULT NOTE ADULT - PROBLEM SELECTOR RECOMMENDATION 2
Reports as occipital pain / tension. Received dose of APAP earlier today with some improvement. Currently written for Lidoderm patch for neck and knees. Caution advised given renal insufficiency. Maximum of 2 patches in a day.   Recommend heat pad  Palliative massage therapist to see patient today.  Recommend standing APAP 650mg PO q8h with hold parameter for patient refusal

## 2019-01-25 NOTE — PROGRESS NOTE ADULT - PROBLEM SELECTOR PLAN 9
Takes Januvia at home  -HgA1c 6.5  -c/w MISS in hospital  -Diabetic diet    ##osteoarthritis/neck pain  consider Skelaxin 800mg BID PRN  Tylenol 650mg TID PRN neck pain  consider Dicolfenac topical 1% QID PRN.

## 2019-01-25 NOTE — PROGRESS NOTE ADULT - PROBLEM SELECTOR PLAN 1
SOB improved, still with mild bibasilar crackles, NET NEG total 8.3L (2.3L/24hours), uptrend in BUN/Cr.   -Change to Lasix 40mg PO daily. Goal net neg 1.5L/day  -c/w home Toprol 25mg qd  -Cont Imdur 60mg qd  -Continue Hydral 25 TID  -Echo 1/22/19 w/ EF 30%, severe LV global HK, mild-mod MR, mod pulm HTN, PASP 55  -EP following, BiV ICD today

## 2019-01-25 NOTE — PROGRESS NOTE ADULT - PROBLEM SELECTOR PLAN 4
Resolved. Recent hx of dysuria, foul smelling odor and was placed on Cipro for "kidney infection" by PMD  -S/p Cipro 250mg BID for total 7 days course, completed 1/22  -F/u UCx

## 2019-01-25 NOTE — PROGRESS NOTE ADULT - SUBJECTIVE AND OBJECTIVE BOX
CARDIOLOGY NP PROGRESS NOTE    Subjective:   Remainder ROS otherwise negative.    Overnight Events: Continued optimization with diuretics, 10mg metolazone given; no acute events overnight. Total net NEG 8.3L, uptrend in BUN/Cr. Weights down.     TELEMETRY: AF 60's-70's, occasional PVC's.    VITAL SIGNS:  T(C): 36.5 (01-25-19 @ 09:10), Max: 37.1 (01-24-19 @ 18:02)  HR: 68 (01-25-19 @ 08:31) (66 - 78)  BP: 135/47 (01-25-19 @ 08:31) (99/40 - 142/47)  RR: 16 (01-25-19 @ 08:31) (16 - 30)  SpO2: 96% (01-25-19 @ 08:31) (90% - 98%)  Wt(kg): 76.8kg<<75.2<<80.5<<82<<86.1kg    I&O's Summary    24 Jan 2019 07:01  -  25 Jan 2019 07:00  --------------------------------------------------------  IN: 620 mL / OUT: 2300 mL / NET: -1680 mL    PHYSICAL EXAM:  Awake, talaktive, in NAD  AXOX3, follows commands, appropriate  Neck supple, no JVD noted  PEERL, nasal/buccal mucosa moist and well perfused  BS clear bilaterally, unlabored, symmetrical  S1/S2, no S3, RRR, no M/G/R noted  Abdomen soft, non tender, non distended  No edema noted, perfusion brisk  Pulses palpable throughout  Skin warm and dry, no rashes/lesions noted  R/L wrist with pressure dressing secure, no hematoma/bleeding noted. Perfusion and sensation intact.        LABS:                          9.8    7.4   )-----------( 149      ( 25 Jan 2019 06:39 )             32.2                              01-25    144  |  96  |  51<H>  ----------------------------<  132<H>  3.7   |  35<H>  |  1.85<H>    Ca    9.6      25 Jan 2019 06:44  Mg     1.9     01-24    TPro  7.5  /  Alb  3.9  /  TBili  0.8  /  DBili  0.2  /  AST  22  /  ALT  29  /  AlkPhos  108  01-25    LIVER FUNCTIONS - ( 25 Jan 2019 06:44 )  Alb: 3.9 g/dL / Pro: 7.5 g/dL / ALK PHOS: 108 U/L / ALT: 29 U/L / AST: 22 U/L / GGT: x                                   CAPILLARY BLOOD GLUCOSE      POCT Blood Glucose.: 125 mg/dL (25 Jan 2019 11:15)  POCT Blood Glucose.: 134 mg/dL (25 Jan 2019 07:14)  POCT Blood Glucose.: 166 mg/dL (24 Jan 2019 21:29)  POCT Blood Glucose.: 146 mg/dL (24 Jan 2019 16:08)            Allergies:  contrast media (iodine-based) (Angioedema)  pineapple (Unknown)    MEDICATIONS  (STANDING):  atorvastatin 40 milliGRAM(s) Oral at bedtime  buDESOnide   0.5 milliGRAM(s) Respule 0.5 milliGRAM(s) Inhalation two times a day  dextrose 5%. 1000 milliLiter(s) (50 mL/Hr) IV Continuous <Continuous>  dextrose 50% Injectable 12.5 Gram(s) IV Push once  dextrose 50% Injectable 25 Gram(s) IV Push once  dextrose 50% Injectable 25 Gram(s) IV Push once  docusate sodium 100 milliGRAM(s) Oral two times a day  ferrous    sulfate 325 milliGRAM(s) Oral daily  hydrALAZINE 25 milliGRAM(s) Oral <User Schedule>  insulin lispro (HumaLOG) corrective regimen sliding scale   SubCutaneous Before meals and at bedtime  isosorbide   mononitrate ER Tablet (IMDUR) 60 milliGRAM(s) Oral daily  levothyroxine 100 MICROGram(s) Oral daily  lidocaine   Patch 1 Patch Transdermal daily  lidocaine   Patch 1 Patch Transdermal every 24 hours  metoprolol succinate ER 25 milliGRAM(s) Oral daily  montelukast 10 milliGRAM(s) Oral daily  pantoprazole    Tablet 40 milliGRAM(s) Oral before breakfast  senna 2 Tablet(s) Oral at bedtime  tiotropium 18 MICROgram(s) Capsule 1 Capsule(s) Inhalation daily  vancomycin  IVPB 1000 milliGRAM(s) IV Intermittent once    MEDICATIONS  (PRN):  bisacodyl Suppository 10 milliGRAM(s) Rectal daily PRN Constipation  dextrose 40% Gel 15 Gram(s) Oral once PRN Blood Glucose LESS THAN 70 milliGRAM(s)/deciliter  glucagon  Injectable 1 milliGRAM(s) IntraMuscular once PRN Glucose LESS THAN 70 milligrams/deciliter  polyethylene glycol 3350 17 Gram(s) Oral two times a day PRN Constipation        DIAGNOSTIC TESTS: CARDIOLOGY NP PROGRESS NOTE    Subjective:   Remainder ROS otherwise negative.    Overnight Events: Continued optimization with diuretics, 10mg metolazone given; no acute events overnight. Total net NEG 8.3L, uptrend in BUN/Cr. Weights down. Eliquis held for BiV ICD today.     TELEMETRY: AF 60's-70's, occasional PVC's.    VITAL SIGNS:  T(C): 36.5 (01-25-19 @ 09:10), Max: 37.1 (01-24-19 @ 18:02)  HR: 68 (01-25-19 @ 08:31) (66 - 78)  BP: 135/47 (01-25-19 @ 08:31) (99/40 - 142/47)  RR: 16 (01-25-19 @ 08:31) (16 - 30)  SpO2: 96% (01-25-19 @ 08:31) (90% - 98%)  Wt(kg): 76.8kg<<75.2<<80.5<<82<<86.1kg    I&O's Summary    24 Jan 2019 07:01  -  25 Jan 2019 07:00  --------------------------------------------------------  IN: 620 mL / OUT: 2300 mL / NET: -1680 mL    PHYSICAL EXAM:  Awake, talaktive, in NAD  AXOX3, follows commands, appropriate  Neck supple, no JVD noted  PEERL, nasal/buccal mucosa moist and well perfused  BS with mild bibasilar crackles, unlabored, symmetrical, on home 2L NC  S1/S2, no S3, no M/G/R noted  Abdomen soft, non tender, non distended  No edema noted, perfusion brisk  Pulses palpable throughout  Skin warm and dry, no rashes/lesions noted    LABS:                    9.8    7.4   )-----------( 149      ( 25 Jan 2019 06:39 )             32.2                              01-25    144  |  96  |  51<H>  ----------------------------<  132<H>  3.7   |  35<H>  |  1.85<H>    Ca    9.6      25 Jan 2019 06:44  Mg     1.9     01-24    TPro  7.5  /  Alb  3.9  /  TBili  0.8  /  DBili  0.2  /  AST  22  /  ALT  29  /  AlkPhos  108  01-25    LIVER FUNCTIONS - ( 25 Jan 2019 06:44 )  Alb: 3.9 g/dL / Pro: 7.5 g/dL / ALK PHOS: 108 U/L / ALT: 29 U/L / AST: 22 U/L / GGT: x                                   CAPILLARY BLOOD GLUCOSE    POCT Blood Glucose.: 125 mg/dL (25 Jan 2019 11:15)  POCT Blood Glucose.: 134 mg/dL (25 Jan 2019 07:14)  POCT Blood Glucose.: 166 mg/dL (24 Jan 2019 21:29)  POCT Blood Glucose.: 146 mg/dL (24 Jan 2019 16:08)    Allergies:  contrast media (iodine-based) (Angioedema)  pineapple (Unknown)    MEDICATIONS  (STANDING):  atorvastatin 40 milliGRAM(s) Oral at bedtime  buDESOnide   0.5 milliGRAM(s) Respule 0.5 milliGRAM(s) Inhalation two times a day  dextrose 5%. 1000 milliLiter(s) (50 mL/Hr) IV Continuous <Continuous>  dextrose 50% Injectable 12.5 Gram(s) IV Push once  dextrose 50% Injectable 25 Gram(s) IV Push once  dextrose 50% Injectable 25 Gram(s) IV Push once  docusate sodium 100 milliGRAM(s) Oral two times a day  ferrous    sulfate 325 milliGRAM(s) Oral daily  hydrALAZINE 25 milliGRAM(s) Oral <User Schedule>  insulin lispro (HumaLOG) corrective regimen sliding scale   SubCutaneous Before meals and at bedtime  isosorbide   mononitrate ER Tablet (IMDUR) 60 milliGRAM(s) Oral daily  levothyroxine 100 MICROGram(s) Oral daily  lidocaine   Patch 1 Patch Transdermal daily  lidocaine   Patch 1 Patch Transdermal every 24 hours  metoprolol succinate ER 25 milliGRAM(s) Oral daily  montelukast 10 milliGRAM(s) Oral daily  pantoprazole    Tablet 40 milliGRAM(s) Oral before breakfast  senna 2 Tablet(s) Oral at bedtime  tiotropium 18 MICROgram(s) Capsule 1 Capsule(s) Inhalation daily  vancomycin  IVPB 1000 milliGRAM(s) IV Intermittent once    MEDICATIONS  (PRN):  bisacodyl Suppository 10 milliGRAM(s) Rectal daily PRN Constipation  dextrose 40% Gel 15 Gram(s) Oral once PRN Blood Glucose LESS THAN 70 milliGRAM(s)/deciliter  glucagon  Injectable 1 milliGRAM(s) IntraMuscular once PRN Glucose LESS THAN 70 milligrams/deciliter  polyethylene glycol 3350 17 Gram(s) Oral two times a day PRN Constipation    DIAGNOSTIC TESTS: CARDIOLOGY NP PROGRESS NOTE    Subjective:  Seen and examined at bedside. Resting comfortably with daughter at bedside. Denies CP/SOB/palpitations/dizziness. Remainder ROS otherwise negative.    Overnight Events: Continued optimization with diuretics, 10mg metolazone given; no acute events overnight. Total net NEG 8.3L, uptrend in BUN/Cr. Weights down. Eliquis held for BiV ICD today.     TELEMETRY: AF 60's-70's, occasional PVC's.    VITAL SIGNS:  T(C): 36.5 (01-25-19 @ 09:10), Max: 37.1 (01-24-19 @ 18:02)  HR: 68 (01-25-19 @ 08:31) (66 - 78)  BP: 135/47 (01-25-19 @ 08:31) (99/40 - 142/47)  RR: 16 (01-25-19 @ 08:31) (16 - 30)  SpO2: 96% (01-25-19 @ 08:31) (90% - 98%)  Wt(kg): 76.8kg<<75.2<<80.5<<82<<86.1kg    I&O's Summary    24 Jan 2019 07:01  -  25 Jan 2019 07:00  --------------------------------------------------------  IN: 620 mL / OUT: 2300 mL / NET: -1680 mL    PHYSICAL EXAM:  Awake, talaktive, in NAD  AXOX3, follows commands, appropriate  Neck supple, no JVD noted  PEERL, nasal/buccal mucosa moist and well perfused  BS with mild bibasilar crackles, unlabored, symmetrical, on home 2L NC  S1/S2, no S3, no M/G/R noted  Abdomen soft, non tender, non distended  No edema noted, perfusion brisk  Pulses palpable throughout  Skin warm and dry, no rashes/lesions noted    LABS:                    9.8    7.4   )-----------( 149      ( 25 Jan 2019 06:39 )             32.2                              01-25    144  |  96  |  51<H>  ----------------------------<  132<H>  3.7   |  35<H>  |  1.85<H>    Ca    9.6      25 Jan 2019 06:44  Mg     1.9     01-24    TPro  7.5  /  Alb  3.9  /  TBili  0.8  /  DBili  0.2  /  AST  22  /  ALT  29  /  AlkPhos  108  01-25    LIVER FUNCTIONS - ( 25 Jan 2019 06:44 )  Alb: 3.9 g/dL / Pro: 7.5 g/dL / ALK PHOS: 108 U/L / ALT: 29 U/L / AST: 22 U/L / GGT: x                                   CAPILLARY BLOOD GLUCOSE    POCT Blood Glucose.: 125 mg/dL (25 Jan 2019 11:15)  POCT Blood Glucose.: 134 mg/dL (25 Jan 2019 07:14)  POCT Blood Glucose.: 166 mg/dL (24 Jan 2019 21:29)  POCT Blood Glucose.: 146 mg/dL (24 Jan 2019 16:08)    Allergies:  contrast media (iodine-based) (Angioedema)  pineapple (Unknown)    MEDICATIONS  (STANDING):  atorvastatin 40 milliGRAM(s) Oral at bedtime  buDESOnide   0.5 milliGRAM(s) Respule 0.5 milliGRAM(s) Inhalation two times a day  dextrose 5%. 1000 milliLiter(s) (50 mL/Hr) IV Continuous <Continuous>  dextrose 50% Injectable 12.5 Gram(s) IV Push once  dextrose 50% Injectable 25 Gram(s) IV Push once  dextrose 50% Injectable 25 Gram(s) IV Push once  docusate sodium 100 milliGRAM(s) Oral two times a day  ferrous    sulfate 325 milliGRAM(s) Oral daily  hydrALAZINE 25 milliGRAM(s) Oral <User Schedule>  insulin lispro (HumaLOG) corrective regimen sliding scale   SubCutaneous Before meals and at bedtime  isosorbide   mononitrate ER Tablet (IMDUR) 60 milliGRAM(s) Oral daily  levothyroxine 100 MICROGram(s) Oral daily  lidocaine   Patch 1 Patch Transdermal daily  lidocaine   Patch 1 Patch Transdermal every 24 hours  metoprolol succinate ER 25 milliGRAM(s) Oral daily  montelukast 10 milliGRAM(s) Oral daily  pantoprazole    Tablet 40 milliGRAM(s) Oral before breakfast  senna 2 Tablet(s) Oral at bedtime  tiotropium 18 MICROgram(s) Capsule 1 Capsule(s) Inhalation daily  vancomycin  IVPB 1000 milliGRAM(s) IV Intermittent once    MEDICATIONS  (PRN):  bisacodyl Suppository 10 milliGRAM(s) Rectal daily PRN Constipation  dextrose 40% Gel 15 Gram(s) Oral once PRN Blood Glucose LESS THAN 70 milliGRAM(s)/deciliter  glucagon  Injectable 1 milliGRAM(s) IntraMuscular once PRN Glucose LESS THAN 70 milligrams/deciliter  polyethylene glycol 3350 17 Gram(s) Oral two times a day PRN Constipation    DIAGNOSTIC TESTS:

## 2019-01-25 NOTE — PROGRESS NOTE ADULT - PROBLEM SELECTOR PLAN 7
Remote Hx of CAD s/p remote PCI x 5. Daughter unclear when was last PCI.  -Off ASA/Plavix per Dr Dillard  -c/w Lipitor 40mg qd  -c/w Toprol 25mg qd    #CKD, stage 3. Baseline appears 1.4-1.5 from previous [facility] admissions  -Hx Renal artery stenosis s/p PTA 1/2018  -Monitor BUN/crea  -Avoid nephrotoxic agents  -Renally dose meds

## 2019-01-25 NOTE — PROGRESS NOTE ADULT - PROBLEM SELECTOR PLAN 8
Takes Januvia at home  -HgA1c 6.5  -c/w MISS in hospital  -Diabetic diet Takes Januvia at home  -HgA1c 6.5  -c/w MISS in hospital  -Diabetic diet    ##osteoarthritis/neck pain  consider Skelaxin 800mg BID PRN  Tylenol 650mg TID PRN neck pain  consider Dicolfenac topical 1% QID PRN.

## 2019-01-25 NOTE — CONSULT NOTE ADULT - PROBLEM SELECTOR RECOMMENDATION 3
Recommend heat pad  Palliative massage therapist to see patient today.  Recommend standing APAP 650mg PO q8h with hold parameter for patient refusal  Consider topical agents eg. diclofenac topical 1% QID PRN

## 2019-01-25 NOTE — PROGRESS NOTE ADULT - PROBLEM SELECTOR PLAN 10
F: No IVF  N: DASH/TLC/DM diet, soft mechanical  E: Replete lytes PRN K<4, Mg<2  P: DVT PPX: HOLD eliquis as above, restart on eliquis post procedure   C: FULL CODE  Dispo: Transfer to CCU

## 2019-01-25 NOTE — CONSULT NOTE ADULT - SUBJECTIVE AND OBJECTIVE BOX
HPI:  89-year-old Divehi speaking female with AFib on Eliquis (cardioversion 1/2018 with early return to afib), LBBB, chronic systolic CHF (EF 20-25% on echo 1/2018; 30% today), CAD s/p remote PCI, HTN, HLD, DM, renal artery stenosis s/p R renal artery PTA 1/2018, CKD (baseline crea 1-2) anemia, hypothyroidism, b/l carotid endarterectomies, asthma, and emphysema on 2L home O2, who presented with worsening SOB and EP called to evaluate for a BiV.    History obtained from daughter.  Patient has had worsening AGUIRRE for the past two weeks.  She notes compliance with all medications.  SOB after walking 3-4 steps and bilateral lower extremity edema and weight gain of 7lbs over the last week.    She was last seen by our service 1/2018 with newly diagnosed atrial fibrillation and CHF exacerbation.  She failed a cardioversion and was loaded with amiodarone.  She is no longer on amiodarone and unclear why it was stopped.  She was recommended for a BiV pacemaker last year; however deferred it at this time after speaking with her cardiologist.  EP called today stating that the patient now wants to undergo a BiV PPM; however patient /family states they have not discussed this with Dr. Conte and are planning on doing this tomorrow am before deciding if they would like to proceed.              PAST MEDICAL & SURGICAL HISTORY:  Renal artery stenosis  Chronic systolic CHF (congestive heart failure)  Hypothyroidism, unspecified type  Anemia  CAD (coronary artery disease)  DM (diabetes mellitus)  Emphysema  Hypercholesteremia  HTN (hypertension)  S/P percutaneous angioplasty of renal artery: 1/2018  S/P bladder repair  S/P hysterectomy with oophorectomy  History of bilateral carotid endarterectomy  History of umbilical hernia repair  S/P cataract surgery  S/P TKR (total knee replacement)      Family history of emphysema (Mother)      Social History:no smoking, no drugs, no alcohol         Inpatient Medications:   apixaban 2.5 milliGRAM(s) Oral every 12 hours  atorvastatin 40 milliGRAM(s) Oral at bedtime  buDESOnide   0.5 milliGRAM(s) Respule 0.5 milliGRAM(s) Inhalation two times a day  furosemide   Injectable 80 milliGRAM(s) IV Push every 12 hours  insulin lispro (HumaLOG) corrective regimen sliding scale   SubCutaneous Before meals and at bedtime  isosorbide   mononitrate ER Tablet (IMDUR) 60 milliGRAM(s) Oral daily  levothyroxine 100 MICROGram(s) Oral daily  metoprolol succinate ER 25 milliGRAM(s) Oral daily  montelukast 10 milliGRAM(s) Oral daily  pantoprazole    Tablet 40 milliGRAM(s) Oral before breakfast  tiotropium 18 MICROgram(s) Capsule 1 Capsule(s) Inhalation daily      Allergies: contrast media (iodine-based) (Angioedema)  pineapple (Unknown)      ROS:   CONSTITUTIONAL: No fever, + weight gain, + fatigue  RESPIRATORY:+ Shortness of Breath, dependant on O2  CARDIOVASCULAR: see HPI  GASTROINTESTINAL: Pt denies  NEUROLOGICAL: Pt denies  SKIN: Pt denies   PSYCHIATRIC: Pt denies  HEME/LYMPH: Pt denies    PHYSICAL:  T(C): 36.2 (01-22-19 @ 09:53), Max: 36.9 (01-21-19 @ 22:58)  HR: 66 (01-22-19 @ 16:57) (62 - 80)  BP: 172/72 (01-22-19 @ 16:57) (139/61 - 172/72)  RR: 16 (01-22-19 @ 16:57) (16 - 20)  SpO2: 97% (01-22-19 @ 16:57) (92% - 100%)     Appearance: No acute distress, well developed, on non rebreather  Eyes: normal appearing conjunctiva, pupils and eyelids  Cardiovascular: irregularly irregular   Respiratory: Lungs clear to auscultation anteriorly   Gastrointestinal:  Soft   Neurologic:  No deficit noted  Psych: A&Ox3, normal mood/affect  Musculoskeletal: normal gait  Skin: no rash noted, normal color and pigmentation.        LABS:                        9.5    7.8   )-----------( 106      ( 22 Jan 2019 05:27 )             31.6     145  |  103  |  47<H>  ----------------------------<  119<H>  4.2   |  30  |  1.54<H>    Mg     2.2     TPro  7.3  /  Alb  4.0  /  TBili  0.5  /  DBili  x   /  AST  48<H>  /  ALT  53<H>  /  AlkPhos  129<H>    PT: 14.8 sec;   INR: 1.30     PTT:32.6 sec  TSH 1.4    EKG: afib with a ventricular rate of 80, LBBB    Telemetry: a lot of noise.  atrial fibrillation with a controlled ventricular rate     ECHO: 1/22/19 EF 30%. LA boderline dilated, mild-mod MR, mild-mod TR, mod pHTN       Assessment Plan:  89-year-old Divehi speaking female with AFib on Eliquis (cardioversion 1/2018 with early return to afib), LBBB, chronic systolic CHF (EF 20-25% on echo 1/2018; 30% today), CAD s/p remote PCI, HTN, HLD, DM, renal artery stenosis s/p R renal artery PTA 1/2018, CKD (baseline crea 1-2) anemia, hypothyroidism, b/l carotid endarterectomies, asthma, and emphysema on 2L home O2, who presented with worsening SOB and EP called to evaluate for a BiV.  We discussed what a BiV pacemaker is and that it may help her frequent heart failure exacerbations given her LBBB.  She is unclear if she would like to proceed with this and would like to discuss this with her primary cardiologist Rachelle Segura tomorrow am.  I have told her that she still needs IV diuresis and likely anesthesia.  When her family is here tomorrow Dr. Connolly will stop by to discuss this with them all in more detail (please call [phone] and they know if he is in a procedure they may need to wait).  Continue with diuresis as if we proceed with this (aim for friday) she should be as close to euvolemic as possible to avoid any need for intubation.  Will continue to follow with you
TIANNA DENISE          MRN-[id number]            ([id number])    HPI:  89-year-old Niuean speaking F, with PMHx of AFib on Eliquis, chronic systolic CHF (EF 20-25% on echo 2018), CAD s/p remote PCI, HTN, HLD, DMT2, KIRA s/p R renal artery PTA 2018, CKD (baseline crea 1-2) anemia, hypothyroidism, b/l carotid endarterectomies, asthma, and emphysema on 2L home O2, who presented c/o progressive worsening SOB x 2 weeks. History obtained from daughter, whom pt lives with. Daughter reports over the last 2 weeks pt has had worsening sob and AGUIRRE where pt gets sob walking 3-4 steps. Also reports cece LE swelling extending to thighs, periorbital swelling, and weight gain of 7lbs over the last week. She was recently seen at her cardiologist Dr Dillard' office last week and was told to switch Torsemide 10mg qd to Lasix 80mg qd. But pt was seen by PMD Dr Ventura and was being treated for UTI vs pyelonephritis where she was told to take Cipro x 7 days, and then start the new dose of Lasix. Last night daughter got concerned due to increased orthopea, where pt had to sleep sitting up in home hospital bed, where at baseline she sleeps at approx 30% angle. Reports dry cough and pleuritic pain. Recently traveled to [facility] in December for 3 weeks, but [place] [facility] Reports compliant w/ low salt diet and medications. But reports drinks at least 7 bottles of water per day. Denies syncope, lightheadedness, dizziness, fever, chills.   In ED: /80-> 144/93, HR 78, R 20, SpO2 97% 2L, T 97.9. BUN/Crea 47/1.62, Alk Phos 128, AST 47, BNP 04700, Trop neg x 1. UA w/ small leuk estrase. CXR w/ pulm vasc congestion, EKG SR 80s, 1st degree HB, LBBB (unchanged). Given Lasix 80mg IV. Admitted to tele [facility] for acute on chronic systolic CHF exacerbation. (2019 14:09)    PAST MEDICAL & SURGICAL HISTORY:  Renal artery stenosis  Chronic systolic CHF (congestive heart failure)  Hypothyroidism, unspecified type  Anemia  CAD (coronary artery disease)  DM (diabetes mellitus)  Emphysema  Hypercholesteremia  HTN (hypertension)  S/P percutaneous angioplasty of renal artery: 2018  S/P bladder repair  S/P hysterectomy with oophorectomy  History of bilateral carotid endarterectomy  History of umbilical hernia repair  S/P cataract surgery  S/P TKR (total knee replacement)    FAMILY HISTORY:  Family history of emphysema (Mother)  Reviewed and found non contributory in father history    SOCIAL HISTORY: . Niuean speaking. Is living in a first floor apartment in [facility] with daughter Selam Donahue [phone]. [facility]. Medicare/Medicaid.    ROS:                   Dyspnea (Kasey 0-10):  0                      N/V (Y/N):  No                            Secretions (Y/N) : No                Agitation(Y/N): No  Pain (Y/N): Yes      -Provocation/Palliation: Laying in bed for prolonged periods increases her pain.   -Quality/Quantity: Acute non malignant somatic musculoskeletal / cramping pain currently controlled.  -Radiating: No  -Severity: Mild-Moderate  -Timing/Frequency: Constant with incidental exacerbations.  -Impact on ADLs: Yes    General:  Denied  HEENT:    Denied  Neck:  Denied  CVS:  Denied  Resp:  Denied  GI:  Denied  :  Denied  Musc:  Leg pain/cramps, knee and foot pains.  Neuro:  Denied  Psych:  Denied  Skin:  Denied  Lymph:  Leg swelling.    Allergies: contrast media (iodine-based) (Angioedema)  Food allergies: pineapple (Unknown)    Opiate Naive (Y/N): Yes  -iStop reviewed (Y/N): Yes. No Rx found on iStop review (Ref#: [id number])  Medications:      MEDICATIONS  (STANDING):  atorvastatin 40 milliGRAM(s) Oral at bedtime  buDESOnide   0.5 milliGRAM(s) Respule 0.5 milliGRAM(s) Inhalation two times a day  docusate sodium 100 milliGRAM(s) Oral two times a day  ferrous    sulfate 325 milliGRAM(s) Oral daily  furosemide   Injectable 80 milliGRAM(s) IV Push every 12 hours  hydrALAZINE 25 milliGRAM(s) Oral <User Schedule>  insulin lispro (HumaLOG) corrective regimen sliding scale   SubCutaneous Before meals and at bedtime  isosorbide   mononitrate ER Tablet (IMDUR) 60 milliGRAM(s) Oral daily  levothyroxine 100 MICROGram(s) Oral daily  lidocaine   Patch 1 Patch Transdermal daily  lidocaine   Patch 1 Patch Transdermal every 24 hours  metoprolol succinate ER 25 milliGRAM(s) Oral daily  montelukast 10 milliGRAM(s) Oral daily  pantoprazole    Tablet 40 milliGRAM(s) Oral before breakfast  senna 2 Tablet(s) Oral at bedtime  tiotropium 18 MICROgram(s) Capsule 1 Capsule(s) Inhalation daily    MEDICATIONS  (PRN):  bisacodyl Suppository 10 milliGRAM(s) Rectal daily PRN Constipation  dextrose 40% Gel 15 Gram(s) Oral once PRN Blood Glucose LESS THAN 70 milliGRAM(s)/deciliter  glucagon  Injectable 1 milliGRAM(s) IntraMuscular once PRN Glucose LESS THAN 70 milligrams/deciliter  polyethylene glycol 3350 17 Gram(s) Oral two times a day PRN Constipation    Labs:    CBC:                        9.8    7.4   )------( 149      ( 2019 06:39 )             32.2     CMP:        144  |  96  |  51<H>  ----------------------------<  132<H>  3.7   |  35<H>  |  1.85<H>    Ca    9.6      2019 06:44  Mg     1.9         TPro  7.5  /  Alb  3.9  /  TBili  0.8  /  DBili  0.2  /  AST  22  /  ALT  29  /  AlkPhos  108      POCT Blood Glucose.: 134: Result Not Confirmed mg/dL (19 @ 07:14)    Ferritin, Serum: 217 ng/mL (19 @ 06:03)    Iron with Total Binding Capacity in AM (19 @ 06:03)    Iron - Total Binding Capacity.: 217 ug/dL    % Saturation, Iron: 13 %    Iron Total, Serum: 28 ug/dL    Unsaturated Iron Binding Capacity: 189 ug/dL    Type + Screen (19 @ 05:30)    ABO Interpretation: A    Rh Interpretation: Positive    Antibody Screen: Negative    Gamma Glutamyl Transferase, Serum: 93 U/L (19 @ 05:27)    Thyroid Stimulating Hormone, Serum: 1.421 uIU/mL (19 @ 05:27)  Free Thyroxine, Serum: 1.11 ng/dL (18 @ 06:38)  Free Triiodothyronine, Serum: 2.17 pg/mL (18 @ 06:38)    Lipid Profile (19 @ 05:27)    Total Cholesterol/HDL Ratio Measurement: 1.8 RATIO    Cholesterol, Serum: 103 mg/dL    Triglycerides, Serum: 66 mg/dL    HDL Cholesterol, Serum: 57: HDL Levels >/= 60 mg/dL are considered beneficial and a "negative" risk  factor.  Effective 08/15/2018: New reference range and interpretive comment. mg/dL    Direct LDL: 33 mg/dL    Hemoglobin A1C, Whole Blood: 6.5  % (19 @ 05:27)    Rapid RVP Result: NotDetec (19 @ 15:09)    Culture - Blood (19 @ 16:40)    Specimen Source: .Blood Blood-Venous    Culture Results:   No growth at 3 days.    Culture - Blood (19 @ 16:40)    Specimen Source: .Blood Blood-Venous    Culture Results:   No growth at 3 days.    Culture - Urine (19 @ 12:28)    -  Amikacin: S <=8    -  Ampicillin: R >16 These ampicillin results predict results for amoxicillin    -  Ampicillin/Sulbactam: R >16/8    -  Cefazolin: R 16 For uncomplicated UTI with K. pneumoniae, E. coli, or P. mirablis: SAYRA <=16 is sensitive and SAYRA >=32 is resistant. This also predicts results for oral agents cefaclor, cefdinir, cefpodoxime, cefprozil, cefuroxime axetil, cephalexin and locarbeffor uncomplicated UTI. Note that some isolates may be susceptible to these agents while testing resistant to cefazolin.    -  Ceftriaxone: S <=1 Enterobacter, Citrobacter, and Serratia may develop resistance during prolonged therapy    -  Ciprofloxacin: R >2    -  Gentamicin: R >8    -  Nitrofurantoin: S <=32 Should not be used to treat pyelonephritis    -  Piperacillin/Tazobactam: I 32    -  Tobramycin: R >8    -  Trimethoprim/Sulfamethoxazole: R >2/38    Specimen Source: .Urine Clean Catch (Midstream)    Culture Results:   >100,000 CFU/ml Escherichia coli    Organism Identification: Escherichia coli    Organism: Escherichia coli    Method Type: SAYRA    Troponin T, Serum: <0.01  ng/mL (19 @ 16:35)  Troponin T, Serum: <0.01 ng/mL (19 @ 11:33)    CKMB Units: 2.0 ng/mL (19 @ 16:35)    Creatine Kinase, Serum: 123 U/L (19 @ 16:35)    Lactate Dehydrogenase, Serum: 192 U/L (18 @ 06:38)    Beta-2-Microglobulin, Serum: 6.0 mg/L (18 @ 15:42)    Protein Electrophoresis, Serum (18 @ 16:08)    Protein Total, Serum: 6.1 g/dL    Total Protein, Serum: 6.1 g/dL    Albumin, Serum: 3.1 g/dL    Alpha 1: 0.5 g/dL    Alpha 2: 1.0 g/dL    Beta Globulin: 0.8 g/dL    Gamma Globulin: 0.8 g/dL    % Albumin: 50.1 %    % Alpha 1: 7.7 %    % Alpha 2: 16.5 %    % Beta: 12.9 %    % Gamma: 12.8 %    Albumin/Globulin Ratio: 1.0 Ratio    Serum Protein Electrophoresis Interp: Normal Electrophoresis Pattern    Microalbumin/Creatinine, Urine (18 @ 20:14)    Microalbumin/Creatinine Ratio: 27 mg/g    Creatinine, Random Urine: 73  mg/dL    Microalbumin.: 2.0 mg/dL    Immunofixation, Serum:   No Monoclonal Band Identified (18 @ 16:08)    Quantiferon - Gold Tuberculosis (18 @ 21:43)    Quantiferon - Gold Tuberculosis Result: Negative: (http://www.cdc.gov/nchstp/tb/).    Quantiferon Adjusted TB Antigen: 0.01 IU/mL    Quantiferon NIL: 0.03 IU/mL    Quantiferon Adjusted Mitogen: 3.27: Positive means M. tuberculosis infection is likely.    Serum Pro-Brain Natriuretic Peptide: 87873 pg/mL (19 @ 11:33)    Imaging:  Reviewed    EXAM:  XR CHEST PORTABLE ROUTINE 1V                        PROCEDURE DATE:  2019    INTERPRETATION:  XR CHEST PORTABLE ROUTINE 1V dated 2019 7:12 AM  CLINICAL INFORMATION: Female, 89 years old.  Shortness of Breath.  PRIOR STUDIES: 2019.  FINDINGS: There is cardiomegaly unchanged. Is no recent pleural or   parenchymal pathology. Side biapical pleural plaques are noted compatible   with the sequela of remote granulomatous disease. 4 catheter-like   structure is projecting over the right upper lung zone possibly   representing a large bore right IJV catheter tip in the region of the   proximal brachycephalic vein.  IMPRESSION:  Cardiomegaly.  No recent pleural or parenchymal pathology.    EXAM:  XR CHEST PORTABLE IMMED 1V                        PROCEDURE DATE:  2019    INTERPRETATION:    INDICATION: Chest Pain  TECHNIQUE: Chest, single portable AP view on 2019 11:43 AM   COMPARISON: 2018  FINDINGS:   Redemonstrated is stable biapical pleural thickening and scattered   calcifications  There is vague opacity in the right upper lung.  There is no focal airspace consolidation in the left lung.    Costophrenic angles are sharp bilaterally, without sizable pleural   effusions.   No pneumothorax is seen.  Cardiac silhouette is enlarged and stable in appearance.   Mediastinal and hilar contours are unremarkable.   Visualized bony thorax demonstrates no significant interval change.   IMPRESSION:   Stable enlarged cardiac silhouette, with a vague opacity in the right   upper lung.    EXAM:  US DPLX LWR EXT VEINS COMPL BI                        PROCEDURE DATE:  2018    INTERPRETATION:    VENOUS DUPLEX DOPPLER OF BOTH LOWER EXTREMITIES dated 2018 7:21 PM  INDICATION: Lower extremity edema. Rule outDVT.  TECHNIQUE: Duplex Doppler evaluation including gray-scale ultrasound   imaging, color flow Doppler imaging, and Doppler spectral analysis of the   veins of both lower extremities was performed.   COMPARISON: None  FINDINGS:  Thigh veins: The common femoral, femoral, popliteal, proximal greater   saphenous, and proximal deep femoral veins are patent and free of   thrombus bilaterally. The veins are normally compressible and have normal   phasic flow and augmentation response.  Calfveins: The left peroneal veins are not visualized due to overlying   subcutaneous edema. The right peroneal veins are patent and free of   thrombus. The paired posterior tibial calf veins are patent bilaterally.  IMPRESSION:  No deep vein thrombosis seen within the visualized deep venous structures.    EXAM:  US RETROPERITONEAL COMPLETE                        PROCEDURE DATE:  2018    INTERPRETATION:  RENAL and BLADDER ULTRASOUND dated 2018 2:02 PM  Indication: Radius renal function. History of bilateral renal artery   stenosis.  Prior studies: None  Findings:  RIGHT KIDNEY:  Length: 9.4 cm  Lesions: None  Hydronephrosis: None  Stones: None  Echogenicity: Increased with cortical thinning.  LEFT KIDNEY:  Length: 9.3 cm  Lesions: None  Hydronephrosis: None  Stones: None  Echogenicity: Increased with cortical thinning.  URINARY BLADDER:  Ureteral jets: Both visible.  Filling defects: None  Bladder volume: Pre-void: 243 ml; Post-void: 32 ml.  IMPRESSION:  Atrophic kidneys with increased cortical mantle echogenicity consistent   with medical kidney disease.    EXAM:  US ABDOMEN LIMITED                        PROCEDURE DATE:  2019    INTERPRETATION:  RIGHT UPPER QUADRANT ULTRASOUND dated 2019 6:33 PM  INDICATION: Right upper quadrant abdominal pain and elevated LFTs.  PRIOR STUDIES:None.  FINDINGS:   Liver: Normal echogenicity with no visible focal abnormality. Increased   in size measuring up to 20.9 cm.  Intrahepatic ducts: Not dilated.  Common bile duct: Normal diameter, measuring 0.2 cm.  Gallbladder: No visible gallstones.  No wall thicening or   pericholecystic fluid. Negative sonographic Cortez's sign.  Pancreas: The visualized portions were normal in appearance.    Abdominal aorta: No abdominal aortic aneurysm is seen.  Inferior vena cava: The visualized portions wre normal in appearance.  Right kidney: Length of 8.8 cm. increased echogenicity. No focal lesions.   IMPRESSION:1.  Hepatomegaly.  2.  Increased right renal echogenicity, compatible with medical renal   disease. Slightly small right kidney.  3.  No sonographic evidence of cholelithiasis or acute cholecystitis.    EXAM:  ECHOCARDIOGRAM W CONTRAST                        PROCEDURE DATE:  2019    INTERPRETATION:  Patient Height: 167.0 cm  Patient Weight: 88.0 kg  Systolic Pressure: 131 mmHg  Diastolic Pressure: 71 mmHg  BSA: 2.0 m^2  Interpretation Summary  Normal left ventricular size and wall thickness.Abnormal (paradoxical)   septal motion consistent with abnormal conductionThere is severe global   hypokinesis of the left ventricle.The left ventricular ejection fraction is severely   reduced.The left ventricular ejection fraction is 30%.The right   ventricle is normal in size and function.The left atrium is borderline dilated.The   left atrial volume index is 34 cc/m2 (normal <34cc/m2)Right atrial size is   normal.Calcified aorti valve.No aortic regurgitation noted.No   hemodynamically significant valvular aortic stenosis.Structurally normal mitral   valve.There is mild to moderate mitral regurgitation.Structurally normal tricuspid   valve.There is mild to moderate tricuspid regurgitation.There is moderate   pulmonary hypertension.The pulmonary artery systolic pressure is   estimated to be 55 mmHg.Structurally normal pulmonic valve.There is trace pulmonic   regurgitation.There is no pericardial effusion.    EXAM:  ESOPHAGEAL ECHO (CARDIOL)                        PROCEDURE DATE:  2018    INTERPRETATION:  Patient Height: 152.4 cm  Patient Weight: 81.6 kg  Systolic Pressure: 138 mmHg  Diastolic Pressure: 63 mmHg  BSA: 1.8 m^2  Interpretation Summary  Left ventricular hypertrophy presentThere is severe global hypokinesis of   the left ventricle.The left ventricular ejection fraction is estimated to be   25-30%No clot seen in the left atrium or in the left atrial   appendage.Left atrial appendage emptying velocities are reduced.Structurally normal   aortic valve.No aortic regurgitation noted.Structurally normal mitral   valve.There is moderate mitral regurgitation.Structurally normal tricuspid   valve.Structurally normal pulmonic valve.No aortic root dilatation.Severe atherosclerotic   plaque(s) in the aortic arch.Severe atherosclerotic plaque(s) in the   descending aorta.There is no pericardial effusion.    12 Lead ECG (19 @ 08:46)  Ventricular Rate 66 BPM  Atrial Rate 66 BPM  P-R Interval 190 ms  QRS Duration 150 ms  Q-T Interval 484 ms  QTC Calculation(Bezet) 507 ms  P Axis 30 degrees  R Axis -22 degrees  T Axis 119 degrees  Diagnosis Line Sinus rhythm Borderline First degree AV block  premature supraventricular complexes  Left bundle branch block  Confirmed by Vannessa Badillo ([id number]) on 2019 11:01:27 AM    12 Lead ECG (19 @ 10:50)   Ventricular Rate 82 BPM  Atrial Rate 82 BPM  P-R Interval 216 ms  QRS Duration 146 ms  Q-T Interval 444 ms  QTC Calculation(Bezet) 518 ms  P Axis 93 degrees  R Axis -41 degrees  T Axis 118 degrees  Diagnosis Line Sinus rhythm [id number] degree AV block  premature atrial complexes  Left bundle branch block  Confirmed by Vannessa Badillo ([id number]) on 2019 11:46:06 AM    PEx:  T(C): 36.5 (19 @ 09:10), Max: 37.1 (19 @ 18:02)  HR: 68 (19 @ 08:31) (66 - 78)  BP: 135/47 (01-25-19 @ 08:31) (99/40 - 142/47)  RR: 16 (19 @ 08:31) (15 - 30)  SpO2: 96% (19 @ 08:31) (90% - 98%)  Wt(kg):  86.1     I&O's Summary  2019 07:01  -  2019 07:00  --------------------------------------------------------  IN: 620 mL / OUT: 2300 mL / NET: -1680 mL    General: AAOx3 Niuean speaking woman in NAD.  HEENT:  NCAT PERRL EOMI MOM   Neck: Support neck pillow+  Decreased ROM due to pain.  CVS: Irregularly irregular rhythm regular rate S1S2 No M/R  Resp: Unlabored CTAB anteriorly  GI: Globose Soft NT ND BS+  :  Normal  Musc: B/L LE varicose veins.  OA changes. Decreased ROM    Neuro: Following commands. No focal decifits.  Psych:  Calm Pleasant Good mood given its her birthday  Skin: No rashes No Phlebitis.  Lymph: Mild LE edema.  Preadmit Karnofsky: 60 %           Current Karnofsky: 40%  Cachexia (Y/N): No  BMI: 33.6    Advanced Directives:     Full Code    Decision maker: The patient is able to participate in complex medical decision making conversations.  Legal surrogate: No documented HCP in paper chart or in [facility]. Given  status the patient's legal surrogate would be considered her adult daughter Selam Donahue [phone].    GOALS OF CARE DISCUSSION       Palliative care info/counseling provided	           Family meeting       Documentation of GOC: Full code.	          REFERRALS	        Massage therapist

## 2019-01-25 NOTE — PROGRESS NOTE ADULT - PROBLEM SELECTOR PLAN 5
SBP 180s on arrival to ED. -160s currently.  -c/w Imdur 60mg qd  -c/w Toprol 25mg qd  - continue hydralazine

## 2019-01-25 NOTE — PROGRESS NOTE ADULT - PROBLEM SELECTOR PLAN 6
Now with Hypotension. However - SBP 180s on arrival to ED. -160s currently.  - Hold all HTN medications for now

## 2019-01-25 NOTE — PROGRESS NOTE ADULT - SUBJECTIVE AND OBJECTIVE BOX
Transfer Note : [facility]     Hospital Course:    89 y/o F with PMH atrial fibrillation (Eliquis), chronic systolic CHF (EF 30% on this admission), CAD s/p PCI, HTN, HLD, DM2, KIRA s/p renal artery PTA (1/2018), CKD, anemia, hypothyroidism, b/l carotid endarterectomies, asthma, and emphysema (2L home O2) who presents for worsening SOB x2 weeks and was admitted [facility] for CHF exacerbation. In ED: /80-> 144/93, HR 78, R 20, SpO2 97% 2L, T 97.9. BUN/Crea 47/1.62, Alk Phos 128, AST 47, BNP 87733, Trop neg x 1. UA w/ small leuk estrase. CXR w/ pulm vasc congestion, EKG SR 80s, 1st degree HB, LBBB (unchanged). Given Lasix 80mg IV. She was given lasix 80mg IV BID for diuresis. Prior echocardiogram showed EF of 20-25%. Repeat echo on this admission showing EF of 30%. She continued to receive lasix 80mg IV BID for 4 days. EP was consulted to evaluate for BiV in the setting of EF of 30% and LBBB. BiV was placed on 1/25. Around 7PM on 1/25 (4 days into hospital admission) after BiV placement she was became hypotensive to 60/30s with MAPs in the 40s. Throughout that time she was asymptomatic and denied chest pain, SOB, dizziness, nausea. She was mentating well, warm and perfused, and continued to make urine. She received 500cc normal saline bolus in total and her BPs were still 60s/40s. Baseline SBPs in 140s. Her last dose of lasix was in the AM and from 2-4PM she had put out ~600cc of urine. Imdur was given however the afternoon hydralazine dose was held. Cardiology fellow was made aware and a bedside echocardiogram was performed to rule out a pericardial effusion as the cause of hypotension s/p BiV placement. Due to continued hypotension the patient was transferred to the CCU for further management.       SUBJECTIVE / INTERVAL HPI: Patient seen and examined at bedside.     VITAL SIGNS:  Vital Signs Last 24 Hrs  T(C): 37.1 (25 Jan 2019 21:31), Max: 37.1 (25 Jan 2019 21:31)  T(F): 98.8 (25 Jan 2019 21:31), Max: 98.8 (25 Jan 2019 21:31)  HR: 94 (25 Jan 2019 19:43) (68 - 98)  BP: 76/32 (25 Jan 2019 19:43) (61/39 - 142/47)  BP(mean): 47 (25 Jan 2019 19:43) (47 - 81)  RR: 20 (25 Jan 2019 19:43) (16 - 20)  SpO2: 96% (25 Jan 2019 19:43) (96% - 99%)    PHYSICAL EXAM:    General: WDWN female resting comfortably in bed   HEENT: NC/AT; PERRL, anicteric sclera; MMM  Neck: supple  Cardiovascular: +S1/S2; RRR  Respiratory: CTA B/L; no W/R/R  Gastrointestinal: soft, NT/ND; +BSx4  Extremities: WWP; no edema, clubbing or cyanosis  Vascular: 2+ radial, DP/PT pulses B/L  Neurological: AAOx3; no focal deficits    MEDICATIONS:  MEDICATIONS  (STANDING):  acetaminophen   Tablet .. 650 milliGRAM(s) Oral every 8 hours  atorvastatin 40 milliGRAM(s) Oral at bedtime  buDESOnide   0.5 milliGRAM(s) Respule 0.5 milliGRAM(s) Inhalation two times a day  dextrose 5%. 1000 milliLiter(s) (50 mL/Hr) IV Continuous <Continuous>  dextrose 50% Injectable 12.5 Gram(s) IV Push once  dextrose 50% Injectable 25 Gram(s) IV Push once  dextrose 50% Injectable 25 Gram(s) IV Push once  docusate sodium 100 milliGRAM(s) Oral two times a day  ferrous    sulfate 325 milliGRAM(s) Oral daily  hydrALAZINE 25 milliGRAM(s) Oral <User Schedule>  insulin lispro (HumaLOG) corrective regimen sliding scale   SubCutaneous Before meals and at bedtime  isosorbide   mononitrate ER Tablet (IMDUR) 60 milliGRAM(s) Oral daily  levothyroxine 100 MICROGram(s) Oral daily  lidocaine   Patch 1 Patch Transdermal daily  lidocaine   Patch 1 Patch Transdermal every 24 hours  metoprolol succinate ER 25 milliGRAM(s) Oral daily  montelukast 10 milliGRAM(s) Oral daily  pantoprazole    Tablet 40 milliGRAM(s) Oral before breakfast  senna 2 Tablet(s) Oral at bedtime  sodium chloride 0.9% Bolus 250 milliLiter(s) IV Bolus once  sodium chloride 0.9% Bolus 500 milliLiter(s) IV Bolus once  tiotropium 18 MICROgram(s) Capsule 1 Capsule(s) Inhalation daily    MEDICATIONS  (PRN):  bisacodyl Suppository 10 milliGRAM(s) Rectal daily PRN Constipation  dextrose 40% Gel 15 Gram(s) Oral once PRN Blood Glucose LESS THAN 70 milliGRAM(s)/deciliter  glucagon  Injectable 1 milliGRAM(s) IntraMuscular once PRN Glucose LESS THAN 70 milligrams/deciliter  polyethylene glycol 3350 17 Gram(s) Oral two times a day PRN Constipation      ALLERGIES:  Allergies    contrast media (iodine-based) (Angioedema)  pineapple (Unknown)    Intolerances        LABS:                        9.4    8.8   )-----------( 145      ( 25 Jan 2019 21:36 )             30.6     01-25    144  |  96  |  51<H>  ----------------------------<  132<H>  3.7   |  35<H>  |  1.85<H>    Ca    9.6      25 Jan 2019 06:44  Mg     1.9     01-24    TPro  7.5  /  Alb  3.9  /  TBili  0.8  /  DBili  0.2  /  AST  22  /  ALT  29  /  AlkPhos  108  01-25    PT/INR - ( 25 Jan 2019 21:35 )   PT: 15.6 sec;   INR: 1.37          PTT - ( 25 Jan 2019 21:35 )  PTT:31.4 sec    CAPILLARY BLOOD GLUCOSE      POCT Blood Glucose.: 197 mg/dL (25 Jan 2019 21:26)      RADIOLOGY & ADDITIONAL TESTS: Reviewed.    ASSESSMENT:    PLAN:

## 2019-01-26 LAB
ALBUMIN SERPL ELPH-MCNC: 3.8 G/DL — SIGNIFICANT CHANGE UP (ref 3.3–5)
ALP SERPL-CCNC: 107 U/L — SIGNIFICANT CHANGE UP (ref 40–120)
ALT FLD-CCNC: 22 U/L — SIGNIFICANT CHANGE UP (ref 10–45)
ANION GAP SERPL CALC-SCNC: 12 MMOL/L — SIGNIFICANT CHANGE UP (ref 5–17)
AST SERPL-CCNC: 20 U/L — SIGNIFICANT CHANGE UP (ref 10–40)
BILIRUB SERPL-MCNC: 0.7 MG/DL — SIGNIFICANT CHANGE UP (ref 0.2–1.2)
BLD GP AB SCN SERPL QL: NEGATIVE — SIGNIFICANT CHANGE UP
BUN SERPL-MCNC: 49 MG/DL — HIGH (ref 7–23)
CALCIUM SERPL-MCNC: 9.5 MG/DL — SIGNIFICANT CHANGE UP (ref 8.4–10.5)
CHLORIDE SERPL-SCNC: 98 MMOL/L — SIGNIFICANT CHANGE UP (ref 96–108)
CO2 SERPL-SCNC: 33 MMOL/L — HIGH (ref 22–31)
CREAT SERPL-MCNC: 1.97 MG/DL — HIGH (ref 0.5–1.3)
CULTURE RESULTS: SIGNIFICANT CHANGE UP
CULTURE RESULTS: SIGNIFICANT CHANGE UP
GLUCOSE SERPL-MCNC: 114 MG/DL — HIGH (ref 70–99)
HCT VFR BLD CALC: 33.8 % — LOW (ref 34.5–45)
HGB BLD-MCNC: 10.2 G/DL — LOW (ref 11.5–15.5)
MAGNESIUM SERPL-MCNC: 2.1 MG/DL — SIGNIFICANT CHANGE UP (ref 1.6–2.6)
MCHC RBC-ENTMCNC: 28.9 PG — SIGNIFICANT CHANGE UP (ref 27–34)
MCHC RBC-ENTMCNC: 30.2 G/DL — LOW (ref 32–36)
MCV RBC AUTO: 95.8 FL — SIGNIFICANT CHANGE UP (ref 80–100)
PLATELET # BLD AUTO: 137 K/UL — LOW (ref 150–400)
POTASSIUM SERPL-MCNC: 3.6 MMOL/L — SIGNIFICANT CHANGE UP (ref 3.5–5.3)
POTASSIUM SERPL-SCNC: 3.6 MMOL/L — SIGNIFICANT CHANGE UP (ref 3.5–5.3)
PROT SERPL-MCNC: 7.5 G/DL — SIGNIFICANT CHANGE UP (ref 6–8.3)
RBC # BLD: 3.53 M/UL — LOW (ref 3.8–5.2)
RBC # FLD: 14.8 % — SIGNIFICANT CHANGE UP (ref 10.3–16.9)
RH IG SCN BLD-IMP: POSITIVE — SIGNIFICANT CHANGE UP
SODIUM SERPL-SCNC: 143 MMOL/L — SIGNIFICANT CHANGE UP (ref 135–145)
SPECIMEN SOURCE: SIGNIFICANT CHANGE UP
SPECIMEN SOURCE: SIGNIFICANT CHANGE UP
UUN UR-MCNC: 219 MG/DL — SIGNIFICANT CHANGE UP
WBC # BLD: 8 K/UL — SIGNIFICANT CHANGE UP (ref 3.8–10.5)
WBC # FLD AUTO: 8 K/UL — SIGNIFICANT CHANGE UP (ref 3.8–10.5)

## 2019-01-26 PROCEDURE — 93306 TTE W/DOPPLER COMPLETE: CPT | Mod: 26

## 2019-01-26 PROCEDURE — 93281 PM DEVICE PROGR EVAL MULTI: CPT | Mod: 26

## 2019-01-26 RX ORDER — APIXABAN 2.5 MG/1
2.5 TABLET, FILM COATED ORAL EVERY 12 HOURS
Qty: 0 | Refills: 0 | Status: DISCONTINUED | OUTPATIENT
Start: 2019-01-26 | End: 2019-01-28

## 2019-01-26 RX ORDER — POTASSIUM CHLORIDE 20 MEQ
40 PACKET (EA) ORAL ONCE
Qty: 0 | Refills: 0 | Status: COMPLETED | OUTPATIENT
Start: 2019-01-26 | End: 2019-01-26

## 2019-01-26 RX ADMIN — Medication 30 MILLILITER(S): at 07:00

## 2019-01-26 RX ADMIN — Medication 650 MILLIGRAM(S): at 14:42

## 2019-01-26 RX ADMIN — Medication 100 MILLIGRAM(S): at 17:56

## 2019-01-26 RX ADMIN — LIDOCAINE 1 PATCH: 4 CREAM TOPICAL at 05:05

## 2019-01-26 RX ADMIN — LIDOCAINE 1 PATCH: 4 CREAM TOPICAL at 23:57

## 2019-01-26 RX ADMIN — Medication 650 MILLIGRAM(S): at 06:50

## 2019-01-26 RX ADMIN — Medication 650 MILLIGRAM(S): at 21:50

## 2019-01-26 RX ADMIN — LIDOCAINE 1 PATCH: 4 CREAM TOPICAL at 18:00

## 2019-01-26 RX ADMIN — Medication 650 MILLIGRAM(S): at 21:51

## 2019-01-26 RX ADMIN — TIOTROPIUM BROMIDE 1 CAPSULE(S): 18 CAPSULE ORAL; RESPIRATORY (INHALATION) at 17:55

## 2019-01-26 RX ADMIN — Medication 0.5 MILLIGRAM(S): at 21:50

## 2019-01-26 RX ADMIN — Medication 100 MILLIGRAM(S): at 06:53

## 2019-01-26 RX ADMIN — Medication 6: at 12:57

## 2019-01-26 RX ADMIN — MONTELUKAST 10 MILLIGRAM(S): 4 TABLET, CHEWABLE ORAL at 12:58

## 2019-01-26 RX ADMIN — Medication 0.5 MILLIGRAM(S): at 05:41

## 2019-01-26 RX ADMIN — Medication 325 MILLIGRAM(S): at 12:57

## 2019-01-26 RX ADMIN — Medication 40 MILLIEQUIVALENT(S): at 12:57

## 2019-01-26 RX ADMIN — LIDOCAINE 1 PATCH: 4 CREAM TOPICAL at 12:58

## 2019-01-26 RX ADMIN — LIDOCAINE 1 PATCH: 4 CREAM TOPICAL at 13:12

## 2019-01-26 RX ADMIN — LIDOCAINE 1 PATCH: 4 CREAM TOPICAL at 05:17

## 2019-01-26 RX ADMIN — SENNA PLUS 2 TABLET(S): 8.6 TABLET ORAL at 21:50

## 2019-01-26 RX ADMIN — Medication 650 MILLIGRAM(S): at 00:18

## 2019-01-26 RX ADMIN — APIXABAN 2.5 MILLIGRAM(S): 2.5 TABLET, FILM COATED ORAL at 17:56

## 2019-01-26 RX ADMIN — APIXABAN 2.5 MILLIGRAM(S): 2.5 TABLET, FILM COATED ORAL at 12:57

## 2019-01-26 RX ADMIN — Medication 650 MILLIGRAM(S): at 06:52

## 2019-01-26 RX ADMIN — Medication 100 MICROGRAM(S): at 06:50

## 2019-01-26 RX ADMIN — PANTOPRAZOLE SODIUM 40 MILLIGRAM(S): 20 TABLET, DELAYED RELEASE ORAL at 06:50

## 2019-01-26 RX ADMIN — ATORVASTATIN CALCIUM 40 MILLIGRAM(S): 80 TABLET, FILM COATED ORAL at 21:50

## 2019-01-26 RX ADMIN — Medication 2: at 21:50

## 2019-01-26 NOTE — PROGRESS NOTE ADULT - PROBLEM SELECTOR PLAN 2
SOB improved, still with mild bibasilar crackles, NET NEG total 8.3L (2.3L/24hours), uptrend in BUN/Cr.   -Hold lasix in the setting of hypotension  -c/w home Toprol 25mg qd  - Hold BP meds for now in the setting of hypotension  -Echo 1/22/19 w/ EF 30%, severe LV global HK, mild-mod MR, mod pulm HTN, PASP 55  -EP following, BiV ICD today SOB improved, still with mild bibasilar crackles, NET NEG total 8.3L (2.3L/24hours), uptrend in BUN/Cr.   -Hold lasix in the setting of hypotension  -c/w home Toprol 25mg qd  - Hold BP meds for now in the setting of hypotension  - Echo 1/22/19 w/ EF 30%, severe LV global HK, mild-mod MR, mod pulm HTN, PASP 55  - EP following, BiV ICD today

## 2019-01-26 NOTE — PROGRESS NOTE ADULT - PROBLEM SELECTOR PLAN 4
Resolved. Recent hx of dysuria, foul smelling odor and was placed on Cipro for "kidney infection" by PMD  -S/p Cipro 250mg BID for total 7 days course, completed 1/22  -F/u UCx Resolved. Recent hx of dysuria, foul smelling odor and was placed on Cipro for "kidney infection" by PMD  -S/p Cipro 250mg BID for total 7 days course, completed 1/22  - F/u UCx

## 2019-01-26 NOTE — PROGRESS NOTE ADULT - SUBJECTIVE AND OBJECTIVE BOX
TRANSFER ACCEPTANCE NOTE from [facility]    Hospital Course:   89 y/o F with PMH atrial fibrillation (Eliquis), chronic systolic CHF (EF 30% on this admission), CAD s/p PCI, HTN, HLD, DM2, KIRA s/p renal artery PTA (1/2018), CKD, anemia, hypothyroidism, b/l carotid endarterectomies, asthma, and emphysema (2L home O2) who presents for worsening SOB x2 weeks and was admitted [facility] for CHF exacerbation. In ED: /80-> 144/93, HR 78, R 20, SpO2 97% 2L, T 97.9. BUN/Crea 47/1.62, Alk Phos 128, AST 47, BNP 66270, Trop neg x 1. UA w/ small leuk estrase. CXR w/ pulm vasc congestion, EKG SR 80s, 1st degree HB, LBBB (unchanged). Given Lasix 80mg IV. She was given lasix 80mg IV BID for diuresis. Prior echocardiogram showed EF of 20-25%. Repeat echo on this admission showing EF of 30%. She continued to receive lasix 80mg IV BID for 4 days. EP was consulted to evaluate for BiV in the setting of EF of 30% and LBBB. BiV was placed on 1/25. Around 7PM on 1/25 (4 days into hospital admission) after BiV placement she was became hypotensive to 60/30s with MAPs in the 40s. Throughout that time she was asymptomatic and denied chest pain, SOB, dizziness, nausea. She was mentating well, warm and perfused, and continued to make urine. She received 500cc normal saline bolus in total and her BPs were still 60s/40s. Baseline SBPs in 140s. Her last dose of Lasix was in the AM and from 2-4PM she had put out ~600cc of urine. Imdur was given however the afternoon hydralazine dose was held. Cardiology fellow was made aware and a bedside echocardiogram was performed to rule out a pericardial effusion as the cause of hypotension s/p BiV placement. Due to continued hypotension the patient was transferred to the CCU for further management.     OVERNIGHT: No overnight events.  SUBJECTIVE: Patient seen and examined at bedside. Patient feeling well, asymptomatic, denies any lightheadedness, dizziness, chest pain, shortness of breath, or any other complaints at this time.     ROS:  CV: Denies chest pain  Resp: Denies SOB  GI: Denies abdominal pain, constipation, diarrhea, nausea, vomiting  : Denies dysuria  ID: Denies fevers, chills  MSK: Denies joint pain     OBJECTIVE:    VITAL SIGNS:  ICU Vital Signs Last 24 Hrs  T(C): 36.9 (26 Jan 2019 01:15), Max: 37.1 (25 Jan 2019 21:31)  T(F): 98.4 (26 Jan 2019 01:15), Max: 98.8 (25 Jan 2019 21:31)  HR: 76 (26 Jan 2019 01:00) (68 - 98)  BP: 84/47 (26 Jan 2019 01:00) (60/26 - 142/47)  BP(mean): 63 (26 Jan 2019 01:00) (36 - 81)  ABP: --  ABP(mean): --  RR: 20 (26 Jan 2019 01:00) (16 - 29)  SpO2: 99% (26 Jan 2019 01:00) (95% - 99%)        01-24 @ 07:01 - 01-25 @ 07:00  --------------------------------------------------------  IN: 620 mL / OUT: 2300 mL / NET: -1680 mL    01-25 @ 07:01  - 01-26 @ 01:53  --------------------------------------------------------  IN: 800 mL / OUT: 2050 mL / NET: -1250 mL      CAPILLARY BLOOD GLUCOSE      POCT Blood Glucose.: 172 mg/dL (25 Jan 2019 23:03)      PHYSICAL EXAM:    General: NAD, comfortable, elderly female   HEENT: NCAT, PERRL, clear conjunctiva, no scleral icterus  Neck: supple, no JVD  Respiratory: CTA b/l, no wheezing, rhonchi, rales  Cardiovascular: RRR, normal S1S2, no M/R/G  Abdomen: soft, NT/ND, bowel sounds in all four quadrants, no palpable masses  Extremities: WWP, no clubbing, cyanosis, or edema  Neuro: AAOx3, no focal deficits     MEDICATIONS:  MEDICATIONS  (STANDING):  acetaminophen   Tablet .. 650 milliGRAM(s) Oral every 8 hours  atorvastatin 40 milliGRAM(s) Oral at bedtime  buDESOnide   0.5 milliGRAM(s) Respule 0.5 milliGRAM(s) Inhalation two times a day  dextrose 5%. 1000 milliLiter(s) (50 mL/Hr) IV Continuous <Continuous>  dextrose 50% Injectable 12.5 Gram(s) IV Push once  dextrose 50% Injectable 25 Gram(s) IV Push once  dextrose 50% Injectable 25 Gram(s) IV Push once  docusate sodium 100 milliGRAM(s) Oral two times a day  ferrous    sulfate 325 milliGRAM(s) Oral daily  furosemide    Tablet 40 milliGRAM(s) Oral daily  hydrALAZINE 25 milliGRAM(s) Oral <User Schedule>  insulin lispro (HumaLOG) corrective regimen sliding scale   SubCutaneous Before meals and at bedtime  isosorbide   mononitrate ER Tablet (IMDUR) 60 milliGRAM(s) Oral daily  levothyroxine 100 MICROGram(s) Oral daily  lidocaine   Patch 1 Patch Transdermal daily  lidocaine   Patch 1 Patch Transdermal every 24 hours  metoprolol succinate ER 25 milliGRAM(s) Oral daily  montelukast 10 milliGRAM(s) Oral daily  pantoprazole    Tablet 40 milliGRAM(s) Oral before breakfast  senna 2 Tablet(s) Oral at bedtime  tiotropium 18 MICROgram(s) Capsule 1 Capsule(s) Inhalation daily  vancomycin  IVPB 1000 milliGRAM(s) IV Intermittent once    MEDICATIONS  (PRN):  bisacodyl Suppository 10 milliGRAM(s) Rectal daily PRN Constipation  dextrose 40% Gel 15 Gram(s) Oral once PRN Blood Glucose LESS THAN 70 milliGRAM(s)/deciliter  glucagon  Injectable 1 milliGRAM(s) IntraMuscular once PRN Glucose LESS THAN 70 milligrams/deciliter  polyethylene glycol 3350 17 Gram(s) Oral two times a day PRN Constipation      ALLERGIES:  Allergies    contrast media (iodine-based) (Angioedema)  pineapple (Unknown)    Intolerances        LABS:                        9.4    8.8   )-----------( 145      ( 25 Jan 2019 21:36 )             30.6     01-25    142  |  96  |  50<H>  ----------------------------<  160<H>  3.6   |  34<H>  |  1.94<H>    Ca    9.2      25 Jan 2019 21:36  Phos  4.3     01-25  Mg     2.0     01-25    TPro  7.5  /  Alb  3.9  /  TBili  0.8  /  DBili  0.2  /  AST  22  /  ALT  29  /  AlkPhos  108  01-25    PT/INR - ( 25 Jan 2019 21:35 )   PT: 15.6 sec;   INR: 1.37          PTT - ( 25 Jan 2019 21:35 )  PTT:31.4 sec      RADIOLOGY & ADDITIONAL TESTS: Reviewed. TRANSFER ACCEPTANCE NOTE from [facility]    Hospital Course:   89 y/o F with PMH atrial fibrillation (Eliquis), chronic systolic CHF (EF 30% on this admission), CAD s/p PCI, HTN, HLD, DM2, KIRA s/p renal artery PTA (1/2018), CKD, anemia, hypothyroidism, b/l carotid endarterectomies, asthma, and emphysema (2L home O2) who presents for worsening SOB x2 weeks and was admitted [facility] for CHF exacerbation. In ED: /80-> 144/93, HR 78, R 20, SpO2 97% 2L, T 97.9. BUN/Crea 47/1.62, Alk Phos 128, AST 47, BNP 68039, Trop neg x 1. UA w/ small leuk estrase. CXR w/ pulm vasc congestion, EKG SR 80s, 1st degree HB, LBBB (unchanged). Given Lasix 80mg IV. She was given lasix 80mg IV BID for diuresis. Prior echocardiogram showed EF of 20-25%. Repeat echo on this admission showing EF of 30%. She continued to receive lasix 80mg IV BID for 4 days. EP was consulted to evaluate for BiV in the setting of EF of 30% and LBBB. BiV was placed on 1/25. Around 7PM on 1/25 (4 days into hospital admission) after BiV placement she was became hypotensive to 60/30s with MAPs in the 40s. Throughout that time she was asymptomatic and denied chest pain, SOB, dizziness, nausea. She was mentating well, warm and perfused, and continued to make urine. She received 500cc normal saline bolus in total and her BPs were still 60s/40s. Baseline SBPs in 140s. Her last dose of Lasix was in the AM and from 2-4PM she had put out ~600cc of urine. Imdur was given however the afternoon hydralazine dose was held. Cardiology fellow was made aware and a bedside echocardiogram was performed to rule out a pericardial effusion as the cause of hypotension s/p BiV placement. Due to continued hypotension the patient was transferred to the CCU for further management. In the CCU patient was given a 500 cc bolus and her MAPS improved to the 70. Imdur and lasix were held.    OVERNIGHT: No overnight events.  SUBJECTIVE: Patient seen and examined at bedside. Patient feeling well, asymptomatic, denies any lightheadedness, dizziness, chest pain, shortness of breath, or any other complaints at this time.     ROS:  CV: Denies chest pain  Resp: Denies SOB  GI: Denies abdominal pain, constipation, diarrhea, nausea, vomiting  : Denies dysuria  ID: Denies fevers, chills  MSK: Denies joint pain     OBJECTIVE:    VITAL SIGNS:  ICU Vital Signs Last 24 Hrs  T(C): 36.9 (26 Jan 2019 01:15), Max: 37.1 (25 Jan 2019 21:31)  T(F): 98.4 (26 Jan 2019 01:15), Max: 98.8 (25 Jan 2019 21:31)  HR: 76 (26 Jan 2019 01:00) (68 - 98)  BP: 84/47 (26 Jan 2019 01:00) (60/26 - 142/47)  BP(mean): 63 (26 Jan 2019 01:00) (36 - 81)  ABP: --  ABP(mean): --  RR: 20 (26 Jan 2019 01:00) (16 - 29)  SpO2: 99% (26 Jan 2019 01:00) (95% - 99%)        01-24 @ 07:01 - 01-25 @ 07:00  --------------------------------------------------------  IN: 620 mL / OUT: 2300 mL / NET: -1680 mL    01-25 @ 07:01 - 01-26 @ 01:53  --------------------------------------------------------  IN: 800 mL / OUT: 2050 mL / NET: -1250 mL      CAPILLARY BLOOD GLUCOSE      POCT Blood Glucose.: 172 mg/dL (25 Jan 2019 23:03)      PHYSICAL EXAM:    General: NAD, comfortable, elderly female   HEENT: NCAT, PERRL, clear conjunctiva, no scleral icterus  Neck: supple, no JVD  Respiratory: CTA b/l, no wheezing, rhonchi, rales  Cardiovascular: RRR, normal S1S2, no M/R/G  Abdomen: soft, NT/ND, bowel sounds in all four quadrants, no palpable masses  Extremities: WWP, no clubbing, cyanosis, or edema  Neuro: AAOx3, no focal deficits     MEDICATIONS:  MEDICATIONS  (STANDING):  acetaminophen   Tablet .. 650 milliGRAM(s) Oral every 8 hours  atorvastatin 40 milliGRAM(s) Oral at bedtime  buDESOnide   0.5 milliGRAM(s) Respule 0.5 milliGRAM(s) Inhalation two times a day  dextrose 5%. 1000 milliLiter(s) (50 mL/Hr) IV Continuous <Continuous>  dextrose 50% Injectable 12.5 Gram(s) IV Push once  dextrose 50% Injectable 25 Gram(s) IV Push once  dextrose 50% Injectable 25 Gram(s) IV Push once  docusate sodium 100 milliGRAM(s) Oral two times a day  ferrous    sulfate 325 milliGRAM(s) Oral daily  furosemide    Tablet 40 milliGRAM(s) Oral daily  hydrALAZINE 25 milliGRAM(s) Oral <User Schedule>  insulin lispro (HumaLOG) corrective regimen sliding scale   SubCutaneous Before meals and at bedtime  isosorbide   mononitrate ER Tablet (IMDUR) 60 milliGRAM(s) Oral daily  levothyroxine 100 MICROGram(s) Oral daily  lidocaine   Patch 1 Patch Transdermal daily  lidocaine   Patch 1 Patch Transdermal every 24 hours  metoprolol succinate ER 25 milliGRAM(s) Oral daily  montelukast 10 milliGRAM(s) Oral daily  pantoprazole    Tablet 40 milliGRAM(s) Oral before breakfast  senna 2 Tablet(s) Oral at bedtime  tiotropium 18 MICROgram(s) Capsule 1 Capsule(s) Inhalation daily  vancomycin  IVPB 1000 milliGRAM(s) IV Intermittent once    MEDICATIONS  (PRN):  bisacodyl Suppository 10 milliGRAM(s) Rectal daily PRN Constipation  dextrose 40% Gel 15 Gram(s) Oral once PRN Blood Glucose LESS THAN 70 milliGRAM(s)/deciliter  glucagon  Injectable 1 milliGRAM(s) IntraMuscular once PRN Glucose LESS THAN 70 milligrams/deciliter  polyethylene glycol 3350 17 Gram(s) Oral two times a day PRN Constipation      ALLERGIES:  Allergies    contrast media (iodine-based) (Angioedema)  pineapple (Unknown)    Intolerances        LABS:                        9.4    8.8   )-----------( 145      ( 25 Jan 2019 21:36 )             30.6     01-25    142  |  96  |  50<H>  ----------------------------<  160<H>  3.6   |  34<H>  |  1.94<H>    Ca    9.2      25 Jan 2019 21:36  Phos  4.3     01-25  Mg     2.0     01-25    TPro  7.5  /  Alb  3.9  /  TBili  0.8  /  DBili  0.2  /  AST  22  /  ALT  29  /  AlkPhos  108  01-25    PT/INR - ( 25 Jan 2019 21:35 )   PT: 15.6 sec;   INR: 1.37          PTT - ( 25 Jan 2019 21:35 )  PTT:31.4 sec      RADIOLOGY & ADDITIONAL TESTS: Reviewed.

## 2019-01-26 NOTE — PROGRESS NOTE ADULT - PROBLEM SELECTOR PLAN 5
On home O2 2L NC at all times per daughter. Not in acute exacerbation.  -c/w Spiriva INH  -c/w Budesonide INLH  -c/w Singulair On home O2 2L NC at all times per daughter. Not in acute exacerbation.  - c/w Spiriva INH  - c/w Budesonide INLH  -c/w Singulair

## 2019-01-26 NOTE — PROGRESS NOTE ADULT - PROBLEM SELECTOR PLAN 8
Remote Hx of CAD s/p remote PCI x 5. Daughter unclear when was last PCI.  -Off ASA/Plavix per Dr Dillard  -c/w Lipitor 40mg qd  -c/w Toprol 25mg qd    #CKD, stage 3. Baseline appears 1.4-1.5 from previous [facility] admissions  -Hx Renal artery stenosis s/p PTA 1/2018  -Monitor BUN/crea  -Avoid nephrotoxic agents  -Renally dose meds

## 2019-01-26 NOTE — PROGRESS NOTE ADULT - PROBLEM SELECTOR PLAN 6
Now with Hypotension. However - SBP 180s on arrival to ED. -160s currently.  - Hold all HTN medications for now Now with Hypotension, MAPs remain >65. However - SBP 180s on arrival to ED. -160s currently.  - Hold all HTN medications for now

## 2019-01-26 NOTE — PROGRESS NOTE ADULT - SUBJECTIVE AND OBJECTIVE BOX
Electrophysiology Device Interrogation       Indication: s/p CRT-P insertion    Device model: 	Cloud Takeoff Valitude			                                Implanting Physician: MD Cathleen    Functioning Mode: DDD 	60 - 130	    Underlying Rhythm:  Afib    Pacemaker dependency:  no    INTERROGATION    Battery status: Good    ATRIUM  Threshold: unable  Sensing: Afib  Impedance: 600 ohms    R VENTRICLE  Threshold: 0.5 V @ 0.5 msec  Sensin.4 mv  Impedance: 752 ohms    L VENTRICLE  Threshold: 1.9 V @ 1.o msec  Sensin.8 mv  Impedance: 623 ohms      Events/Alert: none     Parameter changes: none	     Assessment: Normal device interrogation.  Patient in AFib.  Started Eliquis.  wound looks good.  Relative hypotension treated with IV fluid - she is improved.  CXR Reviewed.  Leads in good postion.  Ready for discharge tomorrow if remains stable.  F/U reviewed in detail with her family.

## 2019-01-26 NOTE — PROGRESS NOTE ADULT - PROBLEM SELECTOR PLAN 1
Hypotension s/p BiV placement. BP 60/40s - MAP 47. Mentating well and making urine. Pt was NPO and was diuresed with lasix 80mg IV BID for 4 days. Net negative 9L since admission. S/p 500cc bolus without improvement in BP.   - Plan to bring patient over to the CCU for closer monitoring   - Bedside echo performed by fellow with a small pericardial effusion not concerning for the cause of her hypotension   - Additional 500cc bolus of normal saline   - Strict I&Os.   - Hold lasix Hypotension s/p BiV placement. BP 60/40s - MAP 47. Mentating well and making urine. Pt was NPO and was diuresed with lasix 80mg IV BID for 4 days. Net negative 9L since admission. S/p 500cc bolus without improvement in BP. May be volume depleted due to overdiuresis, and needs fluid back, currently receiving and additional 500cc bolus.   - Bedside echo performed by fellow with a small pericardial effusion not concerning for the cause of her hypotension   - Strict I&Os.   - Hold lasix

## 2019-01-27 ENCOUNTER — TRANSCRIPTION ENCOUNTER (OUTPATIENT)
Age: 84
End: 2019-01-27

## 2019-01-27 PROCEDURE — 71046 X-RAY EXAM CHEST 2 VIEWS: CPT | Mod: 26

## 2019-01-27 RX ADMIN — Medication 2: at 12:04

## 2019-01-27 RX ADMIN — Medication 650 MILLIGRAM(S): at 22:33

## 2019-01-27 RX ADMIN — Medication 650 MILLIGRAM(S): at 14:00

## 2019-01-27 RX ADMIN — PANTOPRAZOLE SODIUM 40 MILLIGRAM(S): 20 TABLET, DELAYED RELEASE ORAL at 05:21

## 2019-01-27 RX ADMIN — Medication 650 MILLIGRAM(S): at 14:30

## 2019-01-27 RX ADMIN — Medication 100 MICROGRAM(S): at 05:21

## 2019-01-27 RX ADMIN — Medication 100 MILLIGRAM(S): at 05:20

## 2019-01-27 RX ADMIN — LIDOCAINE 1 PATCH: 4 CREAM TOPICAL at 19:00

## 2019-01-27 RX ADMIN — APIXABAN 2.5 MILLIGRAM(S): 2.5 TABLET, FILM COATED ORAL at 18:18

## 2019-01-27 RX ADMIN — SENNA PLUS 2 TABLET(S): 8.6 TABLET ORAL at 21:25

## 2019-01-27 RX ADMIN — Medication 650 MILLIGRAM(S): at 06:18

## 2019-01-27 RX ADMIN — Medication 0.5 MILLIGRAM(S): at 05:21

## 2019-01-27 RX ADMIN — MONTELUKAST 10 MILLIGRAM(S): 4 TABLET, CHEWABLE ORAL at 12:02

## 2019-01-27 RX ADMIN — Medication 0.5 MILLIGRAM(S): at 18:18

## 2019-01-27 RX ADMIN — LIDOCAINE 1 PATCH: 4 CREAM TOPICAL at 12:02

## 2019-01-27 RX ADMIN — Medication 650 MILLIGRAM(S): at 21:25

## 2019-01-27 RX ADMIN — Medication 650 MILLIGRAM(S): at 05:20

## 2019-01-27 RX ADMIN — ATORVASTATIN CALCIUM 40 MILLIGRAM(S): 80 TABLET, FILM COATED ORAL at 21:25

## 2019-01-27 RX ADMIN — APIXABAN 2.5 MILLIGRAM(S): 2.5 TABLET, FILM COATED ORAL at 05:21

## 2019-01-27 RX ADMIN — Medication 100 MILLIGRAM(S): at 18:18

## 2019-01-27 RX ADMIN — Medication 2: at 16:28

## 2019-01-27 RX ADMIN — TIOTROPIUM BROMIDE 1 CAPSULE(S): 18 CAPSULE ORAL; RESPIRATORY (INHALATION) at 12:04

## 2019-01-27 RX ADMIN — LIDOCAINE 1 PATCH: 4 CREAM TOPICAL at 16:28

## 2019-01-27 RX ADMIN — Medication 325 MILLIGRAM(S): at 12:02

## 2019-01-27 NOTE — PROGRESS NOTE ADULT - PROBLEM SELECTOR PROBLEM 10
Discharge planning issues
Nutrition, metabolism, and development symptoms
Nutrition, metabolism, and development symptoms
Discharge planning issues

## 2019-01-27 NOTE — DISCHARGE NOTE ADULT - FUNCTIONAL SCREEN CURRENT LEVEL: AMBULATION, MLM
Creat is loaded pending your approval.   Please advise on the pre medication?  Thanks.   3 = assistive equipment and person

## 2019-01-27 NOTE — PROGRESS NOTE ADULT - PROBLEM SELECTOR PROBLEM 2
Acute on chronic systolic congestive heart failure
Acute on chronic systolic congestive heart failure
PAF (paroxysmal atrial fibrillation)

## 2019-01-27 NOTE — PROGRESS NOTE ADULT - PROBLEM SELECTOR PROBLEM 4
Emphysema of lung
UTI (urinary tract infection)
UTI (urinary tract infection)
Emphysema of lung

## 2019-01-27 NOTE — PROGRESS NOTE ADULT - PROBLEM SELECTOR PLAN 1
SOB improved, still with mild bibasilar crackles, NET NEG total 8.3L (2.3L/24hours), uptrend in BUN/Cr.   -currently holding lasix given recent hypotension.   -currently holding Toprol 25mg qd d/t hypotension  -currently holding Imdur 60mg qd d/t hypotension  -currenlty holding Hydral 25 TID  -Echo 1/22/19 w/ EF 30%, severe LV global HK, mild-mod MR, mod pulm HTN, PASP 55

## 2019-01-27 NOTE — PROGRESS NOTE ADULT - PROBLEM SELECTOR PROBLEM 8
CAD (coronary artery disease)
CAD (coronary artery disease)
DM (diabetes mellitus)

## 2019-01-27 NOTE — PROGRESS NOTE ADULT - SUBJECTIVE AND OBJECTIVE BOX
Pt is s/p PPM  insertion    s/p transient hypotension prompting transfer to CCU    Now back on [facility]     PAST MEDICAL & SURGICAL HISTORY:  Renal artery stenosis  Chronic systolic CHF (congestive heart failure)  Hypothyroidism, unspecified type  Anemia  CAD (coronary artery disease)  DM (diabetes mellitus)  Emphysema  Hypercholesteremia  HTN (hypertension)  S/P percutaneous angioplasty of renal artery: 1/2018  S/P bladder repair  S/P hysterectomy with oophorectomy  History of bilateral carotid endarterectomy  History of umbilical hernia repair  S/P cataract surgery  S/P TKR (total knee replacement)    MEDICATIONS  (STANDING):  acetaminophen   Tablet .. 650 milliGRAM(s) Oral every 8 hours  apixaban 2.5 milliGRAM(s) Oral every 12 hours  atorvastatin 40 milliGRAM(s) Oral at bedtime  buDESOnide   0.5 milliGRAM(s) Respule 0.5 milliGRAM(s) Inhalation two times a day  dextrose 5%. 1000 milliLiter(s) (50 mL/Hr) IV Continuous <Continuous>  dextrose 50% Injectable 12.5 Gram(s) IV Push once  dextrose 50% Injectable 25 Gram(s) IV Push once  dextrose 50% Injectable 25 Gram(s) IV Push once  docusate sodium 100 milliGRAM(s) Oral two times a day  ferrous    sulfate 325 milliGRAM(s) Oral daily  insulin lispro (HumaLOG) corrective regimen sliding scale   SubCutaneous Before meals and at bedtime  levothyroxine 100 MICROGram(s) Oral daily  lidocaine   Patch 1 Patch Transdermal daily  lidocaine   Patch 1 Patch Transdermal every 24 hours  montelukast 10 milliGRAM(s) Oral daily  pantoprazole    Tablet 40 milliGRAM(s) Oral before breakfast  senna 2 Tablet(s) Oral at bedtime  tiotropium 18 MICROgram(s) Capsule 1 Capsule(s) Inhalation daily    MEDICATIONS  (PRN):  bisacodyl Suppository 10 milliGRAM(s) Rectal daily PRN Constipation  dextrose 40% Gel 15 Gram(s) Oral once PRN Blood Glucose LESS THAN 70 milliGRAM(s)/deciliter  glucagon  Injectable 1 milliGRAM(s) IntraMuscular once PRN Glucose LESS THAN 70 milligrams/deciliter  polyethylene glycol 3350 17 Gram(s) Oral two times a day PRN Constipation    ICU Vital Signs Last 24 Hrs  T(C): 37 (27 Jan 2019 10:00), Max: 37 (27 Jan 2019 10:00)  T(F): 98.6 (27 Jan 2019 10:00), Max: 98.6 (27 Jan 2019 10:00)  HR: 98 (27 Jan 2019 08:25) (78 - 98)  BP: 106/47 (27 Jan 2019 08:25) (84/51 - 119/56)  BP(mean): 70 (27 Jan 2019 08:25) (59 - 93)  ABP: --  ABP(mean): --  RR: 18 (27 Jan 2019 08:25) (16 - 21)  SpO2: 93% (27 Jan 2019 08:25) (92% - 100%)      lungs decreased breath sounds at bases  Cv s1 s2  ab dsoft  ext stable                          10.2   8.0   )-----------( 137      ( 26 Jan 2019 06:58 )             33.8   01-26    143  |  98  |  49<H>  ----------------------------<  114<H>  3.6   |  33<H>  |  1.97<H>    Ca    9.5      26 Jan 2019 06:58  Phos  4.3     01-25  Mg     2.1     01-26    TPro  7.5  /  Alb  3.8  /  TBili  0.7  /  DBili  x   /  AST  20  /  ALT  22  /  AlkPhos  107  01-26  PT/INR - ( 25 Jan 2019 21:35 )   PT: 15.6 sec;   INR: 1.37          PTT - ( 25 Jan 2019 21:35 )  PTT:31.4 sec    CXR  neg     echo    EF Normal

## 2019-01-27 NOTE — PROGRESS NOTE ADULT - PROBLEM SELECTOR PLAN 5
SBP 180s on arrival to ED. -160s currently.  -Imdur, Toprol, hydralazine on hold given hypotension -- need to discuss discharge meds.

## 2019-01-27 NOTE — DISCHARGE NOTE ADULT - CARE PLAN
Principal Discharge DX:	Acute on chronic systolic congestive heart failure  Goal:	Follow up with your cardiologist in 1 week  Assessment and plan of treatment:	You were admitted to the cardiac telemetry floor with congestive heart failure.  You were given intravenous Lasix to get rid of the fluid in your lungs and body to help you breathe better. Please take Lasix 20mg EVERY OTHER day as prescribed without missing doses. You had an echocardiogram performed that showed your heart muscle is very weak, the pumping function of your heart or Ejection Fraction is 30%. Your heart is unable to pump the blood effectively throughout your body and can cause fluid backup into your lungs and rest of body. Due to this, you had a BiV Pacemaker placed to help the heart pumping get better. Please maintain a low salt diet and fluid restriction of 1.5 liters per day to prevent from fluid being reaccumulated. Weigh yourself daily and report any weight gain over 2 pounds/day or per week to your Doctor. Please continue taking your medications as prescribed.  Secondary Diagnosis:	Essential hypertension  Assessment and plan of treatment:	Please continue taking blood pressure medications.  Secondary Diagnosis:	UTI (urinary tract infection)  Assessment and plan of treatment:	You completed a 7 days course of antibiotic Ciprofloxacin in the hospital.  Secondary Diagnosis:	Stage 3 chronic kidney disease  Assessment and plan of treatment:	Your blood work showed abnormal kidney function while in the hospital after the BiV Pacemaker was placed, this was likely due to low blood pressure and IV diuretic medications given. You were resumed on a low dose diuretic medication Lasix 20mg every other day. You should follow up with PMD in 1-2 weeks and have your blood work rechecked.

## 2019-01-27 NOTE — DISCHARGE NOTE ADULT - HOSPITAL COURSE
89-year-old Chinese speaking female PMHx AFib on Eliquis, chronic systolic CHF (EF 20-25% on echo 1/2018), CAD s/p remote PCI, HTN, HLD, DMT2, KIRA s/p R renal artery PTA 1/2018, CKD (baseline crea 1-2) anemia, hypothyroidism, b/l carotid endarterectomies, asthma, and emphysema on 2L home O2, who presented c/o progressive worsening SOB, AGUIRRE, cece LE swelling x 2 weeks. Admitted to tele [facility] for acute on chronic systolic CHF exacerbation. Diuresed with IV lasix (total ~8L net NEG) resolved to euvolemia and transitioned to PO Lasix. 1/25 successful BiV AICD with Dr. Connolly. Upon returning to [facility], hypotensive but asymptomatic, likely due to aggressive diuresis and NPO all day for procedure. Treated with IVF but pressures remained low so transferred to CCU for closer evaluation and fluid resuscitation. Given ~500cc NS with normalization of BP's. Transferred back to  1/27. Remained hemodynamically stable. 89-year-old Georgian speaking female PMHx AFib on Eliquis, chronic systolic CHF (EF 20-25% on echo 1/2018), CAD s/p remote PCI, HTN, HLD, DMT2, KIRA s/p R renal artery PTA 1/2018, CKD (baseline crea 1-2) anemia, hypothyroidism, b/l carotid endarterectomies, asthma, and emphysema on 2L home O2, who presented c/o progressive worsening SOB, AGUIRRE, cece LE swelling x 2 weeks. Admitted to tele [facility] for acute on chronic systolic CHF exacerbation. Diuresed with IV lasix (total ~8L net NEG) resolved to euvolemia and transitioned to PO Lasix. Echo performed 1/22/19 w/ EF 30%, severe LV global HK, mild-mod MR, mod pulm HTN, PASP 55. EP was consulted for BiV PPM given EF 30%, LBBB, recurrent CHF exac, which she underwent with Dr. Connolly on 1/25. Upon returning to [facility], pt became hypotensive but asymptomatic, likely due to aggressive diuresis and NPO all day for procedure. Treated with IVF but pressures remained low so transferred to CCU for closer evaluation and fluid resuscitation. Given ~500cc NS with normalization of BP's. Repeat ECHO w/o pericardial effusion. Transferred back to  1/27. Remained hemodynamically stable. 89-year-old Kazakh speaking female PMHx AFib on Eliquis, chronic systolic CHF (EF 20-25% on echo 1/2018), CAD s/p remote PCI, HTN, HLD, DMT2, KIRA s/p R renal artery PTA 1/2018, CKD (baseline crea 1-2) anemia, hypothyroidism, b/l carotid endarterectomies, asthma, and emphysema on 2L home O2, who presented c/o progressive worsening SOB, AGUIRRE, cece LE swelling x 2 weeks. Admitted to tele [facility] for acute on chronic systolic CHF exacerbation. Diuresed with IV lasix (total ~8L net NEG) resolved to euvolemia and transitioned to PO Lasix. Echo performed 1/22/19 w/ EF 30%, severe LV global HK, mild-mod MR, mod pulm HTN, PASP 55. EP was consulted for BiV PPM given EF 30%, LBBB, recurrent CHF exac, which she underwent with Dr. Connolly on 1/25. Upon returning to [facility], pt became hypotensive but asymptomatic, likely due to aggressive diuresis and NPO all day for procedure. Treated with IVF but pressures remained low so transferred to CCU for closer evaluation and fluid resuscitation. Given ~500cc NS with normalization of BP's. Repeat ECHO w/o pericardial effusion. Transferred back to [facility] 1/27. Remained hemodynamically stable. PPM site stable w/o hematoma/bleeding. HHA services resumed by case management.

## 2019-01-27 NOTE — PROGRESS NOTE ADULT - PROBLEM SELECTOR PLAN 10
D/c plan for Monday 1/28 - HHA to be reinstated on Sunday 1/27 (discussed with Josh Reina  and he is following reinstatement) for discharge home on Monday.     PT rec home w/ home PT. HHA x 10 hrs daily. Will need resumption of HHA services prior to DC - notified Josh Riena, , 1/27 to get this done since patient can discharge on Monday.

## 2019-01-27 NOTE — DISCHARGE NOTE ADULT - CARE PROVIDER_API CALL
Phillip Dillard; PhD), Cardiovascular Disease; Interventional Cardiology  [address] [place] [id number]  Phone: [phone]  Fax: [phone]    Tristan Connolly), Cardiac Electrophysiology; Cardiovascular Disease; Internal Medicine  [address] [address]  Phone: [phone]  Fax: [phone]

## 2019-01-27 NOTE — PROGRESS NOTE ADULT - SUBJECTIVE AND OBJECTIVE BOX
CARDIOLOGY NP PROGRESS NOTE    Subjective:   Seen and examined at bedside. Sitting in chair. Daughter at bedside. Denies complaints. Remainder ROS otherwise negative.    Overnight Events: no acute events. Transferred out of CCU back to step down. Received IV fluid now hypotension resolved. Normal device interrogation by EP.     TELEMETRY: AF 80's-90's      VITAL SIGNS:  T(C): 37 (01-27-19 @ 10:00), Max: 37 (01-27-19 @ 10:00)  HR: 94 (01-27-19 @ 05:11) (78 - 94)  BP: 113/58 (01-27-19 @ 05:11) (84/51 - 119/56)  RR: 16 (01-26-19 @ 23:50) (16 - 21)  SpO2: 92% (01-27-19 @ 05:11) (92% - 100%)  Wt(kg): --    I&O's Summary    26 Jan 2019 07:01  -  27 Jan 2019 07:00  --------------------------------------------------------  IN: 670 mL / OUT: 800 mL / NET: -130 mL      PHYSICAL EXAM:  Awake, talkative, in NAD  AXOX3, follows commands, appropriate  Neck supple, no JVD noted  PEERL, nasal/buccal mucosa moist and well perfused  BS diminished but clear bilaterally, unlabored, symmetrical  S1/S2, no S3, no M/G/R noted  Abdomen soft, non tender, non distended  No edema noted, perfusion brisk  Pulses palpable throughout  Skin warm and dry, no rashes/lesions noted  Left upper chest incision C/D/I, no ecchymosis/bleeding.       LABS:                          10.2   8.0   )-----------( 137      ( 26 Jan 2019 06:58 )             33.8                              01-26    143  |  98  |  49<H>  ----------------------------<  114<H>  3.6   |  33<H>  |  1.97<H>    Ca    9.5      26 Jan 2019 06:58  Phos  4.3     01-25  Mg     2.1     01-26    TPro  7.5  /  Alb  3.8  /  TBili  0.7  /  DBili  x   /  AST  20  /  ALT  22  /  AlkPhos  107  01-26    LIVER FUNCTIONS - ( 26 Jan 2019 06:58 )  Alb: 3.8 g/dL / Pro: 7.5 g/dL / ALK PHOS: 107 U/L / ALT: 22 U/L / AST: 20 U/L / GGT: x                                 PT/INR - ( 25 Jan 2019 21:35 )   PT: 15.6 sec;   INR: 1.37          PTT - ( 25 Jan 2019 21:35 )  PTT:31.4 sec  CAPILLARY BLOOD GLUCOSE    Allergies:  contrast media (iodine-based) (Angioedema)  pineapple (Unknown)    MEDICATIONS  (STANDING):  acetaminophen   Tablet .. 650 milliGRAM(s) Oral every 8 hours  apixaban 2.5 milliGRAM(s) Oral every 12 hours  atorvastatin 40 milliGRAM(s) Oral at bedtime  buDESOnide   0.5 milliGRAM(s) Respule 0.5 milliGRAM(s) Inhalation two times a day  dextrose 5%. 1000 milliLiter(s) (50 mL/Hr) IV Continuous <Continuous>  dextrose 50% Injectable 12.5 Gram(s) IV Push once  dextrose 50% Injectable 25 Gram(s) IV Push once  dextrose 50% Injectable 25 Gram(s) IV Push once  docusate sodium 100 milliGRAM(s) Oral two times a day  ferrous    sulfate 325 milliGRAM(s) Oral daily  insulin lispro (HumaLOG) corrective regimen sliding scale   SubCutaneous Before meals and at bedtime  levothyroxine 100 MICROGram(s) Oral daily  lidocaine   Patch 1 Patch Transdermal daily  lidocaine   Patch 1 Patch Transdermal every 24 hours  montelukast 10 milliGRAM(s) Oral daily  pantoprazole    Tablet 40 milliGRAM(s) Oral before breakfast  senna 2 Tablet(s) Oral at bedtime  tiotropium 18 MICROgram(s) Capsule 1 Capsule(s) Inhalation daily    MEDICATIONS  (PRN):  bisacodyl Suppository 10 milliGRAM(s) Rectal daily PRN Constipation  dextrose 40% Gel 15 Gram(s) Oral once PRN Blood Glucose LESS THAN 70 milliGRAM(s)/deciliter  glucagon  Injectable 1 milliGRAM(s) IntraMuscular once PRN Glucose LESS THAN 70 milligrams/deciliter  polyethylene glycol 3350 17 Gram(s) Oral two times a day PRN Constipation        DIAGNOSTIC TESTS:

## 2019-01-27 NOTE — PROGRESS NOTE ADULT - PROBLEM SELECTOR PLAN 3
Recent hx of dysuria, foul smelling odor and was placed on Cipro for "kidney infection" by PMD  -S/p Cipro 250mg BID for total 7 days course, completed 1/22

## 2019-01-27 NOTE — PROGRESS NOTE ADULT - PROBLEM SELECTOR PROBLEM 1
Hypotension
Acute on chronic systolic congestive heart failure
Hypotension
Acute on chronic systolic congestive heart failure

## 2019-01-27 NOTE — PROGRESS NOTE ADULT - PROBLEM SELECTOR PROBLEM 3
PAF (paroxysmal atrial fibrillation)
PAF (paroxysmal atrial fibrillation)
UTI (urinary tract infection)

## 2019-01-27 NOTE — PROGRESS NOTE ADULT - PROBLEM SELECTOR PROBLEM 9
DM (diabetes mellitus)
DM (diabetes mellitus)
Nutrition, metabolism, and development symptoms

## 2019-01-27 NOTE — PROGRESS NOTE ADULT - PROBLEM SELECTOR PLAN 7
Remote Hx of CAD s/p remote PCI x 5. Daughter unclear when was last PCI.  -Off ASA/Plavix per Dr Dillard  -c/w Lipitor 40mg qd  -c/w Toprol 25mg qd when BP allows.     #CKD, stage 3. Baseline appears 1.4-1.5 from previous [facility] admissions, Cr bump to 1.9 -- follow repeat - most likely from hypovolemia   -Hx Renal artery stenosis s/p PTA 1/2018  -Monitor BUN/crea  -Avoid nephrotoxic agents  -Renally dose meds

## 2019-01-27 NOTE — DISCHARGE NOTE ADULT - ADDITIONAL INSTRUCTIONS
Phillip Dillard (MD; PhD), Cardiovascular Disease; Interventional Cardiology  [address] [place] [address]  Phone: [phone]  Fax: [phone]    Tristan Connolly (MD), Cardiac Electrophysiology; Cardiovascular Disease; Internal Medicine  [address] [place] [address] Phone: [phone]  Your appointment is February 21st at 2pm. 1. Please follow up with your Cardiologist in 1 week.  Phillip Dillard (MD; PhD), Cardiovascular Disease; Interventional Cardiology  [address] [place] [address], [address] [place] [address]  Phone: [phone]    2. Please follow up with the pacemaker Dr Connolly. Your appointment is February 21st at 2pm.  Tristan Connolly (MD), Cardiac Electrophysiology; Cardiovascular Disease; Internal Medicine  [address] [place] [address]   Phone: [phone]

## 2019-01-27 NOTE — DISCHARGE NOTE ADULT - PLAN OF CARE
Follow up with your cardiologist in 1 week You were admitted to the cardiac telemetry floor with congestive heart failure.  You were given intravenous Lasix to get rid of the fluid in your lungs and body to help you breathe better. Please take Lasix 20mg EVERY OTHER day as prescribed without missing doses. You had an echocardiogram performed that showed your heart muscle is very weak, the pumping function of your heart or Ejection Fraction is 30%. Your heart is unable to pump the blood effectively throughout your body and can cause fluid backup into your lungs and rest of body. Due to this, you had a BiV Pacemaker placed to help the heart pumping get better. Please maintain a low salt diet and fluid restriction of 1.5 liters per day to prevent from fluid being reaccumulated. Weigh yourself daily and report any weight gain over 2 pounds/day or per week to your Doctor. Please continue taking your medications as prescribed. Please continue taking blood pressure medications. You completed a 7 days course of antibiotic Ciprofloxacin in the hospital. Your blood work showed abnormal kidney function while in the hospital after the BiV Pacemaker was placed, this was likely due to low blood pressure and IV diuretic medications given. You were resumed on a low dose diuretic medication Lasix 20mg every other day. You should follow up with PMD in 1-2 weeks and have your blood work rechecked.

## 2019-01-27 NOTE — PROGRESS NOTE ADULT - PROBLEM SELECTOR PROBLEM 6
Essential hypertension
Essential hypertension
Hypothyroidism, unspecified type

## 2019-01-27 NOTE — DISCHARGE NOTE ADULT - MEDICATION SUMMARY - MEDICATIONS TO TAKE
I will START or STAY ON the medications listed below when I get home from the hospital:    oxygen  -- inhaled once   -- Indication: For Emphysema of lung    budesonide 0.5 mg/2 mL inhalation suspension  -- 2 milliliter(s) inhaled 2 times a day  -- Indication: For Emphysema of lung    acetaminophen 325 mg oral tablet  -- 2 tab(s) by mouth every 8 hours  -- Indication: For PAin    isosorbide mononitrate 30 mg oral tablet, extended release  -- 1 tab(s) by mouth once a day (in the morning)  -- Indication: For Essential hypertension/Congestive heart failure    Eliquis 2.5 mg oral tablet  -- 1 tab(s) by mouth every 12 hours  -- Indication: For PAF (paroxysmal atrial fibrillation)    Januvia 50 mg oral tablet  -- 2 tab(s) by mouth once a day  -- Indication: For DM (diabetes mellitus)    atorvastatin 40 mg oral tablet  -- 1 tab(s) by mouth once a day (at bedtime)  -- Indication: For CAD (coronary artery disease)    metoprolol succinate 25 mg oral tablet, extended release  -- 1 tab(s) by mouth once a day  -- Indication: For PAF (paroxysmal atrial fibrillation)/Congestive heart failure    Spiriva 18 mcg inhalation capsule  -- 1 cap(s) inhaled once a day  -- Indication: For Emphysema of lung    furosemide 20 mg oral tablet  -- 1 tab(s) by mouth every 48 hours   -- Avoid prolonged or excessive exposure to direct and/or artificial sunlight while taking this medication.  It is very important that you take or use this exactly as directed.  Do not skip doses or discontinue unless directed by your doctor.  It may be advisable to drink a full glass orange juice or eat a banana daily while taking this medication.    -- Indication: For Acute on chronic systolic congestive heart failure    FeroSul 325 mg (65 mg elemental iron) oral tablet  -- 1 tab(s) by mouth once a day   -- Indication: For Anemia    montelukast 10 mg oral tablet  -- 1 tab(s) by mouth once a day  -- Indication: For Emphysema of lung    pantoprazole 40 mg oral delayed release tablet  -- 1 tab(s) by mouth once a day  -- Indication: For Gastric protection    Synthroid 100 mcg (0.1 mg) oral tablet  -- 1 tab(s) by mouth once a day  -- Indication: For Hypothyroidism, unspecified type

## 2019-01-27 NOTE — PROGRESS NOTE ADULT - PROBLEM SELECTOR PROBLEM 7
CAD (coronary artery disease)
Hypothyroidism, unspecified type
Hypothyroidism, unspecified type
CAD (coronary artery disease)

## 2019-01-27 NOTE — PROGRESS NOTE ADULT - PROBLEM SELECTOR PROBLEM 5
Emphysema of lung
Emphysema of lung
Essential hypertension

## 2019-01-27 NOTE — PROGRESS NOTE ADULT - ASSESSMENT
89-year-old Wolof speaking female PMHx AFib on Eliquis, chronic systolic CHF (EF 20-25% on echo 1/2018), CAD s/p remote PCI, HTN, HLD, DMT2, KIRA s/p R renal artery PTA 1/2018, CKD (baseline crea 1-2) anemia, hypothyroidism, b/l carotid endarterectomies, asthma, and emphysema on 2L home O2, who presented c/o progressive worsening SOB, AGUIRRE, cece LE swelling x 2 weeks. Admitted to tele [facility] for acute on chronic systolic CHF exacerbation. Optimizing diuretics, meeting UOP goals with uptrend of BUN/Cr. EP consulted yesterday; for BiV ICD today. Clinically stable.
89-year-old Arabic speaking F, with PMHx of AFib on Eliquis, chronic systolic CHF (EF 20-25% on echo 1/2018), CAD s/p remote PCI, HTN, HLD, DMT2, KIRA s/p R renal artery PTA 1/2018, CKD (baseline crea 1-2) anemia, hypothyroidism, b/l carotid endarterectomies, asthma, and emphysema on 2L home O2, who presented c/o progressive worsening SOB, AGUIRRE, cece LE swelling x 2 weeks. Admitted to tele [facility] for acute on chronic systolic CHF exacerbation.
89-year-old Irish speaking F, with PMHx of AFib on Eliquis, chronic systolic CHF (EF 20-25% on echo 1/2018), CAD s/p remote PCI, HTN, HLD, DMT2, KIRA s/p R renal artery PTA 1/2018, CKD (baseline crea 1-2) anemia, hypothyroidism, b/l carotid endarterectomies, asthma, and emphysema on 2L home O2, who presented c/o progressive worsening SOB, AGUIRRE, cece LE swelling x 2 weeks. Admitted to tele [facility] for acute on chronic systolic CHF exacerbation on IV diuresis. EP consulted for possible BiV implant, to go tomorrow
89-year-old Kazakh speaking female PMHx AFib on Eliquis, chronic systolic CHF (EF 20-25% on echo 1/2018), CAD s/p remote PCI, HTN, HLD, DMT2, KIRA s/p R renal artery PTA 1/2018, CKD (baseline crea 1-2) anemia, hypothyroidism, b/l carotid endarterectomies, asthma, and emphysema on 2L home O2, who presented c/o progressive worsening SOB, AGUIRRE, cece LE swelling x 2 weeks. Admitted to tele [facility] for acute on chronic systolic CHF exacerbation. Optimizing diuretics, meeting UOP goals with uptrend of BUN/Cr. EP consulted yesterday; for BiV ICD today. Clinically stable.
89-year-old Setswana speaking F, with PMHx of AFib on Eliquis, chronic systolic CHF (EF 20-25% on echo 1/2018), CAD s/p remote PCI, HTN, HLD, DMT2, KIRA s/p R renal artery PTA 1/2018, CKD (baseline crea 1-2) anemia, hypothyroidism, b/l carotid endarterectomies, asthma, and emphysema on 2L home O2, who presented c/o progressive worsening SOB, AGUIRRE, cece LE swelling x 2 weeks. Admitted to tele [facility] for acute on chronic systolic CHF exacerbation on IV diuresis. EP consulted for possible BiV implant.
89-year-old Thai speaking female PMHx AFib on Eliquis, chronic systolic CHF (EF 20-25% on echo 1/2018), CAD s/p remote PCI, HTN, HLD, DMT2, KIRA s/p R renal artery PTA 1/2018, CKD (baseline crea 1-2) anemia, hypothyroidism, b/l carotid endarterectomies, asthma, and emphysema on 2L home O2, who presented c/o progressive worsening SOB, AGUIRRE, cece LE swelling x 2 weeks. Admitted to tele [facility] for acute on chronic systolic CHF exacerbation. Optimizing diuretics, meeting UOP goals with uptrend of BUN/Cr. EP consulted yesterday; for BiV ICD today. Clinically stable.
s/p A Fib   PPM insertion     cont rx
89-year-old Malay speaking female PMHx AFib on Eliquis, chronic systolic CHF (EF 20-25% on echo 1/2018), CAD s/p remote PCI, HTN, HLD, DMT2, KIRA s/p R renal artery PTA 1/2018, CKD (baseline crea 1-2) anemia, hypothyroidism, b/l carotid endarterectomies, asthma, and emphysema on 2L home O2, who presented c/o progressive worsening SOB, AGUIRRE, cece LE swelling x 2 weeks. Admitted to tele [facility] for acute on chronic systolic CHF exacerbation. Optimizing diuretics, meeting UOP goals with uptrend of BUN/Cr. EP consulted yesterday; for BiV ICD today. Clinically stable.

## 2019-01-27 NOTE — PROGRESS NOTE ADULT - PROBLEM SELECTOR PLAN 2
Currently rate controlled 90's  -BB on hold - would resume now that BP is stable.   -Eliquis         #Transaminitis  Elevated Alk Phos 129, AST/ALT 48/53. Likely hepatic congestion 2/2 CHF exacerbation.  -LFTs resolved  -RUQ US with Hepatomegaly, and no sonographic evidence of cholelithiasis or acute cholecystitis.

## 2019-01-27 NOTE — DISCHARGE NOTE ADULT - MEDICATION SUMMARY - MEDICATIONS TO STOP TAKING
I will STOP taking the medications listed below when I get home from the hospital:    torsemide 10 mg oral tablet  -- 1 tab(s) by mouth once a day    Cipro 250 mg oral tablet  -- 1 tab(s) by mouth every 12 hours for 7 days

## 2019-01-27 NOTE — PROGRESS NOTE ADULT - PROVIDER SPECIALTY LIST ADULT
Cardiology
Electrophysiology
Electrophysiology
Cardiology
CCU

## 2019-01-28 VITALS — TEMPERATURE: 97 F

## 2019-01-28 LAB
ALBUMIN SERPL ELPH-MCNC: 3.5 G/DL — SIGNIFICANT CHANGE UP (ref 3.3–5)
ALP SERPL-CCNC: 108 U/L — SIGNIFICANT CHANGE UP (ref 40–120)
ALT FLD-CCNC: 15 U/L — SIGNIFICANT CHANGE UP (ref 10–45)
ANION GAP SERPL CALC-SCNC: 12 MMOL/L — SIGNIFICANT CHANGE UP (ref 5–17)
AST SERPL-CCNC: 16 U/L — SIGNIFICANT CHANGE UP (ref 10–40)
BILIRUB SERPL-MCNC: 0.4 MG/DL — SIGNIFICANT CHANGE UP (ref 0.2–1.2)
BUN SERPL-MCNC: 62 MG/DL — HIGH (ref 7–23)
CALCIUM SERPL-MCNC: 9.8 MG/DL — SIGNIFICANT CHANGE UP (ref 8.4–10.5)
CHLORIDE SERPL-SCNC: 97 MMOL/L — SIGNIFICANT CHANGE UP (ref 96–108)
CO2 SERPL-SCNC: 32 MMOL/L — HIGH (ref 22–31)
CREAT SERPL-MCNC: 1.84 MG/DL — HIGH (ref 0.5–1.3)
GLUCOSE BLDC GLUCOMTR-MCNC: 234 MG/DL — HIGH (ref 70–99)
GLUCOSE SERPL-MCNC: 117 MG/DL — HIGH (ref 70–99)
HCT VFR BLD CALC: 33.4 % — LOW (ref 34.5–45)
HGB BLD-MCNC: 10.1 G/DL — LOW (ref 11.5–15.5)
MAGNESIUM SERPL-MCNC: 2.4 MG/DL — SIGNIFICANT CHANGE UP (ref 1.6–2.6)
MCHC RBC-ENTMCNC: 29.4 PG — SIGNIFICANT CHANGE UP (ref 27–34)
MCHC RBC-ENTMCNC: 30.2 G/DL — LOW (ref 32–36)
MCV RBC AUTO: 97.1 FL — SIGNIFICANT CHANGE UP (ref 80–100)
PLATELET # BLD AUTO: 118 K/UL — LOW (ref 150–400)
POTASSIUM SERPL-MCNC: 4 MMOL/L — SIGNIFICANT CHANGE UP (ref 3.5–5.3)
POTASSIUM SERPL-SCNC: 4 MMOL/L — SIGNIFICANT CHANGE UP (ref 3.5–5.3)
PROT SERPL-MCNC: 7 G/DL — SIGNIFICANT CHANGE UP (ref 6–8.3)
RBC # BLD: 3.44 M/UL — LOW (ref 3.8–5.2)
RBC # FLD: 14.8 % — SIGNIFICANT CHANGE UP (ref 10.3–16.9)
SODIUM SERPL-SCNC: 141 MMOL/L — SIGNIFICANT CHANGE UP (ref 135–145)
WBC # BLD: 7.4 K/UL — SIGNIFICANT CHANGE UP (ref 3.8–10.5)
WBC # FLD AUTO: 7.4 K/UL — SIGNIFICANT CHANGE UP (ref 3.8–10.5)

## 2019-01-28 PROCEDURE — 99238 HOSP IP/OBS DSCHRG MGMT 30/<: CPT

## 2019-01-28 PROCEDURE — 93010 ELECTROCARDIOGRAM REPORT: CPT

## 2019-01-28 RX ORDER — CIPROFLOXACIN LACTATE 400MG/40ML
1 VIAL (ML) INTRAVENOUS
Qty: 0 | Refills: 0 | COMMUNITY

## 2019-01-28 RX ORDER — LEVOTHYROXINE SODIUM 125 MCG
1 TABLET ORAL
Qty: 0 | Refills: 0 | COMMUNITY

## 2019-01-28 RX ORDER — FUROSEMIDE 40 MG
1 TABLET ORAL
Qty: 0 | Refills: 0 | COMMUNITY
Start: 2019-01-28 | End: 2019-02-26

## 2019-01-28 RX ORDER — ACETAMINOPHEN 500 MG
2 TABLET ORAL
Qty: 0 | Refills: 0 | COMMUNITY
Start: 2019-01-28

## 2019-01-28 RX ORDER — ISOSORBIDE MONONITRATE 60 MG/1
30 TABLET, EXTENDED RELEASE ORAL DAILY
Qty: 0 | Refills: 0 | Status: DISCONTINUED | OUTPATIENT
Start: 2019-01-28 | End: 2019-01-28

## 2019-01-28 RX ORDER — FUROSEMIDE 40 MG
1 TABLET ORAL
Qty: 15 | Refills: 0 | OUTPATIENT
Start: 2019-01-28 | End: 2019-02-26

## 2019-01-28 RX ORDER — FERROUS SULFATE 325(65) MG
1 TABLET ORAL
Qty: 30 | Refills: 0 | OUTPATIENT
Start: 2019-01-28 | End: 2019-02-26

## 2019-01-28 RX ORDER — METOPROLOL TARTRATE 50 MG
25 TABLET ORAL DAILY
Qty: 0 | Refills: 0 | Status: DISCONTINUED | OUTPATIENT
Start: 2019-01-28 | End: 2019-01-28

## 2019-01-28 RX ORDER — LEVOTHYROXINE SODIUM 125 MCG
1 TABLET ORAL
Qty: 0 | Refills: 0 | COMMUNITY
Start: 2019-01-28

## 2019-01-28 RX ADMIN — Medication 650 MILLIGRAM(S): at 06:55

## 2019-01-28 RX ADMIN — Medication 325 MILLIGRAM(S): at 11:10

## 2019-01-28 RX ADMIN — PANTOPRAZOLE SODIUM 40 MILLIGRAM(S): 20 TABLET, DELAYED RELEASE ORAL at 05:37

## 2019-01-28 RX ADMIN — LIDOCAINE 1 PATCH: 4 CREAM TOPICAL at 05:21

## 2019-01-28 RX ADMIN — Medication 100 MILLIGRAM(S): at 05:35

## 2019-01-28 RX ADMIN — Medication 25 MILLIGRAM(S): at 11:11

## 2019-01-28 RX ADMIN — Medication 100 MICROGRAM(S): at 05:35

## 2019-01-28 RX ADMIN — Medication 4: at 11:25

## 2019-01-28 RX ADMIN — APIXABAN 2.5 MILLIGRAM(S): 2.5 TABLET, FILM COATED ORAL at 05:35

## 2019-01-28 RX ADMIN — Medication 650 MILLIGRAM(S): at 05:35

## 2019-01-28 RX ADMIN — Medication 0.5 MILLIGRAM(S): at 05:36

## 2019-01-28 RX ADMIN — TIOTROPIUM BROMIDE 1 CAPSULE(S): 18 CAPSULE ORAL; RESPIRATORY (INHALATION) at 11:11

## 2019-01-28 RX ADMIN — ISOSORBIDE MONONITRATE 30 MILLIGRAM(S): 60 TABLET, EXTENDED RELEASE ORAL at 11:11

## 2019-01-29 PROBLEM — I50.22 CHRONIC SYSTOLIC (CONGESTIVE) HEART FAILURE: Chronic | Status: ACTIVE | Noted: 2019-01-21

## 2019-01-29 PROBLEM — I70.1 ATHEROSCLEROSIS OF RENAL ARTERY: Chronic | Status: ACTIVE | Noted: 2019-01-21

## 2019-01-30 DIAGNOSIS — J43.9 EMPHYSEMA, UNSPECIFIED: ICD-10-CM

## 2019-01-30 DIAGNOSIS — E11.22 TYPE 2 DIABETES MELLITUS WITH DIABETIC CHRONIC KIDNEY DISEASE: ICD-10-CM

## 2019-01-30 DIAGNOSIS — E03.9 HYPOTHYROIDISM, UNSPECIFIED: ICD-10-CM

## 2019-01-30 DIAGNOSIS — I44.7 LEFT BUNDLE-BRANCH BLOCK, UNSPECIFIED: ICD-10-CM

## 2019-01-30 DIAGNOSIS — Z79.01 LONG TERM (CURRENT) USE OF ANTICOAGULANTS: ICD-10-CM

## 2019-01-30 DIAGNOSIS — Z99.81 DEPENDENCE ON SUPPLEMENTAL OXYGEN: ICD-10-CM

## 2019-01-30 DIAGNOSIS — I13.0 HYPERTENSIVE HEART AND CHRONIC KIDNEY DISEASE WITH HEART FAILURE AND STAGE 1 THROUGH STAGE 4 CHRONIC KIDNEY DISEASE, OR UNSPECIFIED CHRONIC KIDNEY DISEASE: ICD-10-CM

## 2019-01-30 DIAGNOSIS — N18.3 CHRONIC KIDNEY DISEASE, STAGE 3 (MODERATE): ICD-10-CM

## 2019-01-30 DIAGNOSIS — I25.10 ATHEROSCLEROTIC HEART DISEASE OF NATIVE CORONARY ARTERY WITHOUT ANGINA PECTORIS: ICD-10-CM

## 2019-01-30 DIAGNOSIS — I48.91 UNSPECIFIED ATRIAL FIBRILLATION: ICD-10-CM

## 2019-01-30 DIAGNOSIS — N39.0 URINARY TRACT INFECTION, SITE NOT SPECIFIED: ICD-10-CM

## 2019-01-30 DIAGNOSIS — I50.23 ACUTE ON CHRONIC SYSTOLIC (CONGESTIVE) HEART FAILURE: ICD-10-CM

## 2019-01-31 ENCOUNTER — APPOINTMENT (OUTPATIENT)
Age: 84
End: 2019-01-31
Payer: MEDICARE

## 2019-01-31 VITALS
HEART RATE: 83 BPM | RESPIRATION RATE: 17 BRPM | TEMPERATURE: 98.1 F | DIASTOLIC BLOOD PRESSURE: 78 MMHG | OXYGEN SATURATION: 95 % | BODY MASS INDEX: 30 KG/M2 | SYSTOLIC BLOOD PRESSURE: 114 MMHG | WEIGHT: 180.3 LBS

## 2019-01-31 DIAGNOSIS — J45.909 UNSPECIFIED ASTHMA, UNCOMPLICATED: ICD-10-CM

## 2019-01-31 DIAGNOSIS — D64.9 ANEMIA, UNSPECIFIED: ICD-10-CM

## 2019-01-31 DIAGNOSIS — I25.10 ATHEROSCLEROTIC HEART DISEASE OF NATIVE CORONARY ARTERY W/OUT ANGINA PECTORIS: ICD-10-CM

## 2019-01-31 DIAGNOSIS — I48.91 UNSPECIFIED ATRIAL FIBRILLATION: ICD-10-CM

## 2019-01-31 DIAGNOSIS — E03.9 HYPOTHYROIDISM, UNSPECIFIED: ICD-10-CM

## 2019-01-31 DIAGNOSIS — E11.29 TYPE 2 DIABETES MELLITUS WITH OTHER DIABETIC KIDNEY COMPLICATION: ICD-10-CM

## 2019-01-31 DIAGNOSIS — I10 ESSENTIAL (PRIMARY) HYPERTENSION: ICD-10-CM

## 2019-01-31 PROCEDURE — 99348 HOME/RES VST EST LOW MDM 30: CPT

## 2019-02-03 PROBLEM — I25.10 CORONARY ARTERY DISEASE, ANGINA PRESENCE UNSPECIFIED, UNSPECIFIED VESSEL OR LESION TYPE, UNSPECIFIED WHETHER NATIVE OR TRANSPLANTED HEART: Status: ACTIVE | Noted: 2018-10-03

## 2019-02-03 PROBLEM — I48.91 AFIB: Status: ACTIVE | Noted: 2019-02-03

## 2019-02-03 PROBLEM — D64.9 ANEMIA: Status: ACTIVE | Noted: 2019-02-03

## 2019-02-03 PROBLEM — J45.909 ASTHMA: Status: ACTIVE | Noted: 2019-02-03

## 2019-02-03 PROBLEM — E03.9 HYPOTHYROIDISM: Status: ACTIVE | Noted: 2019-02-03

## 2019-02-03 PROBLEM — E11.29 TYPE 2 DIABETES MELLITUS WITH OTHER KIDNEY COMPLICATION: Status: ACTIVE | Noted: 2019-02-03

## 2019-02-03 PROBLEM — I10 BENIGN ESSENTIAL HTN: Status: ACTIVE | Noted: 2019-02-03

## 2019-02-04 ENCOUNTER — APPOINTMENT (OUTPATIENT)
Age: 84
End: 2019-02-04
Payer: MEDICARE

## 2019-02-04 DIAGNOSIS — Z87.09 PERSONAL HISTORY OF OTHER DISEASES OF THE RESPIRATORY SYSTEM: ICD-10-CM

## 2019-02-04 PROCEDURE — 99347 HOME/RES VST EST SF MDM 20: CPT

## 2019-02-04 NOTE — HISTORY OF PRESENT ILLNESS
[Post-hospitalization from ___ Hospital] : Post-hospitalization from [unfilled] Hospital [Discharged on ___] : discharged on [unfilled] [Discharge Summary] : discharge summary [Pertinent Labs] : pertinent labs [Radiology Findings] : radiology findings [Discharge Med List] : discharge medication list [Med Reconciliation] : medication reconciliation has been completed [Patient Contacted By: ____] : and contacted by [unfilled] [FreeTextEntry2] : 89-year-old English speaking female PMHx AFib on Eliquis, chronic systolic CHF (EF 20-25% on echo 1/2018), CAD s/p remote PCI, HTN, HLD, DMT2, KIRA s/p R renal artery PTA 1/2018, CKD (baseline crea 1-2) anemia, hypothyroidism, b/l carotid endarterectomies, asthma, and emphysema on 2L home O2, who presented c/o progressive worsening SOB, AGUIRRE, cece LE swelling x 2 weeks. Admitted to [facility] for acute on chronic systolic CHF exacerbation. Diuresed with IV lasix (total ~8L net NEG) resolved to euvolemia and transitioned to PO Lasix. Echo performed 1/22/19 w/ EF 30%, severe LV global HK, mild-mod MR, mod pulm HTN, PASP 55. EP was consulted for BiV PPM given EF 30%, LBBB, recurrent CHF exac, which she underwent with Dr. Connolly on 1/25. Upon returning to [facility], pt became hypotensive but asymptomatic, likely due to aggressive diuresis and NPO all day for procedure. Treated with IVF but pressures remained low so transferred to CCU for closer evaluation and fluid resuscitation. Given ~500cc NS with normalization of BP's. Repeat ECHO w/o pericardial effusion. Transferred back to [facility] 1/27. Remained hemodynamically stable. PPM site stable w/o hematoma/bleeding. HHA services resumed by case management. \par \par *** Copied from discharge summary***\par \par Patient seen at home for CHF and other chronic disease management post hospitalizatio and transitional care\par

## 2019-02-04 NOTE — ASSESSMENT
[FreeTextEntry1] : Ms. Ferrer is 89y/o female recently hospitalized for CHF exacerbation and emphysema. seen at home, HHA, daughter and son-in-law present during the visit. Patient resting recliner with O2 3L via NC,satting at 95% no complaints offered. assessment WNL except, 3+ pitting edema to cece LE, paitent currently on Furosemide 40mg every other day. daughter reports 185ml urine output + a couple of wet pads for entire day yesterday. Base Cr 1.8-1.98 during hospitalization. diminished lung sounds at base bilaterally. Furosemide 20mg advised to be given again tomorrow and weigh pads as well to track output. \par Patient never seen by nephrologist, family wants patient to be seen by nephrologist in [facility].\par Patient stable otherwise, follow up visit scheduled for Monday to follow up\par \par Enforced with patient need for daily weights. Advised to call for an increase of greater than 2 lbs in a day or 5 pounds in a week.\par Adhere to low salt diet and educated patient on foods that should be  avoided such as processed or fast food.\par Limit fluids to 1 liter a day which is 4-5 glasses.\par Continue medications as ordered. \par Follow up with Cardiologist.\par TCM program reinforced and 24/7 contact number of CN provided. Pt advised to call for any worsening symptoms, questions or concerns. Pt verbalized understanding.\par \par \par

## 2019-02-04 NOTE — HEALTH RISK ASSESSMENT
[0] : 2) Feeling down, depressed, or hopeless: Not at all (0) [Low Carb Diet] : low carbohydrate [Low Salt Diet] : low salt [Strict Adherence] : strict adherence [None] : None [FreeTextEntry1] : 1.5L fluid erstriction

## 2019-02-04 NOTE — REVIEW OF SYSTEMS
[Fever] : no fever [Chills] : no chills [Fatigue] : no fatigue [Night Sweats] : no night sweats [Nasal Discharge] : no nasal discharge [Chest Pain] : no chest pain [Palpitations] : no palpitations [Leg Claudication] : no leg claudication [Lower Ext Edema] : lower extremity edema [Shortness Of Breath] : shortness of breath [Wheezing] : no wheezing [Cough] : no cough [Dyspnea on Exertion] : dyspnea on exertion [Abdominal Pain] : no abdominal pain [Nausea] : no nausea [Constipation] : no constipation [Diarrhea] : diarrhea [Melena] : no melena [Headache] : no headache [Dizziness] : no dizziness [Fainting] : no fainting [Negative] : Heme/Lymph

## 2019-02-04 NOTE — PHYSICAL EXAM

## 2019-02-06 ENCOUNTER — APPOINTMENT (OUTPATIENT)
Dept: PULMONOLOGY | Facility: CLINIC | Age: 84
End: 2019-02-06
Payer: MEDICARE

## 2019-02-06 VITALS
BODY MASS INDEX: 31.16 KG/M2 | HEIGHT: 65 IN | RESPIRATION RATE: 18 BRPM | HEART RATE: 82 BPM | OXYGEN SATURATION: 94 % | WEIGHT: 187 LBS | DIASTOLIC BLOOD PRESSURE: 73 MMHG | SYSTOLIC BLOOD PRESSURE: 111 MMHG

## 2019-02-06 DIAGNOSIS — R05 COUGH: ICD-10-CM

## 2019-02-06 DIAGNOSIS — R09.02 HYPOXEMIA: ICD-10-CM

## 2019-02-06 PROCEDURE — 99214 OFFICE O/P EST MOD 30 MIN: CPT

## 2019-02-06 RX ORDER — MONTELUKAST 10 MG/1
10 TABLET, FILM COATED ORAL
Refills: 0 | Status: ACTIVE | COMMUNITY

## 2019-02-06 RX ORDER — ATORVASTATIN CALCIUM 40 MG/1
40 TABLET, FILM COATED ORAL
Refills: 0 | Status: ACTIVE | COMMUNITY

## 2019-02-06 RX ORDER — BUDESONIDE 0.5 MG/2ML
0.5 INHALANT ORAL TWICE DAILY
Qty: 120 | Refills: 3 | Status: ACTIVE | COMMUNITY
Start: 2018-10-03 | End: 1900-01-01

## 2019-02-06 RX ORDER — IPRATROPIUM BROMIDE AND ALBUTEROL SULFATE 2.5; .5 MG/3ML; MG/3ML
0.5-2.5 (3) SOLUTION RESPIRATORY (INHALATION) 4 TIMES DAILY
Qty: 120 | Refills: 0 | Status: ACTIVE | COMMUNITY
Start: 2018-10-03 | End: 1900-01-01

## 2019-02-06 NOTE — HISTORY OF PRESENT ILLNESS
[FreeTextEntry1] : 89 yo female with hx of COPD presents for follow up. The patient complains of PRN productive cough. She uses nebulized steroid and bronchodilators BID. She denies fever, chest pain or hemoptysis. She continues to use supplemental oxygen all the time with a resting room air pulse oximetry level of 87%.

## 2019-02-06 NOTE — REVIEW OF SYSTEMS
[As Noted in HPI] : as noted in HPI [Cough] : cough [Sputum] : sputum  [Dyspnea] : dyspnea [Hypertension] : ~T hypertension [Dysrhythmia] : dysrhythmia [Diabetes] : diabetes mellitus [Negative] : Sleep Disorder [Hemoptysis] : no hemoptysis [Wheezing] : no wheezing

## 2019-02-06 NOTE — PHYSICAL EXAM
[General Appearance - Well Developed] : well developed [Normal Appearance] : normal appearance [Well Groomed] : well groomed [General Appearance - Well Nourished] : well nourished [No Deformities] : no deformities [General Appearance - In No Acute Distress] : no acute distress [Normal Conjunctiva] : the conjunctiva exhibited no abnormalities [Eyelids - No Xanthelasma] : the eyelids demonstrated no xanthelasmas [Neck Appearance] : the appearance of the neck was normal [Neck Cervical Mass (___cm)] : no neck mass was observed [Jugular Venous Distention Increased] : there was no jugular-venous distention [Thyroid Diffuse Enlargement] : the thyroid was not enlarged [Thyroid Nodule] : there were no palpable thyroid nodules [Heart Sounds] : normal S1 and S2 [Murmurs] : no murmurs present [Respiration, Rhythm And Depth] : normal respiratory rhythm and effort [Exaggerated Use Of Accessory Muscles For Inspiration] : no accessory muscle use [Bibasilar Rales/Crackles] : bibasilar rales [Abdomen Soft] : soft [Abdomen Tenderness] : non-tender [Abdomen Mass (___ Cm)] : no abdominal mass palpated [Nail Clubbing] : no clubbing of the fingernails [Cyanosis, Localized] : no localized cyanosis [Petechial Hemorrhages (___cm)] : no petechial hemorrhages [] : no ischemic changes [No Focal Deficits] : no focal deficits [Oriented To Time, Place, And Person] : oriented to person, place, and time [Impaired Insight] : insight and judgment were intact [Affect] : the affect was normal [Irregularly Irregular] : the rhythm was irregularly irregular [FreeTextEntry1] : Left chest PPM

## 2019-02-09 DIAGNOSIS — Z95.5 PRESENCE OF CORONARY ANGIOPLASTY IMPLANT AND GRAFT: ICD-10-CM

## 2019-02-09 DIAGNOSIS — I48.0 PAROXYSMAL ATRIAL FIBRILLATION: ICD-10-CM

## 2019-02-09 DIAGNOSIS — E78.5 HYPERLIPIDEMIA, UNSPECIFIED: ICD-10-CM

## 2019-02-09 DIAGNOSIS — I95.9 HYPOTENSION, UNSPECIFIED: ICD-10-CM

## 2019-02-12 ENCOUNTER — APPOINTMENT (OUTPATIENT)
Dept: NEPHROLOGY | Facility: CLINIC | Age: 84
End: 2019-02-12

## 2019-02-12 PROBLEM — Z87.09 HISTORY OF PULMONARY EMPHYSEMA: Status: RESOLVED | Noted: 2019-02-03 | Resolved: 2019-02-12

## 2019-02-12 NOTE — HISTORY OF PRESENT ILLNESS
[Post-hospitalization from ___ Hospital] : Post-hospitalization from [unfilled] Hospital [Discharged on ___] : discharged on [unfilled] [Discharge Summary] : discharge summary [Pertinent Labs] : pertinent labs [Radiology Findings] : radiology findings [Discharge Med List] : discharge medication list [Med Reconciliation] : medication reconciliation has been completed [Patient Contacted By: ____] : and contacted by [unfilled] [FreeTextEntry2] : 89-year-old Divehi speaking female PMHx AFib on Eliquis, chronic systolic CHF (EF 20-25% on echo 1/2018), CAD s/p remote PCI, HTN, HLD, DMT2, KIRA s/p R renal artery PTA 1/2018, CKD (baseline crea 1-2) anemia, hypothyroidism, b/l carotid endarterectomies, asthma, and emphysema on 2L home O2, who presented c/o progressive worsening SOB, AGUIRRE, cece LE swelling x 2 weeks. Admitted to [facility] for acute on chronic systolic CHF exacerbation. Diuresed with IV lasix (total ~8L net NEG) resolved to euvolemia and transitioned to PO Lasix. Echo performed 1/22/19 w/ EF 30%, severe LV global HK, mild-mod MR, mod pulm HTN, PASP 55. EP was consulted for BiV PPM given EF 30%, LBBB, recurrent CHF exac, which she underwent with Dr. Connolly on 1/25. Upon returning to [facility], pt became hypotensive but asymptomatic, likely due to aggressive diuresis and NPO all day for procedure. Treated with IVF but pressures remained low so transferred to CCU for closer evaluation and fluid resuscitation. Given ~500cc NS with normalization of BP's. Repeat ECHO w/o pericardial effusion. Transferred back to [facility] 1/27. Remained hemodynamically stable. PPM site stable w/o hematoma/bleeding. HHA services resumed by case management. \par \par *** Copied from discharge summary***\par \par Patient seen at home for CHF and other chronic disease management post hospitalizatio and transitional care\par

## 2019-02-12 NOTE — HEALTH RISK ASSESSMENT
[0] : 2) Feeling down, depressed, or hopeless: Not at all (0) [Low Carb Diet] : low carbohydrate [Low Salt Diet] : low salt [Strict Adherence] : strict adherence [FreeTextEntry1] : 1.5L fluid erstriction [None] : None

## 2019-02-12 NOTE — ASSESSMENT
[FreeTextEntry1] : Ms. Ferrer is 89y/o female recently hospitalized for CHF exacerbation and emphysema. seen at home, HHA, daughter and son-in-law present during the visit. Patient resting recliner with O2 3L via NC,satting at 95% no complaints offered. assessment WNL except, 3+ pitting edema to cece LE, masont currently on Furosemide 40mg every other day. daughter reports 185ml urine output + a couple of wet pads for entire day yesterday. Base Cr 1.8-1.98 during hospitalization. diminished lung sounds at base bilaterally. Furosemide 20mg advised to be given again tomorrow and weigh pads as well to track output. \par Patient never seen by nephrologist, family wants patient to be seen by nephrologist in [facility] system.\par Patient stable otherwise, follow up visit scheduled for Monday to follow up\par \par 2/4 Interval progress note\par Patient was followed up at home. daughter Selam reports her urine output has been increased. arpita keeps her legs elevated after taking diuretics in the morning. patient reports ' feeling better' than before. O2 sat at 96% with deep breathing exercise. Nephrologsit appointment made with Dr. Blair on 2/14. walking hourly around apt is reinforced. \par \par continue reinforcing with patient need for daily weights. Advised to call for an increase of greater than 2 lbs in a day or 3 pounds in a week.\par Adhere to low salt diet and educated patient on foods that should be  avoided such as processed or fast food.\par Limit fluids to 1 liter a day which is 4-5 glasses.\par Continue medications as ordered. \par Follow up with Cardiologist.\par TCM program reinforced and 24/7 contact number of CN provided. Pt advised to call for any worsening symptoms, questions or concerns. Pt verbalized understanding.\par \par \par

## 2019-02-12 NOTE — PHYSICAL EXAM

## 2019-02-16 ENCOUNTER — INPATIENT (INPATIENT)
Facility: HOSPITAL | Age: 84
LOS: 6 days | Discharge: HOME HEALTH | DRG: 273 | End: 2019-02-23
Attending: INTERNAL MEDICINE | Admitting: INTERNAL MEDICINE
Payer: MEDICARE

## 2019-02-16 VITALS
HEART RATE: 111 BPM | DIASTOLIC BLOOD PRESSURE: 56 MMHG | SYSTOLIC BLOOD PRESSURE: 102 MMHG | OXYGEN SATURATION: 95 % | RESPIRATION RATE: 23 BRPM | WEIGHT: 184.97 LBS | TEMPERATURE: 98 F

## 2019-02-16 DIAGNOSIS — Z96.659 PRESENCE OF UNSPECIFIED ARTIFICIAL KNEE JOINT: Chronic | ICD-10-CM

## 2019-02-16 DIAGNOSIS — Z98.890 OTHER SPECIFIED POSTPROCEDURAL STATES: Chronic | ICD-10-CM

## 2019-02-16 DIAGNOSIS — Z90.710 ACQUIRED ABSENCE OF BOTH CERVIX AND UTERUS: Chronic | ICD-10-CM

## 2019-02-16 DIAGNOSIS — Z95.0 PRESENCE OF CARDIAC PACEMAKER: Chronic | ICD-10-CM

## 2019-02-16 DIAGNOSIS — Z98.49 CATARACT EXTRACTION STATUS, UNSPECIFIED EYE: Chronic | ICD-10-CM

## 2019-02-16 LAB
ALBUMIN SERPL ELPH-MCNC: 3.9 G/DL — SIGNIFICANT CHANGE UP (ref 3.3–5)
ALP SERPL-CCNC: 154 U/L — HIGH (ref 40–120)
ALT FLD-CCNC: 57 U/L — HIGH (ref 10–45)
ANION GAP SERPL CALC-SCNC: 13 MMOL/L — SIGNIFICANT CHANGE UP (ref 5–17)
APPEARANCE UR: CLEAR — SIGNIFICANT CHANGE UP
APTT BLD: 30.8 SEC — SIGNIFICANT CHANGE UP (ref 27.5–36.3)
AST SERPL-CCNC: 47 U/L — HIGH (ref 10–40)
BASE EXCESS BLDV CALC-SCNC: 5.1 MMOL/L — SIGNIFICANT CHANGE UP
BASOPHILS # BLD AUTO: 0 K/UL — SIGNIFICANT CHANGE UP (ref 0–0.2)
BASOPHILS NFR BLD AUTO: 0 % — SIGNIFICANT CHANGE UP (ref 0–2)
BILIRUB SERPL-MCNC: 0.4 MG/DL — SIGNIFICANT CHANGE UP (ref 0.2–1.2)
BILIRUB UR-MCNC: NEGATIVE — SIGNIFICANT CHANGE UP
BUN SERPL-MCNC: 66 MG/DL — HIGH (ref 7–23)
CALCIUM SERPL-MCNC: 10.1 MG/DL — SIGNIFICANT CHANGE UP (ref 8.4–10.5)
CHLORIDE SERPL-SCNC: 103 MMOL/L — SIGNIFICANT CHANGE UP (ref 96–108)
CK MB CFR SERPL CALC: 1.2 NG/ML — SIGNIFICANT CHANGE UP (ref 0–6.7)
CK SERPL-CCNC: 38 U/L — SIGNIFICANT CHANGE UP (ref 25–170)
CO2 SERPL-SCNC: 29 MMOL/L — SIGNIFICANT CHANGE UP (ref 22–31)
COLOR SPEC: YELLOW — SIGNIFICANT CHANGE UP
CREAT SERPL-MCNC: 1.65 MG/DL — HIGH (ref 0.5–1.3)
DIFF PNL FLD: NEGATIVE — SIGNIFICANT CHANGE UP
EOSINOPHIL # BLD AUTO: 0.08 K/UL — SIGNIFICANT CHANGE UP (ref 0–0.5)
EOSINOPHIL NFR BLD AUTO: 1 % — SIGNIFICANT CHANGE UP (ref 0–6)
GLUCOSE BLDC GLUCOMTR-MCNC: 127 MG/DL — HIGH (ref 70–99)
GLUCOSE BLDC GLUCOMTR-MCNC: 128 MG/DL — HIGH (ref 70–99)
GLUCOSE BLDC GLUCOMTR-MCNC: 154 MG/DL — HIGH (ref 70–99)
GLUCOSE SERPL-MCNC: 150 MG/DL — HIGH (ref 70–99)
GLUCOSE UR QL: NEGATIVE — SIGNIFICANT CHANGE UP
HCO3 BLDV-SCNC: 32 MMOL/L — HIGH (ref 20–27)
HCT VFR BLD CALC: 30.8 % — LOW (ref 34.5–45)
HGB BLD-MCNC: 9.1 G/DL — LOW (ref 11.5–15.5)
INR BLD: 1.41 — HIGH (ref 0.88–1.16)
KETONES UR-MCNC: NEGATIVE — SIGNIFICANT CHANGE UP
LEUKOCYTE ESTERASE UR-ACNC: ABNORMAL
LIDOCAIN IGE QN: 20 U/L — SIGNIFICANT CHANGE UP (ref 7–60)
LYMPHOCYTES # BLD AUTO: 0.49 K/UL — LOW (ref 1–3.3)
LYMPHOCYTES # BLD AUTO: 6 % — LOW (ref 13–44)
MAGNESIUM SERPL-MCNC: 2.7 MG/DL — HIGH (ref 1.6–2.6)
MCHC RBC-ENTMCNC: 29.5 GM/DL — LOW (ref 32–36)
MCHC RBC-ENTMCNC: 29.6 PG — SIGNIFICANT CHANGE UP (ref 27–34)
MCV RBC AUTO: 100.3 FL — HIGH (ref 80–100)
MONOCYTES # BLD AUTO: 0.58 K/UL — SIGNIFICANT CHANGE UP (ref 0–0.9)
MONOCYTES NFR BLD AUTO: 7 % — SIGNIFICANT CHANGE UP (ref 2–14)
NEUTROPHILS # BLD AUTO: 7.07 K/UL — SIGNIFICANT CHANGE UP (ref 1.8–7.4)
NEUTROPHILS NFR BLD AUTO: 86 % — HIGH (ref 43–77)
NITRITE UR-MCNC: NEGATIVE — SIGNIFICANT CHANGE UP
NRBC # BLD: SIGNIFICANT CHANGE UP /100 WBCS (ref 0–0)
NT-PROBNP SERPL-SCNC: HIGH PG/ML (ref 0–300)
PCO2 BLDV: 56 MMHG — HIGH (ref 41–51)
PH BLDV: 7.37 — SIGNIFICANT CHANGE UP (ref 7.32–7.43)
PH UR: 7 — SIGNIFICANT CHANGE UP (ref 5–8)
PLATELET # BLD AUTO: 104 K/UL — LOW (ref 150–400)
PO2 BLDV: 23 MMHG — SIGNIFICANT CHANGE UP
POTASSIUM SERPL-MCNC: 4.8 MMOL/L — SIGNIFICANT CHANGE UP (ref 3.5–5.3)
POTASSIUM SERPL-SCNC: 4.8 MMOL/L — SIGNIFICANT CHANGE UP (ref 3.5–5.3)
PROT SERPL-MCNC: 7.5 G/DL — SIGNIFICANT CHANGE UP (ref 6–8.3)
PROT UR-MCNC: NEGATIVE MG/DL — SIGNIFICANT CHANGE UP
PROTHROM AB SERPL-ACNC: 16.1 SEC — HIGH (ref 10–12.9)
RBC # BLD: 3.07 M/UL — LOW (ref 3.8–5.2)
RBC # FLD: 15.7 % — HIGH (ref 10.3–14.5)
SAO2 % BLDV: 33 % — SIGNIFICANT CHANGE UP
SODIUM SERPL-SCNC: 145 MMOL/L — SIGNIFICANT CHANGE UP (ref 135–145)
SP GR SPEC: 1.01 — SIGNIFICANT CHANGE UP (ref 1–1.03)
T3FREE SERPL-MCNC: 2.07 PG/ML — SIGNIFICANT CHANGE UP (ref 1.71–3.71)
T4 AB SER-ACNC: 8.77 UG/DL — SIGNIFICANT CHANGE UP (ref 3.17–11.72)
T4 FREE SERPL-MCNC: 1.46 NG/DL — SIGNIFICANT CHANGE UP (ref 0.7–1.48)
TROPONIN T SERPL-MCNC: <0.01 NG/ML — SIGNIFICANT CHANGE UP (ref 0–0.01)
TSH SERPL-MCNC: 7.83 UIU/ML — HIGH (ref 0.35–4.94)
UROBILINOGEN FLD QL: 0.2 E.U./DL — SIGNIFICANT CHANGE UP
WBC # BLD: 8.22 K/UL — SIGNIFICANT CHANGE UP (ref 3.8–10.5)
WBC # FLD AUTO: 8.22 K/UL — SIGNIFICANT CHANGE UP (ref 3.8–10.5)

## 2019-02-16 PROCEDURE — 93010 ELECTROCARDIOGRAM REPORT: CPT

## 2019-02-16 PROCEDURE — 99285 EMERGENCY DEPT VISIT HI MDM: CPT | Mod: 25

## 2019-02-16 PROCEDURE — 99223 1ST HOSP IP/OBS HIGH 75: CPT | Mod: AI

## 2019-02-16 PROCEDURE — 93306 TTE W/DOPPLER COMPLETE: CPT | Mod: 26

## 2019-02-16 PROCEDURE — 71045 X-RAY EXAM CHEST 1 VIEW: CPT | Mod: 26

## 2019-02-16 RX ORDER — DEXTROSE 50 % IN WATER 50 %
25 SYRINGE (ML) INTRAVENOUS ONCE
Qty: 0 | Refills: 0 | Status: DISCONTINUED | OUTPATIENT
Start: 2019-02-16 | End: 2019-02-16

## 2019-02-16 RX ORDER — MONTELUKAST 4 MG/1
10 TABLET, CHEWABLE ORAL DAILY
Qty: 0 | Refills: 0 | Status: DISCONTINUED | OUTPATIENT
Start: 2019-02-16 | End: 2019-02-23

## 2019-02-16 RX ORDER — DEXTROSE 50 % IN WATER 50 %
12.5 SYRINGE (ML) INTRAVENOUS ONCE
Qty: 0 | Refills: 0 | Status: DISCONTINUED | OUTPATIENT
Start: 2019-02-16 | End: 2019-02-16

## 2019-02-16 RX ORDER — TIOTROPIUM BROMIDE 18 UG/1
1 CAPSULE ORAL; RESPIRATORY (INHALATION) DAILY
Qty: 0 | Refills: 0 | Status: DISCONTINUED | OUTPATIENT
Start: 2019-02-16 | End: 2019-02-16

## 2019-02-16 RX ORDER — GLUCAGON INJECTION, SOLUTION 0.5 MG/.1ML
1 INJECTION, SOLUTION SUBCUTANEOUS ONCE
Qty: 0 | Refills: 0 | Status: DISCONTINUED | OUTPATIENT
Start: 2019-02-16 | End: 2019-02-23

## 2019-02-16 RX ORDER — BUDESONIDE, MICRONIZED 100 %
0.5 POWDER (GRAM) MISCELLANEOUS
Qty: 0 | Refills: 0 | Status: DISCONTINUED | OUTPATIENT
Start: 2019-02-16 | End: 2019-02-23

## 2019-02-16 RX ORDER — INSULIN LISPRO 100/ML
VIAL (ML) SUBCUTANEOUS
Qty: 0 | Refills: 0 | Status: DISCONTINUED | OUTPATIENT
Start: 2019-02-16 | End: 2019-02-23

## 2019-02-16 RX ORDER — FUROSEMIDE 40 MG
40 TABLET ORAL DAILY
Qty: 0 | Refills: 0 | Status: DISCONTINUED | OUTPATIENT
Start: 2019-02-17 | End: 2019-02-19

## 2019-02-16 RX ORDER — POLYETHYLENE GLYCOL 3350 17 G/17G
17 POWDER, FOR SOLUTION ORAL DAILY
Qty: 0 | Refills: 0 | Status: DISCONTINUED | OUTPATIENT
Start: 2019-02-16 | End: 2019-02-23

## 2019-02-16 RX ORDER — DEXTROSE 50 % IN WATER 50 %
15 SYRINGE (ML) INTRAVENOUS ONCE
Qty: 0 | Refills: 0 | Status: DISCONTINUED | OUTPATIENT
Start: 2019-02-16 | End: 2019-02-16

## 2019-02-16 RX ORDER — LEVOTHYROXINE SODIUM 125 MCG
100 TABLET ORAL DAILY
Qty: 0 | Refills: 0 | Status: DISCONTINUED | OUTPATIENT
Start: 2019-02-16 | End: 2019-02-23

## 2019-02-16 RX ORDER — MONTELUKAST 4 MG/1
1 TABLET, CHEWABLE ORAL
Qty: 0 | Refills: 0 | COMMUNITY

## 2019-02-16 RX ORDER — APIXABAN 2.5 MG/1
2.5 TABLET, FILM COATED ORAL EVERY 12 HOURS
Qty: 0 | Refills: 0 | Status: DISCONTINUED | OUTPATIENT
Start: 2019-02-16 | End: 2019-02-23

## 2019-02-16 RX ORDER — IPRATROPIUM/ALBUTEROL SULFATE 18-103MCG
3 AEROSOL WITH ADAPTER (GRAM) INHALATION EVERY 6 HOURS
Qty: 0 | Refills: 0 | Status: DISCONTINUED | OUTPATIENT
Start: 2019-02-16 | End: 2019-02-23

## 2019-02-16 RX ORDER — SODIUM CHLORIDE 9 MG/ML
1000 INJECTION, SOLUTION INTRAVENOUS
Qty: 0 | Refills: 0 | Status: DISCONTINUED | OUTPATIENT
Start: 2019-02-16 | End: 2019-02-16

## 2019-02-16 RX ORDER — SENNA PLUS 8.6 MG/1
2 TABLET ORAL AT BEDTIME
Qty: 0 | Refills: 0 | Status: DISCONTINUED | OUTPATIENT
Start: 2019-02-16 | End: 2019-02-23

## 2019-02-16 RX ORDER — ISOSORBIDE MONONITRATE 60 MG/1
30 TABLET, EXTENDED RELEASE ORAL DAILY
Qty: 0 | Refills: 0 | Status: DISCONTINUED | OUTPATIENT
Start: 2019-02-16 | End: 2019-02-23

## 2019-02-16 RX ORDER — PANTOPRAZOLE SODIUM 20 MG/1
1 TABLET, DELAYED RELEASE ORAL
Qty: 0 | Refills: 0 | COMMUNITY

## 2019-02-16 RX ORDER — ATORVASTATIN CALCIUM 80 MG/1
40 TABLET, FILM COATED ORAL AT BEDTIME
Qty: 0 | Refills: 0 | Status: DISCONTINUED | OUTPATIENT
Start: 2019-02-16 | End: 2019-02-23

## 2019-02-16 RX ORDER — CEFTRIAXONE 500 MG/1
1000 INJECTION, POWDER, FOR SOLUTION INTRAMUSCULAR; INTRAVENOUS EVERY 24 HOURS
Qty: 0 | Refills: 0 | Status: DISCONTINUED | OUTPATIENT
Start: 2019-02-16 | End: 2019-02-19

## 2019-02-16 RX ORDER — FUROSEMIDE 40 MG
40 TABLET ORAL ONCE
Qty: 0 | Refills: 0 | Status: COMPLETED | OUTPATIENT
Start: 2019-02-16 | End: 2019-02-16

## 2019-02-16 RX ORDER — METOPROLOL TARTRATE 50 MG
25 TABLET ORAL DAILY
Qty: 0 | Refills: 0 | Status: DISCONTINUED | OUTPATIENT
Start: 2019-02-16 | End: 2019-02-23

## 2019-02-16 RX ORDER — SITAGLIPTIN 50 MG/1
2 TABLET, FILM COATED ORAL
Qty: 0 | Refills: 0 | COMMUNITY

## 2019-02-16 RX ORDER — FERROUS SULFATE 325(65) MG
325 TABLET ORAL DAILY
Qty: 0 | Refills: 0 | Status: DISCONTINUED | OUTPATIENT
Start: 2019-02-16 | End: 2019-02-23

## 2019-02-16 RX ORDER — ACETAMINOPHEN 500 MG
650 TABLET ORAL EVERY 8 HOURS
Qty: 0 | Refills: 0 | Status: DISCONTINUED | OUTPATIENT
Start: 2019-02-16 | End: 2019-02-23

## 2019-02-16 RX ORDER — IPRATROPIUM/ALBUTEROL SULFATE 18-103MCG
3 AEROSOL WITH ADAPTER (GRAM) INHALATION
Qty: 0 | Refills: 0 | COMMUNITY

## 2019-02-16 RX ORDER — BUDESONIDE, MICRONIZED 100 %
2 POWDER (GRAM) MISCELLANEOUS
Qty: 0 | Refills: 0 | COMMUNITY

## 2019-02-16 RX ORDER — PANTOPRAZOLE SODIUM 20 MG/1
40 TABLET, DELAYED RELEASE ORAL
Qty: 0 | Refills: 0 | Status: DISCONTINUED | OUTPATIENT
Start: 2019-02-16 | End: 2019-02-23

## 2019-02-16 RX ORDER — TIOTROPIUM BROMIDE 18 UG/1
1 CAPSULE ORAL; RESPIRATORY (INHALATION)
Qty: 0 | Refills: 0 | COMMUNITY

## 2019-02-16 RX ADMIN — MONTELUKAST 10 MILLIGRAM(S): 4 TABLET, CHEWABLE ORAL at 12:03

## 2019-02-16 RX ADMIN — APIXABAN 2.5 MILLIGRAM(S): 2.5 TABLET, FILM COATED ORAL at 12:01

## 2019-02-16 RX ADMIN — Medication 325 MILLIGRAM(S): at 12:01

## 2019-02-16 RX ADMIN — Medication 2: at 21:33

## 2019-02-16 RX ADMIN — CEFTRIAXONE 1000 MILLIGRAM(S): 500 INJECTION, POWDER, FOR SOLUTION INTRAMUSCULAR; INTRAVENOUS at 13:07

## 2019-02-16 RX ADMIN — ATORVASTATIN CALCIUM 40 MILLIGRAM(S): 80 TABLET, FILM COATED ORAL at 21:33

## 2019-02-16 RX ADMIN — APIXABAN 2.5 MILLIGRAM(S): 2.5 TABLET, FILM COATED ORAL at 21:33

## 2019-02-16 RX ADMIN — ISOSORBIDE MONONITRATE 30 MILLIGRAM(S): 60 TABLET, EXTENDED RELEASE ORAL at 12:00

## 2019-02-16 RX ADMIN — Medication 100 MICROGRAM(S): at 11:58

## 2019-02-16 RX ADMIN — PANTOPRAZOLE SODIUM 40 MILLIGRAM(S): 20 TABLET, DELAYED RELEASE ORAL at 12:00

## 2019-02-16 RX ADMIN — Medication 40 MILLIGRAM(S): at 09:24

## 2019-02-16 RX ADMIN — Medication 25 MILLIGRAM(S): at 12:00

## 2019-02-16 NOTE — ED ADULT NURSE REASSESSMENT NOTE - NS ED NURSE REASSESS COMMENT FT1
Pt is resting on bed comfortably. Lasix is given as per MD Smith order. Pt's daughter states "she becomes incontinent after lasix IV and continue peeing. She needs a catheter," MD Smith has been made aware. Catheter insertion order has been received from MD Smith for continuous output monitoring.  Rubalcava 16Fr has been placed with sterile technique, pt tolerated procedure well, 200cc clear yellow urine output noted.

## 2019-02-16 NOTE — H&P ADULT - HISTORY OF PRESENT ILLNESS
89F Nicaraguan speaking PMHx AFib on Eliquis, chronic systolic CHF (EF 20-25% on echo 1/2018), CAD s/p remote PCI, HTN, HLD, DMT2, KIRA s/p R renal artery PTA 1/2018, CKD (baseline crea 1-2) anemia, hypothyroidism, b/l carotid endarterectomies, asthma, and emphysema on 2L home O2, who was recently hospitalized for CHF exacerbation two weeks ago and had a BiV PPM placed during that admission, now presents with SOB since last night. Patient states that she couldn't sleep all night because of her SOB. She has had many similar episodes before but she believes her SOB is getting progressively worse. She has gained 6lbs in the last month. She has swelling in her legs. She states she has been compliant with taking all her medications and has been taking lasix 20mg PO qd. She has not missed a dose. She does not have any fever or chills. She denies cough, chest pain, abdominal pain, n/v, dysuria. Normal BMs.     ED Course: 89F Macanese speaking PMHx AFib on Eliquis, chronic systolic CHF (EF 20-25% on echo 1/2018, biventricular PPM placed 1/2019), CAD s/p remote PCI, HTN, HLD, DMT2, KIRA s/p R renal artery PTA 1/2018, CKD (baseline crea 1-2) anemia, hypothyroidism, b/l carotid endarterectomies, asthma, and emphysema on 2L home O2, who was recently hospitalized for CHF exacerbation two weeks ago and had a BiV PPM placed during that admission, now presents with SOB since last night. Patient states that she couldn't sleep all night because of her SOB. She has had many similar episodes before but she believes her SOB is getting progressively worse. She has gained 6lbs in the last month. She has swelling in her legs. She states she has been compliant with taking all her medications and has been taking lasix 20mg PO qd. She has not missed a dose. She does not have any fever or chills. She denies cough, chest pain, abdominal pain, n/v, abnormal BMs. She was prescribed a 7 day course of augmentin yesterday for a UTI but has not picked up the prescription yet.     ED Course: T 97.7 oral, , -175/56-89, RR 25, O2 98 2L NC. Labs significant for Hb 9.1, neutrophil 86%, BUN 66, Cr 1.65, Alk phos 154, AST 47, ALT 57, TSH 7.831, serum BNP 11,057, trop <0.01. UA positive for small LE, CXR no acute infiltrates. EKG shows paced rhythm no ST elevations or depressions. In ED received lasix 40mg IV x1. 89F Chinese speaking PMHx AFib on Eliquis, chronic systolic CHF (EF 20-25% on echo 1/2018, biventricular PPM placed 1/2019), CAD s/p remote PCI, HTN, HLD, DMT2, KIRA s/p R renal artery PTA 1/2018, CKD (baseline crea 1-2) anemia, hypothyroidism, b/l carotid endarterectomies, asthma, and emphysema on 2L home O2, who was recently hospitalized for CHF exacerbation two weeks ago and had a BiV PPM placed during that admission, now presents with SOB since last night. Patient states that she couldn't sleep all night because of her SOB. She has had many similar episodes before but she believes her SOB is getting progressively worse. She has gained 6lbs in the last month. She has swelling in her legs. She is unable to lie flat at night due to SOB. She states she has been compliant with taking all her medications and has been taking lasix 20mg PO qd. She has not missed a dose. She does not have any fever or chills. She denies cough, chest pain, abdominal pain, n/v, abnormal BMs. She was prescribed a 7 day course of augmentin yesterday for a UTI but has not picked up the prescription yet.     ED Course: T 97.7 oral, , -175/56-89, RR 25, O2 98 2L NC. Labs significant for Hb 9.1, neutrophil 86%, BUN 66, Cr 1.65, Alk phos 154, AST 47, ALT 57, TSH 7.831, serum BNP 11,057, trop <0.01. UA positive for small LE, CXR no acute infiltrates. EKG shows paced rhythm no ST elevations or depressions. In ED received lasix 40mg IV x1. 89F Moroccan speaking PMHx AFib on Eliquis, chronic systolic CHF (EF 20-25% on echo 1/2018, biventricular PPM placed 1/2019), CAD s/p remote PCI, HTN, HLD, DMT2, KIRA s/p R renal artery PTA 1/2018, CKD (baseline crea 1-2) anemia, hypothyroidism, b/l carotid endarterectomies, asthma, and emphysema on 2L home O2, who was recently hospitalized for CHF exacerbation three weeks ago and had a BiV PPM placed during that admission, now presents with SOB since last night. Patient states that she couldn't sleep all night because of her SOB. She has had many similar episodes before but she believes her SOB is getting progressively worse. She has gained 6lbs in the last month. She has swelling in her legs. She is unable to lie flat at night due to SOB. She states she has been compliant with taking all her medications and has been taking lasix 20mg PO qd. She has not missed a dose. She does not have any fever or chills. She denies cough, chest pain, abdominal pain, n/v, abnormal BMs. She was prescribed a 7 day course of augmentin yesterday for a UTI but has not picked up the prescription yet.     ED Course: T 97.7 oral, , -175/56-89, RR 25, O2 98 2L NC. Labs significant for Hb 9.1, neutrophil 86%, BUN 66, Cr 1.65, Alk phos 154, AST 47, ALT 57, TSH 7.831, serum BNP 11,057, trop <0.01. UA positive for small LE, CXR no acute infiltrates. EKG shows paced rhythm no ST elevations or depressions. In ED received lasix 40mg IV x1. 89F Paraguayan speaking PMHx AFib on Eliquis, chronic systolic CHF (EF 20-25% on echo 1/2018, biventricular PPM placed 1/2019), CAD s/p remote PCI, HTN, HLD, DMT2, KIRA s/p R renal artery PTA 1/2018, CKD (baseline crea 1-2) anemia, hypothyroidism, b/l carotid endarterectomies, asthma, and emphysema on 2L home O2, who was recently hospitalized for CHF exacerbation three weeks ago and had a BiV PPM placed during that admission, now presents with SOB since last night. Patient states that she couldn't sleep all night because of her SOB. She has had many similar episodes before but she believes her SOB is getting progressively worse. She has gained 6lbs in the last month. She has swelling in her legs. She is unable to lie flat at night due to SOB. She states she has been compliant with taking all her medications and has been taking lasix 20mg PO qd. She has not missed a dose. She does not have any fever or chills. She denies cough, chest pain, abdominal pain, n/v, abnormal BMs. She was prescribed a 7 day course of augmentin yesterday for a UTI but has not picked up the prescription yet.     ED Course: T 97.7 oral, , -175/56-89, RR 25, O2 98 2L NC. Labs significant for Hb 9.1, neutrophil 86%, BUN 66, Cr 1.65, Alk phos 154, AST 47, ALT 57, TSH 7.831, serum BNP 11,057, trop <0.01. UA positive for small LE, CXR no acute infiltrates. EKG shows paced rhythm no ST elevations or depressions. In ED received lasix 40mg IV x1.     [employer]- ID [id number]

## 2019-02-16 NOTE — H&P ADULT - ASSESSMENT
89-year-old Upper sorbian speaking female PMHx AFib on Eliquis, chronic systolic CHF (EF 20-25% on echo 1/2018), CAD s/p remote PCI, HTN, HLD, DMT2, KIRA s/p R renal artery PTA 1/2018, CKD (baseline crea 1-2) anemia, hypothyroidism, b/l carotid endarterectomies, asthma, and emphysema on 2L home O2, who presented c/o progressive worsening SOB, AGUIRRE, cece LE swelling x 2 weeks. Admitted to [facility] for acute on chronic systolic CHF exacerbation. Optimizing diuretics, meeting UOP goals with uptrend of BUN/Cr. EP consulted yesterday; for BiV ICD today. Clinically stable. 89F Jordanian speaking PMHx AFib on Eliquis, chronic systolic CHF (EF 20-25% on echo 1/2018, biventricular PPM placed 1/2019), CAD s/p remote PCI, HTN, HLD, DMT2, KIRA s/p R renal artery PTA 1/2018, CKD (baseline crea 1-2) anemia, hypothyroidism, b/l carotid endarterectomies, asthma, and emphysema on 2L home O2, who was recently hospitalized for CHF exacerbation three weeks ago and had a BiV PPM placed during that admission, now presents with acute CHF exacerbation likely 2/2 UTI vs increased fluid intake vs hypothyroidism.

## 2019-02-16 NOTE — H&P ADULT - ATTENDING COMMENTS
Assessment: Patient personally seen and examined myself during rounds with the     Resident    ON DATE 2/16/19    Note read, including vitals, physical findings, laboratory data, and radiological reports.   Agree with above and revisions, if any, included below.  - NEW acute systolic heart failure  - SEVERE sob needing IV diuresis  - UNSTABLE rhythm, EP for ablative therapy    TIME SPENT in evaluation and management, reassessments, review and interpretation of labs and x-rays, and hemodynamic management, formulating a plan and coordinating care: ___70____ MIN.  Time does not include procedural time.    Roel Crabtree MD  Cardiology

## 2019-02-16 NOTE — H&P ADULT - PROBLEM SELECTOR PLAN 9
F: No IVF  N: DASH/TLC/DM diet, soft mechanical  E: Replete lytes PRN K<4, Mg<2  P: DVT PPX: HOLD eliquis as above, restart on eliquis post procedure   C: FULL CODE  Dispo: Continue care on tele [facility] F: No IVF  N: DASH/TLC/DM diet, soft mechanical  E: Replete lytes PRN K<4, Mg<2  P: DVT PPX: eliquis  C: FULL CODE  Dispo: Continue  [facility] F: No IVF  N: DASH/TLC/DM diet, soft mechanical  E: Replete lytes PRN K<4, Mg<2  P: DVT PPX: eliquis  C: FULL CODE  Dispo: [facility]

## 2019-02-16 NOTE — H&P ADULT - PROBLEM SELECTOR PLAN 2
-Eliquis Recently diagnosed with UTI. prescribed 7 day course of augmentin but did not start the medication. Was treated on last admission 3 weeks ago with cipro 250mg po bid for 7 day course. UA positive on this admission for nitrites  -start ceftriaxone 1mg IVP qd  -f/u UCx Recently diagnosed with UTI. prescribed 7 day course of augmentin but did not start the medication. Was treated on last admission 3 weeks ago with cipro 250mg po bid for 7 day course. UA positive on this admission.  -start ceftriaxone 1mg IVP qd  -f/u UCx Recently diagnosed with UTI. prescribed 7 day course of augmentin but did not start the medication. Was treated on last admission 3 weeks ago with cipro 250mg po bid for 7 day course. UA positive on admission BEFORE nava placement   -start ceftriaxone 1mg IVP qd  -f/u UCx

## 2019-02-16 NOTE — ED PROVIDER NOTE - OBJECTIVE STATEMENT
89-year-old Croatian speaking female PMHx AFib on Eliquis, chronic systolic CHF (EF 20-25% on echo 1/2018), CAD s/p remote PCI, HTN, HLD, DMT2, KIRA, asthma, and emphysema on 2L home O2, presenting with lower ext swelling, shortness of breath with exertion. Pt is not on Lasix at home, had pace maker placed for biventricular failure 4 weeks ago here at [facility]. Pt denies chest pain, states lower ext swelling has been increasing over days and shortness of breath worsened overnight.

## 2019-02-16 NOTE — H&P ADULT - NSHPPHYSICALEXAM_GEN_ALL_CORE
PHYSICAL EXAM:  GENERAL: NAD, well-developed, resting comfortably in bed on NC  HEAD:  Atraumatic, Normocephalic  EYES: EOMI, PERRLA, conjunctiva and sclera clear  NECK: +JVD  CHEST/LUNG: bibasilar crackles. no wheezes/rales/rhonci  HEART: Regular rate and rhythm; No murmurs, rubs, or gallops. biventricular PPM incision c/d/i  ABDOMEN: Soft, Nontender, Nondistended; Bowel sounds present  EXTREMITIES:, +2 pitting b/l RUBA  NEUROLOGY: non-focal, AAOx3  SKIN: No rashes or lesions PHYSICAL EXAM:  GENERAL: NAD, well-developed, resting comfortably in bed on NC  HEAD:  Atraumatic, Normocephalic  EYES: EOMI, PERRLA, conjunctiva and sclera clear  NECK: +JVD  CHEST/LUNG: bibasilar crackles. no wheezes/rales/rhonci  HEART: Regular rate and rhythm; No murmurs, rubs, or gallops. biventricular PPM incision c/d/i  ABDOMEN: Soft, Nontender, Nondistended; Bowel sounds present  EXTREMITIES:, +2 pitting b/l RUBA  NEUROLOGY: non-focal, AAOx3  SKIN: No rashes or lesions  PSYCH aox3

## 2019-02-16 NOTE — H&P ADULT - PROBLEM SELECTOR PLAN 6
TSH is  -start home Levothyroxine 100mcg qd History of HTN  -start home imdur 30mg po qd  -start home toprol 25mg po qd

## 2019-02-16 NOTE — ED ADULT NURSE NOTE - NSIMPLEMENTINTERV_GEN_ALL_ED
Implemented All Fall with Harm Risk Interventions:  [place] to call system. Call bell, personal items and telephone within reach. Instruct patient to call for assistance. Room bathroom lighting operational. Non-slip footwear when patient is off stretcher. Physically safe environment: no spills, clutter or unnecessary equipment. Stretcher in lowest position, wheels locked, appropriate side rails in place. Provide visual cue, wrist band, yellow gown, etc. Monitor gait and stability. Monitor for mental status changes and reorient to person, place, and time. Review medications for side effects contributing to fall risk. Reinforce activity limits and safety measures with patient and family. Provide visual clues: red socks.

## 2019-02-16 NOTE — H&P ADULT - NSHPLABSRESULTS_GEN_ALL_CORE
LABS:                         9.1    8.22  )-----------( 104      ( 2019 08:29 )             30.8         145  |  103  |  66<H>  ----------------------------<  150<H>  4.8   |  29  |  1.65<H>    Ca    10.1      2019 08:29  Mg     2.7         TPro  7.5  /  Alb  3.9  /  TBili  0.4  /  DBili  x   /  AST  47<H>  /  ALT  57<H>  /  AlkPhos  154<H>      PT/INR - ( 2019 08:29 )   PT: 16.1 sec;   INR: 1.41          PTT - ( 2019 08:29 )  PTT:30.8 sec  Urinalysis Basic - ( 2019 08:48 )    Color: Yellow / Appearance: Clear / S.010 / pH: x  Gluc: x / Ketone: NEGATIVE  / Bili: Negative / Urobili: 0.2 E.U./dL   Blood: x / Protein: NEGATIVE mg/dL / Nitrite: NEGATIVE   Leuk Esterase: Small / RBC: < 5 /HPF / WBC > 10 /HPF   Sq Epi: x / Non Sq Epi: 0-5 /HPF / Bacteria: Many /HPF      CARDIAC MARKERS ( 2019 08:29 )  x     / <0.01 ng/mL / 38 U/L / x     / 1.2 ng/mL      Serum Pro-Brain Natriuretic Peptide: 08191 pg/mL ( @ 08:29)        RADIOLOGY, EKG & ADDITIONAL TESTS: Reviewed.

## 2019-02-16 NOTE — PROGRESS NOTE ADULT - SUBJECTIVE AND OBJECTIVE BOX
EPS Device Interrogation Note      Device:  [employer] BiV PPM  Battery: 4.5 years  Events:  None (chronic AF)  Pacing:  RV 39%, LV 99%  Sensing:  A 0.4, RV 5.8, LV 12.9  Threshold:  A n/a, RV 0.4, LV 1.2  Impedance:  A 682, ,     A/P:    Device is functioning well  Will plan on AV node ablation prior to discharge, after patient is euvolemic, to increase biv pacing percentage

## 2019-02-16 NOTE — PATIENT PROFILE ADULT - BRADEN NUTRITION
Detail Level: Zone Initiate Treatment: Doxy (for the acne cyst) -  but pt may stay on it longer if his overall acne does better while on it. Continue Regimen: clindamycin lotion and differin 0.3% gel (3) adequate

## 2019-02-16 NOTE — ED ADULT TRIAGE NOTE - OTHER COMPLAINTS
CC of Shortness of breath 2 days ago worst today, dyspnea on exerion non-pitting edema bilateral legs, on O2 upon arrival uses walker for ambulation. denies Chest pain

## 2019-02-16 NOTE — H&P ADULT - PROBLEM SELECTOR PLAN 3
TSH is 7.831. Pt takes levothyroxine 100mcg Po qd at home. TSH  on 1/22/19 was 1.421. Pt states she has been compliant with taking home levothyroxine.  -start home Levothyroxine 100mcg qd. (will likely increase dose depending on T3, T4)  -f/u free T3, free T4, T4 total, T3 total  -f/u vit b12, folate

## 2019-02-16 NOTE — ED ADULT NURSE NOTE - CADM POA URETHRAL CATHETER
Chg wipes givenDenies any prostheticsPatient states family physician is aware of upcoming procedure/surgeryDenies sleep apneaDenies family history of anesthesia complicationsDenies shortness of breath nor chest pain while climbing stairs  Patient states he is concerned that his right leg is shorter than the left No

## 2019-02-16 NOTE — H&P ADULT - PROBLEM SELECTOR PLAN 5
Remote Hx of CAD s/p remote PCI x 5. Daughter unclear when was last PCI.  -No ASA/Plavix per Dr Dillard  -c/w Lipitor 40mg qd  -c/w Toprol 25mg qd    #CKD, stage 3. Baseline appears 1.5-1.6 from previous [facility] admissions. Now Cr 1.65 consistent w CKD. Hx Renal artery stenosis s/p PTA 1/2018  -Monitor BUN/crea  -Avoid nephrotoxic agents  -Renally dose meds

## 2019-02-16 NOTE — ED PROVIDER NOTE - CLINICAL SUMMARY MEDICAL DECISION MAKING FREE TEXT BOX
89F with concern for shortness of breath, lower ext swelling, hx of recent pacemaker placement, hx of biventricular CHF, pt is on Eliquis endorses compliance doubt PE with bilateral lower ext swelling. Concern for CHF, gradual onset sob and lower ext swelling, doubt ACS w/o chest pain, will check trop. No fever/cough. Plan for admission to [facility].

## 2019-02-16 NOTE — H&P ADULT - PROBLEM SELECTOR PLAN 4
On home O2 2L NC at all times per daughter. Not in acute exacerbation.  -c/w Spiriva INH  -c/w Budesonide INLH  -c/w Singulair History of Atrial fibrillation. Currently in paced rhythm  -start home Eliquis 2.5mg bid  -start home toprol 25mg po qd  -f/u EP recs

## 2019-02-16 NOTE — H&P ADULT - PROBLEM SELECTOR PLAN 7
Remote Hx of CAD s/p remote PCI x 5. Daughter unclear when was last PCI.  -Off ASA/Plavix per Dr Dillard  -c/w Lipitor 40mg qd  -c/w Toprol 25mg qd    #CKD, stage 3. Baseline appears 1.4-1.5 from previous [facility] admissions, Cr bump to 1.9 -- follow repeat - most likely from hypovolemia   -Hx Renal artery stenosis s/p PTA 1/2018  -Monitor BUN/crea  -Avoid nephrotoxic agents  -Renally dose meds On home O2 2L NC at all times per daughter. Not in acute exacerbation.  -c/w Spiriva INH  -c/w Budesonide INLH  -c/w Singulair

## 2019-02-16 NOTE — ED PROVIDER NOTE - PHYSICAL EXAMINATION
gen: no acute distress, comfortable, conversant  HEENT: oropharynx clear  Neck: supple, no meningismus, no bruit noted bl carotid  CV: rrr   Resp: decreased bs at bases   Abd: nontender, no rebound/guarding  Ext: bilateral pitting edema pedal pulses 2+  Neuro: alert and oriented, gait deferred

## 2019-02-16 NOTE — H&P ADULT - PROBLEM SELECTOR PLAN 1
Presenting with acute SOB since last night. On PE pt has JVD, bibasilar crackles, +2 RUBA. pro BNP is 11,057. CXR clear.  All 2/2 acute CHF exacerbation. Was just hospitalized at [facility] less than 1 month ago for CHF exacerbation and had a PPM placed at that time. Patient states she has been compliant with taking lasix 20mg PO qd and has not missed a dose. s/p lasix 40mg IV x1 in ED. Last echo 1/22/19 showed EF 30%, severe LV global HK, mild-mod MR, mod pulm HTN, PASP 55. Current exacerbation likely 2/2 UTI vs increased fluid intake vs worsening hypothyroidism as TSH is 7.831 up from 1.421 3 weeks ago.  -start lasix 40mg IV qd depending on how patient responds to the 40mg IV in the ED  -start home toprol 25mg PO qd  -start home imdur 30mg PO qd  -f/u echo  -strict ins and outs, daily CXR, daily EKG    #transaminitis  -Likely 2/2 hepatic congestion from CHF exacerbation. had transaminitis on last admission which resolved. RUQ US at that time showed hepatomegaly w no evidence of cholelithiases or acute cholecystitis.   -ALK phos 154, AST 47, ALT 57  -continue to trend LFTs Presenting with acute SOB since last night. On PE pt has JVD, bibasilar crackles, +2 RUBA. pro BNP is 11,057. CXR clear.  All 2/2 acute CHF exacerbation. Was just hospitalized at [facility] less than 1 month ago for CHF exacerbation and had a PPM placed at that time. Patient states she has been compliant with taking lasix 20mg PO qd and has not missed a dose. s/p lasix 40mg IV x1 in ED. Last echo 1/22/19 showed EF 30%, severe LV global HK, mild-mod MR, mod pulm HTN, PASP 55. Current exacerbation likely 2/2 UTI vs increased fluid intake vs worsening hypothyroidism as TSH is 7.831 up from 1.421 3 weeks ago.  -start lasix 40mg IV qd  -start home toprol 25mg PO qd  -start home imdur 30mg PO qd  -f/u echo  -nava placed for strict ins and outs  -daily CXR, daily EKG  -goal is net negative 1.5L    #transaminitis  -Likely 2/2 hepatic congestion from CHF exacerbation. had transaminitis on last admission which resolved. RUQ US at that time showed hepatomegaly w no evidence of cholelithiases or acute cholecystitis.   -ALK phos 154, AST 47, ALT 57  -continue to trend LFTs

## 2019-02-16 NOTE — ED ADULT NURSE NOTE - OBJECTIVE STATEMENT
Pt presented to ED with SOB. As per pt's daughter, pt has been complaining of SOB for 3 days and it worsens with exertion and lying down. Pt's family member states "she had heart surgery on 1/25 here." Edema 3+ noted to bilateral LEs. Pt denies chest pain, palpitation, dizziness, cough, fever, numbness, headache nor any other symptom at this time. Pt is A&O x3, able to speak coheren Pt presented to ED with SOB. As per pt's daughter, pt has been complaining of SOB for 3 days and it worsens with exertion and lying down. Pt's family member states "she had heart surgery on 1/25 here."  Edema 3+ noted to bilateral LEs. Pt denies chest pain, palpitation, dizziness, cough, fever, numbness, headache nor any other symptom at this time. Pt is A&O x3, able to speak coherently, respiration tachy and labored on exertion, skin warm and non-diaphoretic, pacing noted on cardiac monitor. NAD noted at this time.

## 2019-02-16 NOTE — H&P ADULT - PROBLEM SELECTOR PLAN 8
Takes Januvia at home  -HgA1c 6.5  -c/w MISS in hospital  -Diabetic diet    ##osteoarthritis/neck pain  consider Skelaxin 800mg BID PRN  Tylenol 650mg TID PRN neck pain  consider Dicolfenac topical 1% QID PRN. Takes Januvia at home  -HgA1c 6.5  -c/w MISS in hospital  -Diabetic diet    ##osteoarthritis/neck pain  -start home Tylenol 650mg TID PRN neck pain

## 2019-02-17 LAB
ALBUMIN SERPL ELPH-MCNC: 4 G/DL — SIGNIFICANT CHANGE UP (ref 3.3–5)
ALP SERPL-CCNC: 144 U/L — HIGH (ref 40–120)
ALT FLD-CCNC: 47 U/L — HIGH (ref 10–45)
ANION GAP SERPL CALC-SCNC: 10 MMOL/L — SIGNIFICANT CHANGE UP (ref 5–17)
AST SERPL-CCNC: 37 U/L — SIGNIFICANT CHANGE UP (ref 10–40)
BILIRUB SERPL-MCNC: 0.4 MG/DL — SIGNIFICANT CHANGE UP (ref 0.2–1.2)
BUN SERPL-MCNC: 58 MG/DL — HIGH (ref 7–23)
CALCIUM SERPL-MCNC: 9.8 MG/DL — SIGNIFICANT CHANGE UP (ref 8.4–10.5)
CHLORIDE SERPL-SCNC: 100 MMOL/L — SIGNIFICANT CHANGE UP (ref 96–108)
CHOLEST SERPL-MCNC: 94 MG/DL — SIGNIFICANT CHANGE UP (ref 10–199)
CO2 SERPL-SCNC: 34 MMOL/L — HIGH (ref 22–31)
CREAT SERPL-MCNC: 1.61 MG/DL — HIGH (ref 0.5–1.3)
FOLATE SERPL-MCNC: >20 NG/ML — SIGNIFICANT CHANGE UP
GLUCOSE BLDC GLUCOMTR-MCNC: 110 MG/DL — HIGH (ref 70–99)
GLUCOSE BLDC GLUCOMTR-MCNC: 120 MG/DL — HIGH (ref 70–99)
GLUCOSE BLDC GLUCOMTR-MCNC: 136 MG/DL — HIGH (ref 70–99)
GLUCOSE BLDC GLUCOMTR-MCNC: 166 MG/DL — HIGH (ref 70–99)
GLUCOSE SERPL-MCNC: 148 MG/DL — HIGH (ref 70–99)
HCT VFR BLD CALC: 29.6 % — LOW (ref 34.5–45)
HDLC SERPL-MCNC: 40 MG/DL — LOW
HGB BLD-MCNC: 8.5 G/DL — LOW (ref 11.5–15.5)
LIPID PNL WITH DIRECT LDL SERPL: 40 MG/DL — SIGNIFICANT CHANGE UP
MAGNESIUM SERPL-MCNC: 2.6 MG/DL — SIGNIFICANT CHANGE UP (ref 1.6–2.6)
MCHC RBC-ENTMCNC: 28.7 GM/DL — LOW (ref 32–36)
MCHC RBC-ENTMCNC: 29.4 PG — SIGNIFICANT CHANGE UP (ref 27–34)
MCV RBC AUTO: 102.4 FL — HIGH (ref 80–100)
NRBC # BLD: 0 /100 WBCS — SIGNIFICANT CHANGE UP (ref 0–0)
PLATELET # BLD AUTO: 98 K/UL — LOW (ref 150–400)
POTASSIUM SERPL-MCNC: 4.6 MMOL/L — SIGNIFICANT CHANGE UP (ref 3.5–5.3)
POTASSIUM SERPL-SCNC: 4.6 MMOL/L — SIGNIFICANT CHANGE UP (ref 3.5–5.3)
PROT SERPL-MCNC: 7 G/DL — SIGNIFICANT CHANGE UP (ref 6–8.3)
RBC # BLD: 2.89 M/UL — LOW (ref 3.8–5.2)
RBC # FLD: 15.7 % — HIGH (ref 10.3–14.5)
SODIUM SERPL-SCNC: 144 MMOL/L — SIGNIFICANT CHANGE UP (ref 135–145)
TOTAL CHOLESTEROL/HDL RATIO MEASUREMENT: 2.4 RATIO — LOW (ref 3.3–7.1)
TRIGL SERPL-MCNC: 72 MG/DL — SIGNIFICANT CHANGE UP (ref 10–149)
VIT B12 SERPL-MCNC: >2000 PG/ML — HIGH (ref 232–1245)
WBC # BLD: 6.28 K/UL — SIGNIFICANT CHANGE UP (ref 3.8–10.5)
WBC # FLD AUTO: 6.28 K/UL — SIGNIFICANT CHANGE UP (ref 3.8–10.5)

## 2019-02-17 PROCEDURE — 99232 SBSQ HOSP IP/OBS MODERATE 35: CPT

## 2019-02-17 PROCEDURE — 71045 X-RAY EXAM CHEST 1 VIEW: CPT | Mod: 26

## 2019-02-17 RX ADMIN — MONTELUKAST 10 MILLIGRAM(S): 4 TABLET, CHEWABLE ORAL at 11:27

## 2019-02-17 RX ADMIN — Medication 100 MICROGRAM(S): at 06:38

## 2019-02-17 RX ADMIN — Medication 325 MILLIGRAM(S): at 11:27

## 2019-02-17 RX ADMIN — APIXABAN 2.5 MILLIGRAM(S): 2.5 TABLET, FILM COATED ORAL at 17:39

## 2019-02-17 RX ADMIN — Medication 0.5 MILLIGRAM(S): at 17:39

## 2019-02-17 RX ADMIN — ISOSORBIDE MONONITRATE 30 MILLIGRAM(S): 60 TABLET, EXTENDED RELEASE ORAL at 11:27

## 2019-02-17 RX ADMIN — APIXABAN 2.5 MILLIGRAM(S): 2.5 TABLET, FILM COATED ORAL at 06:38

## 2019-02-17 RX ADMIN — SENNA PLUS 2 TABLET(S): 8.6 TABLET ORAL at 21:02

## 2019-02-17 RX ADMIN — CEFTRIAXONE 1000 MILLIGRAM(S): 500 INJECTION, POWDER, FOR SOLUTION INTRAMUSCULAR; INTRAVENOUS at 11:27

## 2019-02-17 RX ADMIN — ATORVASTATIN CALCIUM 40 MILLIGRAM(S): 80 TABLET, FILM COATED ORAL at 21:02

## 2019-02-17 RX ADMIN — Medication 0.5 MILLIGRAM(S): at 06:38

## 2019-02-17 RX ADMIN — Medication 40 MILLIGRAM(S): at 06:38

## 2019-02-17 RX ADMIN — PANTOPRAZOLE SODIUM 40 MILLIGRAM(S): 20 TABLET, DELAYED RELEASE ORAL at 06:38

## 2019-02-17 RX ADMIN — Medication 25 MILLIGRAM(S): at 06:38

## 2019-02-17 RX ADMIN — Medication 2: at 11:26

## 2019-02-17 NOTE — PROGRESS NOTE ADULT - PROBLEM SELECTOR PLAN 1
Acute SOB 2/2 acute on chronic HFrEF exacerbation. BNP 11k.   Will continue on lasix 40IV daily. Will be cautious as patient is older and frail. Will closely follow I/O, Can uptitrate if UOP slows down   Continue Toprol 25mg PO qd  Imdur 30mg PO qd  -nava placed for strict ins and outs  -daily CXR, daily EKG      #transaminitis  -Likely 2/2 hepatic congestion from CHF exacerbation. had transaminitis on last admission which resolved. RUQ US at that time showed hepatomegaly w no evidence of cholelithiases or acute cholecystitis.   -continue to trend LFTs

## 2019-02-17 NOTE — PROGRESS NOTE ADULT - SUBJECTIVE AND OBJECTIVE BOX
INTERVAL HPI/OVERNIGHT EVENTS:    VITAL SIGNS:  T(F): 97.2 (19 @ 09:22)  HR: 80 (19 @ 11:24)  BP: 119/55 (19 @ 11:24)  RR: 18 (19 @ 11:24)  SpO2: 98% (19 @ 11:24)  Wt(kg): --    PHYSICAL EXAM:  GENERAL: NAD, well-developed, resting comfortably in bed on NC  HEAD:  Atraumatic, Normocephalic  EYES: EOMI, PERRLA, conjunctiva and sclera clear  NECK: +JVD  CHEST/LUNG: bibasilar crackles. no wheezes/rales/rhonci  HEART: Regular rate and rhythm; No murmurs, rubs, or gallops. biventricular PPM incision c/d/i  ABDOMEN: Soft, Nontender, Nondistended; Bowel sounds present  EXTREMITIES:, +2 pitting b/l RUBA  NEUROLOGY: non-focal, AAOx3  SKIN: No rashes or lesions    MEDICATIONS  (STANDING):  apixaban 2.5 milliGRAM(s) Oral every 12 hours  atorvastatin 40 milliGRAM(s) Oral at bedtime  buDESOnide   0.5 milliGRAM(s) Respule 0.5 milliGRAM(s) Inhalation two times a day  cefTRIAXone Injectable. 1000 milliGRAM(s) IV Push every 24 hours  ferrous    sulfate 325 milliGRAM(s) Oral daily  furosemide   Injectable 40 milliGRAM(s) IV Push daily  insulin lispro (HumaLOG) corrective regimen sliding scale   SubCutaneous four times a day before meals  isosorbide   mononitrate ER Tablet (IMDUR) 30 milliGRAM(s) Oral daily  levothyroxine 100 MICROGram(s) Oral daily  metoprolol succinate ER 25 milliGRAM(s) Oral daily  montelukast 10 milliGRAM(s) Oral daily  pantoprazole    Tablet 40 milliGRAM(s) Oral before breakfast  polyethylene glycol 3350 17 Gram(s) Oral daily  senna 2 Tablet(s) Oral at bedtime    MEDICATIONS  (PRN):  acetaminophen   Tablet .. 650 milliGRAM(s) Oral every 8 hours PRN Moderate Pain (4 - 6)  ALBUTerol/ipratropium for Nebulization 3 milliLiter(s) Nebulizer every 6 hours PRN Bronchospasm  glucagon  Injectable 1 milliGRAM(s) IntraMuscular once PRN Glucose LESS THAN 70 milligrams/deciliter      Allergies    contrast media (iodine-based) (Angioedema)  pineapple (Unknown)    Intolerances        LABS:                        8.5    6.28  )-----------( 98       ( 2019 08:37 )             29.6         144  |  100  |  58<H>  ----------------------------<  148<H>  4.6   |  34<H>  |  1.61<H>    Ca    9.8      2019 08:37  Mg     2.6         TPro  7.0  /  Alb  4.0  /  TBili  0.4  /  DBili  x   /  AST  37  /  ALT  47<H>  /  AlkPhos  144<H>      PT/INR - ( 2019 08:29 )   PT: 16.1 sec;   INR: 1.41          PTT - ( 2019 08:29 )  PTT:30.8 sec  Urinalysis Basic - ( 2019 08:48 )    Color: Yellow / Appearance: Clear / S.010 / pH: x  Gluc: x / Ketone: NEGATIVE  / Bili: Negative / Urobili: 0.2 E.U./dL   Blood: x / Protein: NEGATIVE mg/dL / Nitrite: NEGATIVE   Leuk Esterase: Small / RBC: < 5 /HPF / WBC > 10 /HPF   Sq Epi: x / Non Sq Epi: 0-5 /HPF / Bacteria: Many /HPF        RADIOLOGY & ADDITIONAL TESTS:

## 2019-02-17 NOTE — PROGRESS NOTE ADULT - PROBLEM SELECTOR PLAN 2
Recently diagnosed with UTI. prescribed 7 day course of augmentin but did not start the medication. Was treated on last admission 3 weeks ago with cipro 250mg po bid for 7 day course. UA positive on admission BEFORE nava placement so PRESENT ON ADMISSION  -Continue ceftriaxone 1mg IVP qd  -f/u UCx- GNR

## 2019-02-18 LAB
-  AMIKACIN: SIGNIFICANT CHANGE UP
-  AMPICILLIN/SULBACTAM: SIGNIFICANT CHANGE UP
-  AMPICILLIN: SIGNIFICANT CHANGE UP
-  CEFAZOLIN: SIGNIFICANT CHANGE UP
-  CEFTRIAXONE: SIGNIFICANT CHANGE UP
-  CIPROFLOXACIN: SIGNIFICANT CHANGE UP
-  GENTAMICIN: SIGNIFICANT CHANGE UP
-  NITROFURANTOIN: SIGNIFICANT CHANGE UP
-  PIPERACILLIN/TAZOBACTAM: SIGNIFICANT CHANGE UP
-  TOBRAMYCIN: SIGNIFICANT CHANGE UP
-  TRIMETHOPRIM/SULFAMETHOXAZOLE: SIGNIFICANT CHANGE UP
ALBUMIN SERPL ELPH-MCNC: 3.5 G/DL — SIGNIFICANT CHANGE UP (ref 3.3–5)
ALP SERPL-CCNC: 141 U/L — HIGH (ref 40–120)
ALT FLD-CCNC: 41 U/L — SIGNIFICANT CHANGE UP (ref 10–45)
ANION GAP SERPL CALC-SCNC: 10 MMOL/L — SIGNIFICANT CHANGE UP (ref 5–17)
AST SERPL-CCNC: 34 U/L — SIGNIFICANT CHANGE UP (ref 10–40)
BILIRUB SERPL-MCNC: 0.3 MG/DL — SIGNIFICANT CHANGE UP (ref 0.2–1.2)
BUN SERPL-MCNC: 53 MG/DL — HIGH (ref 7–23)
CALCIUM SERPL-MCNC: 9.8 MG/DL — SIGNIFICANT CHANGE UP (ref 8.4–10.5)
CHLORIDE SERPL-SCNC: 100 MMOL/L — SIGNIFICANT CHANGE UP (ref 96–108)
CO2 SERPL-SCNC: 30 MMOL/L — SIGNIFICANT CHANGE UP (ref 22–31)
CREAT SERPL-MCNC: 1.5 MG/DL — HIGH (ref 0.5–1.3)
CULTURE RESULTS: SIGNIFICANT CHANGE UP
GLUCOSE BLDC GLUCOMTR-MCNC: 140 MG/DL — HIGH (ref 70–99)
GLUCOSE BLDC GLUCOMTR-MCNC: 164 MG/DL — HIGH (ref 70–99)
GLUCOSE BLDC GLUCOMTR-MCNC: 186 MG/DL — HIGH (ref 70–99)
GLUCOSE BLDC GLUCOMTR-MCNC: 92 MG/DL — SIGNIFICANT CHANGE UP (ref 70–99)
GLUCOSE SERPL-MCNC: 142 MG/DL — HIGH (ref 70–99)
HCT VFR BLD CALC: 30.2 % — LOW (ref 34.5–45)
HGB BLD-MCNC: 8.9 G/DL — LOW (ref 11.5–15.5)
MAGNESIUM SERPL-MCNC: 2.5 MG/DL — SIGNIFICANT CHANGE UP (ref 1.6–2.6)
MCHC RBC-ENTMCNC: 29.5 GM/DL — LOW (ref 32–36)
MCHC RBC-ENTMCNC: 29.9 PG — SIGNIFICANT CHANGE UP (ref 27–34)
MCV RBC AUTO: 101.3 FL — HIGH (ref 80–100)
METHOD TYPE: SIGNIFICANT CHANGE UP
NRBC # BLD: 0 /100 WBCS — SIGNIFICANT CHANGE UP (ref 0–0)
ORGANISM # SPEC MICROSCOPIC CNT: SIGNIFICANT CHANGE UP
ORGANISM # SPEC MICROSCOPIC CNT: SIGNIFICANT CHANGE UP
PLATELET # BLD AUTO: 98 K/UL — LOW (ref 150–400)
POTASSIUM SERPL-MCNC: 4.4 MMOL/L — SIGNIFICANT CHANGE UP (ref 3.5–5.3)
POTASSIUM SERPL-SCNC: 4.4 MMOL/L — SIGNIFICANT CHANGE UP (ref 3.5–5.3)
PROT SERPL-MCNC: 6.8 G/DL — SIGNIFICANT CHANGE UP (ref 6–8.3)
RBC # BLD: 2.98 M/UL — LOW (ref 3.8–5.2)
RBC # FLD: 15.7 % — HIGH (ref 10.3–14.5)
SODIUM SERPL-SCNC: 140 MMOL/L — SIGNIFICANT CHANGE UP (ref 135–145)
SPECIMEN SOURCE: SIGNIFICANT CHANGE UP
WBC # BLD: 6.6 K/UL — SIGNIFICANT CHANGE UP (ref 3.8–10.5)
WBC # FLD AUTO: 6.6 K/UL — SIGNIFICANT CHANGE UP (ref 3.8–10.5)

## 2019-02-18 PROCEDURE — 99232 SBSQ HOSP IP/OBS MODERATE 35: CPT

## 2019-02-18 RX ADMIN — PANTOPRAZOLE SODIUM 40 MILLIGRAM(S): 20 TABLET, DELAYED RELEASE ORAL at 06:24

## 2019-02-18 RX ADMIN — CEFTRIAXONE 1000 MILLIGRAM(S): 500 INJECTION, POWDER, FOR SOLUTION INTRAMUSCULAR; INTRAVENOUS at 12:23

## 2019-02-18 RX ADMIN — ISOSORBIDE MONONITRATE 30 MILLIGRAM(S): 60 TABLET, EXTENDED RELEASE ORAL at 12:24

## 2019-02-18 RX ADMIN — Medication 2: at 12:21

## 2019-02-18 RX ADMIN — Medication 0.5 MILLIGRAM(S): at 06:24

## 2019-02-18 RX ADMIN — Medication 100 MICROGRAM(S): at 06:24

## 2019-02-18 RX ADMIN — APIXABAN 2.5 MILLIGRAM(S): 2.5 TABLET, FILM COATED ORAL at 06:24

## 2019-02-18 RX ADMIN — Medication 25 MILLIGRAM(S): at 06:24

## 2019-02-18 RX ADMIN — APIXABAN 2.5 MILLIGRAM(S): 2.5 TABLET, FILM COATED ORAL at 17:54

## 2019-02-18 RX ADMIN — Medication 2: at 21:32

## 2019-02-18 RX ADMIN — MONTELUKAST 10 MILLIGRAM(S): 4 TABLET, CHEWABLE ORAL at 12:24

## 2019-02-18 RX ADMIN — SENNA PLUS 2 TABLET(S): 8.6 TABLET ORAL at 21:33

## 2019-02-18 RX ADMIN — ATORVASTATIN CALCIUM 40 MILLIGRAM(S): 80 TABLET, FILM COATED ORAL at 21:33

## 2019-02-18 RX ADMIN — Medication 325 MILLIGRAM(S): at 12:24

## 2019-02-18 RX ADMIN — Medication 0.5 MILLIGRAM(S): at 17:54

## 2019-02-18 RX ADMIN — POLYETHYLENE GLYCOL 3350 17 GRAM(S): 17 POWDER, FOR SOLUTION ORAL at 12:24

## 2019-02-18 RX ADMIN — Medication 40 MILLIGRAM(S): at 06:24

## 2019-02-18 NOTE — PHYSICAL THERAPY INITIAL EVALUATION ADULT - CRITERIA FOR SKILLED THERAPEUTIC INTERVENTIONS
impairments found/predicted duration of therapy intervention/therapy frequency/anticipated discharge recommendation

## 2019-02-18 NOTE — PHYSICAL THERAPY INITIAL EVALUATION ADULT - PERTINENT HX OF CURRENT PROBLEM, REHAB EVAL
89 year old female who was recently hospitalized for CHF exacerbation three weeks ago and had a BiV PPM placed during that admission, now presents with acute CHF exacerbation likely 2/2 UTI vs increased fluid intake vs hypothyroidism
89F who was recently hospitalized for CHF exacerbation three weeks ago and had a BiV PPM placed during that admission, now presents with acute CHF exacerbation likely 2/2 UTI vs increased fluid intake vs hypothyroidism

## 2019-02-18 NOTE — PHYSICAL THERAPY INITIAL EVALUATION ADULT - ADDITIONAL COMMENTS
History obtained through home attendant as per patient request. Patient lives alone in a apartment in an elevator building with 3 steps to enter. Patient previously required assistance for ADLs and IADLs provided by a home attendant, 1 person present 24/7. Patient was able to ambulate with a rollator within the home. She required assistance to leave the home and would only leave the home if accompanied by 2 other people.

## 2019-02-18 NOTE — PROGRESS NOTE ADULT - PROBLEM SELECTOR PLAN 1
Acute SOB 2/2 acute on chronic HFrEF exacerbation. BNP 11k.   -Will continue on lasix 40IV daily.  Will closely follow I/O, Can uptitrate if UOP slows down   -Continue Toprol 25mg PO qd  -c.w Imdur 30mg PO qd  -nava removed overnight as it was leaking  -daily CXR, daily EKG      #transaminitis  -Likely 2/2 hepatic congestion from CHF exacerbation. had transaminitis on last admission which resolved. RUQ US at that time showed hepatomegaly w no evidence of cholelithiases or acute cholecystitis.   -RESOLVED  -no need to continue to trend LFTs

## 2019-02-18 NOTE — PHYSICAL THERAPY INITIAL EVALUATION ADULT - GENERAL OBSERVATIONS, REHAB EVAL
Patient received supine in bed, +tele, +NC 2L O2, +primafit. Patient received supine in bed, +tele, +NC 2L O2, +primafit, +bed alarm.

## 2019-02-18 NOTE — PROGRESS NOTE ADULT - SUBJECTIVE AND OBJECTIVE BOX
OVERNIGHT EVENTS: LEROY. Net neg 1L. Rubalcava removed    SUBJECTIVE / INTERVAL HPI: Patient seen and examined at bedside. No complaints. Denies cp, palpitations, sob    VITAL SIGNS:  Vital Signs Last 24 Hrs  T(C): 36.7 (18 Feb 2019 09:25), Max: 36.7 (18 Feb 2019 09:25)  T(F): 98.1 (18 Feb 2019 09:25), Max: 98.1 (18 Feb 2019 09:25)  HR: 90 (18 Feb 2019 08:24) (78 - 90)  BP: 144/58 (18 Feb 2019 08:24) (102/53 - 148/60)  BP(mean): 84 (18 Feb 2019 08:24) (69 - 92)  RR: 20 (18 Feb 2019 08:24) (18 - 26)  SpO2: 97% (18 Feb 2019 08:24) (96% - 98%)    PHYSICAL EXAM:    General: NAD, resting comfortably in bed  Cardiovascular: S1, S2 normal; RRR, no M/G/R, +JVD. biv PPM incision c/d/i  Respiratory: bibasilar crackles, no w/r/r  Gastrointestinal: soft, nontender, nondistended. bowel sounds present.  Skin: no ulcerations or visible rashes appreciated  Extremities: +2 RUBA b/l  Neurological: AAOx3    MEDICATIONS:  MEDICATIONS  (STANDING):  apixaban 2.5 milliGRAM(s) Oral every 12 hours  atorvastatin 40 milliGRAM(s) Oral at bedtime  buDESOnide   0.5 milliGRAM(s) Respule 0.5 milliGRAM(s) Inhalation two times a day  cefTRIAXone Injectable. 1000 milliGRAM(s) IV Push every 24 hours  ferrous    sulfate 325 milliGRAM(s) Oral daily  furosemide   Injectable 40 milliGRAM(s) IV Push daily  insulin lispro (HumaLOG) corrective regimen sliding scale   SubCutaneous four times a day before meals  isosorbide   mononitrate ER Tablet (IMDUR) 30 milliGRAM(s) Oral daily  levothyroxine 100 MICROGram(s) Oral daily  metoprolol succinate ER 25 milliGRAM(s) Oral daily  montelukast 10 milliGRAM(s) Oral daily  pantoprazole    Tablet 40 milliGRAM(s) Oral before breakfast  polyethylene glycol 3350 17 Gram(s) Oral daily  senna 2 Tablet(s) Oral at bedtime    MEDICATIONS  (PRN):  acetaminophen   Tablet .. 650 milliGRAM(s) Oral every 8 hours PRN Moderate Pain (4 - 6)  ALBUTerol/ipratropium for Nebulization 3 milliLiter(s) Nebulizer every 6 hours PRN Bronchospasm  glucagon  Injectable 1 milliGRAM(s) IntraMuscular once PRN Glucose LESS THAN 70 milligrams/deciliter      ALLERGIES:  Allergies    contrast media (iodine-based) (Angioedema)  pineapple (Unknown)    Intolerances        LABS:                        8.9    6.60  )-----------( 98       ( 18 Feb 2019 06:39 )             30.2     02-18    140  |  100  |  53<H>  ----------------------------<  142<H>  4.4   |  30  |  1.50<H>    Ca    9.8      18 Feb 2019 06:39  Mg     2.5     02-18    TPro  6.8  /  Alb  3.5  /  TBili  0.3  /  DBili  x   /  AST  34  /  ALT  41  /  AlkPhos  141<H>  02-18        CAPILLARY BLOOD GLUCOSE      POCT Blood Glucose.: 140 mg/dL (18 Feb 2019 07:03)      RADIOLOGY & ADDITIONAL TESTS: Reviewed.

## 2019-02-18 NOTE — PHYSICAL THERAPY INITIAL EVALUATION ADULT - GAIT DEVIATIONS NOTED, PT EVAL
increased time in double stance/decreased airam/decreased step length/decreased stride length/decreased weight-shifting ability

## 2019-02-18 NOTE — PROGRESS NOTE ADULT - PROBLEM SELECTOR PLAN 2
Recently diagnosed with UTI. prescribed 7 day course of augmentin but did not start the medication. Was treated on last admission 3 weeks ago with cipro 250mg po bid for 7 day course. UA positive on admission BEFORE nava placement so PRESENT ON ADMISSION  -Continue ceftriaxone 1mg IVP qd pending UCx sensitivities  -f/u UCx- GNR

## 2019-02-19 LAB
ANION GAP SERPL CALC-SCNC: 12 MMOL/L — SIGNIFICANT CHANGE UP (ref 5–17)
BUN SERPL-MCNC: 47 MG/DL — HIGH (ref 7–23)
CALCIUM SERPL-MCNC: 9.6 MG/DL — SIGNIFICANT CHANGE UP (ref 8.4–10.5)
CHLORIDE SERPL-SCNC: 101 MMOL/L — SIGNIFICANT CHANGE UP (ref 96–108)
CO2 SERPL-SCNC: 32 MMOL/L — HIGH (ref 22–31)
CREAT SERPL-MCNC: 1.32 MG/DL — HIGH (ref 0.5–1.3)
GLUCOSE BLDC GLUCOMTR-MCNC: 120 MG/DL — HIGH (ref 70–99)
GLUCOSE BLDC GLUCOMTR-MCNC: 135 MG/DL — HIGH (ref 70–99)
GLUCOSE BLDC GLUCOMTR-MCNC: 139 MG/DL — HIGH (ref 70–99)
GLUCOSE BLDC GLUCOMTR-MCNC: 162 MG/DL — HIGH (ref 70–99)
GLUCOSE SERPL-MCNC: 132 MG/DL — HIGH (ref 70–99)
HCT VFR BLD CALC: 30.1 % — LOW (ref 34.5–45)
HGB BLD-MCNC: 8.7 G/DL — LOW (ref 11.5–15.5)
MAGNESIUM SERPL-MCNC: 2.5 MG/DL — SIGNIFICANT CHANGE UP (ref 1.6–2.6)
MCHC RBC-ENTMCNC: 28.9 GM/DL — LOW (ref 32–36)
MCHC RBC-ENTMCNC: 29.3 PG — SIGNIFICANT CHANGE UP (ref 27–34)
MCV RBC AUTO: 101.3 FL — HIGH (ref 80–100)
NRBC # BLD: 0 /100 WBCS — SIGNIFICANT CHANGE UP (ref 0–0)
PHOSPHATE SERPL-MCNC: 4.1 MG/DL — SIGNIFICANT CHANGE UP (ref 2.5–4.5)
PLATELET # BLD AUTO: 92 K/UL — LOW (ref 150–400)
POTASSIUM SERPL-MCNC: 4.5 MMOL/L — SIGNIFICANT CHANGE UP (ref 3.5–5.3)
POTASSIUM SERPL-SCNC: 4.5 MMOL/L — SIGNIFICANT CHANGE UP (ref 3.5–5.3)
RBC # BLD: 2.97 M/UL — LOW (ref 3.8–5.2)
RBC # FLD: 15.5 % — HIGH (ref 10.3–14.5)
SODIUM SERPL-SCNC: 145 MMOL/L — SIGNIFICANT CHANGE UP (ref 135–145)
WBC # BLD: 6.5 K/UL — SIGNIFICANT CHANGE UP (ref 3.8–10.5)
WBC # FLD AUTO: 6.5 K/UL — SIGNIFICANT CHANGE UP (ref 3.8–10.5)

## 2019-02-19 PROCEDURE — 71045 X-RAY EXAM CHEST 1 VIEW: CPT | Mod: 26

## 2019-02-19 PROCEDURE — 99232 SBSQ HOSP IP/OBS MODERATE 35: CPT

## 2019-02-19 RX ORDER — FUROSEMIDE 40 MG
40 TABLET ORAL EVERY 24 HOURS
Qty: 0 | Refills: 0 | Status: DISCONTINUED | OUTPATIENT
Start: 2019-02-20 | End: 2019-02-20

## 2019-02-19 RX ORDER — LISINOPRIL 2.5 MG/1
2.5 TABLET ORAL DAILY
Qty: 0 | Refills: 0 | Status: DISCONTINUED | OUTPATIENT
Start: 2019-02-19 | End: 2019-02-23

## 2019-02-19 RX ADMIN — Medication 0.5 MILLIGRAM(S): at 19:01

## 2019-02-19 RX ADMIN — Medication 40 MILLIGRAM(S): at 06:07

## 2019-02-19 RX ADMIN — PANTOPRAZOLE SODIUM 40 MILLIGRAM(S): 20 TABLET, DELAYED RELEASE ORAL at 06:07

## 2019-02-19 RX ADMIN — Medication 325 MILLIGRAM(S): at 12:10

## 2019-02-19 RX ADMIN — SENNA PLUS 2 TABLET(S): 8.6 TABLET ORAL at 21:50

## 2019-02-19 RX ADMIN — POLYETHYLENE GLYCOL 3350 17 GRAM(S): 17 POWDER, FOR SOLUTION ORAL at 15:39

## 2019-02-19 RX ADMIN — Medication 2: at 12:10

## 2019-02-19 RX ADMIN — Medication 100 MICROGRAM(S): at 06:07

## 2019-02-19 RX ADMIN — Medication 25 MILLIGRAM(S): at 06:07

## 2019-02-19 RX ADMIN — APIXABAN 2.5 MILLIGRAM(S): 2.5 TABLET, FILM COATED ORAL at 06:07

## 2019-02-19 RX ADMIN — ATORVASTATIN CALCIUM 40 MILLIGRAM(S): 80 TABLET, FILM COATED ORAL at 21:50

## 2019-02-19 RX ADMIN — Medication 0.5 MILLIGRAM(S): at 06:07

## 2019-02-19 RX ADMIN — APIXABAN 2.5 MILLIGRAM(S): 2.5 TABLET, FILM COATED ORAL at 19:01

## 2019-02-19 RX ADMIN — ISOSORBIDE MONONITRATE 30 MILLIGRAM(S): 60 TABLET, EXTENDED RELEASE ORAL at 12:10

## 2019-02-19 RX ADMIN — MONTELUKAST 10 MILLIGRAM(S): 4 TABLET, CHEWABLE ORAL at 12:10

## 2019-02-19 NOTE — PROGRESS NOTE ADULT - PROBLEM SELECTOR PLAN 1
Acute SOB 2/2 acute on chronic HFrEF exacerbation. BNP 11k.   -Possibly transition from IV to PO lasix today.  -Continue Toprol 25mg PO qd  -c.w Imdur 30mg PO qd  -possibly start low dose ACE/ARB today  -daily CXR, daily EKG, daily weights, strict ins and out      #transaminitis  -Likely 2/2 hepatic congestion from CHF exacerbation. had transaminitis on last admission which resolved. RUQ US at that time showed hepatomegaly w no evidence of cholelithiases or acute cholecystitis.   -RESOLVED  -no need to continue to trend LFTs Acute SOB 2/2 acute on chronic HFrEF exacerbation. BNP 11k.   -Resolving-no crackles, no JVD, only +2 RUBA b/l today  -Possibly transition from IV lasix 40 to PO lasix today.  -Continue Toprol 25mg PO qd  -c.w Imdur 30mg PO qd  -possibly start low dose ACE/ARB today  -daily CXR, daily EKG, daily weights, strict ins and out      #transaminitis  -Likely 2/2 hepatic congestion from CHF exacerbation. had transaminitis on last admission which resolved. RUQ US at that time showed hepatomegaly w no evidence of cholelithiases or acute cholecystitis.   -RESOLVED  -no need to continue to trend LFTs

## 2019-02-19 NOTE — PROGRESS NOTE ADULT - PROBLEM SELECTOR PLAN 2
Recently diagnosed with UTI. prescribed 7 day course of augmentin but did not start the medication. Was treated on last admission 3 weeks ago with cipro 250mg po bid for 7 day course. UA positive on admission BEFORE nava placement so PRESENT ON ADMISSION  -Continue ceftriaxone 1mg IVP qd pending UCx sensitivities  -f/u UCx- GNR Recently diagnosed with UTI. prescribed 7 day course of augmentin but did not start the medication. Was treated on last admission 3 weeks ago with cipro 250mg po bid for 7 day course. UA positive on admission BEFORE nava placement so PRESENT ON ADMISSION  -discontinue ceftriaxone 1g IV qd as completed full 3 day course.  -UCx growing Ecoli sensitive to ceftriaxone.

## 2019-02-19 NOTE — PROGRESS NOTE ADULT - SUBJECTIVE AND OBJECTIVE BOX
OVERNIGHT EVENTS: Passed TOV. [facility]    SUBJECTIVE / INTERVAL HPI: Patient seen and examined at bedside. No complaints today. Denies CP, palpitations, SOB    VITAL SIGNS:  Vital Signs Last 24 Hrs  T(C): 36.1 (19 Feb 2019 05:12), Max: 36.6 (18 Feb 2019 18:10)  T(F): 97 (19 Feb 2019 05:12), Max: 97.9 (18 Feb 2019 18:10)  HR: 80 (19 Feb 2019 05:02) (74 - 88)  BP: 145/84 (19 Feb 2019 05:02) (108/55 - 145/84)  BP(mean): 110 (19 Feb 2019 05:02) (65 - 110)  RR: 29 (19 Feb 2019 05:02) (18 - 29)  SpO2: 97% (19 Feb 2019 05:02) (96% - 98%)    PHYSICAL EXAM:    General: NAD, resting comfortably in bed  Cardiovascular: S1, S2 normal; RRR, no M/G/R, pacemaker incision c/d/i  Respiratory: CTABL; no W/R/R  Gastrointestinal: soft, nontender, nondistended. bowel sounds present.  Skin: no ulcerations or visible rashes appreciated  Extremities: +2 RUBA b/l  Neurological: AAOx3    MEDICATIONS:  MEDICATIONS  (STANDING):  apixaban 2.5 milliGRAM(s) Oral every 12 hours  atorvastatin 40 milliGRAM(s) Oral at bedtime  buDESOnide   0.5 milliGRAM(s) Respule 0.5 milliGRAM(s) Inhalation two times a day  ferrous    sulfate 325 milliGRAM(s) Oral daily  furosemide   Injectable 40 milliGRAM(s) IV Push daily  insulin lispro (HumaLOG) corrective regimen sliding scale   SubCutaneous four times a day before meals  isosorbide   mononitrate ER Tablet (IMDUR) 30 milliGRAM(s) Oral daily  levothyroxine 100 MICROGram(s) Oral daily  metoprolol succinate ER 25 milliGRAM(s) Oral daily  montelukast 10 milliGRAM(s) Oral daily  pantoprazole    Tablet 40 milliGRAM(s) Oral before breakfast  polyethylene glycol 3350 17 Gram(s) Oral daily  senna 2 Tablet(s) Oral at bedtime    MEDICATIONS  (PRN):  acetaminophen   Tablet .. 650 milliGRAM(s) Oral every 8 hours PRN Moderate Pain (4 - 6)  ALBUTerol/ipratropium for Nebulization 3 milliLiter(s) Nebulizer every 6 hours PRN Bronchospasm  glucagon  Injectable 1 milliGRAM(s) IntraMuscular once PRN Glucose LESS THAN 70 milligrams/deciliter      ALLERGIES:  Allergies    contrast media (iodine-based) (Angioedema)  pineapple (Unknown)    Intolerances        LABS:                        8.7    6.50  )-----------( 92       ( 19 Feb 2019 05:41 )             30.1     02-19    145  |  101  |  47<H>  ----------------------------<  132<H>  4.5   |  32<H>  |  1.32<H>    Ca    9.6      19 Feb 2019 05:41  Phos  4.1     02-19  Mg     2.5     02-19    TPro  6.8  /  Alb  3.5  /  TBili  0.3  /  DBili  x   /  AST  34  /  ALT  41  /  AlkPhos  141<H>  02-18        CAPILLARY BLOOD GLUCOSE      POCT Blood Glucose.: 135 mg/dL (19 Feb 2019 06:42)      RADIOLOGY & ADDITIONAL TESTS: Reviewed. Hospital Course:  89F Thai speaking PMHx AFib on Eliquis, chronic systolic CHF (EF 20-25% on echo 1/2018, biventricular PPM placed 1/2019), CAD s/p remote PCI, HTN, HLD, DMT2, KIRA s/p R renal artery PTA 1/2018, CKD (baseline crea 1-2) anemia, hypothyroidism, b/l carotid endarterectomies, asthma, and emphysema on 2L home O2, who was recently hospitalized for CHF exacerbation three weeks ago and had a BiV PPM placed during that admission, now presents with SOB since last night. ED Course: T 97.7 oral, , -175/56-89, RR 25, O2 98 2L NC. Labs significant for Hb 9.1, neutrophil 86%, BUN 66, Cr 1.65, Alk phos 154, AST 47, ALT 57, TSH 7.831, serum BNP 11,057, trop <0.01. UA positive for small LE, CXR no acute infiltrates. EKG shows paced rhythm no ST elevations or depressions. In ED received lasix 40mg IV x1. Admitted to [facility] for acute CHF exacerbation Started on lasix 40mg IV qd. Echo ordered. EP came to interrogate the BiV PPM. Plan is for AV node ablation prior to discharge once patient is euvolemic in order to increase BiV pacing percentage. Completed 3 day course of ceftriaxone for ecoli UTI. BCx NG x 3 days. Started lisinopril 2.5mg PO qd.     OVERNIGHT EVENTS: Passed TOV. LEROY    SUBJECTIVE / INTERVAL HPI: Patient seen and examined at bedside. No complaints today. Denies CP, palpitations, SOB    VITAL SIGNS:  Vital Signs Last 24 Hrs  T(C): 36.1 (19 Feb 2019 05:12), Max: 36.6 (18 Feb 2019 18:10)  T(F): 97 (19 Feb 2019 05:12), Max: 97.9 (18 Feb 2019 18:10)  HR: 80 (19 Feb 2019 05:02) (74 - 88)  BP: 145/84 (19 Feb 2019 05:02) (108/55 - 145/84)  BP(mean): 110 (19 Feb 2019 05:02) (65 - 110)  RR: 29 (19 Feb 2019 05:02) (18 - 29)  SpO2: 97% (19 Feb 2019 05:02) (96% - 98%)    PHYSICAL EXAM:    General: NAD, resting comfortably in bed  Cardiovascular: S1, S2 normal; RRR, no M/G/R, pacemaker incision c/d/i  Respiratory: CTABL; no W/R/R  Gastrointestinal: soft, nontender, nondistended. bowel sounds present.  Skin: no ulcerations or visible rashes appreciated  Extremities: +2 RUBA b/l  Neurological: AAOx3    MEDICATIONS:  MEDICATIONS  (STANDING):  apixaban 2.5 milliGRAM(s) Oral every 12 hours  atorvastatin 40 milliGRAM(s) Oral at bedtime  buDESOnide   0.5 milliGRAM(s) Respule 0.5 milliGRAM(s) Inhalation two times a day  ferrous    sulfate 325 milliGRAM(s) Oral daily  furosemide   Injectable 40 milliGRAM(s) IV Push daily  insulin lispro (HumaLOG) corrective regimen sliding scale   SubCutaneous four times a day before meals  isosorbide   mononitrate ER Tablet (IMDUR) 30 milliGRAM(s) Oral daily  levothyroxine 100 MICROGram(s) Oral daily  metoprolol succinate ER 25 milliGRAM(s) Oral daily  montelukast 10 milliGRAM(s) Oral daily  pantoprazole    Tablet 40 milliGRAM(s) Oral before breakfast  polyethylene glycol 3350 17 Gram(s) Oral daily  senna 2 Tablet(s) Oral at bedtime    MEDICATIONS  (PRN):  acetaminophen   Tablet .. 650 milliGRAM(s) Oral every 8 hours PRN Moderate Pain (4 - 6)  ALBUTerol/ipratropium for Nebulization 3 milliLiter(s) Nebulizer every 6 hours PRN Bronchospasm  glucagon  Injectable 1 milliGRAM(s) IntraMuscular once PRN Glucose LESS THAN 70 milligrams/deciliter      ALLERGIES:  Allergies    contrast media (iodine-based) (Angioedema)  pineapple (Unknown)    Intolerances        LABS:                        8.7    6.50  )-----------( 92       ( 19 Feb 2019 05:41 )             30.1     02-19    145  |  101  |  47<H>  ----------------------------<  132<H>  4.5   |  32<H>  |  1.32<H>    Ca    9.6      19 Feb 2019 05:41  Phos  4.1     02-19  Mg     2.5     02-19    TPro  6.8  /  Alb  3.5  /  TBili  0.3  /  DBili  x   /  AST  34  /  ALT  41  /  AlkPhos  141<H>  02-18        CAPILLARY BLOOD GLUCOSE      POCT Blood Glucose.: 135 mg/dL (19 Feb 2019 06:42)      RADIOLOGY & ADDITIONAL TESTS: Reviewed.

## 2019-02-20 ENCOUNTER — TRANSCRIPTION ENCOUNTER (OUTPATIENT)
Age: 84
End: 2019-02-20

## 2019-02-20 LAB
ANION GAP SERPL CALC-SCNC: 9 MMOL/L — SIGNIFICANT CHANGE UP (ref 5–17)
BUN SERPL-MCNC: 44 MG/DL — HIGH (ref 7–23)
CALCIUM SERPL-MCNC: 9.8 MG/DL — SIGNIFICANT CHANGE UP (ref 8.4–10.5)
CHLORIDE SERPL-SCNC: 99 MMOL/L — SIGNIFICANT CHANGE UP (ref 96–108)
CO2 SERPL-SCNC: 36 MMOL/L — HIGH (ref 22–31)
CREAT SERPL-MCNC: 1.31 MG/DL — HIGH (ref 0.5–1.3)
GLUCOSE BLDC GLUCOMTR-MCNC: 132 MG/DL — HIGH (ref 70–99)
GLUCOSE BLDC GLUCOMTR-MCNC: 136 MG/DL — HIGH (ref 70–99)
GLUCOSE BLDC GLUCOMTR-MCNC: 170 MG/DL — HIGH (ref 70–99)
GLUCOSE SERPL-MCNC: 131 MG/DL — HIGH (ref 70–99)
HCT VFR BLD CALC: 31.6 % — LOW (ref 34.5–45)
HGB BLD-MCNC: 9.2 G/DL — LOW (ref 11.5–15.5)
MAGNESIUM SERPL-MCNC: 2.3 MG/DL — SIGNIFICANT CHANGE UP (ref 1.6–2.6)
MCHC RBC-ENTMCNC: 29.1 GM/DL — LOW (ref 32–36)
MCHC RBC-ENTMCNC: 29.7 PG — SIGNIFICANT CHANGE UP (ref 27–34)
MCV RBC AUTO: 101.9 FL — HIGH (ref 80–100)
NRBC # BLD: 0 /100 WBCS — SIGNIFICANT CHANGE UP (ref 0–0)
PHOSPHATE SERPL-MCNC: 3.7 MG/DL — SIGNIFICANT CHANGE UP (ref 2.5–4.5)
PLATELET # BLD AUTO: 88 K/UL — LOW (ref 150–400)
POTASSIUM SERPL-MCNC: 4.5 MMOL/L — SIGNIFICANT CHANGE UP (ref 3.5–5.3)
POTASSIUM SERPL-SCNC: 4.5 MMOL/L — SIGNIFICANT CHANGE UP (ref 3.5–5.3)
RBC # BLD: 3.1 M/UL — LOW (ref 3.8–5.2)
RBC # FLD: 15.7 % — HIGH (ref 10.3–14.5)
SODIUM SERPL-SCNC: 144 MMOL/L — SIGNIFICANT CHANGE UP (ref 135–145)
WBC # BLD: 6.16 K/UL — SIGNIFICANT CHANGE UP (ref 3.8–10.5)
WBC # FLD AUTO: 6.16 K/UL — SIGNIFICANT CHANGE UP (ref 3.8–10.5)

## 2019-02-20 PROCEDURE — 99232 SBSQ HOSP IP/OBS MODERATE 35: CPT

## 2019-02-20 PROCEDURE — 71045 X-RAY EXAM CHEST 1 VIEW: CPT | Mod: 26

## 2019-02-20 PROCEDURE — 99222 1ST HOSP IP/OBS MODERATE 55: CPT

## 2019-02-20 RX ORDER — ISOSORBIDE MONONITRATE 60 MG/1
1 TABLET, EXTENDED RELEASE ORAL
Qty: 30 | Refills: 0 | OUTPATIENT
Start: 2019-02-20

## 2019-02-20 RX ORDER — APIXABAN 2.5 MG/1
1 TABLET, FILM COATED ORAL
Qty: 60 | Refills: 0 | OUTPATIENT
Start: 2019-02-20

## 2019-02-20 RX ORDER — FUROSEMIDE 40 MG
1 TABLET ORAL
Qty: 30 | Refills: 0 | OUTPATIENT
Start: 2019-02-20

## 2019-02-20 RX ORDER — ATORVASTATIN CALCIUM 80 MG/1
1 TABLET, FILM COATED ORAL
Qty: 30 | Refills: 0 | OUTPATIENT
Start: 2019-02-20

## 2019-02-20 RX ORDER — MAGNESIUM HYDROXIDE 400 MG/1
30 TABLET, CHEWABLE ORAL DAILY
Qty: 0 | Refills: 0 | Status: DISCONTINUED | OUTPATIENT
Start: 2019-02-20 | End: 2019-02-23

## 2019-02-20 RX ORDER — ISOSORBIDE MONONITRATE 60 MG/1
1 TABLET, EXTENDED RELEASE ORAL
Qty: 0 | Refills: 0 | COMMUNITY

## 2019-02-20 RX ORDER — FUROSEMIDE 40 MG
40 TABLET ORAL DAILY
Qty: 0 | Refills: 0 | Status: DISCONTINUED | OUTPATIENT
Start: 2019-02-21 | End: 2019-02-23

## 2019-02-20 RX ORDER — MAGNESIUM HYDROXIDE 400 MG/1
30 TABLET, CHEWABLE ORAL
Qty: 900 | Refills: 0 | OUTPATIENT
Start: 2019-02-20

## 2019-02-20 RX ORDER — SENNA PLUS 8.6 MG/1
2 TABLET ORAL
Qty: 60 | Refills: 0 | OUTPATIENT
Start: 2019-02-20

## 2019-02-20 RX ORDER — LISINOPRIL 2.5 MG/1
1 TABLET ORAL
Qty: 30 | Refills: 0 | OUTPATIENT
Start: 2019-02-20

## 2019-02-20 RX ORDER — POLYETHYLENE GLYCOL 3350 17 G/17G
17 POWDER, FOR SOLUTION ORAL
Qty: 600 | Refills: 0 | OUTPATIENT
Start: 2019-02-20

## 2019-02-20 RX ORDER — METOPROLOL TARTRATE 50 MG
1 TABLET ORAL
Qty: 30 | Refills: 0 | OUTPATIENT
Start: 2019-02-20

## 2019-02-20 RX ADMIN — APIXABAN 2.5 MILLIGRAM(S): 2.5 TABLET, FILM COATED ORAL at 17:13

## 2019-02-20 RX ADMIN — MAGNESIUM HYDROXIDE 30 MILLILITER(S): 400 TABLET, CHEWABLE ORAL at 06:18

## 2019-02-20 RX ADMIN — LISINOPRIL 2.5 MILLIGRAM(S): 2.5 TABLET ORAL at 06:00

## 2019-02-20 RX ADMIN — MONTELUKAST 10 MILLIGRAM(S): 4 TABLET, CHEWABLE ORAL at 11:31

## 2019-02-20 RX ADMIN — Medication 25 MILLIGRAM(S): at 05:59

## 2019-02-20 RX ADMIN — Medication 200 MILLIGRAM(S): at 21:42

## 2019-02-20 RX ADMIN — ISOSORBIDE MONONITRATE 30 MILLIGRAM(S): 60 TABLET, EXTENDED RELEASE ORAL at 11:31

## 2019-02-20 RX ADMIN — Medication 325 MILLIGRAM(S): at 11:31

## 2019-02-20 RX ADMIN — PANTOPRAZOLE SODIUM 40 MILLIGRAM(S): 20 TABLET, DELAYED RELEASE ORAL at 06:00

## 2019-02-20 RX ADMIN — Medication 100 MICROGRAM(S): at 06:00

## 2019-02-20 RX ADMIN — Medication 0.5 MILLIGRAM(S): at 17:13

## 2019-02-20 RX ADMIN — APIXABAN 2.5 MILLIGRAM(S): 2.5 TABLET, FILM COATED ORAL at 05:59

## 2019-02-20 RX ADMIN — Medication 40 MILLIGRAM(S): at 05:58

## 2019-02-20 RX ADMIN — POLYETHYLENE GLYCOL 3350 17 GRAM(S): 17 POWDER, FOR SOLUTION ORAL at 11:31

## 2019-02-20 RX ADMIN — Medication 2: at 11:31

## 2019-02-20 RX ADMIN — ATORVASTATIN CALCIUM 40 MILLIGRAM(S): 80 TABLET, FILM COATED ORAL at 21:40

## 2019-02-20 RX ADMIN — SENNA PLUS 2 TABLET(S): 8.6 TABLET ORAL at 18:26

## 2019-02-20 RX ADMIN — Medication 0.5 MILLIGRAM(S): at 05:59

## 2019-02-20 NOTE — DISCHARGE NOTE ADULT - CARE PROVIDERS DIRECT ADDRESSES
,DirectAddress_Unknown,DirectAddress_Unknown ,DirectAddress_Unknown,DirectAddress_Unknown,[email or web address]

## 2019-02-20 NOTE — DISCHARGE NOTE ADULT - COMMUNITY RESOURCES
[facility] 10hr x 7d hospital pick-up requested for 12 noon 02/23/2019 (Room 523-2). p: [phone]

## 2019-02-20 NOTE — DISCHARGE NOTE ADULT - MEDICATION SUMMARY - MEDICATIONS TO CHANGE
I will SWITCH the dose or number of times a day I take the medications listed below when I get home from the hospital:    furosemide 20 mg oral tablet  -- 1 tab(s) by mouth every 48 hours   -- Avoid prolonged or excessive exposure to direct and/or artificial sunlight while taking this medication.  It is very important that you take or use this exactly as directed.  Do not skip doses or discontinue unless directed by your doctor.  It may be advisable to drink a full glass orange juice or eat a banana daily while taking this medication.    furosemide 20 mg oral tablet  -- 1 tab(s) by mouth once a day  -- Avoid prolonged or excessive exposure to direct and/or artificial sunlight while taking this medication.  It is very important that you take or use this exactly as directed.  Do not skip doses or discontinue unless directed by your doctor.  It may be advisable to drink a full glass orange juice or eat a banana daily while taking this medication.

## 2019-02-20 NOTE — PROGRESS NOTE ADULT - PROBLEM SELECTOR PLAN 9
F: No IVF  N: DASH/TLC/DM diet, soft mechanical  E: Replete lytes PRN K<4, Mg<2  P: DVT PPX: eliquis  C: FULL CODE  Dispo: [facility]

## 2019-02-20 NOTE — CONSULT NOTE ADULT - SUBJECTIVE AND OBJECTIVE BOX
please see Dr. Ferreira note for device interrogation earlier this admission.      HPI:  89-year-old Swazi speaking female with AFib on Eliquis (cardioversion 1/2018 with early return to afib), LBBB, chronic systolic CHF (EF 25-30% in the past, now 35-40%), CAD s/p PCI, HTN, HLD, DM, renal artery stenosis s/p R renal artery PTA 1/2018, CKD (baseline crea 1-2) anemia, hypothyroidism, b/l carotid endarterectomies, asthma, and emphysema on 2L home O2, with admission 1/2019 for CHF exacerbation s/p BiV pacemaker, who was readmitted with SOB. .    She continued to note some SOB after her BIV but states it was improving.  She presented to [facility] complaining of difficulty sleeping secondary to SOB and 6 pound weight gain and LE edema.   No syncope, chest pain, or near syncope.  She notes some palpitations that are worse after using her inhalers for emphysema.  Her device was checked on admit and she was found to be suboptimally BiV paced.  We have recommended she proceeds with a AV node ablation to promote BIV pacing.         PAST MEDICAL & SURGICAL HISTORY:  Renal artery stenosis  Chronic systolic CHF (congestive heart failure)  Hypothyroidism, unspecified type  Anemia  CAD (coronary artery disease)  DM (diabetes mellitus)  Emphysema  Hypercholesteremia  HTN (hypertension)  S/P biventricular cardiac pacemaker procedure  S/P percutaneous angioplasty of renal artery: 1/2018  S/P bladder repair  S/P hysterectomy with oophorectomy  History of bilateral carotid endarterectomy  History of umbilical hernia repair  S/P cataract surgery  S/P TKR (total knee replacement)      Family history of emphysema (Mother)      Social History: no smoking, no drugs, no alcohol         Inpatient Medications:   acetaminophen   Tablet .. 650 milliGRAM(s) Oral every 8 hours PRN  ALBUTerol/ipratropium for Nebulization 3 milliLiter(s) Nebulizer every 6 hours PRN  apixaban 2.5 milliGRAM(s) Oral every 12 hours  atorvastatin 40 milliGRAM(s) Oral at bedtime  buDESOnide   0.5 milliGRAM(s) Respule 0.5 milliGRAM(s) Inhalation two times a day  ferrous    sulfate 325 milliGRAM(s) Oral daily  insulin lispro (HumaLOG) corrective regimen sliding scale   SubCutaneous four times a day before meals  isosorbide   mononitrate ER Tablet (IMDUR) 30 milliGRAM(s) Oral daily  levothyroxine 100 MICROGram(s) Oral daily  lisinopril 2.5 milliGRAM(s) Oral daily  magnesium hydroxide Suspension 30 milliLiter(s) Oral daily PRN  metoprolol succinate ER 25 milliGRAM(s) Oral daily  montelukast 10 milliGRAM(s) Oral daily  pantoprazole    Tablet 40 milliGRAM(s) Oral before breakfast  polyethylene glycol 3350 17 Gram(s) Oral daily  senna 2 Tablet(s) Oral at bedtime      Allergies: contrast media (iodine-based) (Angioedema)  pineapple (Unknown)      ROS:   CONSTITUTIONAL: No fever, + fatigue  RESPIRATORY: No hemoptysis; + SOB  CARDIOVASCULAR: see HPI  GASTROINTESTINAL: Pt denies  NEUROLOGICAL: Pt denies  SKIN: Pt denies   PSYCHIATRIC: Pt denies  HEME/LYMPH: Pt denies    PHYSICAL:  T(C): 36.1 (02-20-19 @ 14:15), Max: 36.8 (02-19-19 @ 17:49)  HR: 82 (02-20-19 @ 14:12) (72 - 84)  BP: 116/48 (02-20-19 @ 14:12) (112/52 - 147/58)  RR: 17 (02-20-19 @ 14:12) (17 - 27)  SpO2: 99% (02-20-19 @ 14:12) (99% - 100%)     Appearance: No acute distress, well developed  Eyes: normal appearing conjunctiva, pupils and eyelids  Cardiovascular: irregularly irregular   Respiratory: Lungs clear to auscultation	   Gastrointestinal:  NT/ND 	  Neurologic:  No deficit noted  Psych: A&Ox3, normal mood/affect  Musculoskeletal: normal gait  Skin: no rash noted, normal color and pigmentation.        LABS:                        9.2    6.16  )-----------( 88       ( 20 Feb 2019 07:02 )             31.6   144  |  99  |  44<H>  ----------------------------<  131<H>  4.5   |  36<H>  |  1.31<H>    Ca    9.8       Phos  3.7      Mg     2.3            EKG: BiV paced    Telemetry: afib, pacing        Assessment Plan: 89-year-old Swazi speaking female with AFib on Eliquis (cardioversion 1/2018 with early return to afib), LBBB, chronic systolic CHF (EF 25-30% in the past, now 35-40%), CAD s/p PCI, HTN, HLD, DM, renal artery stenosis s/p R renal artery PTA 1/2018, CKD (baseline crea 1-2) anemia, hypothyroidism, b/l carotid endarterectomies, asthma, and emphysema on 2L home O2, with admission 1/2019 for CHF exacerbation s/p BiV pacemaker, who was readmitted with SOB.  We discussed the benefits of an AV node ablation to optimize pacing and she understands that this will make her pacemaker dependant.  the hopes is by increasing BiV pacing we will decrease heart failure exacerbations.  We discussed the procedure in detail including risks, benefits and alternatives (SARA Cardoza spoke in Maori with patient/family).  They would like to proceed.  We will aim to do this tomorrow if the schedule allows.  Please keep her NPO after midnight and call [phone] with any questions or concerns.

## 2019-02-20 NOTE — DISCHARGE NOTE ADULT - CARE PLAN
Principal Discharge DX:	Acute on chronic systolic heart failure  Secondary Diagnosis:	Coronary artery disease involving native coronary artery of native heart without angina pectoris  Secondary Diagnosis:	Chronic atrial fibrillation  Secondary Diagnosis:	Essential hypertension  Secondary Diagnosis:	Hypercholesteremia  Secondary Diagnosis:	Pulmonary emphysema, unspecified emphysema type  Secondary Diagnosis:	Type 2 diabetes mellitus without complication, without long-term current use of insulin Principal Discharge DX:	Acute on chronic systolic heart failure  Goal:	Improvement in your shortness of breath  Assessment and plan of treatment:	You were admitted to the hospital with shortness of breath due to an exacerbation of your underlying heart failure which may have been due to your fluid intake at home or the infection in your urine. We treated you with diuretics to remove fluid from your body and your symptoms improved. Please continue taking medications as prescribed and follow up with your cardiologist.  Secondary Diagnosis:	Coronary artery disease involving native coronary artery of native heart without angina pectoris  Goal:	Maintenance of heart health  Assessment and plan of treatment:	You came to the hospital with a history of coronary artery disease with stents placed in the past. Please continue to take your home medications and follow up with your cardiologist.  Secondary Diagnosis:	Chronic atrial fibrillation  Goal:	Maintenance of heart health  Assessment and plan of treatment:	You came to the hospital with a history of atrial fibrillation. Please continue to take your home medications and follow up with your cardiologist.  Secondary Diagnosis:	Essential hypertension  Goal:	Maintenance of healthy blood pressure  Assessment and plan of treatment:	You came to the hospital with a history of high blood pressure. Please continue to take your home medications and follow up with your cardiologist.  Secondary Diagnosis:	Hypercholesteremia  Goal:	Maintenance of healthy cholesterol levels  Assessment and plan of treatment:	You came to the hospital with a history of high cholesterol. Please continue to take your home medications and follow up with your cardiologist.  Secondary Diagnosis:	Pulmonary emphysema, unspecified emphysema type  Goal:	Maintenance of lung function  Assessment and plan of treatment:	You came to the hospital with a history of lung disease on home oxygen. Please continue to take your home medications and follow up with your pulmonologist (lung doctor).  Secondary Diagnosis:	Type 2 diabetes mellitus without complication, without long-term current use of insulin  Goal:	Maintenance of healthy blood sugar levels  Assessment and plan of treatment:	You came to the hospital with a history of diabetes. Please continue taking your medications and follow up with your primary doctor. Principal Discharge DX:	Acute on chronic systolic heart failure  Goal:	Improvement in your shortness of breath  Assessment and plan of treatment:	You were admitted to the hospital with shortness of breath due to an exacerbation of your underlying heart failure which may have been due to your fluid intake at home or the infection in your urine. We treated you with diuretics to remove fluid from your body and your symptoms improved. You also had a procedure called an ablation to help your pacemaker control your heart rhythm and this procedure was successful. Please continue taking medications as prescribed and follow up with your cardiologist.  Secondary Diagnosis:	Coronary artery disease involving native coronary artery of native heart without angina pectoris  Goal:	Maintenance of heart health  Assessment and plan of treatment:	You came to the hospital with a history of coronary artery disease with stents placed in the past. Please continue to take your home medications and follow up with your cardiologist.  Secondary Diagnosis:	Chronic atrial fibrillation  Goal:	Maintenance of heart health  Assessment and plan of treatment:	You came to the hospital with a history of atrial fibrillation. Please continue to take your home medications and follow up with your cardiologist.  Secondary Diagnosis:	Essential hypertension  Goal:	Maintenance of healthy blood pressure  Assessment and plan of treatment:	You came to the hospital with a history of high blood pressure. Please continue to take your home medications and follow up with your cardiologist.  Secondary Diagnosis:	Hypercholesteremia  Goal:	Maintenance of healthy cholesterol levels  Assessment and plan of treatment:	You came to the hospital with a history of high cholesterol. Please continue to take your home medications and follow up with your cardiologist.  Secondary Diagnosis:	Pulmonary emphysema, unspecified emphysema type  Goal:	Maintenance of lung function  Assessment and plan of treatment:	You came to the hospital with a history of lung disease on home oxygen. Please continue to take your home medications and follow up with your pulmonologist (lung doctor).  Secondary Diagnosis:	Type 2 diabetes mellitus without complication, without long-term current use of insulin  Goal:	Maintenance of healthy blood sugar levels  Assessment and plan of treatment:	You came to the hospital with a history of diabetes. Please continue taking your medications and follow up with your primary doctor. Principal Discharge DX:	Acute on chronic systolic heart failure  Goal:	Improvement in your shortness of breath  Assessment and plan of treatment:	You were admitted to the hospital with shortness of breath due to an exacerbation of your underlying heart failure which may have been due to your fluid intake at home or the infection in your urine. We treated you with diuretics to remove fluid from your body and your symptoms improved. You also had a procedure called an ablation to help your pacemaker control your heart rhythm and this procedure was successful. Please continue taking medications as prescribed and follow up with your cardiologist. Please also follow up with Dr Connolly (Electrophysiology) on 3/21 at 2:45pm.  Secondary Diagnosis:	Coronary artery disease involving native coronary artery of native heart without angina pectoris  Goal:	Maintenance of heart health  Assessment and plan of treatment:	You came to the hospital with a history of coronary artery disease with stents placed in the past. Please continue to take your home medications and follow up with your cardiologist.  Secondary Diagnosis:	Chronic atrial fibrillation  Goal:	Maintenance of heart health  Assessment and plan of treatment:	You came to the hospital with a history of atrial fibrillation. Please continue to take your home medications and follow up with your cardiologist.  Secondary Diagnosis:	Essential hypertension  Goal:	Maintenance of healthy blood pressure  Assessment and plan of treatment:	You came to the hospital with a history of high blood pressure. Please continue to take your home medications and follow up with your cardiologist.  Secondary Diagnosis:	Hypercholesteremia  Goal:	Maintenance of healthy cholesterol levels  Assessment and plan of treatment:	You came to the hospital with a history of high cholesterol. Please continue to take your home medications and follow up with your cardiologist.  Secondary Diagnosis:	Pulmonary emphysema, unspecified emphysema type  Goal:	Maintenance of lung function  Assessment and plan of treatment:	You came to the hospital with a history of lung disease on home oxygen. Please continue to take your home medications and follow up with your pulmonologist (lung doctor).  Secondary Diagnosis:	Type 2 diabetes mellitus without complication, without long-term current use of insulin  Goal:	Maintenance of healthy blood sugar levels  Assessment and plan of treatment:	You came to the hospital with a history of diabetes. Please continue taking your medications and follow up with your primary doctor.

## 2019-02-20 NOTE — PROGRESS NOTE ADULT - PROBLEM SELECTOR PLAN 2
RESOLVED  Pt recently diagnosed with UTI. prescribed 7 day course of augmentin but did not start the medication. Was treated on last admission 3 weeks ago with cipro 250mg po bid for 7 day course. UA positive on admission BEFORE nava placement so PRESENT ON ADMISSION. UCx growing Ecoli sensitive to ceftriaxone. S/p 3 day course of CTX with resolution of sx

## 2019-02-20 NOTE — DISCHARGE NOTE ADULT - MEDICATION SUMMARY - MEDICATIONS TO TAKE
I will START or STAY ON the medications listed below when I get home from the hospital:    oxygen  -- inhaled once   -- Indication: For Home O2 requirment    budesonide 0.5 mg/2 mL inhalation suspension  -- 2 milliliter(s) inhaled 2 times a day  -- Indication: For CoPD    acetaminophen 325 mg oral tablet  -- 2 tab(s) by mouth every 8 hours  -- Indication: For Pain    lisinopril 2.5 mg oral tablet  -- 1 tab(s) by mouth once a day  -- Indication: For Hypertension    magnesium hydroxide 8% oral suspension  -- 30 milliliter(s) by mouth once a day, As needed, Constipation  -- Indication: For Antacid    isosorbide mononitrate 30 mg oral tablet, extended release  -- 1 tab(s) by mouth once a day  -- Indication: For Hypertension    apixaban 2.5 mg oral tablet  -- 1 tab(s) by mouth every 12 hours  -- Indication: For Afib    Januvia 50 mg oral tablet  -- 2 tab(s) by mouth once a day  -- Indication: For diabetes    atorvastatin 40 mg oral tablet  -- 1 tab(s) by mouth once a day (at bedtime)  -- Indication: For Hypercholesteremia    metoprolol succinate 25 mg oral tablet, extended release  -- 1 tab(s) by mouth once a day  -- Indication: For Congestive heart failure    DuoNeb 0.5 mg-2.5 mg/3 mL inhalation solution  -- 3 milliliter(s) inhaled 4 times a day, As Needed  -- Indication: For CoPD    furosemide 40 mg oral tablet  -- 1 tab(s) by mouth once a day  -- Indication: For Congestive heart failure    FeroSul 325 mg (65 mg elemental iron) oral tablet  -- 1 tab(s) by mouth once a day   -- Indication: For iron supplement    polyethylene glycol 3350 oral powder for reconstitution  -- 17 gram(s) by mouth once a day  -- Indication: For Constipation    senna oral tablet  -- 2 tab(s) by mouth once a day (at bedtime)  -- Indication: For Constipation    montelukast 10 mg oral tablet  -- 1 tab(s) by mouth once a day  -- Indication: For CoPD    pantoprazole 40 mg oral delayed release tablet  -- 1 tab(s) by mouth once a day  -- Indication: For Antacid    Synthroid 100 mcg (0.1 mg) oral tablet  -- 1 tab(s) by mouth once a day  -- Indication: For Hypothyroid

## 2019-02-20 NOTE — DISCHARGE NOTE ADULT - CARE PROVIDER_API CALL
Phillip Dillard MD (Cardiology)  [phone]  Phone: (   )    -  Fax: (   )    -  Follow Up Time:     Alexus Ventura MD (Internal Medicine)  [phone]  Phone: (   )    -  Fax: (   )    -  Follow Up Time: Phillip Dillard MD (Cardiology)  [phone]  Phone: (   )    -  Fax: (   )    -  Follow Up Time:     Alexus Ventura MD (Internal Medicine)  [phone]  Phone: (   )    -  Fax: (   )    -  Follow Up Time:     Roel Crabtree)  Internal Medicine  [address] [id number]  Phone: [phone]  Fax: [phone]  Follow Up Time:     Tristan Connolly)  Cardiac Electrophysiology; Cardiovascular Disease; Internal Medicine  [address] [place] [address]  Phone: [phone]  Fax: [phone]  Follow Up Time:

## 2019-02-20 NOTE — DISCHARGE NOTE ADULT - SECONDARY DIAGNOSIS.
Coronary artery disease involving native coronary artery of native heart without angina pectoris Chronic atrial fibrillation Essential hypertension Hypercholesteremia Pulmonary emphysema, unspecified emphysema type Type 2 diabetes mellitus without complication, without long-term current use of insulin

## 2019-02-20 NOTE — DISCHARGE NOTE ADULT - PATIENT PORTAL LINK FT
You can access the [facility] Patient Portal, offered by [facility], by registering with the following website: [email or web address]

## 2019-02-20 NOTE — DISCHARGE NOTE ADULT - HOSPITAL COURSE
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 ED Course: T 97.7 oral, , -175/56-89, RR 25, O2 98 2L NC. Labs significant for Hb 9.1, neutrophil 86%, BUN 66, Cr 1.65, Alk phos 154, AST 47, ALT 57, TSH 7.831, serum BNP 11,057, trop <0.01. UA positive for small LE, CXR no acute infiltrates. EKG shows paced rhythm no ST elevations or depressions. In ED received lasix 40mg IV x1. Admitted to [facility] for acute CHF exacerbation Started on lasix 40mg IV qd. Echo ordered. EP came to interrogate the BiV PPM. EP suggested possible AV node ablation prior to discharge once patient is euvolemic in order to increase BiV pacing percentage, currently pending and may be done as an outpatient- awaiting final EP recs. Completed 3 day course of ceftriaxone for ecoli UTI. BCx NG x 3 days. Started lisinopril 2.5mg PO qd. Trasitioned from IV lasix--> PO lasix home dose. Pt clinically improved, hemodynamically stable and stable for discharge home with home services. 89F Uzbek speaking PMHx AFib on Eliquis, chronic systolic CHF (EF 20-25% on echo 1/2018, biventricular PPM placed 1/2019), CAD s/p remote PCI, HTN, HLD, DMT2, KIRA s/p R renal artery PTA 1/2018, CKD (baseline crea 1-2) anemia, hypothyroidism, b/l carotid endarterectomies, asthma, and emphysema on 2L home O2, who was recently hospitalized for CHF exacerbation three weeks ago and had a BiV PPM placed during that admission, now presents with SOB since last night. ED Course: T 97.7 oral, , -175/56-89, RR 25, O2 98 2L NC. Labs significant for Hb 9.1, neutrophil 86%, BUN 66, Cr 1.65, Alk phos 154, AST 47, ALT 57, TSH 7.831, serum BNP 11,057, trop <0.01. UA positive for small LE, CXR no acute infiltrates. EKG shows paced rhythm no ST elevations or depressions. In ED received lasix 40mg IV x1. Admitted to [facility] for acute CHF exacerbation Started on lasix 40mg IV qd. Echo ordered. EP came to interrogate the BiV PPM. EP suggested possible AV node ablation prior to discharge once patient is euvolemic in order to increase BiV pacing percentage, pt underwent successful AV node ablation by EP on 2/21 and is paced at a rate of 90. Completed 3 day course of ceftriaxone for ecoli UTI. BCx NG x 3 days. Started lisinopril 2.5mg PO qd. Trasitioned from IV lasix--> PO lasix home dose. Pt clinically improved, hemodynamically stable and stable for discharge home with home services. 89F Bruneian speaking PMHx AFib on Eliquis, chronic systolic CHF (EF 20-25% on echo 1/2018, biventricular PPM placed 1/2019), CAD s/p remote PCI, HTN, HLD, DMT2, KIRA s/p R renal artery PTA 1/2018, CKD (baseline crea 1-2) anemia, hypothyroidism, b/l carotid endarterectomies, asthma, and emphysema on 2L home O2, who was recently hospitalized for CHF exacerbation three weeks ago and had a BiV PPM placed during that admission, now presents with SOB since last night. ED Course: T 97.7 oral, , -175/56-89, RR 25, O2 98 2L NC. Labs significant for Hb 9.1, neutrophil 86%, BUN 66, Cr 1.65, Alk phos 154, AST 47, ALT 57, TSH 7.831, serum BNP 11,057, trop <0.01. UA positive for small LE, CXR no acute infiltrates. EKG shows paced rhythm no ST elevations or depressions. In ED received lasix 40mg IV x1. Admitted to [facility] for acute CHF exacerbation Started on lasix 40mg IV qd. Echo ordered. EP came to interrogate the BiV PPM. EP suggested possible AV node ablation prior to discharge once patient is euvolemic in order to increase BiV pacing percentage, pt underwent successful AV node ablation by EP on 2/21 and is paced at a rate of 90. Completed 3 day course of ceftriaxone for ecoli UTI. BCx NG x 3 days. Started lisinopril 2.5mg PO qd. Trasitioned from IV lasix--> PO lasix home dose. Pt clinically improved, hemodynamically stable and stable for discharge home with home services with outpatient follow up.

## 2019-02-20 NOTE — DISCHARGE NOTE ADULT - PROVIDER TOKENS
FREE:[LAST:[Nishant],FIRST:[Phillip Keith MD (Cardiology)],PHONE:[(   )    -],FAX:[(   )    -],ADDRESS:[phone]]],FREE:[LAST:[Lorenzo],FIRST:[MD Alexus (Internal Medicine)],PHONE:[(   )    -],FAX:[(   )    -],ADDRESS:[phone]]] FREE:[LAST:[Statkarimeos],FIRST:[Phillip Keith MD (Cardiology)],PHONE:[(   )    -],FAX:[(   )    -],ADDRESS:[phone]]],FREE:[LAST:[Lorenzo],FIRST:[MD Alexus (Internal Medicine)],PHONE:[(   )    -],FAX:[(   )    -],ADDRESS:[phone]]],PROVIDER:[TOKEN:[[id number]]],PROVIDER:[TOKEN:[[id number]]]

## 2019-02-20 NOTE — PROGRESS NOTE ADULT - PROBLEM SELECTOR PLAN 1
Acute SOB 2/2 acute on chronic HFrEF exacerbation. BNP 11k.   -Resolving-no crackles, no JVD, only +2 RUBA b/l today  -Transitioned from IV lasix --> PO lasix 40mg daily  -Continue Toprol 25mg PO qd, Imdur 30mg PO qd  -C/w lisinopril 2.5 PO daily  -Daily CXR, daily EKG, daily weights, strict ins and out

## 2019-02-20 NOTE — DISCHARGE NOTE ADULT - ADDITIONAL INSTRUCTIONS
Please make an appt to follow up with your Cardiologist Phillip Sarmiento in the next 1-2 weeks [phone].  Please make an appt to follow up with your primary doctor, Dr Alexus Ventura in the next 1-2 weeks.  [phone] Please make an appt to follow up with your doctors, Phillip Sarmiento (cardiology) [phone] and Dr Alexus Ventura  [phone] in the next 1-2 weeks.   Please follow up with Dr Crabtree (Cardiology) on 3/13 at 1:50pm.  Please follow up with Dr Connolly (Electrophysiology) on 3/21 at 2:45pm. Please make an appt to follow up with your doctors, Phillip Sarmiento (cardiology) [phone] and Dr Alexus Ventura  [phone] in the next 1-2 weeks.   Please follow up with Dr Crabtree (Cardiology) on 3/13 at 1:50pm.  Please follow up with Dr Sarabia (Nephrology) on 3/11 at 12:20pm.  Please follow up with Dr Connolly (Electrophysiology) on 3/21 at 2:45pm.

## 2019-02-20 NOTE — DISCHARGE NOTE ADULT - PLAN OF CARE
Improvement in your shortness of breath You were admitted to the hospital with shortness of breath due to an exacerbation of your underlying heart failure which may have been due to your fluid intake at home or the infection in your urine. We treated you with diuretics to remove fluid from your body and your symptoms improved. Please continue taking medications as prescribed and follow up with your cardiologist. Maintenance of heart health You came to the hospital with a history of coronary artery disease with stents placed in the past. Please continue to take your home medications and follow up with your cardiologist. You came to the hospital with a history of atrial fibrillation. Please continue to take your home medications and follow up with your cardiologist. Maintenance of healthy blood pressure You came to the hospital with a history of high blood pressure. Please continue to take your home medications and follow up with your cardiologist. Maintenance of healthy cholesterol levels You came to the hospital with a history of high cholesterol. Please continue to take your home medications and follow up with your cardiologist. Maintenance of lung function You came to the hospital with a history of lung disease on home oxygen. Please continue to take your home medications and follow up with your pulmonologist (lung doctor). Maintenance of healthy blood sugar levels You came to the hospital with a history of diabetes. Please continue taking your medications and follow up with your primary doctor. You were admitted to the hospital with shortness of breath due to an exacerbation of your underlying heart failure which may have been due to your fluid intake at home or the infection in your urine. We treated you with diuretics to remove fluid from your body and your symptoms improved. You also had a procedure called an ablation to help your pacemaker control your heart rhythm and this procedure was successful. Please continue taking medications as prescribed and follow up with your cardiologist. You were admitted to the hospital with shortness of breath due to an exacerbation of your underlying heart failure which may have been due to your fluid intake at home or the infection in your urine. We treated you with diuretics to remove fluid from your body and your symptoms improved. You also had a procedure called an ablation to help your pacemaker control your heart rhythm and this procedure was successful. Please continue taking medications as prescribed and follow up with your cardiologist. Please also follow up with Dr Connolly (Electrophysiology) on 3/21 at 2:45pm.

## 2019-02-20 NOTE — PROGRESS NOTE ADULT - SUBJECTIVE AND OBJECTIVE BOX
INTERVAL HPI/OVERNIGHT EVENTS:    SUBJECTIVE: Patient seen and examined at bedside.    OBJECTIVE:    VITAL SIGNS:  ICU Vital Signs Last 24 Hrs  T(C): 36.1 (20 Feb 2019 14:15), Max: 36.8 (19 Feb 2019 17:49)  T(F): 97 (20 Feb 2019 14:15), Max: 98.2 (19 Feb 2019 17:49)  HR: 82 (20 Feb 2019 14:12) (72 - 84)  BP: 116/48 (20 Feb 2019 14:12) (112/52 - 147/58)  BP(mean): 76 (20 Feb 2019 14:12) (69 - 119)  ABP: --  ABP(mean): --  RR: 17 (20 Feb 2019 14:12) (17 - 27)  SpO2: 99% (20 Feb 2019 14:12) (99% - 100%)        02-19 @ 07:01  -  02-20 @ 07:00  --------------------------------------------------------  IN: 660 mL / OUT: 1000 mL / NET: -340 mL    02-20 @ 07:01  -  02-20 @ 14:35  --------------------------------------------------------  IN: 600 mL / OUT: 0 mL / NET: 600 mL      CAPILLARY BLOOD GLUCOSE      POCT Blood Glucose.: 170 mg/dL (20 Feb 2019 11:16)      PHYSICAL EXAM:    General: NAD  HEENT: NC/AT; PERRL, clear conjunctiva  Neck: supple  Respiratory: CTA b/l  Cardiovascular: +S1/S2; RRR  Abdomen: soft, NT/ND; +BS x4  Extremities: WWP, 2+ peripheral pulses b/l; no LE edema  Skin: normal color and turgor; no rash  Neurological:     MEDICATIONS:  MEDICATIONS  (STANDING):  apixaban 2.5 milliGRAM(s) Oral every 12 hours  atorvastatin 40 milliGRAM(s) Oral at bedtime  buDESOnide   0.5 milliGRAM(s) Respule 0.5 milliGRAM(s) Inhalation two times a day  ferrous    sulfate 325 milliGRAM(s) Oral daily  insulin lispro (HumaLOG) corrective regimen sliding scale   SubCutaneous four times a day before meals  isosorbide   mononitrate ER Tablet (IMDUR) 30 milliGRAM(s) Oral daily  levothyroxine 100 MICROGram(s) Oral daily  lisinopril 2.5 milliGRAM(s) Oral daily  metoprolol succinate ER 25 milliGRAM(s) Oral daily  montelukast 10 milliGRAM(s) Oral daily  pantoprazole    Tablet 40 milliGRAM(s) Oral before breakfast  polyethylene glycol 3350 17 Gram(s) Oral daily  senna 2 Tablet(s) Oral at bedtime    MEDICATIONS  (PRN):  acetaminophen   Tablet .. 650 milliGRAM(s) Oral every 8 hours PRN Moderate Pain (4 - 6)  ALBUTerol/ipratropium for Nebulization 3 milliLiter(s) Nebulizer every 6 hours PRN Bronchospasm  glucagon  Injectable 1 milliGRAM(s) IntraMuscular once PRN Glucose LESS THAN 70 milligrams/deciliter  magnesium hydroxide Suspension 30 milliLiter(s) Oral daily PRN Constipation      ALLERGIES:  Allergies    contrast media (iodine-based) (Angioedema)  pineapple (Unknown)    Intolerances        LABS:                        9.2    6.16  )-----------( 88       ( 20 Feb 2019 07:02 )             31.6     02-20    144  |  99  |  44<H>  ----------------------------<  131<H>  4.5   |  36<H>  |  1.31<H>    Ca    9.8      20 Feb 2019 07:02  Phos  3.7     02-20  Mg     2.3     02-20            RADIOLOGY & ADDITIONAL TESTS: Reviewed. INTERVAL HPI/OVERNIGHT EVENTS: Pt given milk of magnesia for constipation    SUBJECTIVE: Patient seen and examined at bedside.    OBJECTIVE:    VITAL SIGNS:  ICU Vital Signs Last 24 Hrs  T(C): 36.1 (20 Feb 2019 14:15), Max: 36.8 (19 Feb 2019 17:49)  T(F): 97 (20 Feb 2019 14:15), Max: 98.2 (19 Feb 2019 17:49)  HR: 82 (20 Feb 2019 14:12) (72 - 84)  BP: 116/48 (20 Feb 2019 14:12) (112/52 - 147/58)  BP(mean): 76 (20 Feb 2019 14:12) (69 - 119)  ABP: --  ABP(mean): --  RR: 17 (20 Feb 2019 14:12) (17 - 27)  SpO2: 99% (20 Feb 2019 14:12) (99% - 100%)        02-19 @ 07:01  -  02-20 @ 07:00  --------------------------------------------------------  IN: 660 mL / OUT: 1000 mL / NET: -340 mL    02-20 @ 07:01  - 02-20 @ 14:35  --------------------------------------------------------  IN: 600 mL / OUT: 0 mL / NET: 600 mL      CAPILLARY BLOOD GLUCOSE      POCT Blood Glucose.: 170 mg/dL (20 Feb 2019 11:16)      PHYSICAL EXAM:    General: NAD  HEENT: NC/AT; PERRL, clear conjunctiva  Neck: supple  Respiratory: CTA b/l  Cardiovascular: +S1/S2; RRR  Abdomen: soft, NT/ND; +BS x4  Extremities: WWP, 2+ peripheral pulses b/l; no LE edema  Skin: normal color and turgor; no rash  Neurological:     MEDICATIONS:  MEDICATIONS  (STANDING):  apixaban 2.5 milliGRAM(s) Oral every 12 hours  atorvastatin 40 milliGRAM(s) Oral at bedtime  buDESOnide   0.5 milliGRAM(s) Respule 0.5 milliGRAM(s) Inhalation two times a day  ferrous    sulfate 325 milliGRAM(s) Oral daily  insulin lispro (HumaLOG) corrective regimen sliding scale   SubCutaneous four times a day before meals  isosorbide   mononitrate ER Tablet (IMDUR) 30 milliGRAM(s) Oral daily  levothyroxine 100 MICROGram(s) Oral daily  lisinopril 2.5 milliGRAM(s) Oral daily  metoprolol succinate ER 25 milliGRAM(s) Oral daily  montelukast 10 milliGRAM(s) Oral daily  pantoprazole    Tablet 40 milliGRAM(s) Oral before breakfast  polyethylene glycol 3350 17 Gram(s) Oral daily  senna 2 Tablet(s) Oral at bedtime    MEDICATIONS  (PRN):  acetaminophen   Tablet .. 650 milliGRAM(s) Oral every 8 hours PRN Moderate Pain (4 - 6)  ALBUTerol/ipratropium for Nebulization 3 milliLiter(s) Nebulizer every 6 hours PRN Bronchospasm  glucagon  Injectable 1 milliGRAM(s) IntraMuscular once PRN Glucose LESS THAN 70 milligrams/deciliter  magnesium hydroxide Suspension 30 milliLiter(s) Oral daily PRN Constipation      ALLERGIES:  Allergies    contrast media (iodine-based) (Angioedema)  pineapple (Unknown)    Intolerances        LABS:                        9.2    6.16  )-----------( 88       ( 20 Feb 2019 07:02 )             31.6     02-20    144  |  99  |  44<H>  ----------------------------<  131<H>  4.5   |  36<H>  |  1.31<H>    Ca    9.8      20 Feb 2019 07:02  Phos  3.7     02-20  Mg     2.3     02-20            RADIOLOGY & ADDITIONAL TESTS: Reviewed. INTERVAL HPI/OVERNIGHT EVENTS: Pt given milk of magnesia for constipation.     SUBJECTIVE: Patient seen and examined at bedside. States her SOB is improved. Currently denies CP, palpitations, nausea, vomiting, fever chills.     OBJECTIVE:    VITAL SIGNS:  ICU Vital Signs Last 24 Hrs  T(C): 36.1 (20 Feb 2019 14:15), Max: 36.8 (19 Feb 2019 17:49)  T(F): 97 (20 Feb 2019 14:15), Max: 98.2 (19 Feb 2019 17:49)  HR: 82 (20 Feb 2019 14:12) (72 - 84)  BP: 116/48 (20 Feb 2019 14:12) (112/52 - 147/58)  BP(mean): 76 (20 Feb 2019 14:12) (69 - 119)  ABP: --  ABP(mean): --  RR: 17 (20 Feb 2019 14:12) (17 - 27)  SpO2: 99% (20 Feb 2019 14:12) (99% - 100%)        02-19 @ 07:01  -  02-20 @ 07:00  --------------------------------------------------------  IN: 660 mL / OUT: 1000 mL / NET: -340 mL    02-20 @ 07:01  - 02-20 @ 14:35  --------------------------------------------------------  IN: 600 mL / OUT: 0 mL / NET: 600 mL      CAPILLARY BLOOD GLUCOSE      POCT Blood Glucose.: 170 mg/dL (20 Feb 2019 11:16)      PHYSICAL EXAM:    General: NAD  HEENT: NC/AT; PERRL, clear conjunctiva  Neck: supple  Respiratory: +bibasilar crackles, good air entry, no rhonchi or wheezing  Cardiovascular: +S1/S2; irregular rhythm, normal rate, no m/r/g  Abdomen: mildly distended but soft, NT; +BS x4  Extremities: WWP, 2+ peripheral pulses b/l; 1+b/l pedal edema  Skin: normal color and turgor; no rash  Neurological: AA&O x3, fully conversant, no obvious focal deficits    MEDICATIONS:  MEDICATIONS  (STANDING):  apixaban 2.5 milliGRAM(s) Oral every 12 hours  atorvastatin 40 milliGRAM(s) Oral at bedtime  buDESOnide   0.5 milliGRAM(s) Respule 0.5 milliGRAM(s) Inhalation two times a day  ferrous    sulfate 325 milliGRAM(s) Oral daily  insulin lispro (HumaLOG) corrective regimen sliding scale   SubCutaneous four times a day before meals  isosorbide   mononitrate ER Tablet (IMDUR) 30 milliGRAM(s) Oral daily  levothyroxine 100 MICROGram(s) Oral daily  lisinopril 2.5 milliGRAM(s) Oral daily  metoprolol succinate ER 25 milliGRAM(s) Oral daily  montelukast 10 milliGRAM(s) Oral daily  pantoprazole    Tablet 40 milliGRAM(s) Oral before breakfast  polyethylene glycol 3350 17 Gram(s) Oral daily  senna 2 Tablet(s) Oral at bedtime    MEDICATIONS  (PRN):  acetaminophen   Tablet .. 650 milliGRAM(s) Oral every 8 hours PRN Moderate Pain (4 - 6)  ALBUTerol/ipratropium for Nebulization 3 milliLiter(s) Nebulizer every 6 hours PRN Bronchospasm  glucagon  Injectable 1 milliGRAM(s) IntraMuscular once PRN Glucose LESS THAN 70 milligrams/deciliter  magnesium hydroxide Suspension 30 milliLiter(s) Oral daily PRN Constipation      ALLERGIES:  Allergies    contrast media (iodine-based) (Angioedema)  pineapple (Unknown)    Intolerances        LABS:                        9.2    6.16  )-----------( 88       ( 20 Feb 2019 07:02 )             31.6     02-20    144  |  99  |  44<H>  ----------------------------<  131<H>  4.5   |  36<H>  |  1.31<H>    Ca    9.8      20 Feb 2019 07:02  Phos  3.7     02-20  Mg     2.3     02-20            RADIOLOGY & ADDITIONAL TESTS: Reviewed.

## 2019-02-21 ENCOUNTER — APPOINTMENT (OUTPATIENT)
Dept: HEART AND VASCULAR | Facility: CLINIC | Age: 84
End: 2019-02-21

## 2019-02-21 LAB
ANION GAP SERPL CALC-SCNC: 7 MMOL/L — SIGNIFICANT CHANGE UP (ref 5–17)
BUN SERPL-MCNC: 42 MG/DL — HIGH (ref 7–23)
CALCIUM SERPL-MCNC: 9.9 MG/DL — SIGNIFICANT CHANGE UP (ref 8.4–10.5)
CHLORIDE SERPL-SCNC: 99 MMOL/L — SIGNIFICANT CHANGE UP (ref 96–108)
CO2 SERPL-SCNC: 37 MMOL/L — HIGH (ref 22–31)
CREAT SERPL-MCNC: 1.35 MG/DL — HIGH (ref 0.5–1.3)
CULTURE RESULTS: SIGNIFICANT CHANGE UP
CULTURE RESULTS: SIGNIFICANT CHANGE UP
GLUCOSE BLDC GLUCOMTR-MCNC: 111 MG/DL — HIGH (ref 70–99)
GLUCOSE BLDC GLUCOMTR-MCNC: 122 MG/DL — HIGH (ref 70–99)
GLUCOSE BLDC GLUCOMTR-MCNC: 132 MG/DL — HIGH (ref 70–99)
GLUCOSE BLDC GLUCOMTR-MCNC: 205 MG/DL — HIGH (ref 70–99)
GLUCOSE SERPL-MCNC: 129 MG/DL — HIGH (ref 70–99)
HCT VFR BLD CALC: 30.7 % — LOW (ref 34.5–45)
HGB BLD-MCNC: 8.8 G/DL — LOW (ref 11.5–15.5)
MAGNESIUM SERPL-MCNC: 2.6 MG/DL — SIGNIFICANT CHANGE UP (ref 1.6–2.6)
MCHC RBC-ENTMCNC: 28.7 GM/DL — LOW (ref 32–36)
MCHC RBC-ENTMCNC: 29.1 PG — SIGNIFICANT CHANGE UP (ref 27–34)
MCV RBC AUTO: 101.7 FL — HIGH (ref 80–100)
NRBC # BLD: 0 /100 WBCS — SIGNIFICANT CHANGE UP (ref 0–0)
PLATELET # BLD AUTO: 95 K/UL — LOW (ref 150–400)
POTASSIUM SERPL-MCNC: 5.1 MMOL/L — SIGNIFICANT CHANGE UP (ref 3.5–5.3)
POTASSIUM SERPL-SCNC: 5.1 MMOL/L — SIGNIFICANT CHANGE UP (ref 3.5–5.3)
RBC # BLD: 3.02 M/UL — LOW (ref 3.8–5.2)
RBC # FLD: 15.6 % — HIGH (ref 10.3–14.5)
SODIUM SERPL-SCNC: 143 MMOL/L — SIGNIFICANT CHANGE UP (ref 135–145)
SPECIMEN SOURCE: SIGNIFICANT CHANGE UP
SPECIMEN SOURCE: SIGNIFICANT CHANGE UP
WBC # BLD: 6.07 K/UL — SIGNIFICANT CHANGE UP (ref 3.8–10.5)
WBC # FLD AUTO: 6.07 K/UL — SIGNIFICANT CHANGE UP (ref 3.8–10.5)

## 2019-02-21 PROCEDURE — 99232 SBSQ HOSP IP/OBS MODERATE 35: CPT

## 2019-02-21 PROCEDURE — 93650 ICAR CATH ABLTJ AV NODE FUNC: CPT

## 2019-02-21 RX ORDER — OXYMETAZOLINE HYDROCHLORIDE 0.5 MG/ML
2 SPRAY NASAL
Qty: 0 | Refills: 0 | Status: DISCONTINUED | OUTPATIENT
Start: 2019-02-21 | End: 2019-02-23

## 2019-02-21 RX ORDER — PETROLATUM,WHITE
1 JELLY (GRAM) TOPICAL AT BEDTIME
Qty: 0 | Refills: 0 | Status: DISCONTINUED | OUTPATIENT
Start: 2019-02-21 | End: 2019-02-23

## 2019-02-21 RX ORDER — SODIUM CHLORIDE 0.65 %
1 AEROSOL, SPRAY (ML) NASAL THREE TIMES A DAY
Qty: 0 | Refills: 0 | Status: DISCONTINUED | OUTPATIENT
Start: 2019-02-21 | End: 2019-02-23

## 2019-02-21 RX ADMIN — OXYMETAZOLINE HYDROCHLORIDE 2 SPRAY(S): 0.5 SPRAY NASAL at 15:49

## 2019-02-21 RX ADMIN — Medication 40 MILLIGRAM(S): at 07:04

## 2019-02-21 RX ADMIN — MONTELUKAST 10 MILLIGRAM(S): 4 TABLET, CHEWABLE ORAL at 14:45

## 2019-02-21 RX ADMIN — Medication 4: at 16:21

## 2019-02-21 RX ADMIN — Medication 1 APPLICATION(S): at 19:37

## 2019-02-21 RX ADMIN — APIXABAN 2.5 MILLIGRAM(S): 2.5 TABLET, FILM COATED ORAL at 18:51

## 2019-02-21 RX ADMIN — ATORVASTATIN CALCIUM 40 MILLIGRAM(S): 80 TABLET, FILM COATED ORAL at 22:39

## 2019-02-21 RX ADMIN — Medication 0.5 MILLIGRAM(S): at 18:50

## 2019-02-21 RX ADMIN — Medication 1 APPLICATION(S): at 15:47

## 2019-02-21 RX ADMIN — ISOSORBIDE MONONITRATE 30 MILLIGRAM(S): 60 TABLET, EXTENDED RELEASE ORAL at 14:45

## 2019-02-21 RX ADMIN — POLYETHYLENE GLYCOL 3350 17 GRAM(S): 17 POWDER, FOR SOLUTION ORAL at 18:52

## 2019-02-21 RX ADMIN — SENNA PLUS 2 TABLET(S): 8.6 TABLET ORAL at 22:39

## 2019-02-21 RX ADMIN — Medication 25 MILLIGRAM(S): at 07:02

## 2019-02-21 RX ADMIN — LISINOPRIL 2.5 MILLIGRAM(S): 2.5 TABLET ORAL at 07:02

## 2019-02-21 RX ADMIN — Medication 325 MILLIGRAM(S): at 14:45

## 2019-02-21 RX ADMIN — Medication 1 APPLICATION(S): at 22:40

## 2019-02-21 RX ADMIN — APIXABAN 2.5 MILLIGRAM(S): 2.5 TABLET, FILM COATED ORAL at 07:02

## 2019-02-21 RX ADMIN — PANTOPRAZOLE SODIUM 40 MILLIGRAM(S): 20 TABLET, DELAYED RELEASE ORAL at 07:02

## 2019-02-21 RX ADMIN — Medication 0.5 MILLIGRAM(S): at 07:02

## 2019-02-21 RX ADMIN — Medication 100 MICROGRAM(S): at 07:02

## 2019-02-21 NOTE — DIETITIAN INITIAL EVALUATION ADULT. - OTHER INFO
Pt is known to Nutrition team from previous admission. 89 y.o F from home with PMHx of Afib on Eliquis, Chronic Systolic CHF EF 20-25%, BiV PPM placed, CAD s/p PCI, HTN, HLD, DM, KIRA s/p R renal artery PTA, CKD stage III baseline Cr 1.5-1.6, Anemia, Hypothyroidism, B/L carotid endarterectomies, Asthma, Emphysema. Pt admitted with acute CHF exacerbation likely 2/2 UTI vs increased fluid intake vs hypothyroidism. Plan for ablation with EP. Per family and friend at bedside, pt is eating less than before associated with anorexia with weight gain 6 lbs x 1 month likely from fluid retention. Reports good adherence to low sodium diet and manages fluids at home. Pt has known food allergy to Pineapple. Pt takes Furosemide, Synthroid, FeroSul, Acetaminophen, Atorvastatin, Isosobide mononitrate per home meds.  Currently tolerating Mech Soft, CSTCHOSN, DASH/TLC diet, FR 1000 mL/d now with poor <45% PO intake. Please add Ensure Pudding 4 oz PO daily (170 kcal, 4 gm protein) to diet. Denies N/V/D or pain. +Constipation, bowel regimen per team. B12 and Folate WNL. RD will f/u per high risk protocol.

## 2019-02-21 NOTE — DIETITIAN INITIAL EVALUATION ADULT. - PROBLEM SELECTOR PLAN 2
Recently diagnosed with UTI. prescribed 7 day course of augmentin but did not start the medication. Was treated on last admission 3 weeks ago with cipro 250mg po bid for 7 day course. UA positive on admission BEFORE nava placement   -start ceftriaxone 1mg IVP qd  -f/u UCx

## 2019-02-21 NOTE — DIETITIAN INITIAL EVALUATION ADULT. - PROBLEM SELECTOR PLAN 1
Presenting with acute SOB since last night. On PE pt has JVD, bibasilar crackles, +2 RUBA. pro BNP is 11,057. CXR clear.  All 2/2 acute CHF exacerbation. Was just hospitalized at [facility] less than 1 month ago for CHF exacerbation and had a PPM placed at that time. Patient states she has been compliant with taking lasix 20mg PO qd and has not missed a dose. s/p lasix 40mg IV x1 in ED. Last echo 1/22/19 showed EF 30%, severe LV global HK, mild-mod MR, mod pulm HTN, PASP 55. Current exacerbation likely 2/2 UTI vs increased fluid intake vs worsening hypothyroidism as TSH is 7.831 up from 1.421 3 weeks ago.  -start lasix 40mg IV qd  -start home toprol 25mg PO qd  -start home imdur 30mg PO qd  -f/u echo  -nava placed for strict ins and outs  -daily CXR, daily EKG  -goal is net negative 1.5L    #transaminitis  -Likely 2/2 hepatic congestion from CHF exacerbation. had transaminitis on last admission which resolved. RUQ US at that time showed hepatomegaly w no evidence of cholelithiases or acute cholecystitis.   -ALK phos 154, AST 47, ALT 57  -continue to trend LFTs

## 2019-02-21 NOTE — CONSULT NOTE ADULT - ASSESSMENT
A/P  89F with extensive cardiac history including aFib on eliquis, CAD, CHF, and emphysema on 2LNC of home O2 with right nare epistaxis now s/p cauterization of right anterior nasal septum with control.    - Recommend face tent humidified instead of NC if medically cleared   - if repeat epistaxis - apply afrin and manual pressure for 20 minutes   - Epistaxis prevention recs given: Ayr nasal saline TID, vaseline to nares at bedtime, humidified home O2   - No need for ENT follow up if no further epistaxis.     D/W attending whom agrees with above.

## 2019-02-21 NOTE — DIETITIAN INITIAL EVALUATION ADULT. - ENERGY NEEDS
Height: 5'5" Weight: 187 lbs, 191.1 lbs (2/17), 185.6 lbs (2/18), 186.7 lbs (2/20), 183.8 lbs (2/21), IBW 125lbs+/-10%, %%, BMI 31.1,  IBW used to calculate EER as per pt's current body weight >120% of IBW   Estimated nutrient needs based on [facility] SOC for maintenance in older adults  FR 1000 mL/d per team 2/2 CHF

## 2019-02-21 NOTE — PROGRESS NOTE ADULT - SUBJECTIVE AND OBJECTIVE BOX
INTERVAL HPI/OVERNIGHT EVENTS:    SUBJECTIVE: Patient seen and examined at bedside.    OBJECTIVE:    VITAL SIGNS:  ICU Vital Signs Last 24 Hrs  T(C): 36.7 (21 Feb 2019 05:35), Max: 36.7 (21 Feb 2019 05:35)  T(F): 98 (21 Feb 2019 05:35), Max: 98 (21 Feb 2019 05:35)  HR: 78 (21 Feb 2019 05:09) (77 - 92)  BP: 137/70 (21 Feb 2019 05:09) (114/63 - 152/72)  BP(mean): 97 (21 Feb 2019 05:09) (76 - 119)  ABP: --  ABP(mean): --  RR: 21 (21 Feb 2019 05:09) (17 - 21)  SpO2: 99% (21 Feb 2019 05:09) (94% - 100%)        02-20 @ 07:01  -  02-21 @ 07:00  --------------------------------------------------------  IN: 600 mL / OUT: 900 mL / NET: -300 mL      CAPILLARY BLOOD GLUCOSE      POCT Blood Glucose.: 132 mg/dL (21 Feb 2019 07:10)      PHYSICAL EXAM:    General: NAD  HEENT: NC/AT; PERRL, clear conjunctiva  Neck: supple  Respiratory: CTA b/l  Cardiovascular: +S1/S2; RRR  Abdomen: soft, NT/ND; +BS x4  Extremities: WWP, 2+ peripheral pulses b/l; no LE edema  Skin: normal color and turgor; no rash  Neurological:     MEDICATIONS:  MEDICATIONS  (STANDING):  apixaban 2.5 milliGRAM(s) Oral every 12 hours  atorvastatin 40 milliGRAM(s) Oral at bedtime  buDESOnide   0.5 milliGRAM(s) Respule 0.5 milliGRAM(s) Inhalation two times a day  ferrous    sulfate 325 milliGRAM(s) Oral daily  furosemide    Tablet 40 milliGRAM(s) Oral daily  insulin lispro (HumaLOG) corrective regimen sliding scale   SubCutaneous four times a day before meals  isosorbide   mononitrate ER Tablet (IMDUR) 30 milliGRAM(s) Oral daily  levothyroxine 100 MICROGram(s) Oral daily  lisinopril 2.5 milliGRAM(s) Oral daily  metoprolol succinate ER 25 milliGRAM(s) Oral daily  montelukast 10 milliGRAM(s) Oral daily  pantoprazole    Tablet 40 milliGRAM(s) Oral before breakfast  polyethylene glycol 3350 17 Gram(s) Oral daily  senna 2 Tablet(s) Oral at bedtime    MEDICATIONS  (PRN):  acetaminophen   Tablet .. 650 milliGRAM(s) Oral every 8 hours PRN Moderate Pain (4 - 6)  ALBUTerol/ipratropium for Nebulization 3 milliLiter(s) Nebulizer every 6 hours PRN Bronchospasm  glucagon  Injectable 1 milliGRAM(s) IntraMuscular once PRN Glucose LESS THAN 70 milligrams/deciliter  guaiFENesin   Syrup  (Sugar-Free) 200 milliGRAM(s) Oral every 6 hours PRN Cough  magnesium hydroxide Suspension 30 milliLiter(s) Oral daily PRN Constipation      ALLERGIES:  Allergies    contrast media (iodine-based) (Angioedema)  pineapple (Unknown)    Intolerances        LABS:                        8.8    6.07  )-----------( 95       ( 21 Feb 2019 06:43 )             30.7     02-21    143  |  99  |  42<H>  ----------------------------<  129<H>  5.1   |  37<H>  |  1.35<H>    Ca    9.9      21 Feb 2019 06:43  Phos  3.7     02-20  Mg     2.6     02-21            RADIOLOGY & ADDITIONAL TESTS: Reviewed. INTERVAL HPI/OVERNIGHT EVENTS: No acute overnight events.     SUBJECTIVE: Patient seen and examined at bedside. Pt states her SOB is improved, tolerating her home O2 requirement (2L NC). C/o dry cough as well as constipation despite bowel regimen. Denies CP, SOB, palpitations, nausea, vomiting, fever, chills.     OBJECTIVE:    VITAL SIGNS:  ICU Vital Signs Last 24 Hrs  T(C): 36.7 (21 Feb 2019 05:35), Max: 36.7 (21 Feb 2019 05:35)  T(F): 98 (21 Feb 2019 05:35), Max: 98 (21 Feb 2019 05:35)  HR: 78 (21 Feb 2019 05:09) (77 - 92)  BP: 137/70 (21 Feb 2019 05:09) (114/63 - 152/72)  BP(mean): 97 (21 Feb 2019 05:09) (76 - 119)  ABP: --  ABP(mean): --  RR: 21 (21 Feb 2019 05:09) (17 - 21)  SpO2: 99% (21 Feb 2019 05:09) (94% - 100%)        02-20 @ 07:01  -  02-21 @ 07:00  --------------------------------------------------------  IN: 600 mL / OUT: 900 mL / NET: -300 mL      CAPILLARY BLOOD GLUCOSE      POCT Blood Glucose.: 132 mg/dL (21 Feb 2019 07:10)      PHYSICAL EXAM:  General: NAD  HEENT: NC/AT; PERRL, clear conjunctiva  Neck: supple  Respiratory: +mild bibasilar crackles, good air entry, no rhonchi or wheezing  Cardiovascular: +S1/S2; irregular rhythm, normal rate, no m/r/g  Abdomen: mildly distended but soft, NT; +BS x4  Extremities: WWP, 2+ peripheral pulses b/l; 1+b/l pedal edema  Skin: normal color and turgor; no rash  Neurological: AA&O x3, fully conversant, no obvious focal deficits      MEDICATIONS:  MEDICATIONS  (STANDING):  apixaban 2.5 milliGRAM(s) Oral every 12 hours  atorvastatin 40 milliGRAM(s) Oral at bedtime  buDESOnide   0.5 milliGRAM(s) Respule 0.5 milliGRAM(s) Inhalation two times a day  ferrous    sulfate 325 milliGRAM(s) Oral daily  furosemide    Tablet 40 milliGRAM(s) Oral daily  insulin lispro (HumaLOG) corrective regimen sliding scale   SubCutaneous four times a day before meals  isosorbide   mononitrate ER Tablet (IMDUR) 30 milliGRAM(s) Oral daily  levothyroxine 100 MICROGram(s) Oral daily  lisinopril 2.5 milliGRAM(s) Oral daily  metoprolol succinate ER 25 milliGRAM(s) Oral daily  montelukast 10 milliGRAM(s) Oral daily  pantoprazole    Tablet 40 milliGRAM(s) Oral before breakfast  polyethylene glycol 3350 17 Gram(s) Oral daily  senna 2 Tablet(s) Oral at bedtime    MEDICATIONS  (PRN):  acetaminophen   Tablet .. 650 milliGRAM(s) Oral every 8 hours PRN Moderate Pain (4 - 6)  ALBUTerol/ipratropium for Nebulization 3 milliLiter(s) Nebulizer every 6 hours PRN Bronchospasm  glucagon  Injectable 1 milliGRAM(s) IntraMuscular once PRN Glucose LESS THAN 70 milligrams/deciliter  guaiFENesin   Syrup  (Sugar-Free) 200 milliGRAM(s) Oral every 6 hours PRN Cough  magnesium hydroxide Suspension 30 milliLiter(s) Oral daily PRN Constipation      ALLERGIES:  Allergies    contrast media (iodine-based) (Angioedema)  pineapple (Unknown)    Intolerances        LABS:                        8.8    6.07  )-----------( 95       ( 21 Feb 2019 06:43 )             30.7     02-21    143  |  99  |  42<H>  ----------------------------<  129<H>  5.1   |  37<H>  |  1.35<H>    Ca    9.9      21 Feb 2019 06:43  Phos  3.7     02-20  Mg     2.6     02-21            RADIOLOGY & ADDITIONAL TESTS: Reviewed.

## 2019-02-21 NOTE — DIETITIAN INITIAL EVALUATION ADULT. - PERTINENT MEDS FT
Humalog SS, Furosemide, MoM, Lisinopril, Acetaminophen, Atorvastatin, Ferrous sulfate, Isosorbide mononitrate ER, Levothyroxine, Metoprolol succinate ER, Pantoprazole, Miralax, Senna

## 2019-02-21 NOTE — PROGRESS NOTE ADULT - PROBLEM SELECTOR PLAN 1
Acute SOB 2/2 acute on chronic HFrEF exacerbation. BNP 11k.   -Resolving- mild bibasilar crackles, no JVD, only +1 RUBA b/l today  -C/w PO lasix 40mg daily  -Continue Toprol 25mg PO qd, Imdur 30mg PO qd, lisinopril 2.5 PO daily  -Daily CXR, daily EKG, daily weights, strict ins and out  -AV node ablation today to increase biv pacing percentage

## 2019-02-21 NOTE — PROGRESS NOTE ADULT - PROBLEM SELECTOR PLAN 2
TSH is 7.831. Pt takes levothyroxine 100mcg Po qd at home. TSH  on 1/22/19 was 1.421. Pt states she has been compliant with taking home levothyroxine.  -Continue on Levothyroxine 100mcg daily as t3,t4 normal.

## 2019-02-21 NOTE — DIETITIAN INITIAL EVALUATION ADULT. - PROBLEM SELECTOR PLAN 4
History of Atrial fibrillation. Currently in paced rhythm  -start home Eliquis 2.5mg bid  -start home toprol 25mg po qd  -f/u EP recs

## 2019-02-21 NOTE — DIETITIAN INITIAL EVALUATION ADULT. - PROBLEM SELECTOR PLAN 8
Takes Januvia at home  -HgA1c 6.5  -c/w MISS in hospital  -Diabetic diet    ##osteoarthritis/neck pain  -start home Tylenol 650mg TID PRN neck pain

## 2019-02-21 NOTE — CONSULT NOTE ADULT - SUBJECTIVE AND OBJECTIVE BOX
CC: Right epistaxis    HPI: 89F with extensive cardiac history including aFib on eliquis, CAD, CHF, and emphysema on 2LNC of home O2 with right nare epistaxis that started without proceeding trauma last night and continued through the day without relief with manual pressure.  ENT consulted for further evaluation and management.  Patient has had epistaxis in the past requiring cauterization.  On evaluation patient had gauze in right nare minimally saturated with blood without active bleeding in the OP.  Pt not in respiratory distress and VSS.      PAST MEDICAL & SURGICAL HISTORY:  Renal artery stenosis  Chronic systolic CHF (congestive heart failure)  Hypothyroidism, unspecified type  Anemia  CAD (coronary artery disease)  DM (diabetes mellitus)  Emphysema  Hypercholesteremia  HTN (hypertension)  S/P biventricular cardiac pacemaker procedure  S/P percutaneous angioplasty of renal artery: 1/2018  S/P bladder repair  S/P hysterectomy with oophorectomy  History of bilateral carotid endarterectomy  History of umbilical hernia repair  S/P cataract surgery  S/P TKR (total knee replacement)    Allergies    contrast media (iodine-based) (Angioedema)  pineapple (Unknown)    Intolerances    MEDICATIONS  (STANDING):  apixaban 2.5 milliGRAM(s) Oral every 12 hours  atorvastatin 40 milliGRAM(s) Oral at bedtime  buDESOnide   0.5 milliGRAM(s) Respule 0.5 milliGRAM(s) Inhalation two times a day  ferrous    sulfate 325 milliGRAM(s) Oral daily  furosemide    Tablet 40 milliGRAM(s) Oral daily  insulin lispro (HumaLOG) corrective regimen sliding scale   SubCutaneous four times a day before meals  isosorbide   mononitrate ER Tablet (IMDUR) 30 milliGRAM(s) Oral daily  levothyroxine 100 MICROGram(s) Oral daily  lisinopril 2.5 milliGRAM(s) Oral daily  metoprolol succinate ER 25 milliGRAM(s) Oral daily  montelukast 10 milliGRAM(s) Oral daily  oxymetazoline 0.05% Nasal Spray 2 Spray(s) Alternating Nostrils two times a day  pantoprazole    Tablet 40 milliGRAM(s) Oral before breakfast  polyethylene glycol 3350 17 Gram(s) Oral daily  senna 2 Tablet(s) Oral at bedtime    MEDICATIONS  (PRN):  acetaminophen   Tablet .. 650 milliGRAM(s) Oral every 8 hours PRN Moderate Pain (4 - 6)  ALBUTerol/ipratropium for Nebulization 3 milliLiter(s) Nebulizer every 6 hours PRN Bronchospasm  glucagon  Injectable 1 milliGRAM(s) IntraMuscular once PRN Glucose LESS THAN 70 milligrams/deciliter  guaiFENesin   Syrup  (Sugar-Free) 200 milliGRAM(s) Oral every 6 hours PRN Cough  magnesium hydroxide Suspension 30 milliLiter(s) Oral daily PRN Constipation      Social History: Denies toxic habits X 3     Family history: non-contributory    ROS:   ENT: all negative except as noted in HPI     Vital Signs Last 24 Hrs  T(C): 36.2 (21 Feb 2019 14:21), Max: 36.7 (21 Feb 2019 05:35)  T(F): 97.2 (21 Feb 2019 14:21), Max: 98 (21 Feb 2019 05:35)  HR: 90 (21 Feb 2019 12:55) (78 - 92)  BP: 141/67 (21 Feb 2019 12:55) (98/51 - 156/59)  BP(mean): 69 (21 Feb 2019 10:40) (69 - 104)  RR: 16 (21 Feb 2019 12:55) (16 - 21)  SpO2: 96% (21 Feb 2019 12:55) (94% - 99%)                          8.8    6.07  )-----------( 95       ( 21 Feb 2019 06:43 )             30.7    02-21    143  |  99  |  42<H>  ----------------------------<  129<H>  5.1   |  37<H>  |  1.35<H>    Ca    9.9      21 Feb 2019 06:43  Phos  3.7     02-20  Mg     2.6     02-21    PHYSICAL EXAM:  Gen: NAD, A+OX3, phonating at baseline  Head: Normocephalic, Atraumatic  Face: no edema, erythema, or fluctuance. Parotid glands soft without mass  Nose: right nare with small volume oozing from the anterior septum.  left nare clear.  chemical cautery applied to area of bleeding on right anterior septum with adequate control.   Mouth: No Stridor / Drooling / Trismus.  Mucosa moist, tongue/uvula midline, oropharynx clear, no active bleeding.   Neck: Flat, supple, no lymphadenopathy, trachea midline, no masses  Lymphatic: No lymphadenopathy  Resp: breathing easily, no stridor  Neuro: facial nerve intact, no facial droop

## 2019-02-21 NOTE — DIETITIAN INITIAL EVALUATION ADULT. - DIET TYPE
consistent carbohydrate (evening snack)/DASH/TLC (sodium and cholesterol restricted diet)/1000ml/mechanical soft

## 2019-02-21 NOTE — PROGRESS NOTE ADULT - SUBJECTIVE AND OBJECTIVE BOX
Brief EP Procedure Note    Procedure: RF ablation of the AV node    Operators:   1. Laurent Ferreira MD  2. Troy Delaney MD    Indication / Diagnosis:  AF w/ RVR resulting in a low BiV pacing burden and symptomatic heart failure    Narrative:  After obtaining informed consent, TIANNA DENISE was brought to the EP lab in a fasting state and moderate sedation was induced by the anesthesia team. Bilateral groins were steriely prepped and drapped and femoral venous access was obtained with seldinger's technique. The following sheaths and catheters were inserted.    RFV: 8Fr (Ablation catheter)     Catheters were advanced to the heart under floroscopy. The non-irrigated saphire catheter was brought to the his position and a total of ~2 min of RF application was applied. AV block was noted when the patient went from AF w/ RVR to a VVI 40bpm paced rhythm.    After a 20min waiting period the patietn remained paced at 40bpm.    Her [facility] CRTP device was then set to VVI 90bpm.    The catheter and sheath were pulled and hemostasis achieved with manual pressure.  There were no complications.      Conclusion:  1. Successful AVJ ablation resulting in complete heart block and BiV paced rhythm    Plan.  1. 4 hours flat time    Troy Delaney MD,  EP Fellow - PGY8

## 2019-02-22 LAB
ANION GAP SERPL CALC-SCNC: 10 MMOL/L — SIGNIFICANT CHANGE UP (ref 5–17)
BUN SERPL-MCNC: 41 MG/DL — HIGH (ref 7–23)
CALCIUM SERPL-MCNC: 10.1 MG/DL — SIGNIFICANT CHANGE UP (ref 8.4–10.5)
CHLORIDE SERPL-SCNC: 97 MMOL/L — SIGNIFICANT CHANGE UP (ref 96–108)
CO2 SERPL-SCNC: 37 MMOL/L — HIGH (ref 22–31)
CREAT SERPL-MCNC: 1.41 MG/DL — HIGH (ref 0.5–1.3)
GLUCOSE BLDC GLUCOMTR-MCNC: 116 MG/DL — HIGH (ref 70–99)
GLUCOSE BLDC GLUCOMTR-MCNC: 136 MG/DL — HIGH (ref 70–99)
GLUCOSE BLDC GLUCOMTR-MCNC: 142 MG/DL — HIGH (ref 70–99)
GLUCOSE BLDC GLUCOMTR-MCNC: 170 MG/DL — HIGH (ref 70–99)
GLUCOSE SERPL-MCNC: 124 MG/DL — HIGH (ref 70–99)
HCT VFR BLD CALC: 30.5 % — LOW (ref 34.5–45)
HGB BLD-MCNC: 8.9 G/DL — LOW (ref 11.5–15.5)
MAGNESIUM SERPL-MCNC: 2.6 MG/DL — SIGNIFICANT CHANGE UP (ref 1.6–2.6)
MCHC RBC-ENTMCNC: 29.2 GM/DL — LOW (ref 32–36)
MCHC RBC-ENTMCNC: 29.7 PG — SIGNIFICANT CHANGE UP (ref 27–34)
MCV RBC AUTO: 101.7 FL — HIGH (ref 80–100)
NRBC # BLD: 0 /100 WBCS — SIGNIFICANT CHANGE UP (ref 0–0)
PLATELET # BLD AUTO: 98 K/UL — LOW (ref 150–400)
POTASSIUM SERPL-MCNC: 4.6 MMOL/L — SIGNIFICANT CHANGE UP (ref 3.5–5.3)
POTASSIUM SERPL-SCNC: 4.6 MMOL/L — SIGNIFICANT CHANGE UP (ref 3.5–5.3)
RBC # BLD: 3 M/UL — LOW (ref 3.8–5.2)
RBC # FLD: 15.7 % — HIGH (ref 10.3–14.5)
SODIUM SERPL-SCNC: 144 MMOL/L — SIGNIFICANT CHANGE UP (ref 135–145)
WBC # BLD: 7.4 K/UL — SIGNIFICANT CHANGE UP (ref 3.8–10.5)
WBC # FLD AUTO: 7.4 K/UL — SIGNIFICANT CHANGE UP (ref 3.8–10.5)

## 2019-02-22 PROCEDURE — 93010 ELECTROCARDIOGRAM REPORT: CPT

## 2019-02-22 PROCEDURE — 99024 POSTOP FOLLOW-UP VISIT: CPT

## 2019-02-22 PROCEDURE — 99232 SBSQ HOSP IP/OBS MODERATE 35: CPT

## 2019-02-22 RX ADMIN — APIXABAN 2.5 MILLIGRAM(S): 2.5 TABLET, FILM COATED ORAL at 18:34

## 2019-02-22 RX ADMIN — Medication 1 APPLICATION(S): at 22:19

## 2019-02-22 RX ADMIN — Medication 1 APPLICATION(S): at 22:18

## 2019-02-22 RX ADMIN — Medication 2: at 16:52

## 2019-02-22 RX ADMIN — LISINOPRIL 2.5 MILLIGRAM(S): 2.5 TABLET ORAL at 06:42

## 2019-02-22 RX ADMIN — Medication 650 MILLIGRAM(S): at 16:30

## 2019-02-22 RX ADMIN — ISOSORBIDE MONONITRATE 30 MILLIGRAM(S): 60 TABLET, EXTENDED RELEASE ORAL at 12:35

## 2019-02-22 RX ADMIN — Medication 25 MILLIGRAM(S): at 06:41

## 2019-02-22 RX ADMIN — PANTOPRAZOLE SODIUM 40 MILLIGRAM(S): 20 TABLET, DELAYED RELEASE ORAL at 06:42

## 2019-02-22 RX ADMIN — Medication 650 MILLIGRAM(S): at 16:05

## 2019-02-22 RX ADMIN — Medication 0.5 MILLIGRAM(S): at 06:48

## 2019-02-22 RX ADMIN — Medication 0.5 MILLIGRAM(S): at 16:56

## 2019-02-22 RX ADMIN — MONTELUKAST 10 MILLIGRAM(S): 4 TABLET, CHEWABLE ORAL at 12:35

## 2019-02-22 RX ADMIN — Medication 40 MILLIGRAM(S): at 06:42

## 2019-02-22 RX ADMIN — APIXABAN 2.5 MILLIGRAM(S): 2.5 TABLET, FILM COATED ORAL at 06:42

## 2019-02-22 RX ADMIN — SENNA PLUS 2 TABLET(S): 8.6 TABLET ORAL at 22:18

## 2019-02-22 RX ADMIN — Medication 100 MICROGRAM(S): at 06:41

## 2019-02-22 RX ADMIN — Medication 325 MILLIGRAM(S): at 12:35

## 2019-02-22 RX ADMIN — ATORVASTATIN CALCIUM 40 MILLIGRAM(S): 80 TABLET, FILM COATED ORAL at 22:18

## 2019-02-22 NOTE — PROGRESS NOTE ADULT - PROBLEM SELECTOR PROBLEM 3
Hypothyroidism, unspecified type
Hypothyroidism, unspecified type
PAF (paroxysmal atrial fibrillation)
Hypothyroidism, unspecified type
PAF (paroxysmal atrial fibrillation)
Hypothyroidism, unspecified type

## 2019-02-22 NOTE — PROGRESS NOTE ADULT - PROBLEM SELECTOR PROBLEM 6
Emphysema of lung
Essential hypertension
Essential hypertension
Emphysema of lung
Essential hypertension
Essential hypertension

## 2019-02-22 NOTE — PROGRESS NOTE ADULT - PROBLEM SELECTOR PROBLEM 7
DM (diabetes mellitus)
Emphysema of lung
Emphysema of lung
DM (diabetes mellitus)
Emphysema of lung
Emphysema of lung

## 2019-02-22 NOTE — PROGRESS NOTE ADULT - PROBLEM SELECTOR PLAN 4
History of Atrial fibrillation. Currently in paced rhythm  -Continue on Eliquis 2.5mg bid  -Continue on Toprol 25mg po qd  -Per EP: Will plan on AV node ablation prior to discharge, after patient is euvolemic, to increase biv pacing percentage
History of Atrial fibrillation. Currently in paced rhythm  Continue on Eliquis 2.5mg bid  Continue on Toprol 25mg po qd  -f/u EP recs
Remote Hx of CAD s/p remote PCI x 5. Daughter unclear when was last PCI.  -No ASA/Plavix per Dr Dillard  -c/w Lipitor 40mg qd  -c/w Toprol 25mg qd    #CKD, stage 3. Baseline appears 1.5-1.6 from previous [facility] admissions. Now Cr 1.65 consistent w CKD. Hx Renal artery stenosis s/p PTA 1/2018  - Cr improved, currently at baseline  -Monitor BUN/creatinine  -Avoid nephrotoxic agents  -Renally dose meds
History of Atrial fibrillation. Currently in paced rhythm  -Continue on Eliquis 2.5mg bid  -Continue on Toprol 25mg po qd  -Per EP on admission: Will plan on AV node ablation prior to discharge, after patient is euvolemic, to increase biv pacing percentage; EP called today to f.u
Remote Hx of CAD s/p remote PCI x 5. Daughter unclear when was last PCI.  -No ASA/Plavix per Dr Dillard  -c/w Lipitor 40mg qd  -c/w Toprol 25mg qd    #CKD, stage 3. Baseline appears 1.5-1.6 from previous [facility] admissions. Now Cr 1.65 consistent w CKD. Hx Renal artery stenosis s/p PTA 1/2018  - Cr improved, currently at baseline  -Monitor BUN/creatinine  -Avoid nephrotoxic agents  -Renally dose meds
History of Atrial fibrillation. Currently in paced rhythm  -Continue on Eliquis 2.5mg bid  -Continue on Toprol 25mg po qd  -Per EP: Will plan on AV node ablation prior to discharge, after patient is euvolemic, to increase biv pacing percentage

## 2019-02-22 NOTE — PROGRESS NOTE ADULT - PROBLEM SELECTOR PROBLEM 2
Hypothyroidism, unspecified type
UTI (urinary tract infection)
UTI (urinary tract infection)
Hypothyroidism, unspecified type
UTI (urinary tract infection)
UTI (urinary tract infection)

## 2019-02-22 NOTE — PROGRESS NOTE ADULT - PROBLEM SELECTOR PLAN 3
History of Atrial fibrillation. Currently in paced rhythm  -Continue on Eliquis 2.5mg bid  -Continue on Toprol 25mg po qd  -s/p AV node ablation as above
TSH is 7.831. Pt takes levothyroxine 100mcg Po qd at home. TSH  on 1/22/19 was 1.421. Pt states she has been compliant with taking home levothyroxine.  -Continue on Levothyroxine 100mcg daily as t3,t4 normal.
TSH is 7.831. Pt takes levothyroxine 100mcg Po qd at home. TSH  on 1/22/19 was 1.421. Pt states she has been compliant with taking home levothyroxine.  Continue on Levothyroxine 100mcg daily as t3,t4 normal.
History of Atrial fibrillation. Currently in paced rhythm  -Continue on Eliquis 2.5mg bid  -Continue on Toprol 25mg po qd  -AV node ablation today to increase biv pacing percentage
TSH is 7.831. Pt takes levothyroxine 100mcg Po qd at home. TSH  on 1/22/19 was 1.421. Pt states she has been compliant with taking home levothyroxine.  -Continue on Levothyroxine 100mcg daily as t3,t4 normal.
TSH is 7.831. Pt takes levothyroxine 100mcg Po qd at home. TSH  on 1/22/19 was 1.421. Pt states she has been compliant with taking home levothyroxine.  -Continue on Levothyroxine 100mcg daily as t3,t4 normal.

## 2019-02-22 NOTE — PROGRESS NOTE ADULT - PROBLEM SELECTOR PROBLEM 8
DM (diabetes mellitus)
DM (diabetes mellitus)
Nutrition, metabolism, and development symptoms
DM (diabetes mellitus)
Nutrition, metabolism, and development symptoms
DM (diabetes mellitus)

## 2019-02-22 NOTE — PROGRESS NOTE ADULT - PROBLEM SELECTOR PROBLEM 5
CAD (coronary artery disease)
CAD (coronary artery disease)
Essential hypertension
CAD (coronary artery disease)
Essential hypertension
CAD (coronary artery disease)

## 2019-02-22 NOTE — PROGRESS NOTE ADULT - PROVIDER SPECIALTY LIST ADULT
Cardiology
Electrophysiology
Cardiology

## 2019-02-22 NOTE — PROGRESS NOTE ADULT - PROBLEM SELECTOR PLAN 6
History of HTN  -Continue Imdur 30mg po qd  -continue home Toprol 25mg po qd
History of HTN  -Continue Imdur 30mg po qd  -start home Toprol 25mg po qd
On home O2 2L NC at all times per daughter. Not in acute exacerbation. Tolerating home O2  -c/w Spiriva INH  -c/w Budesonide  -c/w Singulair
History of HTN  -Continue Imdur 30mg po qd  -continue home Toprol 25mg po qd
On home O2 2L NC at all times per daughter. Not in acute exacerbation. Tolerating home O2  -c/w Spiriva INH  -c/w Budesonide  -c/w Singulair
History of HTN  -Continue Imdur 30mg po qd  -continue home Toprol 25mg po qd

## 2019-02-22 NOTE — PROGRESS NOTE ADULT - SUBJECTIVE AND OBJECTIVE BOX
INTERVAL HPI/OVERNIGHT EVENTS: No acute overnight events. Pt went for successful AV node ablation with EP, PPM paced at 90. Groin site c/d/i, no hematoma.    SUBJECTIVE: Patient seen and examined at bedside. Pt with no active complaints. Denies CP, SOB, nausea, vomiting, abdominal pain. Eating, urinating and moving bowels normally. Pt stable to discharge, home services reinstated for tomorrow. Planned for d/c tomorrow.    OBJECTIVE:    VITAL SIGNS:  ICU Vital Signs Last 24 Hrs  T(C): 36.8 (22 Feb 2019 14:38), Max: 36.8 (22 Feb 2019 14:38)  T(F): 98.3 (22 Feb 2019 14:38), Max: 98.3 (22 Feb 2019 14:38)  HR: 90 (22 Feb 2019 12:37) (90 - 90)  BP: 99/77 (22 Feb 2019 12:37) (99/77 - 125/70)  BP(mean): 88 (22 Feb 2019 12:37) (73 - 92)  ABP: --  ABP(mean): --  RR: 20 (22 Feb 2019 12:37) (16 - 23)  SpO2: 94% (22 Feb 2019 12:37) (94% - 100%)        02-21 @ 07:01  -  02-22 @ 07:00  --------------------------------------------------------  IN: 210 mL / OUT: 660 mL / NET: -450 mL      CAPILLARY BLOOD GLUCOSE      POCT Blood Glucose.: 142 mg/dL (22 Feb 2019 11:20)      PHYSICAL EXAM:  General: NAD  HEENT: NC/AT; PERRL, clear conjunctiva  Neck: supple  Respiratory: +mild bibasilar crackles, good air entry, no rhonchi or wheezing, tolerating home O2 requirement (2L NC)  Cardiovascular: +S1/S2; irregular rhythm, normal rate, no m/r/g  Abdomen: mildly distended but soft, NT; +BS x4  Extremities: WWP, 2+ peripheral pulses b/l; 1+b/l pedal edema; Groin site c/d/i, no hematoma.  Skin: normal color and turgor; no rash  Neurological: AA&O x3, fully conversant, no obvious focal deficits      MEDICATIONS:  MEDICATIONS  (STANDING):  apixaban 2.5 milliGRAM(s) Oral every 12 hours  atorvastatin 40 milliGRAM(s) Oral at bedtime  buDESOnide   0.5 milliGRAM(s) Respule 0.5 milliGRAM(s) Inhalation two times a day  ferrous    sulfate 325 milliGRAM(s) Oral daily  furosemide    Tablet 40 milliGRAM(s) Oral daily  insulin lispro (HumaLOG) corrective regimen sliding scale   SubCutaneous four times a day before meals  isosorbide   mononitrate ER Tablet (IMDUR) 30 milliGRAM(s) Oral daily  levothyroxine 100 MICROGram(s) Oral daily  lisinopril 2.5 milliGRAM(s) Oral daily  metoprolol succinate ER 25 milliGRAM(s) Oral daily  montelukast 10 milliGRAM(s) Oral daily  oxymetazoline 0.05% Nasal Spray 2 Spray(s) Alternating Nostrils two times a day  pantoprazole    Tablet 40 milliGRAM(s) Oral before breakfast  petrolatum white Ointment 1 Application(s) Topical at bedtime  polyethylene glycol 3350 17 Gram(s) Oral daily  senna 2 Tablet(s) Oral at bedtime  sodium chloride/aloe vera Nasal Gel 1 Application(s) Both Nostrils three times a day    MEDICATIONS  (PRN):  acetaminophen   Tablet .. 650 milliGRAM(s) Oral every 8 hours PRN Moderate Pain (4 - 6)  ALBUTerol/ipratropium for Nebulization 3 milliLiter(s) Nebulizer every 6 hours PRN Bronchospasm  glucagon  Injectable 1 milliGRAM(s) IntraMuscular once PRN Glucose LESS THAN 70 milligrams/deciliter  guaiFENesin   Syrup  (Sugar-Free) 200 milliGRAM(s) Oral every 6 hours PRN Cough  magnesium hydroxide Suspension 30 milliLiter(s) Oral daily PRN Constipation      ALLERGIES:  Allergies    contrast media (iodine-based) (Angioedema)  pineapple (Unknown)    Intolerances        LABS:                        8.9    7.40  )-----------( 98       ( 22 Feb 2019 07:48 )             30.5     02-22    144  |  97  |  41<H>  ----------------------------<  124<H>  4.6   |  37<H>  |  1.41<H>    Ca    10.1      22 Feb 2019 07:48  Mg     2.6     02-22            RADIOLOGY & ADDITIONAL TESTS: Reviewed.

## 2019-02-22 NOTE — PROGRESS NOTE ADULT - PROBLEM SELECTOR PLAN 1
Acute SOB 2/2 acute on chronic HFrEF exacerbation. BNP 11k.   -Resolving- mild bibasilar crackles, no JVD, only +1 RUBA b/l today  -C/w PO lasix 40mg daily  -Continue Toprol 25mg PO qd, Imdur 30mg PO qd, lisinopril 2.5 PO daily  -Daily CXR, daily EKG, daily weights, strict ins and out  -s/p AV node ablation 2/21, currently paced at 90 bpm

## 2019-02-22 NOTE — PROGRESS NOTE ADULT - ASSESSMENT
89-year-old Hungarian speaking female with AFib on Eliquis (cardioversion 1/2018 with early return to afib), LBBB, chronic systolic CHF (EF 25-30% in the past, now 35-40%), BiV-PPM ([employer]), CAD s/p PCI, HTN, HLD, DM, renal artery stenosis s/p R renal artery PTA 1/2018, CKD (baseline crea 1-2) anemia, hypothyroidism, b/l carotid endarterectomies, asthma, and emphysema on 2L home O2, who presented on 2/16/19 with SOB / CHF.  She was diuresed. Subsequently underwent AVN ablation in order to increasing BiV pacing percentage (was not getting adequate biv pacing due to presence of AFIB).    - s/p AVN ablation yesterday. Device programmed to have pacing rate at 90 bpm.  Pacing rate will be tapered down in the follow up appointment.   - Groin wound site stable. Continue Eliquis 2.5 mg BID.   - f/u with Dr. Connolly on 3/21 (thursday) at 2:45 PM here at [facility]
89F Albanian speaking PMHx AFib on Eliquis, chronic systolic CHF (EF 20-25% on echo 1/2018, biventricular PPM placed 1/2019), CAD s/p remote PCI, HTN, HLD, DMT2, KIRA s/p R renal artery PTA 1/2018, CKD (baseline crea 1-2) anemia, hypothyroidism, b/l carotid endarterectomies, asthma, and emphysema on 2L home O2, who was recently hospitalized for CHF exacerbation three weeks ago and had a BiV PPM placed during that admission, now presents with acute CHF exacerbation likely 2/2 UTI vs increased fluid intake vs hypothyroidism.
89F Hungarian speaking PMHx AFib on Eliquis, chronic systolic CHF (EF 20-25% on echo 1/2018, biventricular PPM placed 1/2019), CAD s/p remote PCI, HTN, HLD, DMT2, KIRA s/p R renal artery PTA 1/2018, CKD (baseline crea 1-2) anemia, hypothyroidism, b/l carotid endarterectomies, asthma, and emphysema on 2L home O2, who was recently hospitalized for CHF exacerbation three weeks ago and had a BiV PPM placed during that admission, now presents with acute CHF exacerbation likely 2/2 UTI vs increased fluid intake vs hypothyroidism.
89F Pashto speaking PMHx AFib on Eliquis, chronic systolic CHF (EF 20-25% on echo 1/2018, biventricular PPM placed 1/2019), CAD s/p remote PCI, HTN, HLD, DMT2, KIRA s/p R renal artery PTA 1/2018, CKD (baseline crea 1-2) anemia, hypothyroidism, b/l carotid endarterectomies, asthma, and emphysema on 2L home O2, who was recently hospitalized for CHF exacerbation three weeks ago and had a BiV PPM placed during that admission, now presents with acute CHF exacerbation likely 2/2 UTI vs increased fluid intake vs hypothyroidism, s/p treatment of UTI, clinically improved with diuresis, stable for discharge, home services reinistated for d/c tomorrow.
89F Slovak speaking PMHx AFib on Eliquis, chronic systolic CHF (EF 20-25% on echo 1/2018, biventricular PPM placed 1/2019), CAD s/p remote PCI, HTN, HLD, DMT2, KIRA s/p R renal artery PTA 1/2018, CKD (baseline crea 1-2) anemia, hypothyroidism, b/l carotid endarterectomies, asthma, and emphysema on 2L home O2, who was recently hospitalized for CHF exacerbation three weeks ago and had a BiV PPM placed during that admission, now presents with acute CHF exacerbation likely 2/2 UTI vs increased fluid intake vs hypothyroidism, s/p treatment of UTI, clinically improved with diuresis.
89F Syrian speaking PMHx AFib on Eliquis, chronic systolic CHF (EF 20-25% on echo 1/2018, biventricular PPM placed 1/2019), CAD s/p remote PCI, HTN, HLD, DMT2, KIRA s/p R renal artery PTA 1/2018, CKD (baseline crea 1-2) anemia, hypothyroidism, b/l carotid endarterectomies, asthma, and emphysema on 2L home O2, who was recently hospitalized for CHF exacerbation three weeks ago and had a BiV PPM placed during that admission, now presents with acute CHF exacerbation likely 2/2 UTI vs increased fluid intake vs hypothyroidism.
89F Taiwanese speaking PMHx AFib on Eliquis, chronic systolic CHF (EF 20-25% on echo 1/2018, biventricular PPM placed 1/2019), CAD s/p remote PCI, HTN, HLD, DMT2, KIRA s/p R renal artery PTA 1/2018, CKD (baseline crea 1-2) anemia, hypothyroidism, b/l carotid endarterectomies, asthma, and emphysema on 2L home O2, who was recently hospitalized for CHF exacerbation three weeks ago and had a BiV PPM placed during that admission, now presents with acute CHF exacerbation likely 2/2 UTI vs increased fluid intake vs hypothyroidism.

## 2019-02-22 NOTE — PROGRESS NOTE ADULT - PROBLEM SELECTOR PLAN 7
On home O2 2L NC at all times per daughter. Not in acute exacerbation.  -c/w Spiriva INH  -c/w Budesonide  -c/w Singulair
On home O2 2L NC at all times per daughter. Not in acute exacerbation.  -c/w Spiriva INH  -c/w Budesonide  -c/w Singulair
Takes Januvia at home  -HgA1c 6.5  -c/w M-ISS in hospital  -Diabetic diet    ##osteoarthritis/neck pain  -c/w home Tylenol 650mg TID PRN neck pain
On home O2 2L NC at all times per daughter. Not in acute exacerbation.  -c/w Spiriva INH  -c/w Budesonide  -c/w Singulair
Takes Januvia at home  -HgA1c 6.5  -c/w M-ISS in hospital  -Diabetic diet    ##osteoarthritis/neck pain  -c/w home Tylenol 650mg TID PRN neck pain
On home O2 2L NC at all times per daughter. Not in acute exacerbation.  -c/w Spiriva INH  -c/w Budesonide  -c/w Singulair

## 2019-02-22 NOTE — PROGRESS NOTE ADULT - PROBLEM SELECTOR PLAN 8
Takes Januvia at home  -HgA1c 6.5  -c/w M-ISS in hospital  -Diabetic diet    ##osteoarthritis/neck pain  -c/w home Tylenol 650mg TID PRN neck pain
F: No IVF  N: DASH/TLC/DM diet, soft mechanical +ensure pudding daily  E: Replete lytes PRN K<4, Mg<2  P: DVT PPX: eliquis  C: FULL CODE  Dispo: [facility]
Takes Januvia at home  -HgA1c 6.5  -c/w M-ISS in hospital  -Diabetic diet    ##osteoarthritis/neck pain  -c/w home Tylenol 650mg TID PRN neck pain
Takes Januvia at home  -HgA1c 6.5  -c/w M-ISS in hospital  -Diabetic diet    ##osteoarthritis/neck pain  -start home Tylenol 650mg TID PRN neck pain
F: No IVF  N:  NPO for procedure today, resume DASH/TLC/DM diet, soft mechanical +ensure pudding daily after procedure  E: Replete lytes PRN K<4, Mg<2  P: DVT PPX: eliquis  C: FULL CODE  Dispo:  [facility]
Takes Januvia at home  -HgA1c 6.5  -c/w M-ISS in hospital  -Diabetic diet    ##osteoarthritis/neck pain  -c/w home Tylenol 650mg TID PRN neck pain

## 2019-02-22 NOTE — PROGRESS NOTE ADULT - SUBJECTIVE AND OBJECTIVE BOX
EPS Progress Note  CC: SOB     S: s/p AVN ablation yesterday. No overnight event or complaints today     O: T(C): 36.4 (02-22-19 @ 06:18), Max: 36.6 (02-21-19 @ 09:58)  HR: 90 (02-22-19 @ 05:47) (80 - 90)  BP: 125/66 (02-22-19 @ 05:47) (98/51 - 156/59)  RR: 19 (02-22-19 @ 05:47) (16 - 22)  SpO2: 100% (02-22-19 @ 05:47) (96% - 100%)    TELE:  at 90 bpm     PHYSICAL  Constitutional:  NAD        Neck: No JVD  Pulm:  Diminished BS at bases. no wheeze or rale  Cardiac:   + s1/s2, regular (pacing), PPM at left chest site.   GI:  +BS , soft ND/NT  Vascular: No LE edema, pulse 2+. Right groin wound without bleeding or hematoma.   Neuro: AAO x 3. no focal deficit  Skin: Warm. No rash or lesion     LABS:                        8.9    7.40  )-----------( 98       ( 22 Feb 2019 07:48 )             30.5     02-22    144  |  97  |  41<H>  ----------------------------<  124<H>  4.6   |  37<H>  |  1.41<H>    Ca    10.1      22 Feb 2019 07:48  Mg     2.6     02-22          MEDICATIONS:  acetaminophen   Tablet .. 650 milliGRAM(s) Oral every 8 hours PRN  ALBUTerol/ipratropium for Nebulization 3 milliLiter(s) Nebulizer every 6 hours PRN  apixaban 2.5 milliGRAM(s) Oral every 12 hours  atorvastatin 40 milliGRAM(s) Oral at bedtime  buDESOnide   0.5 milliGRAM(s) Respule 0.5 milliGRAM(s) Inhalation two times a day  ferrous    sulfate 325 milliGRAM(s) Oral daily  furosemide    Tablet 40 milliGRAM(s) Oral daily  glucagon  Injectable 1 milliGRAM(s) IntraMuscular once PRN  guaiFENesin   Syrup  (Sugar-Free) 200 milliGRAM(s) Oral every 6 hours PRN  insulin lispro (HumaLOG) corrective regimen sliding scale   SubCutaneous four times a day before meals  isosorbide   mononitrate ER Tablet (IMDUR) 30 milliGRAM(s) Oral daily  levothyroxine 100 MICROGram(s) Oral daily  lisinopril 2.5 milliGRAM(s) Oral daily  magnesium hydroxide Suspension 30 milliLiter(s) Oral daily PRN  metoprolol succinate ER 25 milliGRAM(s) Oral daily  montelukast 10 milliGRAM(s) Oral daily  oxymetazoline 0.05% Nasal Spray 2 Spray(s) Alternating Nostrils two times a day  pantoprazole    Tablet 40 milliGRAM(s) Oral before breakfast  petrolatum white Ointment 1 Application(s) Topical at bedtime  polyethylene glycol 3350 17 Gram(s) Oral daily  senna 2 Tablet(s) Oral at bedtime  sodium chloride/aloe vera Nasal Gel 1 Application(s) Both Nostrils three times a day

## 2019-02-22 NOTE — PROGRESS NOTE ADULT - PROBLEM SELECTOR PROBLEM 4
CAD (coronary artery disease)
PAF (paroxysmal atrial fibrillation)
PAF (paroxysmal atrial fibrillation)
CAD (coronary artery disease)
PAF (paroxysmal atrial fibrillation)
PAF (paroxysmal atrial fibrillation)

## 2019-02-22 NOTE — PROGRESS NOTE ADULT - PROBLEM SELECTOR PLAN 5
History of HTN  -Continue Imdur 30mg po qd  -continue home Toprol 25mg po qd
Remote Hx of CAD s/p remote PCI x 5. Daughter unclear when was last PCI.  -No ASA/Plavix per Dr Dillard  -c/w Lipitor 40mg qd  -c/w Toprol 25mg qd    #CKD, stage 3. Baseline appears 1.5-1.6 from previous [facility] admissions. Now Cr 1.65 consistent w CKD. Hx Renal artery stenosis s/p PTA 1/2018  -Monitor BUN/creatinine  -Avoid nephrotoxic agents  -Renally dose meds
Remote Hx of CAD s/p remote PCI x 5. Daughter unclear when was last PCI.  -No ASA/Plavix per Dr Dillard  -c/w Lipitor 40mg qd  -c/w Toprol 25mg qd    #CKD, stage 3. Baseline appears 1.5-1.6 from previous [facility] admissions. Now Cr 1.65 consistent w CKD. Hx Renal artery stenosis s/p PTA 1/2018  -Monitor BUN/creatinine  -Avoid nephrotoxic agents  -Renally dose meds
History of HTN  -Continue Imdur 30mg po qd  -continue home Toprol 25mg po qd
Remote Hx of CAD s/p remote PCI x 5. Daughter unclear when was last PCI.  -No ASA/Plavix per Dr Dillard  -c/w Lipitor 40mg qd  -c/w Toprol 25mg qd    #CKD, stage 3. Baseline appears 1.5-1.6 from previous [facility] admissions. Now Cr 1.65 consistent w CKD. Hx Renal artery stenosis s/p PTA 1/2018  -Monitor BUN/creatinine  -Avoid nephrotoxic agents  -Renally dose meds
Remote Hx of CAD s/p remote PCI x 5. Daughter unclear when was last PCI.  -No ASA/Plavix per Dr Dillard  -c/w Lipitor 40mg qd  -c/w Toprol 25mg qd    #CKD, stage 3. Baseline appears 1.5-1.6 from previous [facility] admissions. Now Cr 1.65 consistent w CKD. Hx Renal artery stenosis s/p PTA 1/2018  -Monitor BUN/creatinine  -Avoid nephrotoxic agents  -Renally dose meds

## 2019-02-23 ENCOUNTER — INBOUND DOCUMENT (OUTPATIENT)
Age: 84
End: 2019-02-23

## 2019-02-23 VITALS
HEART RATE: 90 BPM | RESPIRATION RATE: 20 BRPM | DIASTOLIC BLOOD PRESSURE: 42 MMHG | SYSTOLIC BLOOD PRESSURE: 112 MMHG | OXYGEN SATURATION: 95 %

## 2019-02-23 LAB — GLUCOSE BLDC GLUCOMTR-MCNC: 129 MG/DL — HIGH (ref 70–99)

## 2019-02-23 PROCEDURE — 99238 HOSP IP/OBS DSCHRG MGMT 30/<: CPT

## 2019-02-23 RX ORDER — SODIUM CHLORIDE 0.65 %
1 AEROSOL, SPRAY (ML) NASAL
Qty: 1 | Refills: 0 | OUTPATIENT
Start: 2019-02-23

## 2019-02-23 RX ADMIN — APIXABAN 2.5 MILLIGRAM(S): 2.5 TABLET, FILM COATED ORAL at 06:31

## 2019-02-23 RX ADMIN — Medication 1 APPLICATION(S): at 06:32

## 2019-02-23 RX ADMIN — MONTELUKAST 10 MILLIGRAM(S): 4 TABLET, CHEWABLE ORAL at 10:36

## 2019-02-23 RX ADMIN — Medication 40 MILLIGRAM(S): at 06:31

## 2019-02-23 RX ADMIN — Medication 325 MILLIGRAM(S): at 10:36

## 2019-02-23 RX ADMIN — OXYMETAZOLINE HYDROCHLORIDE 2 SPRAY(S): 0.5 SPRAY NASAL at 06:32

## 2019-02-23 RX ADMIN — LISINOPRIL 2.5 MILLIGRAM(S): 2.5 TABLET ORAL at 06:31

## 2019-02-23 RX ADMIN — Medication 25 MILLIGRAM(S): at 06:31

## 2019-02-23 RX ADMIN — Medication 100 MICROGRAM(S): at 06:31

## 2019-02-23 RX ADMIN — ISOSORBIDE MONONITRATE 30 MILLIGRAM(S): 60 TABLET, EXTENDED RELEASE ORAL at 10:36

## 2019-02-23 RX ADMIN — Medication 0.5 MILLIGRAM(S): at 10:36

## 2019-02-23 RX ADMIN — PANTOPRAZOLE SODIUM 40 MILLIGRAM(S): 20 TABLET, DELAYED RELEASE ORAL at 06:31

## 2019-02-25 ENCOUNTER — APPOINTMENT (OUTPATIENT)
Age: 84
End: 2019-02-25
Payer: MEDICARE

## 2019-02-25 VITALS
OXYGEN SATURATION: 95 % | SYSTOLIC BLOOD PRESSURE: 108 MMHG | RESPIRATION RATE: 22 BRPM | DIASTOLIC BLOOD PRESSURE: 57 MMHG | HEART RATE: 91 BPM

## 2019-02-25 DIAGNOSIS — J44.9 CHRONIC OBSTRUCTIVE PULMONARY DISEASE, UNSPECIFIED: ICD-10-CM

## 2019-02-25 DIAGNOSIS — I50.22 CHRONIC SYSTOLIC (CONGESTIVE) HEART FAILURE: ICD-10-CM

## 2019-02-25 DIAGNOSIS — N18.3 CHRONIC KIDNEY DISEASE, STAGE 3 (MODERATE): ICD-10-CM

## 2019-02-25 PROCEDURE — 99349 HOME/RES VST EST MOD MDM 40: CPT

## 2019-02-25 PROCEDURE — 99490 CHRNC CARE MGMT STAFF 1ST 20: CPT | Mod: 25

## 2019-02-26 PROBLEM — N18.3 STAGE 3 CHRONIC KIDNEY DISEASE: Status: ACTIVE | Noted: 2019-02-03

## 2019-02-26 PROBLEM — J44.9 COPD, MODERATE: Status: ACTIVE | Noted: 2018-10-03

## 2019-02-26 PROCEDURE — 87633 RESP VIRUS 12-25 TARGETS: CPT

## 2019-02-26 PROCEDURE — C1900: CPT

## 2019-02-26 PROCEDURE — 80048 BASIC METABOLIC PNL TOTAL CA: CPT

## 2019-02-26 PROCEDURE — C1898: CPT

## 2019-02-26 PROCEDURE — 81001 URINALYSIS AUTO W/SCOPE: CPT

## 2019-02-26 PROCEDURE — 80053 COMPREHEN METABOLIC PANEL: CPT

## 2019-02-26 PROCEDURE — 99285 EMERGENCY DEPT VISIT HI MDM: CPT | Mod: 25

## 2019-02-26 PROCEDURE — 87798 DETECT AGENT NOS DNA AMP: CPT

## 2019-02-26 PROCEDURE — 84443 ASSAY THYROID STIM HORMONE: CPT

## 2019-02-26 PROCEDURE — 94640 AIRWAY INHALATION TREATMENT: CPT

## 2019-02-26 PROCEDURE — 85027 COMPLETE CBC AUTOMATED: CPT

## 2019-02-26 PROCEDURE — 83036 HEMOGLOBIN GLYCOSYLATED A1C: CPT

## 2019-02-26 PROCEDURE — 80061 LIPID PANEL: CPT

## 2019-02-26 PROCEDURE — C1892: CPT

## 2019-02-26 PROCEDURE — 85730 THROMBOPLASTIN TIME PARTIAL: CPT

## 2019-02-26 PROCEDURE — 86850 RBC ANTIBODY SCREEN: CPT

## 2019-02-26 PROCEDURE — 93005 ELECTROCARDIOGRAM TRACING: CPT

## 2019-02-26 PROCEDURE — 93306 TTE W/DOPPLER COMPLETE: CPT

## 2019-02-26 PROCEDURE — 97161 PT EVAL LOW COMPLEX 20 MIN: CPT

## 2019-02-26 PROCEDURE — 86901 BLOOD TYPING SEROLOGIC RH(D): CPT

## 2019-02-26 PROCEDURE — 86900 BLOOD TYPING SEROLOGIC ABO: CPT

## 2019-02-26 PROCEDURE — 85025 COMPLETE CBC W/AUTO DIFF WBC: CPT

## 2019-02-26 PROCEDURE — 80076 HEPATIC FUNCTION PANEL: CPT

## 2019-02-26 PROCEDURE — 83735 ASSAY OF MAGNESIUM: CPT

## 2019-02-26 PROCEDURE — 82977 ASSAY OF GGT: CPT

## 2019-02-26 PROCEDURE — 71046 X-RAY EXAM CHEST 2 VIEWS: CPT

## 2019-02-26 PROCEDURE — 83540 ASSAY OF IRON: CPT

## 2019-02-26 PROCEDURE — 97116 GAIT TRAINING THERAPY: CPT

## 2019-02-26 PROCEDURE — 87086 URINE CULTURE/COLONY COUNT: CPT

## 2019-02-26 PROCEDURE — 36415 COLL VENOUS BLD VENIPUNCTURE: CPT

## 2019-02-26 PROCEDURE — 71045 X-RAY EXAM CHEST 1 VIEW: CPT

## 2019-02-26 PROCEDURE — 96374 THER/PROPH/DIAG INJ IV PUSH: CPT

## 2019-02-26 PROCEDURE — 83550 IRON BINDING TEST: CPT

## 2019-02-26 PROCEDURE — 82728 ASSAY OF FERRITIN: CPT

## 2019-02-26 PROCEDURE — 85610 PROTHROMBIN TIME: CPT

## 2019-02-26 PROCEDURE — 87040 BLOOD CULTURE FOR BACTERIA: CPT

## 2019-02-26 PROCEDURE — 87486 CHLMYD PNEUM DNA AMP PROBE: CPT

## 2019-02-26 PROCEDURE — 84540 ASSAY OF URINE/UREA-N: CPT

## 2019-02-26 PROCEDURE — 76705 ECHO EXAM OF ABDOMEN: CPT

## 2019-02-26 PROCEDURE — 87581 M.PNEUMON DNA AMP PROBE: CPT

## 2019-02-26 PROCEDURE — C2621: CPT

## 2019-02-26 PROCEDURE — 82550 ASSAY OF CK (CPK): CPT

## 2019-02-26 PROCEDURE — C1769: CPT

## 2019-02-26 PROCEDURE — 83880 ASSAY OF NATRIURETIC PEPTIDE: CPT

## 2019-02-26 PROCEDURE — 84484 ASSAY OF TROPONIN QUANT: CPT

## 2019-02-26 PROCEDURE — 87186 SC STD MICRODIL/AGAR DIL: CPT

## 2019-02-26 PROCEDURE — C1894: CPT

## 2019-02-26 PROCEDURE — C1730: CPT

## 2019-02-26 PROCEDURE — 97530 THERAPEUTIC ACTIVITIES: CPT

## 2019-02-26 PROCEDURE — 82962 GLUCOSE BLOOD TEST: CPT

## 2019-02-26 PROCEDURE — 84100 ASSAY OF PHOSPHORUS: CPT

## 2019-02-26 PROCEDURE — 82553 CREATINE MB FRACTION: CPT

## 2019-02-26 PROCEDURE — C8929: CPT

## 2019-02-26 PROCEDURE — C1877: CPT

## 2019-02-26 NOTE — HISTORY OF PRESENT ILLNESS
[Post-hospitalization from ___ Hospital] : Post-hospitalization from [unfilled] Hospital [Discharged on ___] : discharged on [unfilled] [Discharge Summary] : discharge summary [Pertinent Labs] : pertinent labs [Radiology Findings] : radiology findings [Discharge Med List] : discharge medication list [Med Reconciliation] : medication reconciliation has been completed [Patient Contacted By: ____] : and contacted by [unfilled] [FreeTextEntry2] : 89-year-old Slovak speaking female PMHx AFib on Eliquis, chronic systolic CHF (EF 20-25% on echo 1/2018), CAD s/p remote PCI, HTN, HLD, DMT2, KIRA s/p R renal artery PTA 1/2018, CKD (baseline crea 1-2) anemia, hypothyroidism, b/l carotid endarterectomies, asthma, and emphysema on 2L home O2, who presented c/o progressive worsening SOB, AGUIRRE, cece LE swelling x 2 weeks. Admitted to tele [facility] for acute on chronic systolic CHF exacerbation. Diuresed with IV lasix (total ~8L net NEG) resolved to euvolemia and transitioned to PO Lasix. Echo performed 1/22/19 w/ EF 30%, severe LV global HK, mild-mod MR, mod pulm HTN, PASP 55. EP was consulted for BiV PPM given EF 30%, LBBB, recurrent CHF exac, which she underwent with Dr. Connolly on 1/25. Upon returning to [facility], pt became hypotensive but asymptomatic, likely due to aggressive diuresis and NPO all day for procedure. Treated with IVF but pressures remained low so transferred to CCU for closer evaluation and fluid resuscitation. Given ~500cc NS with normalization of BP's. Repeat ECHO w/o pericardial effusion. Transferred back to [facility] 1/27. Remained hemodynamically stable. PPM site stable w/o hematoma/bleeding. A services resumed by case management. \par \par *** Copied from discharge summary***\par \par Patient seen at home for CHF and other chronic disease management post hospitalization and transitional care\par \par 2/25 Interval Progress note\par Patient presented to ED with worsening SOB, on 2/6, found to have UTI and BNP 25317, treated with IV ceftriaxone and IV lasix. transitoning to PO Lasix, discharged home on 2/23 with [facility] and [facility]. Being followed up at home for transitional care management, During today's visit goals of care discussion took place. patient and family wish to remain patient at home for care going forward and avoid ED/hopsialization.

## 2019-02-26 NOTE — ASSESSMENT
[FreeTextEntry1] : Ms. Ferrer is 89y/o female recently hospitalized for CHF exacerbation and emphysema. seen at home, HHA, daughter and son-in-law present during the visit. Patient resting recliner with O2 3L via NC,satting at 95% no complaints offered. assessment WNL except, 3+ pitting edema to cece LE, rosemarytent currently on Furosemide 40mg every other day. daughter reports 185ml urine output + a couple of wet pads for entire day yesterday. Base Cr 1.8-1.98 during hospitalization. diminished lung sounds at base bilaterally. Furosemide 20mg advised to be given again tomorrow and weigh pads as well to track output. \par Patient never seen by nephrologist, family wants patient to be seen by nephrologist in [facility] system.\par Patient stable otherwise, follow up visit scheduled for Monday to follow up\par \par 2/4 Interval progress note\par Patient was followed up at home. daughter Selam reports her urine output has been increased. patient keeps her legs elevated after taking diuretics in the morning. patient reports ' feeling better' than before. O2 sat at 96% with deep breathing exercise. Nephrologsit appointment made with Dr. Blair on 2/14. walking hourly around apt is reinforced. \par \par continue reinforcing with patient need for daily weights. Advised to call for an increase of greater than 2 lbs in a day or 3 pounds in a week.\par Adhere to low salt diet and educated patient on foods that should be  avoided such as processed or fast food.\par Limit fluids to 1 liter a day which is 4-5 glasses.\par Continue medications as ordered. \par Follow up with Cardiologist.\par TCM program reinforced and 24/7 contact number of CN provided. Pt advised to call for any worsening symptoms, questions or concerns. Pt verbalized understanding.\par \par 2/25 Interval Progress Note\par Upon visit patient found in bed with O2 supplemented via NC at 2ml/min, complaining of joint pain, daughter stated that patient had decreased PO intake since last hospitalization and less wet diapers even with higher dosage on diuretics. patient was unable to get out of bed today, reporting fatigue. patient states ' not much luck left for her now' and doesn't want to go to hospital next time.\par daughter and granddaughter agree to provide are at home with comfort care.\par \par \par

## 2019-02-26 NOTE — REVIEW OF SYSTEMS
[Shortness Of Breath] : shortness of breath [Dyspnea on Exertion] : dyspnea on exertion [Negative] : Heme/Lymph [Orthopnea] : orthopnea [Fever] : no fever [Chills] : no chills [Fatigue] : no fatigue [Night Sweats] : no night sweats [Nasal Discharge] : no nasal discharge [Chest Pain] : no chest pain [Palpitations] : no palpitations [Leg Claudication] : no leg claudication [Lower Ext Edema] : no lower extremity edema [Wheezing] : wheezing [Cough] : no cough [Abdominal Pain] : no abdominal pain [Nausea] : no nausea [Constipation] : no constipation [Diarrhea] : diarrhea [Melena] : no melena [Dysuria] : dysuria [Incontinence] : incontinence [Hematuria] : no hematuria [Joint Pain] : joint pain [Muscle Weakness] : muscle weakness [Headache] : no headache [Dizziness] : no dizziness [Fainting] : no fainting [FreeTextEntry6] : on supplemental O2 via NC [FreeTextEntry9] : joint pain on cece shoulders, neck and lower back

## 2019-02-26 NOTE — HEALTH RISK ASSESSMENT
[0] : 2) Feeling down, depressed, or hopeless: Not at all (0) [Low Carb Diet] : low carbohydrate [Low Salt Diet] : low salt [Strict Adherence] : strict adherence [None] : None [No falls in past year] : Patient reported no falls in the past year [With Patient/Caregiver] : With Patient/Caregiver [] : No [FreeTextEntry1] : 1.5L fluid erstriction [de-identified] : fully dependent [de-identified] : fully deoendent [AdvancecareDate] : 2/25/2019 [FreeTextEntry4] : Jc's daughter Selam lives with patient and primary caregiver, does not have DNR/DNI or MOLST form as of now but wishes to provide care at home going forward.  patient's granddaughter Xi Lara also conferenced in during the discussion, reporting that esvin is aware that she is not making much longer and wishes to remain at home

## 2019-02-26 NOTE — PHYSICAL EXAM
[No Acute Distress] : no acute distress [Well Developed] : well developed [Well-Appearing] : well-appearing [Normal Sclera/Conjunctiva] : normal sclera/conjunctiva [PERRL] : pupils equal round and reactive to light [EOMI] : extraocular movements intact [Normal Outer Ear/Nose] : the outer ears and nose were normal in appearance [Normal Oropharynx] : the oropharynx was normal [No JVD] : no jugular venous distention [Supple] : supple [No Lymphadenopathy] : no lymphadenopathy [Thyroid Normal, No Nodules] : the thyroid was normal and there were no nodules present [Normal Rate] : normal rate  [Regular Rhythm] : with a regular rhythm [Normal S1, S2] : normal S1 and S2 [No Murmur] : no murmur heard [No Carotid Bruits] : no carotid bruits [No Abdominal Bruit] : a ~M bruit was not heard ~T in the abdomen [No Varicosities] : no varicosities [Pedal Pulses Present] : the pedal pulses are present [No Extremity Clubbing/Cyanosis] : no extremity clubbing/cyanosis [No Palpable Aorta] : no palpable aorta [Soft] : abdomen soft [Non Tender] : non-tender [Non-distended] : non-distended [No Masses] : no abdominal mass palpated [No HSM] : no HSM [Normal Bowel Sounds] : normal bowel sounds [Normal Posterior Cervical Nodes] : no posterior cervical lymphadenopathy [Normal Anterior Cervical Nodes] : no anterior cervical lymphadenopathy [No CVA Tenderness] : no CVA  tenderness [No Spinal Tenderness] : no spinal tenderness [No Joint Swelling] : no joint swelling [Grossly Normal Strength/Tone] : grossly normal strength/tone [No Rash] : no rash [Normal Gait] : normal gait [Coordination Grossly Intact] : coordination grossly intact [No Focal Deficits] : no focal deficits [Deep Tendon Reflexes (DTR)] : deep tendon reflexes were 2+ and symmetric [Normal Affect] : the affect was normal [Normal Insight/Judgement] : insight and judgment were intact [Well Nourished] : well nourished [No Edema] : there was no peripheral edema [de-identified] : dminished lung sounds at base bilaterally, home O2 via NC

## 2019-03-01 DIAGNOSIS — N39.0 URINARY TRACT INFECTION, SITE NOT SPECIFIED: ICD-10-CM

## 2019-03-01 DIAGNOSIS — R04.0 EPISTAXIS: ICD-10-CM

## 2019-03-01 DIAGNOSIS — Z95.0 PRESENCE OF CARDIAC PACEMAKER: ICD-10-CM

## 2019-03-01 DIAGNOSIS — E11.9 TYPE 2 DIABETES MELLITUS WITHOUT COMPLICATIONS: ICD-10-CM

## 2019-03-01 DIAGNOSIS — Z95.5 PRESENCE OF CORONARY ANGIOPLASTY IMPLANT AND GRAFT: ICD-10-CM

## 2019-03-01 DIAGNOSIS — I25.10 ATHEROSCLEROTIC HEART DISEASE OF NATIVE CORONARY ARTERY WITHOUT ANGINA PECTORIS: ICD-10-CM

## 2019-03-01 DIAGNOSIS — M19.90 UNSPECIFIED OSTEOARTHRITIS, UNSPECIFIED SITE: ICD-10-CM

## 2019-03-01 DIAGNOSIS — I50.23 ACUTE ON CHRONIC SYSTOLIC (CONGESTIVE) HEART FAILURE: ICD-10-CM

## 2019-03-01 DIAGNOSIS — I27.20 PULMONARY HYPERTENSION, UNSPECIFIED: ICD-10-CM

## 2019-03-01 DIAGNOSIS — Z91.041 RADIOGRAPHIC DYE ALLERGY STATUS: ICD-10-CM

## 2019-03-01 DIAGNOSIS — I13.0 HYPERTENSIVE HEART AND CHRONIC KIDNEY DISEASE WITH HEART FAILURE AND STAGE 1 THROUGH STAGE 4 CHRONIC KIDNEY DISEASE, OR UNSPECIFIED CHRONIC KIDNEY DISEASE: ICD-10-CM

## 2019-03-01 DIAGNOSIS — J43.9 EMPHYSEMA, UNSPECIFIED: ICD-10-CM

## 2019-03-01 DIAGNOSIS — Z98.62 PERIPHERAL VASCULAR ANGIOPLASTY STATUS: ICD-10-CM

## 2019-03-01 DIAGNOSIS — E03.9 HYPOTHYROIDISM, UNSPECIFIED: ICD-10-CM

## 2019-03-01 DIAGNOSIS — E78.5 HYPERLIPIDEMIA, UNSPECIFIED: ICD-10-CM

## 2019-03-01 DIAGNOSIS — Z96.659 PRESENCE OF UNSPECIFIED ARTIFICIAL KNEE JOINT: ICD-10-CM

## 2019-03-01 DIAGNOSIS — D64.9 ANEMIA, UNSPECIFIED: ICD-10-CM

## 2019-03-01 DIAGNOSIS — Z98.890 OTHER SPECIFIED POSTPROCEDURAL STATES: ICD-10-CM

## 2019-03-01 DIAGNOSIS — N18.3 CHRONIC KIDNEY DISEASE, STAGE 3 (MODERATE): ICD-10-CM

## 2019-03-01 DIAGNOSIS — R06.02 SHORTNESS OF BREATH: ICD-10-CM

## 2019-03-01 DIAGNOSIS — E66.9 OBESITY, UNSPECIFIED: ICD-10-CM

## 2019-03-01 DIAGNOSIS — I48.0 PAROXYSMAL ATRIAL FIBRILLATION: ICD-10-CM

## 2019-03-01 DIAGNOSIS — K59.00 CONSTIPATION, UNSPECIFIED: ICD-10-CM

## 2019-03-07 PROCEDURE — 94640 AIRWAY INHALATION TREATMENT: CPT

## 2019-03-07 PROCEDURE — 82962 GLUCOSE BLOOD TEST: CPT

## 2019-03-07 PROCEDURE — 87040 BLOOD CULTURE FOR BACTERIA: CPT

## 2019-03-07 PROCEDURE — 84439 ASSAY OF FREE THYROXINE: CPT

## 2019-03-07 PROCEDURE — 82550 ASSAY OF CK (CPK): CPT

## 2019-03-07 PROCEDURE — C1894: CPT

## 2019-03-07 PROCEDURE — 85025 COMPLETE CBC W/AUTO DIFF WBC: CPT

## 2019-03-07 PROCEDURE — 83690 ASSAY OF LIPASE: CPT

## 2019-03-07 PROCEDURE — 97530 THERAPEUTIC ACTIVITIES: CPT

## 2019-03-07 PROCEDURE — 93005 ELECTROCARDIOGRAM TRACING: CPT | Mod: XU

## 2019-03-07 PROCEDURE — 84481 FREE ASSAY (FT-3): CPT

## 2019-03-07 PROCEDURE — 85610 PROTHROMBIN TIME: CPT

## 2019-03-07 PROCEDURE — 51702 INSERT TEMP BLADDER CATH: CPT

## 2019-03-07 PROCEDURE — 82553 CREATINE MB FRACTION: CPT

## 2019-03-07 PROCEDURE — 93306 TTE W/DOPPLER COMPLETE: CPT

## 2019-03-07 PROCEDURE — 87186 SC STD MICRODIL/AGAR DIL: CPT

## 2019-03-07 PROCEDURE — 96374 THER/PROPH/DIAG INJ IV PUSH: CPT | Mod: XU

## 2019-03-07 PROCEDURE — 36415 COLL VENOUS BLD VENIPUNCTURE: CPT

## 2019-03-07 PROCEDURE — 84443 ASSAY THYROID STIM HORMONE: CPT

## 2019-03-07 PROCEDURE — 82607 VITAMIN B-12: CPT

## 2019-03-07 PROCEDURE — 82803 BLOOD GASES ANY COMBINATION: CPT

## 2019-03-07 PROCEDURE — 83735 ASSAY OF MAGNESIUM: CPT

## 2019-03-07 PROCEDURE — 97161 PT EVAL LOW COMPLEX 20 MIN: CPT

## 2019-03-07 PROCEDURE — 87086 URINE CULTURE/COLONY COUNT: CPT

## 2019-03-07 PROCEDURE — 97116 GAIT TRAINING THERAPY: CPT

## 2019-03-07 PROCEDURE — 85027 COMPLETE CBC AUTOMATED: CPT

## 2019-03-07 PROCEDURE — 84484 ASSAY OF TROPONIN QUANT: CPT

## 2019-03-07 PROCEDURE — 84100 ASSAY OF PHOSPHORUS: CPT

## 2019-03-07 PROCEDURE — 80048 BASIC METABOLIC PNL TOTAL CA: CPT

## 2019-03-07 PROCEDURE — 85730 THROMBOPLASTIN TIME PARTIAL: CPT

## 2019-03-07 PROCEDURE — C1733: CPT

## 2019-03-07 PROCEDURE — 80053 COMPREHEN METABOLIC PANEL: CPT

## 2019-03-07 PROCEDURE — 99285 EMERGENCY DEPT VISIT HI MDM: CPT | Mod: 25

## 2019-03-07 PROCEDURE — 71045 X-RAY EXAM CHEST 1 VIEW: CPT

## 2019-03-07 PROCEDURE — 83880 ASSAY OF NATRIURETIC PEPTIDE: CPT

## 2019-03-07 PROCEDURE — 84436 ASSAY OF TOTAL THYROXINE: CPT

## 2019-03-07 PROCEDURE — 82746 ASSAY OF FOLIC ACID SERUM: CPT

## 2019-03-07 PROCEDURE — 80061 LIPID PANEL: CPT

## 2019-03-07 PROCEDURE — 81001 URINALYSIS AUTO W/SCOPE: CPT

## 2019-03-07 PROCEDURE — C1893: CPT

## 2019-03-11 ENCOUNTER — APPOINTMENT (OUTPATIENT)
Dept: NEPHROLOGY | Facility: CLINIC | Age: 84
End: 2019-03-11

## 2019-03-13 ENCOUNTER — APPOINTMENT (OUTPATIENT)
Dept: HEART AND VASCULAR | Facility: CLINIC | Age: 84
End: 2019-03-13

## 2019-03-21 ENCOUNTER — APPOINTMENT (OUTPATIENT)
Dept: HEART AND VASCULAR | Facility: CLINIC | Age: 84
End: 2019-03-21

## 2019-07-24 ENCOUNTER — APPOINTMENT (OUTPATIENT)
Dept: PULMONOLOGY | Facility: CLINIC | Age: 84
End: 2019-07-24

## 2019-11-12 NOTE — ED ADULT TRIAGE NOTE - NS ED NOTE AC HIGH RISK COUNTRIES
Dx: Neck pain, pain in R hand         Authorized # of Visits:  n/a         Next MD visit: none scheduled  Fall Risk: standard         Precautions: n/a             Subjective: Pt reports tingling and numbness in R hand now intermittent.  Overall perceived im /24 Date:  TX#: /24 Date:  TX#: /24 Date:  TX#: /24   THERE EX 25  MINS THERE EX 45  MINS THERE EX 25  MINS THERE EX 30  MINS       UBE 3'/3' fwd/bkd UBE 3'/3' fwd/bkd UBE 3'/3' fwd/bkd UBE 3'/3' fwd/bkd       Doorway stretch 30 sec X 3 Doorway stretch 30 No

## 2020-08-07 NOTE — ED PROVIDER NOTE - NS ED MD DISPO ADMIT [facility]
Abdomen , soft, nontender, nondistended , no guarding or rigidity , no masses palpable , normal bowel sounds , Liver and Spleen , no hepatomegaly present , no hepatosplenomegaly , liver nontender , spleen not palpable MEDICINE

## 2020-08-26 NOTE — H&P ADULT - HISTORY OF PRESENT ILLNESS
Provided SBIRT services: Full screen positive. Brief Intervention Performed. Screening results were reviewed with the patient and patient was provided information about healthy guidelines and potential negative consequences associated with level of risk. Motivation and readiness to reduce or stop use was discussed and goals and activities to make changes were suggested/offered. 89 y/o female with HTN, HLD, NIDDM, Anemia, hypothyroidism,  afib on eliquis, systolic CHF (25%), known CAD with previous PCIs, b/l carotid endarterectomies, asthma and emphysema who presented to the ED c/o cough.  Cough worsening x 2 weeks a/w occasional production of yellow sputum. 89 y/o female with HTN, HLD, NIDDM, Anemia, hypothyroidism,  afib on eliquis, systolic CHF (25%), known CAD with previous PCIs, b/l carotid endarterectomies, asthma and emphysema who presented to the ED c/o cough.  Cough worsening x 2 weeks a/w occasional production of yellow sputum and chest tightness.  Pt also with substernal and epigastric pain for one week, non-radiating, non-exertional, worse with cough.  No diaphoresis, N/V.  Also c/o headache with coughing and generalized malaise. Denies sick contacts, weight loss.    In ED Vital Signs Last 24 Hrs  TMax: 99.7, HR: 77 - 91, BP: 139/68 - 176/85, RR: 20, SpO2: 95% - 98% on RA.  RVP + Rhinovirus.  Given Cef and Azithro x 1, methylprednisolone 125mg.

## 2020-09-25 NOTE — PHYSICAL THERAPY INITIAL EVALUATION ADULT - REHAB POTENTIAL, PT EVAL
Called and left a message for the pt to call and schedule a follow up as soon as possible for his medication   good, to achieve stated therapy goals

## 2020-10-05 NOTE — PROGRESS NOTE ADULT - PROBLEM SELECTOR PLAN 9
Attempted to call patient due to request for an appointment, left message on voicemail to call the clinic at [phone]   Takes Januvia at home  -HgA1c 6.5  -c/w MISS in hospital  -Diabetic diet    ##osteoarthritis/neck pain  consider Skelaxin 800mg BID PRN  Tylenol 650mg TID PRN neck pain  consider Dicolfenac topical 1% QID PRN.

## 2020-11-05 NOTE — PHYSICAL THERAPY INITIAL EVALUATION ADULT - PERTINENT HX OF CURRENT PROBLEM, REHAB EVAL
Pt transported to rm [id number] via cart on tele monitor with Chinmay Swann and Anahi DEL CASTILLO @BS. Pt is in stable cond. Verbal report given to [facility] KIRK HODGE.       Mecca Goff RN  ASC 89-year-old woman with PMH asthma, emphysema, Afib, CAD, systolic CHF, HTN, HLD, NIDDM, anemia, hypothyroidism, and PSH PCIs and b/l carotid endarterectomies who presented in ED with worsening cough, productive of yellow sputum x 2 weeks. She had a positive RVP and CXR showed possible developing RUL pneumonia. Repeat CXR showed resolution of RUL opacity.

## 2021-01-25 NOTE — H&P ADULT - PSH
Problem: Mobility Impaired (Adult and Pediatric)  Goal: *Acute Goals and Plan of Care (Insert Text)  Description: Pt able to ambulate with MOD (I) with a RW, she was educated on how to perform steps and has a ramp to enter her home. PLOF: Pt was a limited community ambulator who used a Rollator or cane, and who was on 2L at all times. She was (I) with ADLs. Outcome: Resolved/Met     Problem: Patient Education: Go to Patient Education Activity  Goal: Patient/Family Education  Outcome: Resolved/Met   PHYSICAL THERAPY EVALUATION AND DISCHARGE    Patient: Dino Landa (59 y.o. female)  Date: 1/25/2021  Primary Diagnosis: Primary osteoarthritis of right knee [M17.11]  Knee osteoarthritis [M17.10]  Procedure(s) (LRB):  RIGHT TKA (Right) Day of Surgery   Precautions:   WBAT    ASSESSMENT :  Based on the objective data described below, the patient presents s/p R TKA and she is WBAT. She is able to ambulate with MOD (I) with a RW. She is unable to walk very far due to limitations in breathing and stating that she normally wears O2 while walking but that she can walk for a short time without it, SpO2 drops to 89-90% with gait. She states she normally is not able to walk very far due to breathing and that she will sit on her rollator and use it to keep going. She can return home with outpatient P.T. Patient does not require further skilled intervention at this level of care. PLAN :  Recommendations and Planned Interventions:   No formal PT needs identified at this time. Discharge Recommendations: Outpatient  Further Equipment Recommendations for Discharge: N/A     SUBJECTIVE:   Patient stated my knee is hurting now.     OBJECTIVE DATA SUMMARY:     Past Medical History:   Diagnosis Date    Arthritis     spinal stenosis    Breast CA ([facility] [address] 2017    Left Breast (chemo/radiation)    Diabetes ([facility] [address]     Type 2    Edema     legs and ankles    GERD (gastroesophageal reflux disease)     High cholesterol Hypertension     Ill-defined condition     patient states hemorrhage behind Left eye-following Dr. Jose ARAGON (myocardial infarction) ([facility]) 06/2018    per patient    Neuropathy     Restless leg      Past Surgical History:   Procedure Laterality Date    HX BACK SURGERY  08/30/2018    HX BREAST BIOPSY Left 02/03/2017    BREAST CANCER    HX BREAST LUMPECTOMY Left     HX CATARACT REMOVAL Right     HX HEART CATHETERIZATION  07/2018    no stents    HX KNEE REPLACEMENT       Barriers to Learning/Limitations: None  Compensate with: N/A  Home Situation:   Home Situation  Home Environment: Private residence  # Steps to Enter: 4  Rails to Enter: Yes  Hand Rails : Bilateral  Wheelchair Ramp: Yes  One/Two Story Residence: One story  Living Alone: Yes  Support Systems: Family member(s)  Patient Expects to be Discharged to:: Private residence  Current DME Used/Available at Home: Walker, rollator, Cane, straight  Critical Behavior:  Neurologic State: Appropriate for age  Orientation Level: Oriented X4    Skin Integrity: Incision (comment)(R TKA)  Skin Integumentary  Skin Integrity: Incision (comment)(R TKA)     Strength:    Strength: Generally decreased, functional(R knee: 4/5, SLR 1200, 3.75 hours after spinal)     Tone & Sensation:   Sensation: Intact     Range Of Motion:   AROM: Generally decreased, functional(R knee: 8-94)    PROM: Generally decreased, functional(R knee:7-100)     Posture:  Posture (WDL): Within defined limits      Functional Mobility:  Bed Mobility:  Rolling: Modified independent  Supine to Sit: Modified independent  Sit to Supine: Modified independent  Scooting: Modified independent  Transfers:  Sit to Stand: Modified independent  Stand to Sit: Modified independent    Balance:   Sitting: Intact  Standing: Intact  Ambulation/Gait Training:  Distance (ft): 150 Feet (ft)(Limited by dyspnea and fatigue.)  Assistive Device: Walker, rolling  Ambulation - Level of Assistance: Modified independent     Gait  Description (WDL): Exceptions to WDL  Gait Abnormalities: Antalgic  Right Side Weight Bearing: As tolerated     Stairs:  Stairs - Level of Assistance: Other (comment)(Pt educated on how to navigate stairs.)     Today's TX:   Pt performing gait as noted above. Pain:  Pain level pre-treatment: 7/10   Pain level post-treatment: 10/10  Pain Location: R knee  Pain Intervention(s): Medication (see MAR); Rest, Ice, Repositioning   Response to intervention: Nurse notified, See doc flow    Activity Tolerance:   Fair, limited by breathing   Please refer to the flowsheet for vital signs taken during this treatment. After treatment:   []         Patient left in no apparent distress sitting up in chair  [x]         Patient left in no apparent distress in bed  []         Call bell left within reach  [x]         Nursing notified  []         Caregiver present  []         Bed alarm activated  []         SCDs applied    COMMUNICATION/EDUCATION:   [x]         Role of Physical Therapy in the acute care setting. []         Fall prevention education was provided and the patient/caregiver indicated understanding. []         Patient/family have participated as able in goal setting and plan of care. []         Patient/family agree to work toward stated goals and plan of care. []         Patient understands intent and goals of therapy, but is neutral about his/her participation. []         Patient is unable to participate in goal setting/plan of care: ongoing with therapy staff.  []         Other:     Thank you for this referral.  Rhett Jeans, PT, DPT   Time Calculation: 35 mins History of bilateral carotid endarterectomy    History of umbilical hernia repair    S/P biventricular cardiac pacemaker procedure    S/P bladder repair    S/P cataract surgery    S/P hysterectomy with oophorectomy    S/P percutaneous angioplasty of renal artery  1/2018  S/P TKR (total knee replacement)

## 2021-06-23 NOTE — ED ADULT NURSE NOTE - NS ED NURSE RECORD ANOTHER VITAL SIGN
Received a form from [employer].  I thought that Dr. Bentley filled this out already at one the visits from 2 weeks ago.  Pt states that it was taken care of already and pt has been approved.  Okay to shred the form.   
Yes

## 2021-10-07 NOTE — ED PROVIDER NOTE - PMH
Anemia    CAD (coronary artery disease)    DM (diabetes mellitus)    Emphysema    HTN (hypertension)    Hypercholesteremia Ear Star Wedge Flap Text: The defect edges were debeveled with a #15 blade scalpel.  Given the location of the defect and the proximity to free margins (helical rim) an ear star wedge flap was deemed most appropriate.  Using a sterile surgical marker, the appropriate flap was drawn incorporating the defect and placing the expected incisions between the helical rim and antihelix where possible.  The area thus outlined was incised through and through with a #15 scalpel blade.

## 2022-03-29 NOTE — PATIENT PROFILE ADULT. - PRO ANTICIPATED DISCH DISP
CHIEF COMPLAINT:  Office Visit (Urticaria) and Consultation (Requesting Dr Rose Lewis)        HISTORY OF PRESENT ILLNESS:  Magaly Campos is a very pleasant 36 year old female presents for evaluation of urticaria at the request of Rose Lewis DO.  This episode of hives started in February. She reports years of having hives that would last a few week-months and then resolve.     The patient describes the hives as red, raised, itchy and lasting less then 24 hours.. She is currently having hives on at least 50% of days despite using Cetrizine 10 mg a day and as needed benadryl. Benadryl makes her sedated. At the onset of the hives, she was experiencing angioedema (eyes, feet,  and hands). She had a cold right before the hives started. She also had another cold since then.    Urticarial trigger(s) include exercise, cold air, high temperature, pressure , infection and stress. She denies a vaccine within the 6 weeks before the hives.     The patient tried Cetirizine, Prednisone and Benadryl. The prednisone helped but did not resolve the hives..     The patient denies changes in home environment, work environment, medications, health food supplements, personal care products, cleaning products or occupational exposures.     The patient denies a history of weight loss, weight gain, change in bowel habits, joint pain, fever, night sweats, h/o autoimmune disease and h/o malignancy. No prior evaluation for the hives.         Allergy Review  Food Allergies:History of swollen lips after eating raspberry sorbet (she still avoids raspberries  Bee Sting Allergies: Stung in the past without reaction  Penicillin Allergy:Yes. She had hives in her early 20's. She can't recall why she needed the penicillin.    CURRENT MEDICATIONS:  Current Outpatient Medications   Medication Sig Dispense Refill   • atorvastatin (LIPITOR) 10 MG tablet TAKE 1 TABLET BY MOUTH DAILY 90 tablet 0   • amLODIPine (NORVASC) 10 MG tablet TAKE 1 TABLET BY MOUTH  DAILY 90 tablet 0   • omeprazole (PrilOSEC) 20 MG capsule TAKE 1 CAPSULE BY MOUTH DAILY 90 capsule 0   • levonorgestrel-ethinyl estradiol (Sronyx) 0.1-20 MG-MCG per tablet Take 1 tablet by mouth daily. 84 tablet 4   • EPINEPHrine (EpiPen 2-Luis M) 0.3 MG/0.3ML auto-injector Inject 0.3 mLs into the muscle 1 time as needed for Anaphylaxis. 2 each 1   • cimetidine (TAGAMET) 400 MG tablet Take 1 tablet by mouth nightly. 90 tablet 3   • cetirizine (ZyrTEC) 10 MG tablet Take 1 tablet by mouth nightly. 30 tablet 0   • Glucosamine-Chondroit-Vit C-Mn (GLUCOSAMINE CHONDR 500 COMPLEX) tablet Take 1 tablet by mouth daily. 50 capsule 0   • Omega-3 Fatty Acids (FISH OIL) 1200 MG CAPSULE DELAYED RELEASE Take four capsules daily     • DISPENSE Garlic 12,000 mg daily     • Cholecalciferol (VITAMIN D) 2000 UNITS Cap Take one capsule daily     • Multiple Vitamin (MULTI VITAMIN DAILY) Tab Take one tablet daily       No current facility-administered medications for this visit.       PAST MEDICAL HISTORY:  Past Medical History:   Diagnosis Date   • Acid reflux 2013   • Arthritis 2014    neck   • Chicken pox 1991   • Eczema    • Environmental allergies    • Hypertension 2012   • IBS (irritable bowel syndrome) 2013   • Ovarian cyst    • Pap smear for cervical cancer screening 10/01/2014   • PMH of 2012    rapid heart rate    • Routine eye exam 05/01/2015       ALLERGIES TO MEDICATIONS:  ALLERGIES:   Allergen Reactions   • Amoxicillin HIVES   • Lisinopril HIVES   • Dextromethorphan HIVES   • Doxycycline HIVES        FAMILY HISTORY:  Family History   Problem Relation Age of Onset   • Hypertension Mother    • Hyperlipidemia Mother    • Diabetes Mother         borderline   • Other Mother         gerd/stomac polyps/ulcers   • Cancer Mother         thyroid - age 26,skin   • Hypertension Father    • Hyperlipidemia Father    • Diabetes Father    • Cancer Father 44        thyroid   • Urticaria Father    • Diabetes Maternal Grandmother          REVIEW  OF SYSTEMS:  Review of Systems   Constitutional: Negative.    HENT: Negative.    Eyes: Negative.    Respiratory: Negative.    Cardiovascular: Negative.    Gastrointestinal: Negative.    Endocrine: Negative.    Musculoskeletal: Negative.          PHYSICAL EXAMINATION:  Physical Exam  Vitals reviewed.   Constitutional:       Appearance: Normal appearance.   HENT:      Head: Atraumatic.      Right Ear: Tympanic membrane, ear canal and external ear normal.      Left Ear: Tympanic membrane, ear canal and external ear normal.      Nose:      Comments: Septum midline. Nasal mucosa normal. No significant secretions. No nasal polyps.        Mouth/Throat:      Mouth: Mucous membranes are moist.      Pharynx: Oropharynx is clear.      Neck: Neck supple.   Eyes:      Conjunctiva/sclera: Conjunctivae normal.      Pupils: Pupils are equal, round, and reactive to light.   Cardiovascular:      Rate and Rhythm: Normal rate and regular rhythm.      Heart sounds: Normal heart sounds. No murmur heard.    No friction rub. No gallop.   Pulmonary:      Effort: Pulmonary effort is normal.      Breath sounds: Normal breath sounds. No wheezing, rhonchi or rales.   Lymphadenopathy:      Cervical: No cervical adenopathy.   Skin:     General: Skin is warm and dry.      Comments: Minimal dermatographism   Neurological:      Mental Status: She is alert.   Psychiatric:         Behavior: Behavior normal.          Assessment and Plan:  Chronic idiopathic urticaria  -Uncontrolled based on history and exam  -Cause of the hives was not identified by history or exam  -Start Cetirizine 10 mg tablets 1-2 tablets twice daily as needed for hives. (may substitute Fexofenadine 180 mg twice a day)  -Famotidine 40 mg a day   -If hives are not controlled with high dose antihistamines, please let me know. The next step in treatment is the use of Omalizumab (Xolair)  -Reviewed evaluation for chronic urticaria, recommendations to avoid specific allergen testing  without a history that suggests a particular food. Discussed red flags in hives (none in her history other then her father having recurring hives--tryptase ordered for alpha tryptasemia). Reviewed the Chose Wisely program and its recommendations in chronic urticaria.   TOTAL TRYPTASE; Future  - CBC WITH DIFFERENTIAL; Future  - COMPREHENSIVE METABOLIC PANEL; Future  - THYROID STIMULATING HORMONE; Future     History of Penicillin Allergy  -Distant history of hives following the use of infection. ? If the hives may have been triggered by the infection rather then the penicillin given her history  -Recommend penicillin testing in the future, once hives are resolved and off of antihistamines.     Thank you for allowing me to participate in the care of Magaly Campos. Note electronically sent to provider.       home w/ home health

## 2022-07-01 NOTE — ED ADULT NURSE NOTE - NSFALLRSKASSISTTYPE_ED_ALL_ED
1) advance po diet to clears>fulls>regular 2) monitor weights track trends 3) Monitor BM if none x > 3 days initiate bowel meds 4) Monitor lytes and hydration replete prn 5) MVI w/minerals to meet RDI's  Standing/Walking/Toileting

## 2022-08-03 NOTE — PHYSICAL THERAPY INITIAL EVALUATION ADULT - LEVEL OF CONSCIOUSNESS, REHAB EVAL
[FreeTextEntry1] : \par Preventive care:\par  She has had her flu vaccine this year. She had 2 pneumonia vaccines. She follows with gynecologist on a regular basis. Mammogram and US breast negative Oct 2020. She had colonoscopy in 2019.
alert

## 2022-09-16 NOTE — PATIENT PROFILE ADULT - FLU SEASON?
EXAMINATION TYPE: CT brain cspine wo con

 

DATE OF EXAM: 9/16/2022

 

COMPARISON: None

 

HISTORY: fall, nose laceration w/ pain, etoh+

 

CT DLP: 1118.7 mGycm

Automated exposure control for dose reduction was used.

 

Images of the brain and cervical spine obtained with no contrast.

 

There is mild cerebral cortical atrophy. There is hypodensity in the inferior posterior right occipit
al lobe consistent with old infarct that measures 2.5 cm. No midline shift. No sign of intracranial h
emorrhage. The calvarium is intact. There is normal aeration of the mastoid sinuses.

 

There is lower cervical kyphotic deformity. There is disc space narrowing and spur formation at C5-6 
and C6-7 and C7-T1. There is anterior subluxation deformity of C4-5 that measures 4 mm. There is mult
ilevel cervical facet arthropathy. The skull base is intact. Odontoid process is intact. There is thi
ckening and calcification of the transverse ligament.

 

IMPRESSION:

Cerebral atrophy. 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 No acute intracranial abnormality.

 

Multilevel cervical spondylotic changes and kyphotic deformity. Subluxation deformity at C4-5. No cer
vical spine fracture.
EXAMINATION TYPE: CT facial bones wo con

 

DATE OF EXAM: 9/16/2022

 

COMPARISON: None

 

HISTORY: fall

 

CT DLP: 1118.7 mGycm

Automated exposure control for dose reduction was used.

 

Images obtained from the bottom of the mandible to the top of the frontal sinuses with no contrast.

 

There is metal artifact from the dental work. The mandibular ring appears intact. Temporomandibular j
oints are intact. There is mild mucosal thickening in the maxillary sinuses. Zygomatic arches appear 
normal. Maxilla is intact. The nasal bone is intact. Orbital margins are intact. No evidence of orbit
al blowout fracture. No retro-orbital mass. The globes are symmetric. There is slight deformity of th
e skin on the surface of the nose that could be a laceration on the right side.

 

IMPRESSION:

No fracture seen. Minimal mucosal thickening and  mucous retention cysts in the maxillary sinuses. Mi
nimal nasal deformity could be a laceration.
Yes...

## 2023-01-13 NOTE — DISCHARGE NOTE ADULT - REASON FOR ADMISSION
ANEMIA CLINIC VISIT    Patient: Isha Hess   MRN: [id number]  YOB: 1997  Date of Visit: 1/13/2023    Diagnosis and Initial Laboratory Studies   Ms. Isha Hess is a 25 year old female we follow for iron deficiency anemia    Anemia Treatments  Ferrous sulfate 300 mg/6.8 mLs solution daily    Interim History  Narrative:  Patient needs to have HCT of 38-52 to donate plasma.  Complains of being cold, tachycardia.    Last EGD/Colonoscopy:  None    Bleeding Review of System: Patient reports  menorrhagia some periods are long and heavy, other short and moderate.      Other Review of Systems:  She reports  chills.    Medications and Allergies:  Current Outpatient Medications   Medication Sig Dispense Refill   • Melatonin 10 MG Tab      • pantoprazole (PROTONIX) 20 MG tablet Take 1 tablet by mouth every 12 hours. 30 tablet 5   • gabapentin (NEURONTIN) 300 MG capsule Take 3 capsules by mouth nightly as needed (Insomnia). 90 capsule 0   • QUEtiapine (SEROquel) 100 MG tablet Take 100mg nightly, may take an extra 50mg daily PRN for racing thoughts or insomnia 90 tablet 0   • Ubrogepant 100 MG Tab Take 100 mg by mouth as needed (migraine). May repeat a dose after 2 hours.  Max dose 200 mg/24 hrs. 16 tablet 3   • rizatriptan (MAXALT) 10 MG tablet Take 1 tablet by mouth as needed for Migraine. Take 1 tablet by mouth at onset of migraine. May repeat after 2 hours if needed. 10 tablet 3   • galcanezumab-gnlm (EMGALITY) 120 MG/ML injection Inject 1 mL into the skin every 30 days. 1.12 mL 6   • ferrous sulfate 220 (44 Fe) MG/5ML liquid Take 6.8 mLs by mouth daily as needed (menstural blood loss). 205 mL 0   • folic acid (FOLATE) 1 MG tablet Take 1 tablet by mouth daily. 0 90 tablet 1   • cyanocobalamin 1000 MCG tablet Take 1 tablet by mouth daily. 90 tablet 1   • albuterol 108 (90 Base) MCG/ACT inhaler Inhale 2 puffs into the lungs every 4 hours as needed for Wheezing. 8.5 g 3   • diazePAM (Valium) 5 MG  tablet Take 1 tablet by mouth every 8 hours as needed for Muscle spasms. 9 tablet 0   • Banophen 25 MG capsule Take 1-2 capsules by mouth every 4 hours as needed for Sleep (or Headache). 60 capsule 1   • Baclofen 5 MG tablet Take 1-2 tablets by mouth every 8 hours as needed (migraine's). 60 tablet 2   • Lidocaine-Hydrocort, Perianal, 3-0.5 % Cream Apply 1 application topically 2 times daily as needed (pain). 85 g 3   • ibuprofen (MOTRIN) 600 MG tablet Take 1 tablet by mouth every 6 hours as needed for Pain. 90 tablet 1   • ondansetron (Zofran ODT) 4 MG disintegrating tablet Place 1 tablet onto the tongue every 6 hours. 10 tablet 0   • sumatriptan (Imitrex) 100 MG tablet Take 1 tablet by mouth at onset of migraine. May repeat after 2 hours if needed. 10 tablet 0   • hydroCORTisone (Anusol-HC) 2.5 % rectal cream Place 1 application rectally 2 times daily. 30 g 0   • acetaminophen (TYLENOL) 325 MG tablet Take 2 tablets by mouth every 6 hours. (Patient taking differently: Take 650 mg by mouth every 6 hours as needed.) 60 tablet 3     No current facility-administered medications for this visit.       ALLERGIES:  Tegretol, Pollen, Pollen extract, Skin adhesives, and Trazodone    Vitals:     Visit Vitals  /78 (BP Location: LUE - Left upper extremity, Patient Position: Standing, Cuff Size: Regular)   Pulse 88   Temp 98.3 °F (36.8 °C) (Oral)   Ht 5' 4\" (1.626 m)   Wt 82.2 kg (181 lb 5.3 oz)   LMP 10/31/2022 Comment: varies frequency, varies days flow: heavy/moderate    SpO2 99%   BMI 31.12 kg/m²        Physical Examination:  GENERAL: appears stated age, well developed and well nourished, in no distress and normal affect  SKIN: no pallor  LYMPH NODES: no cervical adenopathy, no supraclavicular adenopathy   EYES:  sclerae non-icteric  LUNGS: No respiratory distress, full effort  HEART: normal rate   NEUROLOGIC: no focal deficits noted.  Alert and oriented      Interim Laboratory Studies:  Recent Labs   Lab 01/09/23  1218  11/24/22  2255 11/09/22  1003 11/07/22  1425   WBC 7.3 9.6 14.5* 18.9*   RBC 4.02 3.94* 4.24 4.86   HCT 37.1 36.1 38.4 43.9   HGB 12.4 12.2 13.2 15.1   MCV 92.3 91.6 90.6 90.3   MCH 30.8 31.0 31.1 31.1    201 207 240   Absolute Neutrophils 4.5 8.0*  --  15.6*   Absolute Lymphocytes 1.9 1.1  --  1.7   Absolute Monocytes 0.6 0.4  --  1.0*   Absolute Eosinophils  0.2 0.0  --  0.0   Absolute Basophils 0.1 0.0  --  0.1       Recent Labs   Lab 01/10/23  1700 11/24/22  2300 11/24/22  2255 06/18/22  1131 06/18/22  1122 03/29/22  1553   Sodium 138  --  143  --  139 139   Potassium 4.1  --  4.1  --  3.9 4.1   Chloride 108  --  111*  --  109 105   Carbon Dioxide 25  --  27  --  25 26   Glucose 92  --  167*  --  87 88   BUN 13  --  12  --  12 11   Creatinine 0.59 0.60 0.62   < > 0.64 0.60   Calcium 8.6  --  8.0*  --  8.3* 8.6   Albumin  --   --   --   --  3.7 4.0   Magnesium 2.3  --   --   --   --   --    Bilirubin, Total  --   --   --   --  0.4 0.6   Alkaline Phosphatase  --   --   --   --  94 99   GOT/AST  --   --   --   --  12 11   GPT  --   --   --   --  20 21    < > = values in this interval not displayed.        Recent Labs   Lab 01/09/23  1218 11/07/22  1425 02/22/22  1441   Ferritin 46 35 29   Retic Count 2.8* 2.5 2.3   Reticulocyte Hemoglobin Content 36.1 34.9 36.2   Vitamin B12 347 237 415   Folate 11.7 4.3* 10.7   Iron, Percent Saturation 27 20 10*   Iron Binding Capacity [phone]       Radiologic Data:        ASSESSMENT: Isha Hess is a 25 year old female with:  1. History of iron deficiency anemia, quiescent  2. Abnormal Uterine Bleeding  3. GERD  4. Easy Bruising  5. Knee Pain, on chronic NSAID therapy  6. Migraines     PLAN:    1. Treatment:  a. Start daily liquid iron in the hopes that her HCT will remain above 38.    2. Diagnostic Studies:  a. CBC/retic, iron studies, VB12/folate at return to clinic  3. Follow-up:  a. Return to clinic two months    Ray Montoya PA-C/SHANNA Calle   Shortness of breath

## 2023-05-02 NOTE — PHYSICAL THERAPY INITIAL EVALUATION ADULT - DIAGNOSIS, PT EVAL
6B: Impaired Aerobic Capacity/Endurance Associated with Deconditioning Albendazole Counseling:  I discussed with the patient the risks of albendazole including but not limited to cytopenia, kidney damage, nausea/vomiting and severe allergy.  The patient understands that this medication is being used in an off-label manner.

## 2023-08-15 NOTE — ED ADULT NURSE NOTE - NSSISCREENINGQ1_ED_A_ED
sterile technique, indwelling urinary device inserted/sterile technique, old cysto removed, new tube inserted/prior to placement, an active physician order for the placement of a urinary catheter was verified/a urinary catheter insertion kit was used for all insertion materials
No

## 2023-09-01 NOTE — DISCHARGE NOTE ADULT - CARE PROVIDERS DIRECT ADDRESSES
,DirectAddress_Unknown,[email or web address]
PAST MEDICAL HISTORY:  Benign essential HTN     BPH associated with nocturia     HLD (hyperlipidemia)

## 2024-06-14 NOTE — ED ADULT TRIAGE NOTE - NS ED NURSE DIRECT TO ROOM YN
Medication: sertraline 25 mg, 1 tab daily passed protocol.   Last office visit date: 3/21/24  Next appointment scheduled?: Yes   Number of refills given: #90 R 1    SSRI (Selective Serotonin Reuptake Inhibitors) Adult Refill Protocol - 12 Month Protocol [id number]/2024 12:26 AM   Protocol Details Seen by prescribing provider or same department within the last 12 months or has a future appt in 3 months - IF FAILED PLEASE LOOK AT CHART REVIEW FOR LAST VISIT AND PROCEED ACCORDINGLY    Medication (including dose and sig) on current meds list    Current med list does not contain more than one SSRI and/or SNRI medication        
No

## 2024-09-06 NOTE — ED PROVIDER NOTE - EYES, MLM
Clear bilaterally Maternal Alert  s/p ID consult as per MFM - see consult note 6/5/2024   Elaina Goldman RN 7/17/2024

## 2024-11-18 NOTE — DIETITIAN INITIAL EVALUATION ADULT. - NS AS NUTRI DX KNOWLEDGE BELIEFS
Continue with current plan of care. PCP is managing Trazodone dosing.   Will hold off on sleep study at this time given pattern of improvement with sleep.   Follow up every 6-12 months for recheck to make sure things continue to go well and for consideration of sleep study.   
Food - and nutrition - related knowledge deficit

## 2025-01-13 NOTE — PROGRESS NOTE ADULT - PROBLEM SELECTOR PLAN 1
Right heart cath 1/24 revealed moderate to severe pulmonary HTN.  - will continue diuresis with Lasix 40mg IV daily, Hydralazine 10mg PO TID, Isosordil 10mg PO TID, strict I/Os and daily weights.  - no ACE/ARB due to worsening renal function.  - Echo 1/18 revealed moderately dilated LA, moderate MR, mild to moderate TR, evidence of pulmonary hypertension (PASP 57 mmHg), severe global hypokinesis of the LV, EF 20- 25%. (newly depressed) (M6) obeys commands

## 2025-04-07 NOTE — ED ADULT NURSE NOTE - NS ED NURSE PATIENT LEFT UNIT TIME
CC:  Janae Bautista is here today for Follow-up (Results for PFT & therapy)         Health Maintenance       DTaP/Tdap/Td Vaccine (6 - Tdap)  Overdue since 1/23/2015    Meningococcal Serogroup B Vaccine (1 of 2 - Standard)  Never done    COVID-19 Vaccine (1 - 2024-25 season)  Never done    Cervical Cancer Screening (View Topic Details)  Never done    Chlamydia and Gonorrhea Screening (if sexually active) (Yearly)  Postponed until 2/17/2026    HPV Vaccine (1 - 3-dose series)  Postponed until 2/17/2026           Following review of the above:  Patient wishes to discuss with clinician: Cervical Cancer Screening, COVID-19, Dtap/Tdap/Td, and Meningococcal    Note: Refer to final orders and clinician documentation.          Patient would like communication of their results via:      Cell Phone:   Telephone Information:   Mobile [phone]     Okay to leave a message containing results? Yes     14:07

## 2025-04-22 NOTE — DISCHARGE NOTE ADULT - PATIENT PORTAL LINK FT
Dapsone Counseling: I discussed with the patient the risks of dapsone including but not limited to hemolytic anemia, agranulocytosis, rashes, methemoglobinemia, kidney failure, peripheral neuropathy, headaches, GI upset, and liver toxicity.  Patients who start dapsone require monitoring including baseline LFTs and weekly CBCs for the first month, then every month thereafter.  The patient verbalized understanding of the proper use and possible adverse effects of dapsone.  All of the patient's questions and concerns were addressed. Topical Retinoid Pregnancy And Lactation Text: This medication is Pregnancy Category C. It is unknown if this medication is excreted in breast milk. Winlevi Counseling:  I discussed with the patient the risks of topical clascoterone including but not limited to erythema, scaling, itching, and stinging. Patient voiced their understanding. Aklief counseling:  Patient advised to apply a pea-sized amount only at bedtime and wait 30 minutes after washing their face before applying.  If too drying, patient may add a non-comedogenic moisturizer.  The most commonly reported side effects including irritation, redness, scaling, dryness, stinging, burning, itching, and increased risk of sunburn.  The patient verbalized understanding of the proper use and possible adverse effects of retinoids.  All of the patient's questions and concerns were addressed. Minocycline Pregnancy And Lactation Text: This medication is Pregnancy Category D and not consider safe during pregnancy. It is also excreted in breast milk. Doxycycline Counseling:  Patient counseled regarding possible photosensitivity and increased risk for sunburn.  Patient instructed to avoid sunlight, if possible.  When exposed to sunlight, patients should wear protective clothing, sunglasses, and sunscreen.  The patient was instructed to call the office immediately if the following severe adverse effects occur:  hearing changes, easy bruising/bleeding, severe headache, or vision changes.  The patient verbalized understanding of the proper use and possible adverse effects of doxycycline.  All of the patient's questions and concerns were addressed. Tetracycline Counseling: Patient counseled regarding possible photosensitivity and increased risk for sunburn.  Patient instructed to avoid sunlight, if possible.  When exposed to sunlight, patients should wear protective clothing, sunglasses, and sunscreen.  The patient was instructed to call the office immediately if the following severe adverse effects occur:  hearing changes, easy bruising/bleeding, severe headache, or vision changes.  The patient verbalized understanding of the proper use and possible adverse effects of tetracycline.  All of the patient's questions and concerns were addressed. Patient understands to avoid pregnancy while on therapy due to potential birth defects. Detail Level: Zone Benzoyl Peroxide Pregnancy And Lactation Text: This medication is Pregnancy Category C. It is unknown if benzoyl peroxide is excreted in breast milk. Topical Sulfur Applications Counseling: Topical Sulfur Counseling: Patient counseled that this medication may cause skin irritation or allergic reactions.  In the event of skin irritation, the patient was advised to reduce the amount of the drug applied or use it less frequently.   The patient verbalized understanding of the proper use and possible adverse effects of topical sulfur application.  All of the patient's questions and concerns were addressed. High Dose Vitamin A Pregnancy And Lactation Text: High dose vitamin A therapy is contraindicated during pregnancy and breast feeding. Erythromycin Pregnancy And Lactation Text: This medication is Pregnancy Category B and is considered safe during pregnancy. It is also excreted in breast milk. Bactrim Counseling:  I discussed with the patient the risks of sulfa antibiotics including but not limited to GI upset, allergic reaction, drug rash, diarrhea, dizziness, photosensitivity, and yeast infections.  Rarely, more serious reactions can occur including but not limited to aplastic anemia, agranulocytosis, methemoglobinemia, blood dyscrasias, liver or kidney failure, lung infiltrates or desquamative/blistering drug rashes. Birth Control Pills Counseling: Birth Control Pill Counseling: I discussed with the patient the potential side effects of OCPs including but not limited to increased risk of stroke, heart attack, thrombophlebitis, deep venous thrombosis, hepatic adenomas, breast changes, GI upset, headaches, and depression.  The patient verbalized understanding of the proper use and possible adverse effects of OCPs. All of the patient's questions and concerns were addressed. Spironolactone Counseling: Patient advised regarding risks of diarrhea, abdominal pain, hyperkalemia, birth defects (for female patients), liver toxicity and renal toxicity. The patient may need blood work to monitor liver and kidney function and potassium levels while on therapy. The patient verbalized understanding of the proper use and possible adverse effects of spironolactone.  All of the patient's questions and concerns were addressed. Isotretinoin Pregnancy And Lactation Text: This medication is Pregnancy Category X and is considered extremely dangerous during pregnancy. It is unknown if it is excreted in breast milk. Sarecycline Counseling: Patient advised regarding possible photosensitivity and discoloration of the teeth, skin, lips, tongue and gums.  Patient instructed to avoid sunlight, if possible.  When exposed to sunlight, patients should wear protective clothing, sunglasses, and sunscreen.  The patient was instructed to call the office immediately if the following severe adverse effects occur:  hearing changes, easy bruising/bleeding, severe headache, or vision changes.  The patient verbalized understanding of the proper use and possible adverse effects of sarecycline.  All of the patient's questions and concerns were addressed. Doxycycline Pregnancy And Lactation Text: This medication is Pregnancy Category D and not consider safe during pregnancy. It is also excreted in breast milk but is considered safe for shorter treatment courses. Tazorac Counseling:  Patient advised that medication is irritating and drying.  Patient may need to apply sparingly and wash off after an hour before eventually leaving it on overnight.  The patient verbalized understanding of the proper use and possible adverse effects of tazorac.  All of the patient's questions and concerns were addressed. Aklief Pregnancy And Lactation Text: It is unknown if this medication is safe to use during pregnancy.  It is unknown if this medication is excreted in breast milk.  Breastfeeding women should use the topical cream on the smallest area of the skin for the shortest time needed while breastfeeding.  Do not apply to nipple and areola. Azithromycin Counseling:  I discussed with the patient the risks of azithromycin including but not limited to GI upset, allergic reaction, drug rash, diarrhea, and yeast infections. Azelaic Acid Pregnancy And Lactation Text: This medication is considered safe during pregnancy and breast feeding. Topical Clindamycin Counseling: Patient counseled that this medication may cause skin irritation or allergic reactions.  In the event of skin irritation, the patient was advised to reduce the amount of the drug applied or use it less frequently.   The patient verbalized understanding of the proper use and possible adverse effects of clindamycin.  All of the patient's questions and concerns were addressed. Dapsone Pregnancy And Lactation Text: This medication is Pregnancy Category C and is not considered safe during pregnancy or breast feeding. Winlevi Pregnancy And Lactation Text: This medication is considered safe during pregnancy and breastfeeding. Use Enhanced Medication Counseling?: No Minocycline Counseling: Patient advised regarding possible photosensitivity and discoloration of the teeth, skin, lips, tongue and gums.  Patient instructed to avoid sunlight, if possible.  When exposed to sunlight, patients should wear protective clothing, sunglasses, and sunscreen.  The patient was instructed to call the office immediately if the following severe adverse effects occur:  hearing changes, easy bruising/bleeding, severe headache, or vision changes.  The patient verbalized understanding of the proper use and possible adverse effects of minocycline.  All of the patient's questions and concerns were addressed. High Dose Vitamin A Counseling: Side effects reviewed, pt to contact office should one occur. Birth Control Pills Pregnancy And Lactation Text: This medication should be avoided if pregnant and for the first 30 days post-partum. You can access the [facility], offered by [facility], by registering with the following website: [email or web address] Topical Retinoid counseling:  Patient advised to apply a pea-sized amount only at bedtime and wait 30 minutes after washing their face before applying.  If too drying, patient may add a non-comedogenic moisturizer. The patient verbalized understanding of the proper use and possible adverse effects of retinoids.  All of the patient's questions and concerns were addressed. Topical Sulfur Applications Pregnancy And Lactation Text: This medication is Pregnancy Category C and has an unknown safety profile during pregnancy. It is unknown if this topical medication is excreted in breast milk. Bactrim Pregnancy And Lactation Text: This medication is Pregnancy Category D and is known to cause fetal risk.  It is also excreted in breast milk. Benzoyl Peroxide Counseling: Patient counseled that medicine may cause skin irritation and bleach clothing.  In the event of skin irritation, the patient was advised to reduce the amount of the drug applied or use it less frequently.   The patient verbalized understanding of the proper use and possible adverse effects of benzoyl peroxide.  All of the patient's questions and concerns were addressed. Topical Clindamycin Pregnancy And Lactation Text: This medication is Pregnancy Category B and is considered safe during pregnancy. It is unknown if it is excreted in breast milk. Spironolactone Pregnancy And Lactation Text: This medication can cause feminization of the male fetus and should be avoided during pregnancy. The active metabolite is also found in breast milk. Isotretinoin Counseling: Patient should get monthly blood tests, not donate blood, not drive at night if vision affected, not share medication, and not undergo elective surgery for 6 months after tx completed. Side effects reviewed, pt to contact office should one occur. Tazorac Pregnancy And Lactation Text: This medication is not safe during pregnancy. It is unknown if this medication is excreted in breast milk. Erythromycin Counseling:  I discussed with the patient the risks of erythromycin including but not limited to GI upset, allergic reaction, drug rash, diarrhea, increase in liver enzymes, and yeast infections. Azithromycin Pregnancy And Lactation Text: This medication is considered safe during pregnancy and is also secreted in breast milk. Azelaic Acid Counseling: Patient counseled that medicine may cause skin irritation and to avoid applying near the eyes.  In the event of skin irritation, the patient was advised to reduce the amount of the drug applied or use it less frequently.   The patient verbalized understanding of the proper use and possible adverse effects of azelaic acid.  All of the patient's questions and concerns were addressed. Detail Level: Detailed